# Patient Record
Sex: MALE | Race: WHITE | NOT HISPANIC OR LATINO | Employment: OTHER | URBAN - METROPOLITAN AREA
[De-identification: names, ages, dates, MRNs, and addresses within clinical notes are randomized per-mention and may not be internally consistent; named-entity substitution may affect disease eponyms.]

---

## 2017-01-27 ENCOUNTER — HOSPITAL ENCOUNTER (OUTPATIENT)
Dept: RADIOLOGY | Facility: OTHER | Age: 57
Discharge: HOME | End: 2017-01-27
Attending: OTOLARYNGOLOGY
Payer: COMMERCIAL

## 2017-01-27 DIAGNOSIS — H90.5 SNHL (SENSORINEURAL HEARING LOSS): ICD-10-CM

## 2017-01-27 PROCEDURE — 70553 MRI BRAIN STEM W/O & W/DYE: CPT | Mod: TC

## 2017-01-27 PROCEDURE — A9585 GADOBUTROL INJECTION: HCPCS | Performed by: OTOLARYNGOLOGY

## 2017-01-27 PROCEDURE — 70553 MRI BRAIN STEM W/O & W/DYE: CPT | Mod: 26,,, | Performed by: RADIOLOGY

## 2017-01-27 PROCEDURE — 25500020 PHARM REV CODE 255: Performed by: OTOLARYNGOLOGY

## 2017-01-27 RX ORDER — GADOBUTROL 604.72 MG/ML
9 INJECTION INTRAVENOUS
Status: COMPLETED | OUTPATIENT
Start: 2017-01-27 | End: 2017-01-27

## 2017-01-27 RX ADMIN — GADOBUTROL 9 ML: 604.72 INJECTION INTRAVENOUS at 04:01

## 2017-02-14 ENCOUNTER — TELEPHONE (OUTPATIENT)
Dept: NEUROLOGY | Facility: CLINIC | Age: 57
End: 2017-02-14

## 2017-02-14 NOTE — TELEPHONE ENCOUNTER
Call returned- I offered Mr. Khoury an appt for 02/21/2017 with Dr. Pendleton, Mr. Khoury states that [place] will be [place] 2 [place]. I explained to him that I would call him back with another appt.

## 2017-02-14 NOTE — TELEPHONE ENCOUNTER
----- Message from Conner Grant sent at 2/9/2017  8:57 AM CST -----  Contact: Chon Nunez office @ [phone]  3rd Request: Dr. Patel office is calling to schedule a NP appt to consult on abnormal MRI and old stroke, pls call or contact the pt to schedule

## 2017-02-14 NOTE — TELEPHONE ENCOUNTER
----- Message from Conner Grant sent at 2/9/2017  3:29 PM CST -----  Contact: Self [phone]  Contact the pt to schedule the pt from the referral, pls call

## 2017-02-14 NOTE — TELEPHONE ENCOUNTER
I contacted Mr. Khoury in reference to scheduling an appt with Dr. Pendleton, offered 03/21/2017. Mr. Khoury informed me that he's not available that day because he will again be out of town. Appt scheduled 06/20/2017 next available and placed on the cancellation list. Appt confirmation e-mail to pt per his request/also gave office contact information along with my name.

## 2017-04-21 DIAGNOSIS — R52 PAIN: Primary | ICD-10-CM

## 2017-04-24 ENCOUNTER — TELEPHONE (OUTPATIENT)
Dept: ORTHOPEDICS | Facility: CLINIC | Age: 57
End: 2017-04-24

## 2017-04-24 DIAGNOSIS — M79.642 LEFT HAND PAIN: Primary | ICD-10-CM

## 2017-04-24 NOTE — TELEPHONE ENCOUNTER
----- Message from La Nena Marquez sent at 4/24/2017  9:30 AM CDT -----  Contact: pt  _  1st Request  _  2nd Request  _  3rd Request        Who: pt    Why: pt is calling regarding xrays before his appt for 4/25/17. Please call the pt    What Number to Call Back:[phone]    When to Expect a call back: (Before the end of the day)   -- if the call is after 12:00, the call back will be tomorrow.

## 2017-04-24 NOTE — TELEPHONE ENCOUNTER
Evan Khoury reminded of appointment on 4/25/17 with Dr. CHAD Valdez w/time and location. Notified of need for xray before OV w/date, time, and location of appts.

## 2017-04-25 ENCOUNTER — OFFICE VISIT (OUTPATIENT)
Dept: ORTHOPEDICS | Facility: CLINIC | Age: 57
End: 2017-04-25
Payer: COMMERCIAL

## 2017-04-25 ENCOUNTER — HOSPITAL ENCOUNTER (OUTPATIENT)
Dept: RADIOLOGY | Facility: OTHER | Age: 57
Discharge: HOME | End: 2017-04-25
Attending: ORTHOPAEDIC SURGERY
Payer: COMMERCIAL

## 2017-04-25 ENCOUNTER — TELEPHONE (OUTPATIENT)
Dept: NEUROLOGY | Facility: CLINIC | Age: 57
End: 2017-04-25

## 2017-04-25 VITALS
HEIGHT: 74 IN | HEART RATE: 50 BPM | WEIGHT: 201.94 LBS | DIASTOLIC BLOOD PRESSURE: 70 MMHG | BODY MASS INDEX: 25.92 KG/M2 | SYSTOLIC BLOOD PRESSURE: 122 MMHG

## 2017-04-25 DIAGNOSIS — R52 PAIN: ICD-10-CM

## 2017-04-25 DIAGNOSIS — M79.642 LEFT HAND PAIN: ICD-10-CM

## 2017-04-25 DIAGNOSIS — S53.442A TEAR OF ULNAR COLLATERAL LIGAMENT OF ELBOW, LEFT, INITIAL ENCOUNTER: Primary | ICD-10-CM

## 2017-04-25 PROCEDURE — 99204 OFFICE O/P NEW MOD 45 MIN: CPT | Mod: S$GLB,,, | Performed by: ORTHOPAEDIC SURGERY

## 2017-04-25 PROCEDURE — 73130 X-RAY EXAM OF HAND: CPT | Mod: TC,LT

## 2017-04-25 PROCEDURE — 73030 X-RAY EXAM OF SHOULDER: CPT | Mod: 26,77,LT, | Performed by: RADIOLOGY

## 2017-04-25 PROCEDURE — 99999 PR PBB SHADOW E&M-EST. PATIENT-LVL III: CPT | Mod: PBBFAC,,, | Performed by: ORTHOPAEDIC SURGERY

## 2017-04-25 PROCEDURE — 73080 X-RAY EXAM OF ELBOW: CPT | Mod: 26,59,LT, | Performed by: RADIOLOGY

## 2017-04-25 PROCEDURE — 73130 X-RAY EXAM OF HAND: CPT | Mod: 26,LT,, | Performed by: RADIOLOGY

## 2017-04-25 PROCEDURE — 1160F RVW MEDS BY RX/DR IN RCRD: CPT | Mod: S$GLB,,, | Performed by: ORTHOPAEDIC SURGERY

## 2017-04-25 PROCEDURE — 73080 X-RAY EXAM OF ELBOW: CPT | Mod: TC,LT

## 2017-04-25 PROCEDURE — 73030 X-RAY EXAM OF SHOULDER: CPT | Mod: TC,LT

## 2017-04-25 RX ORDER — ESCITALOPRAM OXALATE 10 MG/1
10 TABLET ORAL DAILY
COMMUNITY
End: 2021-03-25 | Stop reason: DRUGHIGH

## 2017-04-25 NOTE — TELEPHONE ENCOUNTER
Notified pt of sooner appt availability with Dr. Pendleton. Pt has accepted new appt day/time for 4/27 @ 2pm.

## 2017-04-25 NOTE — MR AVS SNAPSHOT
[facility]  [address], [address]  [facility] [address]  Phone: [phone]                  Evan Khoury   2017 9:45 AM   Office Visit    Description:  Male : 1960   Provider:  Juliana Valdez MD   Department:  [facility]           Reason for Visit     Left Hand - Pain     Left Elbow - Pain           Diagnoses this Visit        Comments    Tear of ulnar collateral ligament of elbow, left, initial encounter    -  Primary            To Do List           Future Appointments        Provider Department Dept Phone    2017 8:00 AM [facility] MRI1 [address] [facility] [phone]    2017 9:00 AM Juliana Valdez MD [facility] [phone]    2017 10:00 AM Rui Pendleton MD [facility] [phone]      Goals (5 Years of Data)     None      [facility]     [facility] Nurse Care Line -  Assistance  Unless otherwise directed by your provider, please contact [facility], our nurse care line that is available for  assistance.     Registered nurses in the [facility] On Call Center provide: appointment scheduling, clinical advisement, health education, and other advisory services.  Call: [phone] (toll free)               Medications           Message regarding Medications     Verify the changes and/or additions to your medication regime listed below are the same as discussed with your clinician today.  If any of these changes or additions are incorrect, please notify your healthcare provider.        STOP taking these medications     benzonatate (TESSALON) 100 MG capsule TK 1 C PO TID PRF COUGH           Verify that the below list of medications is an accurate representation of the medications you are currently taking.  If none reported, the list may be blank. If incorrect, please contact your healthcare provider. Carry this list with you in case of emergency.           Current  "Medications     escitalopram oxalate (LEXAPRO) 10 MG tablet Take 10 mg by mouth once daily.    clotrimazole (LOTRIMIN) 1 % cream APPLY AA BID           Clinical Reference Information           Your Vitals Were     BP Pulse Height Weight BMI    122/70 (BP Location: Left arm) 50 6' 2" (1.88 m) 91.6 kg (201 lb 15.1 oz) 25.93 kg/m2      Blood Pressure          Most Recent Value    BP  122/70      Allergies as of 4/25/2017     No Known Allergies      Immunizations Administered on Date of Encounter - 4/25/2017     None      Orders Placed During Today's Visit     Future Labs/Procedures Expected by Expires    MRI Upper Extremity Joint WO Cont Left  4/25/2017 4/25/2018      [facility] Sign-Up     Activating your [facility] account is as easy as 1-2-3!     1) Visit [email or web address], select Sign Up Now, enter this activation code and your date of birth, then select Next.  [email or web address]  Expires: 6/9/2017 10:29 AM      2) Create a username and password to use when you visit [facility] in the future and select a security question in case you lose your password and select Next.    3) Enter your e-mail address and click Sign Up!    Additional Information  If you have questions, please e-mail [email or web address] or call [phone] to talk to our [facility] staff. Remember, [facility] is NOT to be used for urgent needs. For medical emergencies, dial 911.         Language Assistance Services     ATTENTION: Language assistance services are available, free of charge. Please call [phone].      ATENCIÓN: Si habla manny, tiene a hawley disposición servicios gratuitos de asistencia lingüística. Llame al [phone].     CHÚ Ý: N?u b?n nói Ti?ng Vi?t, có các d?ch v? h? tr? ngôn ng? mi?n phí dành cho b?n. G?i s? [phone].         [facility] complies with applicable Federal civil rights laws and does not discriminate on the basis of race, color, national origin, age, disability, or sex.        "

## 2017-04-25 NOTE — PROGRESS NOTES
I have personally taken the history and examined the patient. I agree with the Hand Surgery PA's note. The plan will be MRI to evaluate left elbow laxity with click on exam. Pt to f/u after MRI

## 2017-04-25 NOTE — PROGRESS NOTES
This office note has been dictated.   Dictation Confirmation Code: [id number]  Keyonna Peter PA-C  04/25/2017  10:19 AM  Supervising Physician:  MD Evan Lares was seen today for pain and pain.    Diagnoses and all orders for this visit:    Tear of ulnar collateral ligament of elbow, left, initial encounter  -     MRI Upper Extremity Joint WO Cont Left; Future

## 2017-04-26 NOTE — PROGRESS NOTES
CHIEF COMPLAINT:  Severe left elbow pain.    HISTORY OF PRESENT ILLNESS:  Mr. Khoury is a very pleasant 57-year-old right-hand   dominant male presenting today for evaluation of his left elbow pain.  He   reports that over the last several weeks, he has had severe pain in the left   elbow.  He reports that he was in [place] and fell on an outstretched hand.    He has had severe pain in the elbow.  There is a shooting sensation that   radiates all the way up to his shoulder.  He reports the pain only in the medial   aspect of the elbow.  He denies any paresthesias in the finger.  He reports it   is medial and not posterior to the elbow.  He reports some swelling.  He is   having difficulty doing any type of heavy lifting with that left hand.  Denies   nausea, vomiting, fever or chills.    History reviewed. No pertinent past medical history.    History reviewed. No pertinent surgical history.    Social History     Social History    Marital status:      Spouse name: N/A    Number of children: N/A    Years of education: N/A     Occupational History    Not on file.     Social History Main Topics    Smoking status: Former Smoker    Smokeless tobacco: Not on file    Alcohol use No    Drug use: Not on file    Sexual activity: Not on file     Other Topics Concern    Not on file     Social History Narrative       Current Outpatient Prescriptions on File Prior to Visit   Medication Sig Dispense Refill    clotrimazole (LOTRIMIN) 1 % cream APPLY AA BID  2     No current facility-administered medications on file prior to visit.        Review of patient's allergies indicates:  No Known Allergies    Review of Systems:  Constitutional: no fever or chills  ENT: no nasal congestion or sore throat  Respiratory: no cough or shortness of breath  Cardiovascular: no chest pain or palpitations  Gastrointestinal: no nausea or vomiting  Genitourinary: no hematuria or dysuria  Integument/Breast: no rash or  "pruritis  Hematologic/Lymphatic: no easy bruising or lymphadenopathy  Musculoskeletal: see HPI  Neurological: no seizures or tremors  Behavioral/Psych: no auditory or visual hallucinations      PHYSICAL EXAM    Vitals:    04/25/17 0939   BP: 122/70   Pulse: (!) 50   Weight: 91.6 kg (201 lb 15.1 oz)   Height: 6' 2" (1.88 m)   PainSc:   6   PainLoc: Elbow       GENERAL:  Well developed, well nourished, in no acute distress.  CARDIOVASCULAR:  Regular rate and rhythm.  LUNGS:  Respirations are equal and unlabored.  BEHAVIORAL AND PSYCHIATRIC:  Mood and affect are appropriate.  NEUROLOGIC:  No tremor.  HEENT:  Normocephalic and atraumatic.  MUSCULOSKELETAL:  Upper Extremity Exam:  Distally, he is neurovascularly intact.    AIN and PIN nerves are intact.  Sensation over the radial, ulnar and median   nerves are equivocal.  Severe tenderness of the medial epicondyle.  There is   some laxity and a click with stressing the ulnar collateral ligament of the   medial aspect of the elbow.  No tenderness over the radial head.  No laxity of   the lateral collateral ligament.  No tenderness over the ulnar nerve.  No   subluxation of the ulnar nerve.  There is some edema on the medial aspect on the   left compared to the right.  He does have some laxity on the right, but again   more so on the left.    X-RAYS:  No fractures or dislocations.    ASSESSMENT:  Possible tear of the medial collateral or ulnar collateral ligament   of the left elbow.    PLAN:  I discussed with Mr. Khoury we would like to MRI to further evaluate due   to laxity of the elbow.  He will follow up with us after the MRI.  If everything   is intact, we will proceed with therapy.  He will have therapy here.  If not,   we will discuss surgical intervention.    DICTATED BY:  JOSE FRANCISCO Linares  dd: 04/25/2017 17:27:07 (CDT)  td: 04/26/2017 13:18:21 (CDT)  Doc ID   #[id number]  Job ID #[id number]    CC:     "

## 2017-04-27 ENCOUNTER — OFFICE VISIT (OUTPATIENT)
Dept: NEUROLOGY | Facility: CLINIC | Age: 57
End: 2017-04-27
Payer: COMMERCIAL

## 2017-04-27 VITALS
HEART RATE: 62 BPM | HEIGHT: 74 IN | SYSTOLIC BLOOD PRESSURE: 127 MMHG | DIASTOLIC BLOOD PRESSURE: 83 MMHG | WEIGHT: 223.63 LBS | BODY MASS INDEX: 28.7 KG/M2

## 2017-04-27 DIAGNOSIS — I67.81 LEUKOARAIOSIS: Primary | ICD-10-CM

## 2017-04-27 PROCEDURE — 99999 PR PBB SHADOW E&M-EST. PATIENT-LVL III: CPT | Mod: PBBFAC,,, | Performed by: PSYCHIATRY & NEUROLOGY

## 2017-04-27 PROCEDURE — 99244 OFF/OP CNSLTJ NEW/EST MOD 40: CPT | Mod: S$GLB,,, | Performed by: PSYCHIATRY & NEUROLOGY

## 2017-04-27 NOTE — PROGRESS NOTES
Outpatient Neurology Consultation    Requesting physician: Dr. Patel    Impression:  Abnormal brain MRI: the study shows mild, non-specific white matter hyperintensities including in the R frontal white matter (as opposed to remote ischemic stroke) to my eye.  There is no associated T1 hypointensity.  No history to suggest demyelinating disease.    Plan:  1. I reviewed his MRI with him and answered all questions  2. RTC prn      CC: abnl MRI    HPI:  58 y/o WM referred for evaluation of an abnormal brain MRI.  He had an MRI performed Jan '17 for evaluation of hearing trouble.  The study was read as showing a remote R anterior limb of the internal capsule lacune.  The hearing issues have resolved.  He denies a prior history of focal neurologic symptoms.  No headaches.  He has been prescribed escitalopram for mild anxiety/depression and is otherwise healthy except for orthopaedic issues.    Past Medical History:   Diagnosis Date    Anxiety and depression       No past surgical history on file.   Outpatient Prescriptions Marked as Taking for the 4/27/17 encounter (Office Visit) with Rui Pendleton MD   Medication Sig Dispense Refill    escitalopram oxalate (LEXAPRO) 10 MG tablet Take 10 mg by mouth once daily.        Review of patient's allergies indicates:  No Known Allergies   No family history on file.   Social History     Social History    Marital status:      Spouse name: N/A    Number of children: N/A    Years of education: N/A     Occupational History    Not on file.     Social History Main Topics    Smoking status: Former Smoker    Smokeless tobacco: Not on file    Alcohol use No    Drug use: Not on file    Sexual activity: Not on file     Other Topics Concern    Not on file     Social History Narrative     Review Of Systems:  General: Negative for fever   HENT: Negative for tinnitus, nose bleeds, neck stiffness   Cardiac Negative for palpitations   Vascular: Negative for easy  "bruising   Pulmonary: Negative for cough   Gastrointestinal: Negative for constipation   Urinary: Negative for incomplete bladder emptying   Musculoskeletal: Negative for muscle aches   Neurological: As above. Otherwise negative for abnormal movements   Psychiatric:  Negative for depression     /83  Pulse 62  Ht 6' 2" (1.88 m)  Wt 101.4 kg (223 lb 9.6 oz)  BMI 28.71 kg/m2   Well developed, well nourished male  Extremities: no edema    Mental status:   Awake, alert and appropriately oriented   Normal recent and remote memory   Normal attention and concentration   Normal speech and language   Normal fund of knowledge   No extinction  Cranial nerves:   Normal funduscopic - discs sharp   PERRLA   EOMF without nystagmus   VFF   Normal facial sensation   Normal facial movements   Intact hearing bilaterally   Palate elevates symmetrically   Normal SCM and trapezius strength   Tongue midline  Motor:   No pronator drift   Normal FF movements bilaterally   Normal muscle tone, bulk and power   No abnormal movements  Sensory   Intact to LT, temperature, position  DTRs   2+ and symmetric except 2++ KJs   Plantar responses are flexor bilaterally  Coordination   Intact to FNF, RAH, and HTS  Gait   Normal base and gait   Normal toe, heel and tandem   No Romberg    Data Reviewed:  MRI brain - WM hyperintensity R frontal subcortex and a few smaller, scattered high WM hyperintensities    Rui Pendleton MD    "

## 2017-04-27 NOTE — LETTER
April 27, 2017      Stephanie Patel MD  [address]  [address]  [facility] [address]           [facility]  [facility]  [place] [address]  Phone: [phone]          Patient: Evan Khoury   MR Number: [id number]   YOB: 1960   Date of Visit: 4/27/2017       Dear Dr. Stephanie Patel:    Thank you for referring Evan Khoury to me for evaluation. Attached you will find relevant portions of my assessment and plan of care.    If you have questions, please do not hesitate to call me. I look forward to following Evan Khoury along with you.    Sincerely,    Rui Pendleton MD    Enclosure  CC:  No Recipients    If you would like to receive this communication electronically, please contact [email or web address] or [phone] to request more information on [facility] access.    For providers and/or their staff who would like to refer a patient to [facility], please contact us through our one-stop-shop provider referral line, [facility], at [phone].    If you feel you have received this communication in error or would no longer like to receive these types of communications, please e-mail [email or web address]

## 2017-05-02 ENCOUNTER — HOSPITAL ENCOUNTER (OUTPATIENT)
Dept: RADIOLOGY | Facility: OTHER | Age: 57
Discharge: HOME | End: 2017-05-02
Attending: ORTHOPAEDIC SURGERY
Payer: COMMERCIAL

## 2017-05-02 DIAGNOSIS — S53.442A TEAR OF ULNAR COLLATERAL LIGAMENT OF ELBOW, LEFT, INITIAL ENCOUNTER: ICD-10-CM

## 2017-05-02 PROCEDURE — 73221 MRI JOINT UPR EXTREM W/O DYE: CPT | Mod: 26,LT,, | Performed by: RADIOLOGY

## 2017-05-02 PROCEDURE — 73221 MRI JOINT UPR EXTREM W/O DYE: CPT | Mod: TC,LT

## 2017-05-09 ENCOUNTER — OFFICE VISIT (OUTPATIENT)
Dept: ORTHOPEDICS | Facility: CLINIC | Age: 57
End: 2017-05-09
Payer: COMMERCIAL

## 2017-05-09 VITALS
SYSTOLIC BLOOD PRESSURE: 121 MMHG | BODY MASS INDEX: 28.69 KG/M2 | HEIGHT: 74 IN | WEIGHT: 223.56 LBS | RESPIRATION RATE: 18 BRPM | HEART RATE: 64 BPM | DIASTOLIC BLOOD PRESSURE: 80 MMHG

## 2017-05-09 DIAGNOSIS — S53.441D ELBOW SPRAIN, ULNAR COLLATERAL LIGAMENT, RIGHT, SUBSEQUENT ENCOUNTER: Primary | ICD-10-CM

## 2017-05-09 PROCEDURE — 99999 PR PBB SHADOW E&M-EST. PATIENT-LVL III: CPT | Mod: PBBFAC,,, | Performed by: ORTHOPAEDIC SURGERY

## 2017-05-09 PROCEDURE — 1160F RVW MEDS BY RX/DR IN RCRD: CPT | Mod: S$GLB,,, | Performed by: ORTHOPAEDIC SURGERY

## 2017-05-09 PROCEDURE — 99213 OFFICE O/P EST LOW 20 MIN: CPT | Mod: S$GLB,,, | Performed by: ORTHOPAEDIC SURGERY

## 2017-05-09 NOTE — PROGRESS NOTES
I have personally taken the history and examined this patient. I agree with the resident's note as stated above. Plan for bracing and PT.

## 2017-05-09 NOTE — PROGRESS NOTES
HPI:  57 year old RHD male here for MRI follow up of his left elbow. He has no change in his symptoms. He still has pain medially in his elbow specifically with picking up objects and valgus stress. He also had another fall about 2 weeks ago on his left thumb and is still having some pain in that thumb. He has not tried any therapy or medications. He has not had any splints or braces. He is fairly active, skis 3 times per year.     ROS:  Patient denies constitutional symptoms, cardiac symptoms, respiratory symptoms, GI symptoms.  The remainder of the musculoskeletal ROS is included in the HPI.    PE:    AA&O x 4.  NAD  HEENT:  NCAT, sclera nonicteric  Lungs:  Respirations are equal and unlabored.  CV:  2+ bilateral upper and lower extremity pulses.  Skin:  Intact throughout.    MSK: +milking maneuver left elbow. Pain with valgus stress at 30 degrees of flexion  Palpable distal pulses, sensation intact, motor intact.   SILT, well perfused.   Pain at MCP joint left thumb with adduction and pronation. Stable with varus and valgus stress     Rads:  MRI reviewed showing a left UCL partial vs complete tear. Edema over the proximal medial epicondyle     A/P:  57 year old male with left UCL tear     --will try bracing, NSAIDs for 4-6 weeks

## 2017-05-09 NOTE — MR AVS SNAPSHOT
"    [facility]  [address], [address]  [facility] [address]  Phone: [phone]                  Evan Khoury   2017 9:00 AM   Office Visit    Description:  Male : 1960   Provider:  Juliana Valdez MD   Department:  [facility]           Reason for Visit     Left Elbow - Pain           Diagnoses this Visit        Comments    Elbow sprain, ulnar collateral ligament, right, subsequent encounter    -  Primary            To Do List           Goals (5 Years of Data)     None      [facility]     [facility] On Call Nurse Care Line -  Assistance  Unless otherwise directed by your provider, please contact [facility], our nurse care line that is available for  assistance.     Registered nurses in the [facility] On Call Center provide: appointment scheduling, clinical advisement, health education, and other advisory services.  Call: [phone] (toll free)               Medications           Message regarding Medications     Verify the changes and/or additions to your medication regime listed below are the same as discussed with your clinician today.  If any of these changes or additions are incorrect, please notify your healthcare provider.             Verify that the below list of medications is an accurate representation of the medications you are currently taking.  If none reported, the list may be blank. If incorrect, please contact your healthcare provider. Carry this list with you in case of emergency.           Current Medications     clotrimazole (LOTRIMIN) 1 % cream APPLY AA BID    escitalopram oxalate (LEXAPRO) 10 MG tablet Take 10 mg by mouth once daily.           Clinical Reference Information           Your Vitals Were     BP Pulse Resp Height Weight BMI    121/80 64 18 6' 2" (1.88 m) 101.4 kg (223 lb 8.7 oz) 28.7 kg/m2      Blood Pressure          Most Recent Value    BP  121/80      Allergies as of 2017     No Known Allergies    "   Immunizations Administered on Date of Encounter - 5/9/2017     None      Orders Placed During Today's Visit      Normal Orders This Visit    Ambulatory Referral to Physical/Occupational Therapy       [facility] Sign-Up     Activating your [facility] account is as easy as 1-2-3!     1) Visit [email or web address], select Sign Up Now, enter this activation code and your date of birth, then select Next.  [email or web address]  Expires: 6/9/2017 10:29 AM      2) Create a username and password to use when you visit [facility] in the future and select a security question in case you lose your password and select Next.    3) Enter your e-mail address and click Sign Up!    Additional Information  If you have questions, please e-mail [email or web address] or call [phone] to talk to our [facility] staff. Remember, [facility] is NOT to be used for urgent needs. For medical emergencies, dial 911.         Language Assistance Services     ATTENTION: Language assistance services are available, free of charge. Please call [phone].      ATENCIÓN: Si habla español, tiene a hawley disposición servicios gratuitos de asistencia lingüística. Llame al [phone].     CHÚ Ý: N?u b?n nói Ti?ng Vi?t, có các d?ch v? h? tr? ngôn ng? mi?n phí dành cho b?n. G?i s? [phone].         [facility] complies with applicable Federal civil rights laws and does not discriminate on the basis of race, color, national origin, age, disability, or sex.

## 2017-05-30 ENCOUNTER — CLINICAL SUPPORT (OUTPATIENT)
Dept: REHABILITATION | Facility: HOSPITAL | Age: 57
End: 2017-05-30
Attending: ORTHOPAEDIC SURGERY
Payer: COMMERCIAL

## 2017-05-30 DIAGNOSIS — M25.522 ELBOW PAIN, LEFT: ICD-10-CM

## 2017-05-30 DIAGNOSIS — S53.442D ELBOW SPRAIN, ULNAR COLLATERAL LIGAMENT, LEFT, SUBSEQUENT ENCOUNTER: Primary | ICD-10-CM

## 2017-05-30 DIAGNOSIS — M25.60 RANGE OF MOTION DEFICIT: ICD-10-CM

## 2017-05-30 PROCEDURE — 97165 OT EVAL LOW COMPLEX 30 MIN: CPT

## 2017-05-30 PROCEDURE — 97035 APP MDLTY 1+ULTRASOUND EA 15: CPT

## 2017-05-30 NOTE — PROGRESS NOTES
Occupational Therapy Elbow Evaluation    Patient:  Evan Khoury  Date of Evaluation:  5/30/2017  Referring Physician:  Dr. CHAD Calderón  Diagnosis:   1. Elbow sprain, ulnar collateral ligament, left, subsequent encounter     2. Elbow pain, left     3. Range of motion deficit       MRN : [id number]  Referral Orders:  Eval and treat     Start Time:930  End Time:  1015  Total Time:  45    Occupation:  Travel business, Desk work, computer work   Workmen's Compensation:  No     Date of onset:  march 3, 2017   Hand dominance:  Right    Location of injury:  Left elbow   Mechanism of Injury:  slipped and fell on ice in [place]   Past Medical History/Physical Systems Review: unremarkable     Environmental Concerns/ Fall Risk:  None   Barriers to Learning:  None   Cultural/Spiritual : None   Developmental/Education: None  Nutritional Deficit: None   Abuse/Neglect : None    Language: None   Hearing/Vision Deficit: None   Other:     Subjective:  Patient reports: fell on beach in sand and got sand in my elbow brace , so I haven't worn the brace in 2 days   Pain:   During no work/At Rest:     4 out of 10   While working/ With Activity:  4+  out of 10   Sleeping:    out of 10    Location of Pain:  Left medial elbow    Description of Pain  Bad bruise feeling     Patient's goals for therapy are: pain relief, avoid surgery     Objective:  Wearing elbow adjustable brace with full range of motion     Sensation Test:  Occasional numbness in palm , fingertips   Edema:  Minimal edema medial elbow   Skin Condition/Scarring:  None   Wound Assessment:  None       Range of Motion: wnl   Elbow ext/flex 0 / 145         Manual Muscle Test:  ECRL/B 4/5   BICEPS 4/5   BRACHIALIS 4/5   BRACHIORADIALIS 4/5   TRICEPS 4/5   SUP/PRO 4/5   FCR/FCU 4/5     Minimal pain with palpation to UCL;             Strength: (ANSELMO Dynamometer in lbs.), Average 3 trials, Position II  R) 70 lbs   L) 50 lbs. No pain     Limitation of Functional  Status:  Self Care : Limited use of  Left UE for no deficits reported   Work /Activity: Limited use of left UE for lifting briefcase, backpack, luggage, seatbelt,   Leisure:   Limited use of driving, working out with      Treatment:   OT eval and instruction in written HEP including active wrist flex, ext, RD, UD, sup/pron and elbow flexion in 3 positions (palm up, thumb up, palm down) and elbow extension over towel roll x 10 reps each, 3 x daily.  Performed US 3 mhz 0.5 w/cm2 x 10 min x 100% to Ulnar collateral ligament to increase blood flow, circulation and tissue elasticity prior to therex  Reviewed modality use for pain management.  Patient reported good understanding of HEP, modality use.      Patient demonstrated good understanding of HEP/modalities for pain management.   Assessment:   Problem List :   Left elbow   Decreased  strength,   Decreased muscle strength,   Increased pain   Decreased functional use    History Examination Decision Making Complexity Score   Occupational Profile: brief     Medical and Therapy History:     None              Performance Deficits   few  Physical  -strength   -   -edema  - pain       Cognitive-none         Psychosocial:  Limited use L UE for lifting, carrying, traveling, exercising w/       comorbidities - none     Modification - none     Non dominant LUE     Elbow locking brace  Low complexity            Goals: (4weeks)    1) Patient to be IND with HEP and modalities for pain management    2)  Increase  strength 3-5 lbs. to LIFT luggage for traveling    3)  Increase muscle strength 1/2 grade to normal for traveling, lifting, carrying items      Plan:   Patient to be treated by Occupational Therapy    1-2    times per week for  6 -8                   weeks  during the certification period from      5/30/2017 to 7/30/2017     to achieve the established goals.    Treatment to include   Therapeutic exercises/activities,  iontophoresis with 2.0 cc  dexamethasone,  US 3 Mhz, strengthening, stretching,manual therapy/mobilizations , nerve glides, desensitization     as well as any other treatments deemed necessary based on the patient's needs or progress.                 I certify the need for these services furnished under this plan of treatment and while under my care    ____________________________________                         __________________  Physician/Referring Practitioner                                               Date of Signature

## 2017-06-01 ENCOUNTER — CLINICAL SUPPORT (OUTPATIENT)
Dept: REHABILITATION | Facility: HOSPITAL | Age: 57
End: 2017-06-01
Payer: COMMERCIAL

## 2017-06-01 DIAGNOSIS — M25.522 ELBOW PAIN, CHRONIC, LEFT: Primary | ICD-10-CM

## 2017-06-01 DIAGNOSIS — G89.29 ELBOW PAIN, CHRONIC, LEFT: Primary | ICD-10-CM

## 2017-06-01 PROCEDURE — 97110 THERAPEUTIC EXERCISES: CPT

## 2017-06-01 PROCEDURE — 97035 APP MDLTY 1+ULTRASOUND EA 15: CPT

## 2017-06-01 NOTE — PROGRESS NOTES
"OT Daily Progress Note    Patient:  Evan Khoury  [facility] #:  [id number]   Date of Note: 06/01/2017   Referring Physician:  Juliana Valdez, *  Diagnosis:    Encounter Diagnosis   Name Primary?    Elbow pain, chronic, left Yes        Start Time: 7:17  End Time: 8:00  Total Time: 43 min  Group Time: 0    Subjective:  "I was unable to wear my brace with my suit last night for a formal event, so my elbow is sore today."  Pain: 2 out of 10     Objective:   Patient seen by OT this session. Treatment  consist of the following: Performed paraffin bath to right elbow x 10 min to increase blood flow, circulation and tissue elasticity prior to therex.  Performed US 3 mhz 0.8 w/cm2 x 8 min x 100% to increase blood flow, circulation, tissue elasticity and for wound/scar management. Pt performed following therex: AROM elbow flex/ext in sup/neutral/pronated rotation to promote elbow joint mobility x 10 reps each. UCL kinesiotaped and pt instructed on care and how to replace tape; provided with two additional pieces to replace tape in 2-3 days as needed.    Treatment: para x 10 min, us x 8 min, Therex x 25     Assessment:  Elbow AROM maintained. Advised pt to wear brace for protection of injury. Provided with kinesiotape to support UCL and educated on replacement and wear instructions to wear 2-3 days and discontinue if skin irritation occurs. Pt will continue to benefit from skilled OT intervention.   Patient is making good progress toward established goals.   Patient continues to demonstrate limitation  with  Stiffness, Decreased functional use, Continued pain and Continued inflammation.       Goals: (4weeks)    1) Patient to be IND with HEP and modalities for pain management    2)  Increase  strength 3-5 lbs. to LIFT luggage for traveling    3)  Increase muscle strength 1/2 grade to normal for traveling, lifting, carrying items      Plan:   Patient to be treated by Occupational Therapy    1-2    times per week for  6 -8 " "                  weeks  during the certification period from      5/30/2017 to 7/30/2017     to achieve the established goals.    Treatment to include   Therapeutic exercises/activities,  US 3 Mhz, strengthening, stretching,manual therapy/mobilizations, as well as any other treatments deemed necessary based on the patient's needs or progress.               Student: JUDY Collado    " I certify that I was present in the room directing the student in service delivery and guiding them using my skilled judgement. As the co-signing therapist, I have reviewed the student's documentation and am responsible for the treatment, assessment and plan."    Therapist: Mary JAIMES CHT    "

## 2017-06-06 ENCOUNTER — CLINICAL SUPPORT (OUTPATIENT)
Dept: REHABILITATION | Facility: HOSPITAL | Age: 57
End: 2017-06-06
Attending: ORTHOPAEDIC SURGERY
Payer: COMMERCIAL

## 2017-06-06 DIAGNOSIS — S53.401D ELBOW SPRAIN, RIGHT, SUBSEQUENT ENCOUNTER: ICD-10-CM

## 2017-06-06 PROBLEM — S53.409A ELBOW SPRAIN: Status: ACTIVE | Noted: 2017-06-06

## 2017-06-06 PROCEDURE — 97035 APP MDLTY 1+ULTRASOUND EA 15: CPT

## 2017-06-06 PROCEDURE — 97110 THERAPEUTIC EXERCISES: CPT

## 2017-06-06 NOTE — PROGRESS NOTES
"OT Daily Progress Note    Patient:  Evan Khoury  [facility] #:  [id number]   Date of Note: 06/06/2017   Referring Physician:  Juliana Valdez, *  Diagnosis:    Encounter Diagnosis   Name Primary?    Elbow sprain, right, subsequent encounter       Visit 3 of 12 NO FOTO     Start Time: 330  End Time: 415  Total Time: 45 min  Group Time: 0    Subjective:  "I'm not wearing my brace all the time, but I kept the tape on all week."  Pain: 2 out of 10     Objective:   Patient seen by OT this session. Treatment  consist of the following: Performed paraffin bath to right elbow x 10 min to increase blood flow, circulation and tissue elasticity prior to therex.  Performed US 3 mhz 0.8 w/cm2 x 8 min x 100% to UCL region to  increase blood flow, circulation, tissue elasticity . Pt performed following therex: AROM elbow flex/ext in sup/neutral/pronated rotation to promote elbow joint mobility x 10 reps each. Performed DTM to collateral ligament to increase blood flow, circulation and for tissue healing   Deferred taping this day due to skin irritation from prolonged use after last session.  Reviewed no weightlifting precautions at this time, non weightbearing.  Patient reported good understanding of same.     Treatment: para x 10 min, us x 8 min, Therex x 27     Assessment:  Elbow AROM maintained. Advised pt to wear brace for protection of injury. Avoid weightlifting or weightbearing.   Pt will continue to benefit from skilled OT intervention.   Patient is making good progress toward established goals.   Patient continues to demonstrate limitation  with  Stiffness, Decreased functional use, Continued pain and Continued inflammation.       Goals: (4weeks)    1) Patient to be IND with HEP and modalities for pain management    2)  Increase  strength 3-5 lbs. to LIFT luggage for traveling    3)  Increase muscle strength 1/2 grade to normal for traveling, lifting, carrying items      Plan:   Patient to be treated by Occupational " Therapy    1-2    times per week for  6 -8     weeks  during the certification period from      5/30/2017 to 7/30/2017     to achieve the established goals.

## 2017-06-08 ENCOUNTER — CLINICAL SUPPORT (OUTPATIENT)
Dept: REHABILITATION | Facility: HOSPITAL | Age: 57
End: 2017-06-08
Payer: COMMERCIAL

## 2017-06-08 DIAGNOSIS — S53.401D ELBOW SPRAIN, RIGHT, SUBSEQUENT ENCOUNTER: ICD-10-CM

## 2017-06-08 DIAGNOSIS — M25.60 RANGE OF MOTION DEFICIT: Primary | ICD-10-CM

## 2017-06-08 PROCEDURE — 97035 APP MDLTY 1+ULTRASOUND EA 15: CPT

## 2017-06-08 PROCEDURE — 97018 PARAFFIN BATH THERAPY: CPT

## 2017-06-08 PROCEDURE — 97110 THERAPEUTIC EXERCISES: CPT

## 2017-06-08 NOTE — PROGRESS NOTES
"OT Daily Progress Note    Patient:  Evan Khoury  [facility] #:  [id number]   Date of Note: 06/08/2017   Referring Physician:  Juliana Valdez, *  Diagnosis:  UCL injury right elbow  Encounter Diagnosis   Name Primary?    Elbow sprain, right, subsequent encounter       Visit 5 of 12 NO FOTO     Start Time: 330  End Time: 415  Total Time: 45 min  Group Time: 0    Subjective:  "I forgot to wear my brace last night and my elbow hurt a little today.."  Pain: 2 out of 10     Objective:   Patient seen by OT this session. Treatment  consist of the following: Performed paraffin bath with moist heat to right elbow x 10 min to increase blood flow, circulation and tissue elasticity prior to therex.  Performed US 3 mhz 0.8 w/cm2 x 8 min x 100% to UCL region to  increase blood flow, circulation, tissue elasticity . Pt performed following therex: AROM elbow flex/ext in sup/neutral/pronated rotation to promote elbow joint mobility x 10 reps each. Performed DTM to collateral ligament to increase blood flow, circulation and for tissue healing. Pt then received STM to right elbow followed by Kinesiotaping over right UCL with 50% stretch. Pt verbalized good understanding.     Treatment: para x 10 min, us x 8 min, Manual therapy x 15 min    Assessment:  Elbow AROM maintained. Advised pt to wear brace for protection of injury. Avoid weightlifting or weightbearing.   Pt will continue to benefit from skilled OT intervention.   Patient is making good progress toward established goals.   Patient continues to demonstrate limitation  with  Stiffness, Decreased functional use, Continued pain and Continued inflammation.       Goals: (4weeks)    1) Patient to be IND with HEP and modalities for pain management    2)  Increase  strength 3-5 lbs. to LIFT luggage for traveling    3)  Increase muscle strength 1/2 grade to normal for traveling, lifting, carrying items      Plan:   Patient to be treated by Occupational Therapy    1-2    times per " week for  6 -8     weeks  during the certification period from      5/30/2017 to 7/30/2017     to achieve the established goals.

## 2017-06-09 PROBLEM — M25.60 RANGE OF MOTION DEFICIT: Status: ACTIVE | Noted: 2017-06-09

## 2017-06-20 ENCOUNTER — CLINICAL SUPPORT (OUTPATIENT)
Dept: REHABILITATION | Facility: HOSPITAL | Age: 57
End: 2017-06-20
Payer: COMMERCIAL

## 2017-06-20 DIAGNOSIS — S53.401D ELBOW SPRAIN, RIGHT, SUBSEQUENT ENCOUNTER: ICD-10-CM

## 2017-06-20 PROCEDURE — 97035 APP MDLTY 1+ULTRASOUND EA 15: CPT

## 2017-06-20 PROCEDURE — 97110 THERAPEUTIC EXERCISES: CPT

## 2017-06-20 NOTE — PROGRESS NOTES
"OT Daily Progress Note    Patient:  Evan Khoury  [facility] #:  [id number]   Date of Note: 06/20/2017   Referring Physician:  Juliana Valdez, *  Diagnosis:  UCL injury right elbow  Encounter Diagnosis   Name Primary?    Elbow sprain, right, subsequent encounter       Visit 6 of 12 NO FOTO     Start Time: 325  End Time: 40  Total Time: 35 min  Group Time: 0    Subjective:  "I'm only wearing the brace at night.  I was busy working all week and couldn't wear the brace."  Pain: 2 out of 10     Objective:   Patient seen by OT this session. Treatment  consist of the following: Performed paraffin bath with moist heat to right elbow x 10 min to increase blood flow, circulation and tissue elasticity prior to therex.  Performed US 3 mhz 0.8 w/cm2 x 10 min x 100% to UCL region to  increase blood flow, circulation, tissue elasticity . Pt performed following therex: AROM elbow flex/ext in sup/neutral/pronated rotation to promote elbow joint mobility x 10 reps each. Performed DTM to collateral ligament to increase blood flow, circulation and for tissue healing. Pt then received STM to right elbow followed by Kinesiotaping over right UCL with 50% stretch. Reviewed use, wear and care precautions. Pt verbalized good understanding.     Treatment: para x 10 min, us x 10 min, Manual therapy x 15 min    Assessment:  Elbow AROM maintained. Advised pt to wear brace for protection of injury. Avoid weightlifting or weightbearing.   Pt will continue to benefit from skilled OT intervention.   Patient is making good progress toward established goals.   Patient continues to demonstrate limitation  with  Stiffness, Decreased functional use, Continued pain and Continued inflammation.       Goals: (4weeks)    1) Patient to be IND with HEP and modalities for pain management    2)  Increase  strength 3-5 lbs. to LIFT luggage for traveling    3)  Increase muscle strength 1/2 grade to normal for traveling, lifting, carrying items      Plan: "   Patient to be treated by Occupational Therapy    1-2    times per week for  6 -8     weeks  during the certification period from      5/30/2017 to 7/30/2017     to achieve the established goals.

## 2017-06-21 ENCOUNTER — TELEPHONE (OUTPATIENT)
Dept: ORTHOPEDICS | Facility: CLINIC | Age: 57
End: 2017-06-21

## 2017-06-22 ENCOUNTER — OFFICE VISIT (OUTPATIENT)
Dept: ORTHOPEDICS | Facility: CLINIC | Age: 57
End: 2017-06-22
Payer: COMMERCIAL

## 2017-06-22 ENCOUNTER — CLINICAL SUPPORT (OUTPATIENT)
Dept: REHABILITATION | Facility: HOSPITAL | Age: 57
End: 2017-06-22
Payer: COMMERCIAL

## 2017-06-22 VITALS — HEIGHT: 74 IN | BODY MASS INDEX: 28.69 KG/M2 | WEIGHT: 223.56 LBS

## 2017-06-22 DIAGNOSIS — S53.441D ELBOW SPRAIN, ULNAR COLLATERAL LIGAMENT, RIGHT, SUBSEQUENT ENCOUNTER: Primary | ICD-10-CM

## 2017-06-22 DIAGNOSIS — S53.402D ELBOW SPRAIN, LEFT, SUBSEQUENT ENCOUNTER: ICD-10-CM

## 2017-06-22 DIAGNOSIS — G89.29 ELBOW PAIN, CHRONIC, LEFT: Primary | ICD-10-CM

## 2017-06-22 DIAGNOSIS — M25.522 ELBOW PAIN, CHRONIC, LEFT: Primary | ICD-10-CM

## 2017-06-22 DIAGNOSIS — M25.60 RANGE OF MOTION DEFICIT: ICD-10-CM

## 2017-06-22 PROCEDURE — 99999 PR PBB SHADOW E&M-EST. PATIENT-LVL II: CPT | Mod: PBBFAC,,, | Performed by: ORTHOPAEDIC SURGERY

## 2017-06-22 PROCEDURE — 97018 PARAFFIN BATH THERAPY: CPT

## 2017-06-22 PROCEDURE — 97035 APP MDLTY 1+ULTRASOUND EA 15: CPT

## 2017-06-22 PROCEDURE — 97140 MANUAL THERAPY 1/> REGIONS: CPT

## 2017-06-22 PROCEDURE — 99212 OFFICE O/P EST SF 10 MIN: CPT | Mod: S$GLB,,, | Performed by: ORTHOPAEDIC SURGERY

## 2017-06-22 NOTE — PROGRESS NOTES
"OT Daily Progress Note    Patient:  Evan Khoury  [facility] #:  [id number]   Date of Note: 06/22/2017   Referring Physician:  Juliana Valdez, *  Diagnosis:  UCL injury right elbow  Encounter Diagnoses   Name Primary?    Range of motion deficit     Elbow sprain, left, subsequent encounter     Elbow pain, chronic, left Yes      Visit 7 of 12 NO FOTO     Start Time: 8:30  End Time: 9:30  Total Time: 30 min  Group Time: 0    Subjective:  "I'm only wearing the brace at night."  Pain: 2 out of 10     Objective:   Patient seen by OT this session. Treatment  consist of the following: Performed paraffin bath with moist heat to right elbow x 10 min to increase blood flow, circulation and tissue elasticity prior to therex.  Performed US 3 mhz 0.8 w/cm2 x 10 min x 100% to UCL region to  increase blood flow, circulation, tissue elasticity . Pt performed following therex: AROM elbow flex/ext in sup/neutral/pronated rotation to promote elbow joint mobility x 10 reps each. Performed DTM to collateral ligament to increase blood flow, circulation and for tissue healing. Pt then received IASTYM to ulnar side of right elbow.    Treatment: para x 10 min, us x 10 min, Manual therapy x 10 min    Assessment:  Elbow AROM maintained. Meeting with physician today.   Pt will continue to benefit from skilled OT intervention.   Patient is making good progress toward established goals.   Patient continues to demonstrate limitation  with  Stiffness, Decreased functional use, Continued pain and Continued inflammation.       Goals: (4weeks)    1) Patient to be IND with HEP and modalities for pain management    2)  Increase  strength 3-5 lbs. to LIFT luggage for traveling    3)  Increase muscle strength 1/2 grade to normal for traveling, lifting, carrying items      Plan:   Advance with strengthening exercises next session, d/c elbow brace per M.D.  Patient to be treated by Occupational Therapy    1-2    times per week for  6 -8     weeks  " during the certification period from      5/30/2017 to 7/30/2017     to achieve the established goals.

## 2017-06-23 NOTE — PROGRESS NOTES
I have personally taken the history and examined the patient. I agree with the Hand Surgery PA's note. The plan will be start strengthening. Pt is doing well, elbow stable, NTTP over elbow.

## 2017-06-27 ENCOUNTER — CLINICAL SUPPORT (OUTPATIENT)
Dept: REHABILITATION | Facility: HOSPITAL | Age: 57
End: 2017-06-27
Attending: ORTHOPAEDIC SURGERY
Payer: COMMERCIAL

## 2017-06-27 DIAGNOSIS — S53.401D ELBOW SPRAIN, RIGHT, SUBSEQUENT ENCOUNTER: ICD-10-CM

## 2017-06-27 DIAGNOSIS — M25.60 RANGE OF MOTION DEFICIT: ICD-10-CM

## 2017-06-27 PROCEDURE — 97035 APP MDLTY 1+ULTRASOUND EA 15: CPT

## 2017-06-27 PROCEDURE — 97140 MANUAL THERAPY 1/> REGIONS: CPT

## 2017-06-27 PROCEDURE — 97110 THERAPEUTIC EXERCISES: CPT

## 2017-06-27 NOTE — PROGRESS NOTES
"OT Daily Progress Note    Patient:  Evan Khoury  [facility] #:  [id number]   Date of Note: 06/27/2017   Referring Physician:  Juliana Valdez, *  Diagnosis:  UCL injury right elbow  Encounter Diagnoses   Name Primary?    Range of motion deficit     Elbow sprain, right, subsequent encounter       Visit 8 of 12 NO FOTO     Start Time: 8:30  End Time: 9:15  Total Time: 45 min  Group Time: 0    Subjective:  "I'm only wearing the brace at night."  Pain: 2 out of 10     Objective:   Patient seen by OT this session. Treatment  consist of the following: Performed paraffin bath with moist heat to right elbow x 10 min to increase blood flow, circulation and tissue elasticity prior to therex.  Performed US 3 mhz 0.8 w/cm2 x 10 min x 100% to UCL region to  increase blood flow, circulation, tissue elasticity. Performed DTM to collateral ligament to increase blood flow, circulation and for tissue healing. Pt then received IASTYM to ulnar side of right elbow.  Pt performed the following therex: Elbow flex and triceps ext 4lb. 2 x 15 reps, black theraband elbow flex and tricep ext 2 x 15 reps, gripper 69 lbs. (black on notch 4).    Treatment: para x 10 min, us x 10 min, Manual therapy x 10 min, therex x 15 min.    Assessment:  Advanced strengthening activity. Tolerated with no increased complaints of pain.   Pt will continue to benefit from skilled OT intervention.   Patient is making good progress toward established goals.   Patient continues to demonstrate limitation  with  Stiffness, Decreased functional use, Continued pain and Continued inflammation.       Goals: (4weeks)    1) Patient to be IND with HEP and modalities for pain management    2)  Increase  strength 3-5 lbs. to LIFT luggage for traveling    3)  Increase muscle strength 1/2 grade to normal for traveling, lifting, carrying items      Plan:   Advance with strengthening exercises next session, d/c elbow brace per M.D.  Patient to be treated by Occupational " Therapy    1-2    times per week for  6 -8     weeks  during the certification period from      5/30/2017 to 7/30/2017     to achieve the established goals.

## 2017-07-06 ENCOUNTER — CLINICAL SUPPORT (OUTPATIENT)
Dept: REHABILITATION | Facility: HOSPITAL | Age: 57
End: 2017-07-06
Payer: COMMERCIAL

## 2017-07-06 DIAGNOSIS — M25.60 RANGE OF MOTION DEFICIT: ICD-10-CM

## 2017-07-06 DIAGNOSIS — S53.401D ELBOW SPRAIN, RIGHT, SUBSEQUENT ENCOUNTER: ICD-10-CM

## 2017-07-06 PROCEDURE — 97110 THERAPEUTIC EXERCISES: CPT

## 2017-07-06 PROCEDURE — 97018 PARAFFIN BATH THERAPY: CPT | Mod: 59

## 2017-07-06 NOTE — PROGRESS NOTES
"OT Daily Progress Note    Patient:  Evan Khoury  [facility] #:  [id number]   Date of Note: 07/06/2017   Referring Physician:  Juliana Valdez, *  Diagnosis:  UCL injury right elbow  Encounter Diagnoses   Name Primary?    Range of motion deficit     Elbow sprain, right, subsequent encounter       Visit 9  of 12 NO FOTO     Start Time: 8:30  End Time: 9:10  Total Time: 40 min  Group Time: 0    Subjective:  "I"m not wearing the brace and not having any pain.  I start traveling soon, next week.  "  Pain: 2 out of 10     Objective:   Patient seen by OT this session. Treatment  consist of the following: Performed paraffin bath with moist heat to right elbow x 10 min to increase blood flow, circulation and tissue elasticity prior to therex.   Pt performed the following therex: Elbow flex for biceps, brachialis, brachioradialis and triceps ext overhead against gravity 4lb. 2 x 15 reps,  Sintia  theraband elbow flex and tricep ext 2 x 15 reps,  Added IR/ER x 15 reps each with theraband and tricep extension in door x 15 reps each.  gripper 69 lbs. (black on notch 4).     Left) 90 lbs. (+40)    MMT 4-5 / 10        Treatment: para x 10 min,  therex x 30 min.    Assessment:  Advanced strengthening activity. Tolerated with no increased complaints of pain.   Pt will continue to benefit from skilled OT intervention.   Patient is making good progress toward established goals.   Patient continues to demonstrate limitation  with  Stiffness, Decreased functional use, Continued pain and Continued inflammation.       Goals: (4weeks)    1) Patient to be IND with HEP and modalities for pain management --met    2)  Increase  strength 3-5 lbs. to LIFT luggage for traveling ---met    3)  Increase muscle strength 1/2 grade to normal for traveling, lifting, carrying items---met       Plan:   Advance with strengthening exercises next session, d/c elbow brace per M.D.  Patient to be treated by Occupational Therapy    1-2    times per " week for  6 -8     weeks  during the certification period from      5/30/2017 to 7/30/2017     to achieve the established goals.  Patient to call for followup as needed following traveling, otherwise will discharge following expiration of POC with HEP and all goals met.

## 2020-12-10 ENCOUNTER — TELEPHONE (OUTPATIENT)
Dept: OTOLARYNGOLOGY | Facility: CLINIC | Age: 60
End: 2020-12-10

## 2020-12-10 ENCOUNTER — CLINICAL SUPPORT (OUTPATIENT)
Dept: OTOLARYNGOLOGY | Facility: CLINIC | Age: 60
End: 2020-12-10
Payer: COMMERCIAL

## 2020-12-10 ENCOUNTER — OFFICE VISIT (OUTPATIENT)
Dept: OTOLARYNGOLOGY | Facility: CLINIC | Age: 60
End: 2020-12-10
Payer: COMMERCIAL

## 2020-12-10 VITALS
BODY MASS INDEX: 26.24 KG/M2 | SYSTOLIC BLOOD PRESSURE: 119 MMHG | WEIGHT: 204.5 LBS | TEMPERATURE: 98 F | DIASTOLIC BLOOD PRESSURE: 73 MMHG | HEIGHT: 74 IN | HEART RATE: 57 BPM

## 2020-12-10 DIAGNOSIS — H93.13 BILATERAL TINNITUS: ICD-10-CM

## 2020-12-10 DIAGNOSIS — Z77.122 HISTORY OF EXPOSURE TO NOISE: ICD-10-CM

## 2020-12-10 DIAGNOSIS — H90.3 ASYMMETRICAL SENSORINEURAL HEARING LOSS: ICD-10-CM

## 2020-12-10 DIAGNOSIS — R09.81 NASAL CONGESTION: ICD-10-CM

## 2020-12-10 DIAGNOSIS — H90.3 ASYMMETRICAL SENSORINEURAL HEARING LOSS: Primary | ICD-10-CM

## 2020-12-10 PROCEDURE — 3008F PR BODY MASS INDEX (BMI) DOCUMENTED: ICD-10-PCS | Mod: CPTII,S$GLB,, | Performed by: OTOLARYNGOLOGY

## 2020-12-10 PROCEDURE — 99204 PR OFFICE/OUTPT VISIT, NEW, LEVL IV, 45-59 MIN: ICD-10-PCS | Mod: S$GLB,,, | Performed by: OTOLARYNGOLOGY

## 2020-12-10 PROCEDURE — 92550 PR TYMPANOMETRY AND REFLEX THRESHOLD MEASUREMENTS: ICD-10-PCS | Mod: S$GLB,,, | Performed by: AUDIOLOGIST-HEARING AID FITTER

## 2020-12-10 PROCEDURE — 92557 COMPREHENSIVE HEARING TEST: CPT | Mod: S$GLB,,, | Performed by: AUDIOLOGIST-HEARING AID FITTER

## 2020-12-10 PROCEDURE — 92550 TYMPANOMETRY & REFLEX THRESH: CPT | Mod: S$GLB,,, | Performed by: AUDIOLOGIST-HEARING AID FITTER

## 2020-12-10 PROCEDURE — 3008F BODY MASS INDEX DOCD: CPT | Mod: CPTII,S$GLB,, | Performed by: OTOLARYNGOLOGY

## 2020-12-10 PROCEDURE — 92557 PR COMPREHENSIVE HEARING TEST: ICD-10-PCS | Mod: S$GLB,,, | Performed by: AUDIOLOGIST-HEARING AID FITTER

## 2020-12-10 PROCEDURE — 99204 OFFICE O/P NEW MOD 45 MIN: CPT | Mod: S$GLB,,, | Performed by: OTOLARYNGOLOGY

## 2020-12-10 RX ORDER — CETIRIZINE HYDROCHLORIDE 10 MG/1
10 TABLET ORAL DAILY
COMMUNITY

## 2020-12-10 NOTE — PROGRESS NOTES
Subjective:       Patient ID: Evan Khoury is a 60 y.o. male.    Chief Complaint: Hearing Loss    He is a former patient of mine who was seen once in January 2017.  He is here today complaining of difficulty hearing gradually worsening over the past few years.  Family and friends have been complaining about this.  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  Also attends live music concerts from time to time with plugs in more recent years.  Notes bilateral tinnitus which worsened after a fall hitting his head 3 or 4 years ago.  No spinning, fluctuations, fullness, otorrhea, otalgia.  Also complains of tendency for nasal congestion and postnasal drip present much of the time.  Takes Zyrtec which helps to a degree.  Also has had relief with Flonase but concerned as to whether it is okay to use it regularly.  Traveling in the next few weeks and has had problems clearing his ears when flying on [place].        Review of Systems     Constitutional: Negative for chills and fever.      HENT: Positive for hearing loss, ringing in the ears and stuffy nose.  Negative for ear discharge, ear pain and voice change.      Respiratory:  Negative for cough and shortness of breath.      Cardiovascular:  Negative for chest pain.     Gastrointestinal:  Negative for abdominal pain.     Genitourinary: Negative for difficulty urinating.     Musc: Negative for aching joints.     Skin: Negative for rash.     Neurological: Negative for dizziness.      Hematologic: Negative for swollen glands.                Objective:        Vitals:    12/10/20 1359   BP: 119/73   Pulse: (!) 57   Temp: 97.5 °F (36.4 °C)     Body mass index is 26.26 kg/m².  Physical Exam  Constitutional:       General: He is not in acute distress.     Appearance: He is well-developed.   HENT:      Head: Normocephalic and atraumatic.      Right Ear: Tympanic membrane, ear canal and external ear normal.      Left Ear: Tympanic  membrane, ear canal and external ear normal.      Nose: No nasal deformity, mucosal edema or rhinorrhea.      Mouth/Throat:      Mouth: Mucous membranes are moist.      Pharynx: No pharyngeal swelling, oropharyngeal exudate or posterior oropharyngeal erythema.   Neck:      Musculoskeletal: Neck supple.      Trachea: Phonation normal.   Pulmonary:      Effort: Pulmonary effort is normal. No respiratory distress.   Lymphadenopathy:      Cervical: No cervical adenopathy.   Skin:     General: Skin is warm and dry.   Neurological:      Mental Status: He is alert and oriented to person, place, and time.   Psychiatric:         Speech: Speech normal.         Behavior: Behavior normal.         Tests / Results:        Progression of high-frequency sensorineural hearing loss as compared to prior audiogram done January 2017.    Prior MRI of brain and IAC's without and with gadolinium reviewed as well as subsequent neurovascular consult with Dr. Pendleton.        Assessment:       1. Asymmetrical sensorineural hearing loss    2. Bilateral tinnitus    3. History of exposure to noise    4. Nasal congestion        Plan:        Reviewed all above and considerations and recommendations and answered questions.    Hearing protection reviewed.  Hearing aid consultation recommended.  Recheck audiogram in 1 year unless change or problems.  Okay to use fluticasone nasal spray every evening.  When snow skiing however, hold fluticasone and use moisturizing measures.  Afrin nasal spray before flying and insufflation exercises.  Recheck 4-6 weeks.

## 2020-12-10 NOTE — TELEPHONE ENCOUNTER
----- Message from Hillary Shetty sent at 12/9/2020  2:31 PM CST -----  Regarding: Patient call back  Who called:EMILIE ROSSI [[id number]]    What is the request in detail: Patient is requesting a call back. He states he would like to schedule an appt with Dr. Patel. He is a former patient, and states he is friends with her brother. She states he is having hearing loss and would like to be seen before 1/4 (soonest appt). He would also need an appt with audiology, none available. He would like to further discuss.   Please advise.    Can the clinic reply by [facility]? No    Best call back number: [phone]    Additional Information: N/A

## 2020-12-10 NOTE — PATIENT INSTRUCTIONS
Use fluticasone nasal sprays 2 sprays in each nostril every evening.  Continue Zyrtec every evening.    OTC Ayr nasal gel and/or saline nasal spray as needed.      Hearing protection.  Hearing aid consultation.  Recheck with audiogram one year unless change or problems prior.    Follow up nasal symptoms one month.

## 2020-12-10 NOTE — TELEPHONE ENCOUNTER
Spoke with patient told only have 1:30pm today with audiology and will fit him in to see Dr. Patel after that. Patient took appointment.

## 2020-12-10 NOTE — Clinical Note
Your patient, Evan Khoury, was recently seen for an audiogram.  My assessment and recommendations are enclosed.    If you should have any questions or concerns, please contact me at [phone].     Sincerely,  Christpoher Franklin, CCC-A, F-[facility]  Audiologist  [facility]

## 2020-12-10 NOTE — PROGRESS NOTES
Christopher Franklin, CCC-A, F-AAA  Audiologist - [facility] [address] [place] [address]  [email or web address]  [phone]    Patient: Evan Khoury   MRN: [id number]  [address]   Home Phone [phone]   Work Phone Not on file.   Mobile [phone]   : 1960  [place]: 12/10/2020      AUDIOLOGICAL EVALUATION      RECOMMENDATIONS:   It is recommended that Evan Khoury:   Follow up medically with Dr. Patel to assess his asymmetrical hearing loss and tinnitus.   Receive binaural hearing aids to improve speech understanding, pending medical clearance.   Continue to receive audiological monitoring annually.   Use precaution and/or hearing protection in noisy environments.    If you should have any questions or concerns regarding the above information, please do not hesitate to contact me at [phone].      _______________________________  Christopher Franklin, CCC-A, F-AAA  Audiologist

## 2020-12-31 ENCOUNTER — CLINICAL SUPPORT (OUTPATIENT)
Dept: URGENT CARE | Facility: CLINIC | Age: 60
End: 2020-12-31
Payer: COMMERCIAL

## 2020-12-31 DIAGNOSIS — Z11.9 SCREENING EXAMINATION FOR UNSPECIFIED INFECTIOUS DISEASE: Primary | ICD-10-CM

## 2020-12-31 LAB
CTP QC/QA: YES
SARS-COV-2 RDRP RESP QL NAA+PROBE: NEGATIVE

## 2020-12-31 PROCEDURE — U0002 COVID-19 LAB TEST NON-CDC: HCPCS | Mod: QW,S$GLB,, | Performed by: FAMILY MEDICINE

## 2020-12-31 PROCEDURE — 99211 OFF/OP EST MAY X REQ PHY/QHP: CPT | Mod: S$GLB,,, | Performed by: FAMILY MEDICINE

## 2020-12-31 PROCEDURE — 99211 PR OFFICE/OUTPT VISIT, EST, LEVL I: ICD-10-PCS | Mod: S$GLB,,, | Performed by: FAMILY MEDICINE

## 2020-12-31 PROCEDURE — U0002: ICD-10-PCS | Mod: QW,S$GLB,, | Performed by: FAMILY MEDICINE

## 2020-12-31 NOTE — PROGRESS NOTES
Subjective:       Patient ID: Evan Khoury is a 60 y.o. male.    Vitals:  vitals were not taken for this visit.     Chief Complaint: ASYMP    CDC TESTING AND QUARANTINE GUIDELINES FOR EXPOSURE   A CLOSE EXPOSURE IS DEFINED AS ANYONE WHO HAD A MASKED OR AN UNMASKED EXPOSURE TO A KNOWN  19 POSITIVE PERSON, AT LESS THAN 6 FT FOR MORE THAN 15 MINUTES. IF YOUR EXPOSURE MEETS THIS DEFINITION, THEN YOU ARE REQUIRED TO QUARANTINE FOR 14 DAYS PER THE CDC. THEY RECOMMEND THAT A TEST CAN BE PERFORMED IF YOU ASYMPTOMATIC.(SOMEONE WHO DOES NOT HAVE ANY SYMPTOMS), AND A TEST SHOULD BE DONE IF YOU DEVELOP SYMPTOMS AFTER AN EXPOSURE AS DESCRIBED ABOVE.    IF YOU MEET THE DEFINITION OF A CLOSE EXPOSURE, IT DOES NOT MATTER WHETHER OR NOT YOU ARE ASYMPTOMATIC OR SYMPTOMATIC- A NEGATIVE TEST DOES NOT GET YOU OUT OF 14 DAYS OF QUARANTINE!    PLEASE NOTE THAT IF YOU ARE ASYMPTOMATIC AND WAIT MORE THAN 4 DAYS TO TEST AFTER AN EXPOSURE, YOU RISK LENGTHENING YOUR QUARANTINE.THIS IS BECAUSE IF YOU TEST POSITIVE AS AN ASYMPTOMATIC, YOUR ISOLATION IS 10 DAYS FROM THE DATE OF THE POSITIVE TEST, NOT THE DATE OF EXPOSURE. SO FOR EXAMPLE, IF YOU TEST POSITIVE AS AN ASYMPTOMATIC ON DAY 7 FROM EXPOSURE, YOU HAVE NOW EXTENDED YOUR 14 DAY QUARANTINE TO A  17 DAY ISOLAION    IF YOUR EXPOSURE DOES NOT MEET THE ABOVE DEFINITION, YOU MAY RETURN TO YOUR NORMAL ACTIVITIES INCLUDING SOCIAL DISTANCING, WEARING MASKS, AND FREQUENT HANDWASHING      ROS    Objective:      Physical Exam      Assessment:       1. Screening examination for unspecified infectious disease        Plan:         Screening examination for unspecified infectious disease

## 2021-01-21 ENCOUNTER — CLINICAL SUPPORT (OUTPATIENT)
Dept: URGENT CARE | Facility: CLINIC | Age: 61
End: 2021-01-21
Payer: COMMERCIAL

## 2021-01-21 DIAGNOSIS — Z11.9 SCREENING EXAMINATION FOR UNSPECIFIED INFECTIOUS DISEASE: Primary | ICD-10-CM

## 2021-01-21 LAB
CTP QC/QA: YES
SARS-COV-2 RDRP RESP QL NAA+PROBE: NEGATIVE

## 2021-01-21 PROCEDURE — 99211 PR OFFICE/OUTPT VISIT, EST, LEVL I: ICD-10-PCS | Mod: S$GLB,CS,, | Performed by: PHYSICIAN ASSISTANT

## 2021-01-21 PROCEDURE — U0002: ICD-10-PCS | Mod: QW,S$GLB,, | Performed by: PHYSICIAN ASSISTANT

## 2021-01-21 PROCEDURE — 99211 OFF/OP EST MAY X REQ PHY/QHP: CPT | Mod: S$GLB,CS,, | Performed by: PHYSICIAN ASSISTANT

## 2021-01-21 PROCEDURE — U0002 COVID-19 LAB TEST NON-CDC: HCPCS | Mod: QW,S$GLB,, | Performed by: PHYSICIAN ASSISTANT

## 2021-01-29 ENCOUNTER — TELEPHONE (OUTPATIENT)
Dept: OTOLARYNGOLOGY | Facility: CLINIC | Age: 61
End: 2021-01-29

## 2021-02-22 ENCOUNTER — OFFICE VISIT (OUTPATIENT)
Dept: OTOLARYNGOLOGY | Facility: CLINIC | Age: 61
End: 2021-02-22
Payer: COMMERCIAL

## 2021-02-22 ENCOUNTER — CLINICAL SUPPORT (OUTPATIENT)
Dept: OTOLARYNGOLOGY | Facility: CLINIC | Age: 61
End: 2021-02-22
Payer: COMMERCIAL

## 2021-02-22 VITALS — SYSTOLIC BLOOD PRESSURE: 129 MMHG | DIASTOLIC BLOOD PRESSURE: 77 MMHG | HEART RATE: 73 BPM

## 2021-02-22 DIAGNOSIS — Z71.89 ENCOUNTER FOR HEARING AID CONSULTATION: Primary | ICD-10-CM

## 2021-02-22 DIAGNOSIS — R09.81 NASAL CONGESTION: ICD-10-CM

## 2021-02-22 DIAGNOSIS — Z01.812 PRE-PROCEDURE LAB EXAM: ICD-10-CM

## 2021-02-22 PROCEDURE — 99214 PR OFFICE/OUTPT VISIT, EST, LEVL IV, 30-39 MIN: ICD-10-PCS | Mod: S$GLB,,, | Performed by: OTOLARYNGOLOGY

## 2021-02-22 PROCEDURE — 1126F PR PAIN SEVERITY QUANTIFIED, NO PAIN PRESENT: ICD-10-PCS | Mod: S$GLB,,, | Performed by: OTOLARYNGOLOGY

## 2021-02-22 PROCEDURE — 99214 OFFICE O/P EST MOD 30 MIN: CPT | Mod: S$GLB,,, | Performed by: OTOLARYNGOLOGY

## 2021-02-22 PROCEDURE — 99499 UNLISTED E&M SERVICE: CPT | Mod: S$GLB,,, | Performed by: AUDIOLOGIST-HEARING AID FITTER

## 2021-02-22 PROCEDURE — 99499 NO LOS: ICD-10-PCS | Mod: S$GLB,,, | Performed by: AUDIOLOGIST-HEARING AID FITTER

## 2021-02-22 PROCEDURE — 1126F AMNT PAIN NOTED NONE PRSNT: CPT | Mod: S$GLB,,, | Performed by: OTOLARYNGOLOGY

## 2021-02-22 RX ORDER — AZELASTINE 1 MG/ML
SPRAY, METERED NASAL
Qty: 30 ML | Refills: 2 | Status: SHIPPED | OUTPATIENT
Start: 2021-02-22

## 2021-03-11 ENCOUNTER — OFFICE VISIT (OUTPATIENT)
Dept: OPTOMETRY | Facility: CLINIC | Age: 61
End: 2021-03-11
Payer: COMMERCIAL

## 2021-03-11 DIAGNOSIS — H52.203 MYOPIA WITH ASTIGMATISM AND PRESBYOPIA, BILATERAL: ICD-10-CM

## 2021-03-11 DIAGNOSIS — H52.13 MYOPIA WITH ASTIGMATISM AND PRESBYOPIA, BILATERAL: ICD-10-CM

## 2021-03-11 DIAGNOSIS — H52.4 MYOPIA WITH ASTIGMATISM AND PRESBYOPIA, BILATERAL: ICD-10-CM

## 2021-03-11 DIAGNOSIS — H25.13 NUCLEAR SCLEROSIS OF BOTH EYES: Primary | ICD-10-CM

## 2021-03-11 DIAGNOSIS — H47.393 OPTIC NERVE CUPPING OF BOTH EYES: ICD-10-CM

## 2021-03-11 PROCEDURE — 99999 PR PBB SHADOW E&M-EST. PATIENT-LVL II: ICD-10-PCS | Mod: PBBFAC,,, | Performed by: OPTOMETRIST

## 2021-03-11 PROCEDURE — 1126F AMNT PAIN NOTED NONE PRSNT: CPT | Mod: S$GLB,,, | Performed by: OPTOMETRIST

## 2021-03-11 PROCEDURE — 92133 CPTRZD OPH DX IMG PST SGM ON: CPT | Mod: S$GLB,,, | Performed by: OPTOMETRIST

## 2021-03-11 PROCEDURE — 92015 PR REFRACTION: ICD-10-PCS | Mod: S$GLB,,, | Performed by: OPTOMETRIST

## 2021-03-11 PROCEDURE — 92133 POSTERIOR SEGMENT OCT OPTIC NERVE(OCULAR COHERENCE TOMOGRAPHY) - OU - BOTH EYES: ICD-10-PCS | Mod: S$GLB,,, | Performed by: OPTOMETRIST

## 2021-03-11 PROCEDURE — 92015 DETERMINE REFRACTIVE STATE: CPT | Mod: S$GLB,,, | Performed by: OPTOMETRIST

## 2021-03-11 PROCEDURE — 1126F PR PAIN SEVERITY QUANTIFIED, NO PAIN PRESENT: ICD-10-PCS | Mod: S$GLB,,, | Performed by: OPTOMETRIST

## 2021-03-11 PROCEDURE — 92004 PR EYE EXAM, NEW PATIENT,COMPREHESV: ICD-10-PCS | Mod: S$GLB,,, | Performed by: OPTOMETRIST

## 2021-03-11 PROCEDURE — 92083 EXTENDED VISUAL FIELD XM: CPT | Mod: S$GLB,,, | Performed by: OPTOMETRIST

## 2021-03-11 PROCEDURE — 99999 PR PBB SHADOW E&M-EST. PATIENT-LVL II: CPT | Mod: PBBFAC,,, | Performed by: OPTOMETRIST

## 2021-03-11 PROCEDURE — 92083 HUMPHREY VISUAL FIELD - OU - BOTH EYES: ICD-10-PCS | Mod: S$GLB,,, | Performed by: OPTOMETRIST

## 2021-03-11 PROCEDURE — 92004 COMPRE OPH EXAM NEW PT 1/>: CPT | Mod: S$GLB,,, | Performed by: OPTOMETRIST

## 2021-03-22 ENCOUNTER — HOSPITAL ENCOUNTER (OUTPATIENT)
Dept: PREADMISSION TESTING | Facility: OTHER | Age: 61
Discharge: HOME | End: 2021-03-22
Attending: ANESTHESIOLOGY
Payer: COMMERCIAL

## 2021-03-22 DIAGNOSIS — Z01.812 PRE-PROCEDURE LAB EXAM: ICD-10-CM

## 2021-03-22 PROCEDURE — U0003 INFECTIOUS AGENT DETECTION BY NUCLEIC ACID (DNA OR RNA); SEVERE ACUTE RESPIRATORY SYNDROME CORONAVIRUS 2 (SARS-COV-2) (CORONAVIRUS DISEASE [COVID-19]), AMPLIFIED PROBE TECHNIQUE, MAKING USE OF HIGH THROUGHPUT TECHNOLOGIES AS DESCRIBED BY CMS-2020-01-R: HCPCS | Performed by: OTOLARYNGOLOGY

## 2021-03-22 PROCEDURE — U0005 INFEC AGEN DETEC AMPLI PROBE: HCPCS | Performed by: OTOLARYNGOLOGY

## 2021-03-23 LAB — SARS-COV-2 RNA RESP QL NAA+PROBE: NOT DETECTED

## 2021-03-24 ENCOUNTER — TELEPHONE (OUTPATIENT)
Dept: OTOLARYNGOLOGY | Facility: CLINIC | Age: 61
End: 2021-03-24

## 2021-03-25 ENCOUNTER — OFFICE VISIT (OUTPATIENT)
Dept: OTOLARYNGOLOGY | Facility: CLINIC | Age: 61
End: 2021-03-25
Payer: COMMERCIAL

## 2021-03-25 VITALS — SYSTOLIC BLOOD PRESSURE: 126 MMHG | HEART RATE: 59 BPM | DIASTOLIC BLOOD PRESSURE: 79 MMHG

## 2021-03-25 DIAGNOSIS — J34.2 NASAL SEPTAL DEVIATION: ICD-10-CM

## 2021-03-25 DIAGNOSIS — R09.89 CHRONIC THROAT CLEARING: ICD-10-CM

## 2021-03-25 DIAGNOSIS — R09.81 NASAL CONGESTION: ICD-10-CM

## 2021-03-25 PROCEDURE — 99214 PR OFFICE/OUTPT VISIT, EST, LEVL IV, 30-39 MIN: ICD-10-PCS | Mod: 25,S$GLB,, | Performed by: OTOLARYNGOLOGY

## 2021-03-25 PROCEDURE — 31575 LARYNGOSCOPY: ICD-10-PCS | Mod: S$GLB,,, | Performed by: OTOLARYNGOLOGY

## 2021-03-25 PROCEDURE — 99214 OFFICE O/P EST MOD 30 MIN: CPT | Mod: 25,S$GLB,, | Performed by: OTOLARYNGOLOGY

## 2021-03-25 PROCEDURE — 31575 DIAGNOSTIC LARYNGOSCOPY: CPT | Mod: S$GLB,,, | Performed by: OTOLARYNGOLOGY

## 2021-03-25 PROCEDURE — 1125F AMNT PAIN NOTED PAIN PRSNT: CPT | Mod: S$GLB,,, | Performed by: OTOLARYNGOLOGY

## 2021-03-25 PROCEDURE — 1125F PR PAIN SEVERITY QUANTIFIED, PAIN PRESENT: ICD-10-PCS | Mod: S$GLB,,, | Performed by: OTOLARYNGOLOGY

## 2021-03-25 RX ORDER — ESCITALOPRAM OXALATE 10 MG/1
20 TABLET ORAL
COMMUNITY
Start: 2021-03-22

## 2021-03-25 RX ORDER — MONTELUKAST SODIUM 10 MG/1
10 TABLET ORAL DAILY
Qty: 30 TABLET | Refills: 1 | Status: SHIPPED | OUTPATIENT
Start: 2021-03-25 | End: 2021-04-29 | Stop reason: SDUPTHER

## 2021-03-25 RX ORDER — OMEPRAZOLE 20 MG/1
20 CAPSULE, DELAYED RELEASE ORAL 2 TIMES DAILY
Qty: 60 CAPSULE | Refills: 1 | Status: SHIPPED | OUTPATIENT
Start: 2021-03-25 | End: 2021-04-29 | Stop reason: SDUPTHER

## 2021-04-16 ENCOUNTER — PATIENT MESSAGE (OUTPATIENT)
Dept: RESEARCH | Facility: HOSPITAL | Age: 61
End: 2021-04-16

## 2022-01-02 RX ORDER — OMEPRAZOLE 20 MG/1
CAPSULE, DELAYED RELEASE ORAL
Qty: 60 CAPSULE | Refills: 1 | Status: SHIPPED | OUTPATIENT
Start: 2022-01-02

## 2022-01-02 NOTE — TELEPHONE ENCOUNTER
Please let him know I refilled this, but due for follow-up and help him with an appointment.  Thank you.

## 2022-01-03 NOTE — TELEPHONE ENCOUNTER
Tested positive on Wednesday for covid but omeprazole did not do much for him but is sick right now with the virus and will get back to us when better, it was an automated thing a pharmacy that sent RX to us.

## 2022-01-07 ENCOUNTER — HOSPITAL ENCOUNTER (OUTPATIENT)
Dept: RADIOLOGY | Facility: HOSPITAL | Age: 62
Discharge: HOME | End: 2022-01-07
Attending: PHYSICIAN ASSISTANT
Payer: COMMERCIAL

## 2022-01-07 ENCOUNTER — NURSE TRIAGE (OUTPATIENT)
Dept: ADMINISTRATIVE | Facility: CLINIC | Age: 62
End: 2022-01-07
Payer: COMMERCIAL

## 2022-01-07 ENCOUNTER — OFFICE VISIT (OUTPATIENT)
Dept: SPORTS MEDICINE | Facility: CLINIC | Age: 62
End: 2022-01-07
Payer: COMMERCIAL

## 2022-01-07 VITALS
HEART RATE: 81 BPM | SYSTOLIC BLOOD PRESSURE: 127 MMHG | DIASTOLIC BLOOD PRESSURE: 79 MMHG | HEIGHT: 74 IN | BODY MASS INDEX: 25.93 KG/M2 | WEIGHT: 202 LBS

## 2022-01-07 DIAGNOSIS — S50.02XA CONTUSION OF LEFT ELBOW, INITIAL ENCOUNTER: ICD-10-CM

## 2022-01-07 DIAGNOSIS — S40.012A CONTUSION OF LEFT SHOULDER, INITIAL ENCOUNTER: ICD-10-CM

## 2022-01-07 DIAGNOSIS — M25.512 ACUTE PAIN OF LEFT SHOULDER: Primary | ICD-10-CM

## 2022-01-07 DIAGNOSIS — M25.522 LEFT ELBOW PAIN: ICD-10-CM

## 2022-01-07 DIAGNOSIS — M25.511 RIGHT SHOULDER PAIN, UNSPECIFIED CHRONICITY: ICD-10-CM

## 2022-01-07 PROCEDURE — 3078F PR MOST RECENT DIASTOLIC BLOOD PRESSURE < 80 MM HG: ICD-10-PCS | Mod: CPTII,S$GLB,, | Performed by: PHYSICIAN ASSISTANT

## 2022-01-07 PROCEDURE — 3074F PR MOST RECENT SYSTOLIC BLOOD PRESSURE < 130 MM HG: ICD-10-PCS | Mod: CPTII,S$GLB,, | Performed by: PHYSICIAN ASSISTANT

## 2022-01-07 PROCEDURE — 99204 OFFICE O/P NEW MOD 45 MIN: CPT | Mod: S$GLB,,, | Performed by: PHYSICIAN ASSISTANT

## 2022-01-07 PROCEDURE — 3008F PR BODY MASS INDEX (BMI) DOCUMENTED: ICD-10-PCS | Mod: CPTII,S$GLB,, | Performed by: PHYSICIAN ASSISTANT

## 2022-01-07 PROCEDURE — 99999 PR PBB SHADOW E&M-EST. PATIENT-LVL III: ICD-10-PCS | Mod: PBBFAC,,, | Performed by: PHYSICIAN ASSISTANT

## 2022-01-07 PROCEDURE — 1159F PR MEDICATION LIST DOCUMENTED IN MEDICAL RECORD: ICD-10-PCS | Mod: CPTII,S$GLB,, | Performed by: PHYSICIAN ASSISTANT

## 2022-01-07 PROCEDURE — 3078F DIAST BP <80 MM HG: CPT | Mod: CPTII,S$GLB,, | Performed by: PHYSICIAN ASSISTANT

## 2022-01-07 PROCEDURE — 99999 PR PBB SHADOW E&M-EST. PATIENT-LVL III: CPT | Mod: PBBFAC,,, | Performed by: PHYSICIAN ASSISTANT

## 2022-01-07 PROCEDURE — 3074F SYST BP LT 130 MM HG: CPT | Mod: CPTII,S$GLB,, | Performed by: PHYSICIAN ASSISTANT

## 2022-01-07 PROCEDURE — 73080 X-RAY EXAM OF ELBOW: CPT | Mod: 26,LT,, | Performed by: RADIOLOGY

## 2022-01-07 PROCEDURE — 73080 X-RAY EXAM OF ELBOW: CPT | Mod: TC,LT

## 2022-01-07 PROCEDURE — 73030 X-RAY EXAM OF SHOULDER: CPT | Mod: 26,LT,, | Performed by: RADIOLOGY

## 2022-01-07 PROCEDURE — 1160F PR REVIEW ALL MEDS BY PRESCRIBER/CLIN PHARMACIST DOCUMENTED: ICD-10-PCS | Mod: CPTII,S$GLB,, | Performed by: PHYSICIAN ASSISTANT

## 2022-01-07 PROCEDURE — 73080 XR ELBOW COMPLETE 3 VIEW LEFT: ICD-10-PCS | Mod: 26,LT,, | Performed by: RADIOLOGY

## 2022-01-07 PROCEDURE — 3008F BODY MASS INDEX DOCD: CPT | Mod: CPTII,S$GLB,, | Performed by: PHYSICIAN ASSISTANT

## 2022-01-07 PROCEDURE — 1160F RVW MEDS BY RX/DR IN RCRD: CPT | Mod: CPTII,S$GLB,, | Performed by: PHYSICIAN ASSISTANT

## 2022-01-07 PROCEDURE — 73030 XR SHOULDER COMPLETE 2 OR MORE VIEWS LEFT: ICD-10-PCS | Mod: 26,LT,, | Performed by: RADIOLOGY

## 2022-01-07 PROCEDURE — 73030 X-RAY EXAM OF SHOULDER: CPT | Mod: TC,LT

## 2022-01-07 PROCEDURE — 99204 PR OFFICE/OUTPT VISIT, NEW, LEVL IV, 45-59 MIN: ICD-10-PCS | Mod: S$GLB,,, | Performed by: PHYSICIAN ASSISTANT

## 2022-01-07 PROCEDURE — 1159F MED LIST DOCD IN RCRD: CPT | Mod: CPTII,S$GLB,, | Performed by: PHYSICIAN ASSISTANT
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
## 2022-01-24 ENCOUNTER — TELEPHONE (OUTPATIENT)
Dept: SPORTS MEDICINE | Facility: CLINIC | Age: 62
End: 2022-01-24
Payer: COMMERCIAL

## 2022-01-25 ENCOUNTER — HOSPITAL ENCOUNTER (OUTPATIENT)
Dept: RADIOLOGY | Facility: HOSPITAL | Age: 62
Discharge: HOME | End: 2022-01-25
Attending: PHYSICIAN ASSISTANT
Payer: COMMERCIAL

## 2022-01-25 ENCOUNTER — OFFICE VISIT (OUTPATIENT)
Dept: SPORTS MEDICINE | Facility: CLINIC | Age: 62
End: 2022-01-25
Payer: COMMERCIAL

## 2022-01-25 VITALS
DIASTOLIC BLOOD PRESSURE: 70 MMHG | WEIGHT: 202 LBS | BODY MASS INDEX: 25.93 KG/M2 | HEART RATE: 70 BPM | SYSTOLIC BLOOD PRESSURE: 109 MMHG | HEIGHT: 74 IN

## 2022-01-25 DIAGNOSIS — M25.461 EFFUSION, RIGHT KNEE: ICD-10-CM

## 2022-01-25 DIAGNOSIS — M25.562 PAIN IN BOTH KNEES, UNSPECIFIED CHRONICITY: ICD-10-CM

## 2022-01-25 DIAGNOSIS — M23.91 ACUTE INTERNAL DERANGEMENT OF KNEE, RIGHT: ICD-10-CM

## 2022-01-25 DIAGNOSIS — M25.561 ACUTE PAIN OF RIGHT KNEE: Primary | ICD-10-CM

## 2022-01-25 DIAGNOSIS — M25.561 PAIN IN BOTH KNEES, UNSPECIFIED CHRONICITY: ICD-10-CM

## 2022-01-25 PROCEDURE — 73564 XR KNEE ORTHO BILAT WITH FLEXION: ICD-10-PCS | Mod: 26,,, | Performed by: RADIOLOGY

## 2022-01-25 PROCEDURE — 99999 PR PBB SHADOW E&M-EST. PATIENT-LVL IV: ICD-10-PCS | Mod: PBBFAC,,, | Performed by: PHYSICIAN ASSISTANT

## 2022-01-25 PROCEDURE — 3078F DIAST BP <80 MM HG: CPT | Mod: CPTII,S$GLB,, | Performed by: PHYSICIAN ASSISTANT

## 2022-01-25 PROCEDURE — 1159F PR MEDICATION LIST DOCUMENTED IN MEDICAL RECORD: ICD-10-PCS | Mod: CPTII,S$GLB,, | Performed by: PHYSICIAN ASSISTANT

## 2022-01-25 PROCEDURE — 1159F MED LIST DOCD IN RCRD: CPT | Mod: CPTII,S$GLB,, | Performed by: PHYSICIAN ASSISTANT

## 2022-01-25 PROCEDURE — 73564 X-RAY EXAM KNEE 4 OR MORE: CPT | Mod: TC,50

## 2022-01-25 PROCEDURE — 99214 PR OFFICE/OUTPT VISIT, EST, LEVL IV, 30-39 MIN: ICD-10-PCS | Mod: S$GLB,,, | Performed by: PHYSICIAN ASSISTANT

## 2022-01-25 PROCEDURE — 99999 PR PBB SHADOW E&M-EST. PATIENT-LVL IV: CPT | Mod: PBBFAC,,, | Performed by: PHYSICIAN ASSISTANT

## 2022-01-25 PROCEDURE — 73564 X-RAY EXAM KNEE 4 OR MORE: CPT | Mod: 26,,, | Performed by: RADIOLOGY

## 2022-01-25 PROCEDURE — 3074F PR MOST RECENT SYSTOLIC BLOOD PRESSURE < 130 MM HG: ICD-10-PCS | Mod: CPTII,S$GLB,, | Performed by: PHYSICIAN ASSISTANT

## 2022-01-25 PROCEDURE — 3078F PR MOST RECENT DIASTOLIC BLOOD PRESSURE < 80 MM HG: ICD-10-PCS | Mod: CPTII,S$GLB,, | Performed by: PHYSICIAN ASSISTANT

## 2022-01-25 PROCEDURE — 1160F RVW MEDS BY RX/DR IN RCRD: CPT | Mod: CPTII,S$GLB,, | Performed by: PHYSICIAN ASSISTANT

## 2022-01-25 PROCEDURE — 3008F PR BODY MASS INDEX (BMI) DOCUMENTED: ICD-10-PCS | Mod: CPTII,S$GLB,, | Performed by: PHYSICIAN ASSISTANT

## 2022-01-25 PROCEDURE — 3074F SYST BP LT 130 MM HG: CPT | Mod: CPTII,S$GLB,, | Performed by: PHYSICIAN ASSISTANT

## 2022-01-25 PROCEDURE — 99214 OFFICE O/P EST MOD 30 MIN: CPT | Mod: S$GLB,,, | Performed by: PHYSICIAN ASSISTANT

## 2022-01-25 PROCEDURE — 1160F PR REVIEW ALL MEDS BY PRESCRIBER/CLIN PHARMACIST DOCUMENTED: ICD-10-PCS | Mod: CPTII,S$GLB,, | Performed by: PHYSICIAN ASSISTANT

## 2022-01-25 PROCEDURE — 3008F BODY MASS INDEX DOCD: CPT | Mod: CPTII,S$GLB,, | Performed by: PHYSICIAN ASSISTANT

## 2022-01-25 RX ORDER — CELECOXIB 200 MG/1
200 CAPSULE ORAL 2 TIMES DAILY WITH MEALS
Qty: 60 CAPSULE | Refills: 0 | Status: SHIPPED | OUTPATIENT
Start: 2022-01-25 | End: 2022-09-12

## 2022-01-25 NOTE — PROGRESS NOTES
Subjective:          Chief Complaint: Evan Khoury is a 61 y.o. male who had concerns including Pain of the Right Knee.    Seen by Humza Kincaid PA-C, 2 weeks ago for left shoulder and elbow pain.    Evan Khoury previously seen by us for left knee pain, 11 years s/p left knee ACL reconstruction.  He presents for new right knee pain.  The pain started 1 weeks ago while skiing his ski got caught and he felt a pop inside his knee.  Episodes of instability and pain followed.  He reports similar to previous knee.  Pain symptoms have not improved. Pain is located over (points to) medial joint line and has moved to posterior knee.  He reports that the pain is a 6 /10 aching and throbbing pain today and not responding adequately to conservative measures which have included activity modifications, rest, and oral medication. Is affecting ADLs and limiting desired level of activity. Denies numbness, tingling, radiation, and inability to bear weight.  Pain is 8 /10 at its worst    Mechanical symptoms:  None recently  Subjective instability: (+)   Worse with increased activity  Better with rest.   Nocturnal symptoms: (+)    DATE OF PROCEDURE: 03/29/2011      SURGEON: Maris Velazquez MD      FIRST ASSISTANT: Jaelyn Andujar.      SECOND ASSISTANT: Vernell Choi.      PROCEDURES PERFORMED:   1. Left knee anterior cruciate ligament reconstruction.   2. Left knee medial meniscus repair.   3. Left knee arthroscopic chondroplasty.   4. Left knee arthroscopic PRP arthrocentesis. Note: PRP material was   injected at the completion of the case, injecting approximately 5 mL PRP   material directly into the knee at the tibial tunnel region        Review of Systems   Constitutional: Negative for chills and fever.   HENT: Negative for congestion and sore throat.    Eyes: Negative for discharge and double vision.   Cardiovascular: Negative for chest pain, palpitations and syncope.   Respiratory: Negative for cough and shortness of breath.     Endocrine: Negative for cold intolerance and heat intolerance.   Skin: Negative for dry skin and rash.   Musculoskeletal: Positive for falls, joint pain and joint swelling.   Gastrointestinal: Negative for abdominal pain, nausea and vomiting.   Neurological: Negative for focal weakness, numbness and paresthesias.                   Objective:        General: Evan is well-developed, well-nourished, appears stated age, in no acute distress, alert and oriented to time, place and person.     General    Vitals reviewed.  Constitutional: He is oriented to person, place, and time. He appears well-developed and well-nourished. No distress.   Eyes: Conjunctivae and EOM are normal. Pupils are equal, round, and reactive to light.   Neck: Neck supple. No JVD present.   Cardiovascular: Normal heart sounds and intact distal pulses.    No murmur heard.  Pulmonary/Chest: Effort normal and breath sounds normal. No respiratory distress.   Abdominal: Soft. Bowel sounds are normal. He exhibits no distension. There is no abdominal tenderness.   Neurological: He is alert and oriented to person, place, and time. Coordination normal.   Psychiatric: He has a normal mood and affect. His behavior is normal. Judgment and thought content normal.     General Musculoskeletal Exam   Gait: abnormal and antalgic       Right Knee Exam     Inspection   Erythema: absent  Scars: absent  Swelling: absent  Effusion: present  Deformity: absent  Bruising: absent    Tenderness   The patient is tender to palpation of the medial joint line.    Range of Motion   Extension: 0   Flexion:  130 (+ pain) abnormal     Tests   Meniscus   Nik:  Medial - positive Lateral - negative  Ligament Examination   Lachman: abnormal - grade IIPCL-Posterior Drawer: normal (0 to 2mm)     MCL - Valgus: abnormal - grade II  LCL - Varus: normalDial Test at 90 degrees: normal (< 5 degrees)  Posterolateral Corner: stable  Patella   Patellar Glide (quadrants): Lateral - 1   Medial  - 2  Patellar Grind: negative    Other   Popliteal (Baker's) Cyst: absent  Sensation: normal    Left Knee Exam     Inspection   Erythema: absent  Scars: present  Swelling: absent  Effusion: absent  Deformity: absent  Bruising: absent    Range of Motion   Extension: 0   Flexion: 130     Tests   Meniscus   Nik:  Medial - negative Lateral - negative  Stability Lachman: normal (-1 to 2mm) PCL-Posterior Drawer: normal (0 to 2mm)  MCL - Valgus: normal (0 to 2mm)  LCL - Varus: normal (0 to 2mm)Dial Test at 90 degrees: normal (< 5 degrees)  Posterolateral Corner: stable  Patella   Patellar Glide (Quadrants): Lateral - 1 Medial - 2  Patellar Grind: negative    Other   Popliteal (Baker's) Cyst: absent  Sensation: normal    Right Hip Exam     Tests   Mesha: negative  Left Hip Exam     Tests   Mesha: negative          Muscle Strength   Right Lower Extremity   Hip Abduction: 5/5   Quadriceps:  4/5   Hamstrin/5   Left Lower Extremity   Hip Abduction: 5/5   Quadriceps:  4/5   Hamstrin/5     Vascular Exam     Right Pulses  Dorsalis Pedis:      2+  Posterior Tibial:      2+        Left Pulses  Dorsalis Pedis:      2+  Posterior Tibial:      2+        Edema  Right Lower Leg: absent  Left Lower Leg: absent    Radiographic Findings 2022:    Right: No fracture dislocation bone destruction or OCD seen.     Left: There is ACL repair.  No fracture dislocation bone destruction or OCD seen.    Xrays of the knees were ordered and reviewed by me today. These findings were discussed and reviewed with the patient.        Assessment:       Encounter Diagnoses   Name Primary?    Acute pain of right knee Yes    Acute internal derangement of knee, right     Effusion, right knee           Plan:       We have discussed a variety of treatment options including medications, injections, physical therapy and other alternative treatments. I also explained the indications, risks and benefits of surgery. Given the patient's hx and  examination, we should proceed with MRI at this time. Pt agrees with treatment plan.    I made the decision to obtain old records of the patient including previous notes and imaging. I independently reviewed and interpreted lab results today as well as prior imaging.     1. MRI right knee to assess for ACL, MCL, and medial meniscus.    2. Ice compress to the affected area 2-3x a day for 15-20 minutes as needed for pain management.  3. Ambulatory referral to physical therapy for prehabilitation  4. Celebrex 200 mg twice daily PRN for pain management.  5. RTC to see Db Watkins PA-C for follow-up and MRI results.    All of the patient's questions were answered and the patient will contact us if they have any questions or concerns in the interim.                    [facility] patient questionnaires have been collected today.

## 2022-01-27 ENCOUNTER — TELEPHONE (OUTPATIENT)
Dept: SPORTS MEDICINE | Facility: CLINIC | Age: 62
End: 2022-01-27
Payer: COMMERCIAL

## 2022-01-27 NOTE — TELEPHONE ENCOUNTER
----- Message from Magy Price sent at 1/27/2022  3:11 PM CST -----  Type:  Patient Call Back    Who Called:  Evan     What is the reqeust in detail: Pt is requesting a call back in regards to having his MRI orders sent to either [facility]  or [facility]. Please Advise     Can the clinic reply by [facility]?    Best Call Back Number [phone]

## 2022-01-27 NOTE — TELEPHONE ENCOUNTER
The pt would like to have his MRI done at [facility] ( Fax: [phone]). I let him know we would fax over the order to them today and [facility] will call him to schedule.

## 2022-02-02 ENCOUNTER — OFFICE VISIT (OUTPATIENT)
Dept: SPORTS MEDICINE | Facility: CLINIC | Age: 62
End: 2022-02-02
Payer: COMMERCIAL

## 2022-02-02 ENCOUNTER — TELEPHONE (OUTPATIENT)
Dept: PREADMISSION TESTING | Facility: HOSPITAL | Age: 62
End: 2022-02-02
Payer: COMMERCIAL

## 2022-02-02 VITALS
SYSTOLIC BLOOD PRESSURE: 110 MMHG | WEIGHT: 202 LBS | HEIGHT: 74 IN | DIASTOLIC BLOOD PRESSURE: 66 MMHG | BODY MASS INDEX: 25.93 KG/M2 | HEART RATE: 66 BPM

## 2022-02-02 DIAGNOSIS — S83.241A ACUTE MEDIAL MENISCUS TEAR OF RIGHT KNEE, INITIAL ENCOUNTER: ICD-10-CM

## 2022-02-02 DIAGNOSIS — S83.511A RUPTURE OF ANTERIOR CRUCIATE LIGAMENT OF RIGHT KNEE, INITIAL ENCOUNTER: Primary | ICD-10-CM

## 2022-02-02 DIAGNOSIS — M23.91 INTERNAL DERANGEMENT OF RIGHT KNEE: ICD-10-CM

## 2022-02-02 DIAGNOSIS — M94.261 CHONDROMALACIA OF RIGHT KNEE: ICD-10-CM

## 2022-02-02 DIAGNOSIS — Z01.818 PRE-OP TESTING: Primary | ICD-10-CM

## 2022-02-02 DIAGNOSIS — S83.281A ACUTE TEAR LATERAL MENISCUS, RIGHT, INITIAL ENCOUNTER: ICD-10-CM

## 2022-02-02 DIAGNOSIS — S83.511D RUPTURE OF ANTERIOR CRUCIATE LIGAMENT OF RIGHT KNEE, SUBSEQUENT ENCOUNTER: Primary | ICD-10-CM

## 2022-02-02 PROCEDURE — 3078F PR MOST RECENT DIASTOLIC BLOOD PRESSURE < 80 MM HG: ICD-10-PCS | Mod: CPTII,S$GLB,, | Performed by: PHYSICIAN ASSISTANT

## 2022-02-02 PROCEDURE — 99999 PR PBB SHADOW E&M-EST. PATIENT-LVL III: ICD-10-PCS | Mod: PBBFAC,,, | Performed by: PHYSICIAN ASSISTANT

## 2022-02-02 PROCEDURE — 3078F DIAST BP <80 MM HG: CPT | Mod: CPTII,S$GLB,, | Performed by: PHYSICIAN ASSISTANT

## 2022-02-02 PROCEDURE — 3074F SYST BP LT 130 MM HG: CPT | Mod: CPTII,S$GLB,, | Performed by: PHYSICIAN ASSISTANT

## 2022-02-02 PROCEDURE — 99214 PR OFFICE/OUTPT VISIT, EST, LEVL IV, 30-39 MIN: ICD-10-PCS | Mod: S$GLB,,, | Performed by: PHYSICIAN ASSISTANT

## 2022-02-02 PROCEDURE — 1159F PR MEDICATION LIST DOCUMENTED IN MEDICAL RECORD: ICD-10-PCS | Mod: CPTII,S$GLB,, | Performed by: PHYSICIAN ASSISTANT

## 2022-02-02 PROCEDURE — 99214 OFFICE O/P EST MOD 30 MIN: CPT | Mod: S$GLB,,, | Performed by: PHYSICIAN ASSISTANT

## 2022-02-02 PROCEDURE — 3008F BODY MASS INDEX DOCD: CPT | Mod: CPTII,S$GLB,, | Performed by: PHYSICIAN ASSISTANT

## 2022-02-02 PROCEDURE — 3008F PR BODY MASS INDEX (BMI) DOCUMENTED: ICD-10-PCS | Mod: CPTII,S$GLB,, | Performed by: PHYSICIAN ASSISTANT

## 2022-02-02 PROCEDURE — 1160F RVW MEDS BY RX/DR IN RCRD: CPT | Mod: CPTII,S$GLB,, | Performed by: PHYSICIAN ASSISTANT

## 2022-02-02 PROCEDURE — 1159F MED LIST DOCD IN RCRD: CPT | Mod: CPTII,S$GLB,, | Performed by: PHYSICIAN ASSISTANT

## 2022-02-02 PROCEDURE — 1160F PR REVIEW ALL MEDS BY PRESCRIBER/CLIN PHARMACIST DOCUMENTED: ICD-10-PCS | Mod: CPTII,S$GLB,, | Performed by: PHYSICIAN ASSISTANT

## 2022-02-02 PROCEDURE — 99999 PR PBB SHADOW E&M-EST. PATIENT-LVL III: CPT | Mod: PBBFAC,,, | Performed by: PHYSICIAN ASSISTANT

## 2022-02-02 PROCEDURE — 3074F PR MOST RECENT SYSTOLIC BLOOD PRESSURE < 130 MM HG: ICD-10-PCS | Mod: CPTII,S$GLB,, | Performed by: PHYSICIAN ASSISTANT

## 2022-02-02 NOTE — PROGRESS NOTES
Subjective:          Chief Complaint: Evan Khoury is a 61 y.o. male who had concerns including Results and Pain of the Right Knee.    Interval hx: Pt presents for MRI results and follow-up. He reports symptoms have mildly improved.    Previous HPI: Evan Khoury previously seen by us for left knee pain, 11 years s/p left knee ACL reconstruction.  He presents for new right knee pain.  The pain started 1 weeks ago while skiing his ski got caught and he felt a pop inside his knee.  Episodes of instability and pain followed.  He reports similar to previous knee.  Pain symptoms have not improved. Pain is located over (points to) medial joint line and has moved to posterior knee.  He reports that the pain is a 6 /10 aching and throbbing pain today and not responding adequately to conservative measures which have included activity modifications, rest, and oral medication. Is affecting ADLs and limiting desired level of activity. Denies numbness, tingling, radiation, and inability to bear weight.  Pain is 8 /10 at its worst    Mechanical symptoms:  None recently  Subjective instability: (+)   Worse with increased activity  Better with rest.   Nocturnal symptoms: (+)    DATE OF PROCEDURE: 03/29/2011      SURGEON: Maris Velazquez MD      FIRST ASSISTANT: Jaelyn Andujar.      SECOND ASSISTANT: Vernell Choi.      PROCEDURES PERFORMED:   1. Left knee anterior cruciate ligament reconstruction.   2. Left knee medial meniscus repair.   3. Left knee arthroscopic chondroplasty.   4. Left knee arthroscopic PRP arthrocentesis. Note: PRP material was   injected at the completion of the case, injecting approximately 5 mL PRP   material directly into the knee at the tibial tunnel region      Pain  Associated symptoms include joint swelling. Pertinent negatives include no abdominal pain, chest pain, chills, congestion, coughing, fever, nausea, numbness, rash, sore throat or vomiting.       Review of Systems   Constitutional: Negative for  chills and fever.   HENT: Negative for congestion and sore throat.    Eyes: Negative for discharge and double vision.   Cardiovascular: Negative for chest pain, palpitations and syncope.   Respiratory: Negative for cough and shortness of breath.    Endocrine: Negative for cold intolerance and heat intolerance.   Skin: Negative for dry skin and rash.   Musculoskeletal: Positive for falls, joint pain and joint swelling.   Gastrointestinal: Negative for abdominal pain, nausea and vomiting.   Neurological: Negative for focal weakness, numbness and paresthesias.                   Objective:        General: Evan is well-developed, well-nourished, appears stated age, in no acute distress, alert and oriented to time, place and person.     General    Vitals reviewed.  Constitutional: He is oriented to person, place, and time. He appears well-developed and well-nourished. No distress.   Eyes: Conjunctivae and EOM are normal. Pupils are equal, round, and reactive to light.   Neck: Neck supple. No JVD present.   Cardiovascular: Normal heart sounds and intact distal pulses.    No murmur heard.  Pulmonary/Chest: Effort normal and breath sounds normal. No respiratory distress.   Abdominal: Soft. Bowel sounds are normal. He exhibits no distension. There is no abdominal tenderness.   Neurological: He is alert and oriented to person, place, and time. Coordination normal.   Psychiatric: He has a normal mood and affect. His behavior is normal. Judgment and thought content normal.     General Musculoskeletal Exam   Gait: abnormal and antalgic       Right Knee Exam     Inspection   Erythema: absent  Scars: absent  Swelling: absent  Effusion: present  Deformity: absent  Bruising: absent    Tenderness   The patient is tender to palpation of the medial joint line.    Range of Motion   Extension: 0   Flexion:  130 (+ pain) abnormal     Tests   Meniscus   Nik:  Medial - positive Lateral - negative  Ligament Examination   Lachman:  abnormal - grade IIPCL-Posterior Drawer: normal (0 to 2mm)     MCL - Valgus: abnormal - grade II  LCL - Varus: normalDial Test at 30 degrees: normal (< 5 degrees)Dial Test at 90 degrees: normal (< 5 degrees)  Posterolateral Corner: stable (+TTP)  Patella   Patellar Glide (quadrants): Lateral - 1   Medial - 2  Patellar Grind: negative    Other   Popliteal (Baker's) Cyst: absent  Sensation: normal    Left Knee Exam     Inspection   Erythema: absent  Scars: present  Swelling: absent  Effusion: absent  Deformity: absent  Bruising: absent    Range of Motion   Extension: 0   Flexion: 140     Tests   Meniscus   Nik:  Medial - negative Lateral - negative  Stability Lachman: normal (-1 to 2mm) PCL-Posterior Drawer: normal (0 to 2mm)  MCL - Valgus: normal (0 to 2mm)  LCL - Varus: normal (0 to 2mm)Dial Test at 90 degrees: normal (< 5 degrees)  Posterolateral Corner: stable  Patella   Patellar Glide (Quadrants): Lateral - 1 Medial - 2  Patellar Grind: negative    Other   Popliteal (Baker's) Cyst: absent  Sensation: normal    Right Hip Exam     Tests   Mesha: negative  Left Hip Exam     Tests   Mesha: negative          Muscle Strength   Right Lower Extremity   Hip Abduction: 5/5   Quadriceps:  4/5   Hamstrin/5   Left Lower Extremity   Hip Abduction: 5/5   Quadriceps:  4/5   Hamstrin/5     Vascular Exam     Right Pulses  Dorsalis Pedis:      2+  Posterior Tibial:      2+        Left Pulses  Dorsalis Pedis:      2+  Posterior Tibial:      2+        Edema  Right Lower Leg: absent  Left Lower Leg: absent    Radiographic Findings:    Right: No fracture dislocation bone destruction or OCD seen.     Left: There is ACL repair.  No fracture dislocation bone destruction or OCD seen.    Xrays of the knees were reviewed by me today. These findings were discussed and reviewed with the patient.    MRI right knee impression:    1. ACL rupture  2. Medial meniscus tear  3. Proximal popliteus muscle rupture      Assessment:        Encounter Diagnoses   Name Primary?    Rupture of anterior cruciate ligament of right knee, subsequent encounter Yes    Internal derangement of right knee           Plan:       Dr. Maris Velazquez was present in clinic today and directly involved in the decision making process of this clinic evaluation.       1. 1. MRI results reviewed today and discussed with Maris Velazquez MD. We reviewed with Evan KEVIN Khoury today, the pathology and natural history of his diagnosis. We have discussed a variety of treatment options including medications, physical therapy and other alternative treatments. I also explained the indications, risks and benefits of surgery. After discussion, he decided to proceed with surgery. The decision was made to go forward with      1. Right knee ACL reconstruction with hamstring autograft  2. Right knee arthroscopic menisectomy versus meniscus repair  3. Right knee arthroscopic chondroplasty  4. Right knee arthroscopic synovectomy    We also discussed, given their PMHx, medically optimization and clearance from preop center.     Booking sheet filled out, plan for February 22nd.       The details of the surgical procedure were explained, including the location of probable incisions and a description of likely hardware and/or grafts to be used.  The patient understands the likely convalescence after surgery.  Also, we have thoroughly discussed the risks, benefits and alternatives to surgery, including, but not limited to, the risk of infection, joint stiffness, blood clot (including DVT and/or pulmonary embolus), neurologic and vascular injury.  It was explained that, if tissue has been repaired or reconstructed, there is a chance of failure, which may require further management.    I made the decision to obtain old records of the patient including previous notes and imaging. I independently reviewed and interpreted lab results today as well as prior imaging.     2. Ice compress to the affected area  2-3x a day for 15-20 minutes as needed for pain management.  3. Ambulatory referral to physical therapy for prehabilitation  4. Celebrex 200 mg twice daily PRN for pain management.  5. IKDC, SF-12 and KOOS was not filled out today in clinic.     RTC in 2 weeks with Db Watkins PA-C. Patient will not fill out IKDC, SF-12 and KOOS on return.      All of the patient's questions were answered and the patient will contact us if they have any questions or concerns in the interim.              [facility] patient questionnaires have been collected today.

## 2022-02-02 NOTE — TELEPHONE ENCOUNTER
----- Message from Anila Cid MA sent at 2/2/2022  2:38 PM CST -----  Regarding: PCP Clearance for [facility] for Dr. Velazquez' Pt  Would you please reach out and schedule PCP clearance appointment with Dr. Edwards or either his NPs (Issac Barney & Matt Taveras) for  surgical patient dos 2/22/22.    Thanks   Anila Cid   Medical Assistant   [facility]

## 2022-02-04 ENCOUNTER — TELEPHONE (OUTPATIENT)
Dept: SPORTS MEDICINE | Facility: CLINIC | Age: 62
End: 2022-02-04
Payer: COMMERCIAL

## 2022-02-04 NOTE — TELEPHONE ENCOUNTER
Spoke with patient and offered him Feb 24, March 8 or March 10. He said he will need to discuss with his wife and will let us know on Monday.     Sri Dyson   Clinical Assistant to Dr. Maris Velazquez      ----- Message from Mariajose Mancini sent at 2/4/2022  1:29 PM CST -----  Who Called: Patient    What is the reqeust in detail: Requesting call back to discuss rescheduling surgery. Patient found a conflict in his schedule. Please advise.     Can the clinic reply by [facility]? No    Best Call Back Number: [phone]    Additional Information: Please advise.

## 2022-02-07 ENCOUNTER — CLINICAL SUPPORT (OUTPATIENT)
Dept: REHABILITATION | Facility: OTHER | Age: 62
End: 2022-02-07
Payer: COMMERCIAL

## 2022-02-07 DIAGNOSIS — M23.91 ACUTE INTERNAL DERANGEMENT OF KNEE, RIGHT: ICD-10-CM

## 2022-02-07 DIAGNOSIS — M25.561 ACUTE PAIN OF RIGHT KNEE: ICD-10-CM

## 2022-02-07 DIAGNOSIS — R29.898 WEAKNESS OF BOTH LOWER EXTREMITIES: ICD-10-CM

## 2022-02-07 DIAGNOSIS — M25.461 EFFUSION, RIGHT KNEE: ICD-10-CM

## 2022-02-07 PROCEDURE — 97110 THERAPEUTIC EXERCISES: CPT | Mod: PN

## 2022-02-07 PROCEDURE — 97161 PT EVAL LOW COMPLEX 20 MIN: CPT | Mod: PN

## 2022-02-07 PROCEDURE — 97530 THERAPEUTIC ACTIVITIES: CPT | Mod: PN

## 2022-02-07 NOTE — PLAN OF CARE
[facility]  Physical Therapy Initial Evaluation    Date: 2/7/2022   Name: Evan Khoury  Clinic Number: [id number]    Therapy Diagnosis:   Encounter Diagnoses   Name Primary?    Acute pain of right knee     Acute internal derangement of knee, right     Effusion, right knee     Weakness of both lower extremities      Physician: Luis Watkins, *    Physician Orders: PT Eval and Treat   Medical Diagnosis from Referral: Acute pain of right knee [M25.561], Acute internal derangement of knee, right [M23.91], Effusion, right knee [M25.461]  Evaluation Date: 2/7/2022  Authorization Period Expiration: 12/31/2022  Plan of Care Expiration: 04/04/2022  Visit # / Visits authorized: 1/ 1   Progress Note Due: 03/04/2022  FOTO: 1/ 1    Precautions: Standard    Time In: 10:15 am  Time Out: 11:00 am  Total Appointment Time (timed & untimed codes): 45 minutes    Subjective   History of current condition - Evan reports: he is 11 years s/p left knee ACL reconstruction.  He presents for new right knee pain.  The pain started a few weeks ago while skiing his ski got caught and he felt a pop inside his knee.  Episodes of instability and pain followed.  He reports similar to previous knee. Pain symptoms have not improved. Pain is located over (points to) medial joint line and has moved to posterior knee.  Denies numbness, tingling, radiation, and inability to bear weight.    UMER: Skiing accident   Any popping: no  Any Locking: no  Any buckling: occasionally   Pain radiates: no  Pain constant or intermittent: intermittent   Any injections: no    Pain:  Current 0/10, worst 2/10, best 0/10   Location: right knee   Description: Aching  Aggravating Factors: sudden turns with foot planted, descending stairs fast   Easing Factors: rest     Prior Therapy: yes after left ACL repair   Social History: lives with their spouse  Occupation: Business owner (travel business)   Prior Level of Function: independent  "  Current Level of Function: independent with feelings of instability     Pts goals: "get leg as strong as possible prior to surgery"    Imaging:  Narrative & Impression  EXAMINATION:  XR KNEE ORTHO BILAT WITH FLEXION     CLINICAL HISTORY:  Pain in right knee     FINDINGS:  Bilateral four views:     Right: No fracture dislocation bone destruction or OCD seen.     Left: There is ACL repair.  No fracture dislocation bone destruction or OCD seen.     Medical History:   Past Medical History:   Diagnosis Date    Anxiety and depression      Surgical History:   Evan Khoury  has no past surgical history on file.    Medications:   Evan has a current medication list which includes the following prescription(s): azelastine, celecoxib, cetirizine, clotrimazole, escitalopram oxalate, escitalopram oxalate, montelukast, and omeprazole.    Allergies:   Review of patient's allergies indicates:  No Known Allergies     Objective   Observation: Pt ambulates into clinic with independence and no AD     Sensation: intact to light touch    DTR: 2+    Range of Motion:   Knee Left active Left Passive   Flexion WFL WFL   Extension WFL WFL     Knee Right active Right Passive   Flexion 131* 135*   Extension 0 -2       Lower Extremity Strength   Right LE  Left LE    Knee extension: 4/5 Knee extension: 4/5   Knee flexion: 4-/5 Knee flexion: 4-/5   Hip flexion: 4/5 Hip flexion: 4/5   Hip extension:  4/5 Hip extension: 4/5   Hip abduction: 4/5 Hip abduction: 4/5   Hip adduction: 4-/5 Hip adduction 4-/5   Ankle dorsiflexion: 4+/5 Ankle dorsiflexion: 4+/5   Ankle plantarflexion: 4+/5 Ankle plantarflexion: 4+/5       Special Tests:   Right Left   Anterior Drawer Positive negative   Apley's Compression Positive Negative     Squat: High arch observed in standing but collapses with performance of partial squat  Slight weight shift to left     Single leg balance: Will assess at later date     Joint Mobility:     Patellar sup./inf: " "Hypomobile   Patellar med/lat: hypomobile     Palpation: None noted     Flexibility: decreased soft tissue flexibility    Ely's test: R = + ; L =+   Popliteal Angle: R = 32 degrees ; L = 21 degrees      CMS Impairment/Limitation/Restriction for FOTO  Survey    Therapist reviewed FOTO scores for Evan Khoury on 2/7/2022.   FOTO documents entered into [employer] - see Media section.    Limitation Score: 36%           TREATMENT   Treatment Time In: 10:40 am  Treatment Time Out: 11:00 am  Total Treatment time separate from Evaluation: 20 minutes     Evan received therapeutic exercises to develop strength, endurance, ROM and flexibility for 20 minutes including:  +Hamstring stretch 3x30"   +Bridge w/ heel dig 2x10   +Clamshell 2x10 GTB 5" hold  +SL bridge 2x10  +TKE maroon TB 2x10 5"  +Staggered stane sit<>stand 2x10    Home Exercises and Patient Education Provided    Education provided:   -HEP, POC  -Patient was educated on initial evaluation findings and expectations as well as future PT services, procedures, and expectations for optimal compliance with therapy    Written Home Exercises Provided: yes.  Exercises were reviewed and Evan was able to demonstrate them prior to the end of the session.  Evan demonstrated good  understanding of the education provided.     See EMR under Patient Instructions for exercises provided 2/7/2022.    Assessment   Evan is a 61 y.o. male referred to outpatient Physical Therapy with a medical diagnosis of Acute pain of right knee [M25.561], Acute internal derangement of knee, right [M23.91], Effusion, right knee [M25.461]. Pt presents with signs and symptoms consistent with diagnosis including weakness of right lower extremity, pain in right knee, decreased soft tissue flexibility, and decreased functional mobility tolerance. These deficits are limiting patient in full participation in ADL's and leisure activities such as ascending and descending stairs and sudden movements with foot " planted. Pt will benefit from skilled outpatient Physical Therapy to address the deficits stated above and in the chart below, provide pt/family education, and to maximize pt's level of independence.    Pt prognosis is Good.   Pt will benefit from skilled outpatient Physical Therapy to address the deficits stated above and in the chart below, provide pt/family education, and to maximize pt's level of independence.     Plan of care discussed with patient: Yes  Pt's spiritual, cultural and educational needs considered and patient is agreeable to the plan of care and goals as stated below:     Anticipated Barriers for therapy: None    Medical Necessity is demonstrated by the following  History  Co-morbidities and personal factors that may impact the plan of care Co-morbidities:   Previous ACL injury to left     Personal Factors:   no deficits     low   Examination  Body Structures and Functions, activity limitations and participation restrictions that may impact the plan of care Body Regions:   lower extremities    Body Systems:    gross symmetry  ROM  strength  balance  gait  motor control  motor learning    Participation Restrictions:   difficulty with turns,     Activity limitations:   Learning and applying knowledge  no deficits    General Tasks and Commands  no deficits    Communication  no deficits    Mobility  walking    Self care  no deficits    Domestic Life  no deficits    Interactions/Relationships  no deficits    Life Areas  no deficits    Community and Social Life  no deficits         Complexity: low   Clinical Presentation stable and uncomplicated low   Decision Making/ Complexity Score: low       GOALS: Short Term Goals: 6 weeks  1.Report decreased in pain at worse less than  <   / =  3  /10  to increase tolerance for functional mobility.On going  3. Increased right lower extremity MMT 1/2 grade to increase tolerance for ADL and work activities.On going  5.Pt to tolerate HEP to improve ROM and  independence with ADL's.On going    Long Term Goals: 12 weeks  1.Report decreased in pain at worse less than  <   / =  0/10  to increase tolerance for functional mobility. On going  2.Increased right lower extremity MMT 1 grade to increase tolerance for ADL and work activities.On going  3. Pt will report 16% on FOTO knee survey  to demonstrate increase in LE function with every day tasks. On going  4. Pt to be Independent with HEP to improve ROM and independence with ADL's. On going     Plan   Plan of care Certification: 2/7/2022 to 05/02/2022    Outpatient Physical Therapy 2 times weekly for 12 weeks to include the following interventions: Cervical/Lumbar Traction, Electrical Stimulation PRN, Gait Training, Iontophoresis (with PRN), Manual Therapy, Moist Heat/ Ice, Neuromuscular Re-ed, Patient Education, Self Care, Therapeutic Activities and Therapeutic Exercise. Dry needling Progress HEP towards D/C. Recommend F/U with MD if symptoms worsen or do not resolve. Patient may be seen by a PTA for treatment to carry out their plan of care.  Face-to-face conferences will be held.    Richard Layton, PT      I CERTIFY THE NEED FOR THESE SERVICES FURNISHED UNDER THIS PLAN OF TREATMENT AND WHILE UNDER MY CARE   Physician's comments:     Physician's Signature: ___________________________________________________

## 2022-02-08 ENCOUNTER — TELEPHONE (OUTPATIENT)
Dept: SPORTS MEDICINE | Facility: CLINIC | Age: 62
End: 2022-02-08
Payer: COMMERCIAL

## 2022-02-08 ENCOUNTER — CLINICAL SUPPORT (OUTPATIENT)
Dept: REHABILITATION | Facility: OTHER | Age: 62
End: 2022-02-08
Payer: COMMERCIAL

## 2022-02-08 DIAGNOSIS — R29.898 WEAKNESS OF BOTH LOWER EXTREMITIES: Primary | ICD-10-CM

## 2022-02-08 PROCEDURE — 97110 THERAPEUTIC EXERCISES: CPT | Mod: PN

## 2022-02-08 NOTE — PROGRESS NOTES
"  [facility]   Physical Therapy Treatment Note     Name: Evan Khoury  Clinic Number: [id number]    Therapy Diagnosis:   Encounter Diagnosis   Name Primary?    Weakness of both lower extremities Yes     Physician: Luis Watkins, *    Visit Date: 2/8/2022    Physician Orders: PT Eval and Treat   Medical Diagnosis from Referral: Acute pain of right knee [M25.561], Acute internal derangement of knee, right [M23.91], Effusion, right knee [M25.461]  Evaluation Date: 2/7/2022  Authorization Period Expiration: 12/31/2022  Plan of Care Expiration: 04/04/2022  Visit # / Visits authorized: 1/ 1   Progress Note Due: 03/04/2022  FOTO: 1/ 1    PTA Visit #: 0/5     Time In: 9:49  Time Out: 10:30  Total Billable Time: 41 minutes    SUBJECTIVE     Pt reports: minimal pain today, has to change original surgery date 2/2 to scheduling conflict.  He was compliant with home exercise program.  Response to previous treatment: fair  Functional change: none    Pain: 1/10  Location: right knee    OBJECTIVE     Objective Measures updated at progress report unless specified.     Treatment     Evan received the treatments listed below:      therapeutic exercises to develop strength, endurance, ROM, flexibility, posture and core stabilization for 45 minutes including:    Hamstring stretch 3x30"   Bridge w/ heel dig 3x10   Clamshell 2x10 GTB 5" hold  SL bridge 2x10  TKE maroon TB 2x10 5"  Staggered stane sit<>stand 2x10  SLR x30  Quad set 2 min 5" hold      Patient Education and Home Exercises     Home Exercises Provided and Patient Education Provided     Education provided:   -HEP, POC  -Patient was educated on initial evaluation findings and expectations as well as future PT services, procedures, and expectations for optimal compliance with therapy    Written Home Exercises Provided: Patient instructed to cont prior HEP. Exercises were reviewed and Evan was able to demonstrate them prior to the end of the " session.  Evan demonstrated good  understanding of the education provided. See EMR under Patient Instructions for exercises provided during therapy sessions    ASSESSMENT     Evan tolerated treatment session well. He states that he is pushing his Surgery date back 2/2 a scheduling conflict. He required multiple rest breaks throughout the session 2/2 fatigue. He was able to complete all therex with minimal cueing for form. Cont to progress R Knee strengthening exercises. Next visit update HEP. Per evaluating PT: Pt would like to get multiple exercises to complete at home in order to prehab on his own prior to surgery.     Evan Is progressing well towards his goals.   Pt prognosis is Good.     Pt will continue to benefit from skilled outpatient physical therapy to address the deficits listed in the problem list box on initial evaluation, provide pt/family education and to maximize pt's level of independence in the home and community environment.     Pt's spiritual, cultural and educational needs considered and pt agreeable to plan of care and goals.     Anticipated barriers to physical therapy: none    GOALS: Short Term Goals: 6 weeks  1.Report decreased in pain at worse less than  <   / =  3  /10  to increase tolerance for functional mobility.On going  3. Increased right lower extremity MMT 1/2 grade to increase tolerance for ADL and work activities.On going  5.Pt to tolerate HEP to improve ROM and independence with ADL's.On going     Long Term Goals: 12 weeks  1.Report decreased in pain at worse less than  <   / =  0/10  to increase tolerance for functional mobility. On going  2.Increased right lower extremity MMT 1 grade to increase tolerance for ADL and work activities.On going  3. Pt will report 16% on FOTO knee survey  to demonstrate increase in LE function with every day tasks. On going  4. Pt to be Independent with HEP to improve ROM and independence with ADL's. On going     PLAN     Cont per POC with  emphasis on strengthening RLE.     Timothy Sun, PT

## 2022-02-08 NOTE — TELEPHONE ENCOUNTER
Spoke with patient, got his surgery rescheduled to March 8. He sent Dr. Velazquez a picture of his insurance card and we will have our Ortho resident complete a peer to peer and make sure insurance information is up to date in our system.     Sri Dyson   Clinical Assistant to Dr. Maris Velazquez      ----- Message from Mariajose Mancini sent at 2/8/2022 11:21 AM CST -----  Who Called: [employer] Authorization    What is the reqeust in detail: Requesting call back to discuss authorization for surgery being denied. Dr. Velazquez has until 02/17/22 to provide additional information. Please advise.      Can the clinic reply by [facility]? N/A    Best Call Back Number: [phone]    Additional Information: Please advise.

## 2022-02-09 ENCOUNTER — TELEPHONE (OUTPATIENT)
Dept: SPORTS MEDICINE | Facility: CLINIC | Age: 62
End: 2022-02-09
Payer: COMMERCIAL

## 2022-02-09 NOTE — TELEPHONE ENCOUNTER
----- Message from Osmar Hugo MD sent at 2/9/2022 11:24 AM CST -----  Regarding: RE: p2p needed all codes denied  Bc there was no formal imaging or read uploaded I could only get the ACL 04183 approved. The confirmation code for this was [id number].     Options the doc posed to me was either 1) Continue with the current case request and resubmit the other codes after surgery (with the hope that they will be approved) or 2) Just resubmit an entirely new request.     All of this is purely dependent on getting his formal read and imaging uploaded. So could you talk to Dr. Velazquez and see what he wants to do? This is way above my paygrade yony.     Thanks  ----- Message -----  From: Sri Dyson  Sent: 2/8/2022  12:43 PM CST  To: Osmar Hugo MD  Subject: RE: p2p needed all codes denied                  Osmar,     Sent you pictures of his insurance card. Give them a call back & see if they can find him now. Let me know! Thank you    Sri Dyson   Clinical Assistant to Dr. Maris Velazquez   ----- Message -----  From: Osmar Hugo MD  Sent: 2/8/2022  10:07 AM CST  To: Sri Dyson  Subject: RE: p2p needed all codes denied                  According to the [employer] people, I need the patients member ID number in order to process the request for a P2P because the patient isn't showing up in their database. Would you be able to provide that for me?  ----- Message -----  From: Sri Dyson  Sent: 2/7/2022   4:09 PM CST  To: Osmar Hugo MD  Subject: FW: p2p needed all codes denied                  Hi Osmar,    Please see the information below regarding a peer to peer for this patient. Must be completed by 2/14. Let me know when you have it scheduled for so I can tell the pre-auth department! Thank you!    Sri Dyson   Clinical Assistant to Dr. Maris Velazquez   ----- Message -----  From: Cassie Forbes  Sent: 2/7/2022   3:59 PM CST  To: [facility] Staff  Subject: p2p needed all codes  denied                      Per [employer] cpt codes 85889, 57246, 75946, 64452 were denied a p2p can be done by calling [phone] with in 7 days   83331 Arthroscopy Knee Synovectomy Limited Spx  Right    Non-Authorized Criteria Not Met   Clinical Rationale:  Your doctor told us that you have knee pain. Your doctor wants to do surgery to remove the flexible lining of the knee joint to treat your knee pain. This lining is called synovium. This surgery is needed when this lining is inflamed from a certain condition. We reviewed the notes we received. The notes do not show that you have one of these conditions. As a result, this surgery is not medically necessary. We used [facility] Guideline titled Joint Surgery, Knee Arthroscopy and Open Procedures to make this decision. You may view this guideline at [email or web address].    76966 Arthrs Knee Debridement/Shaving Artclr Crtlg  Right    Non-Authorized Criteria Not Met   Clinical Rationale:  Your doctor told us that you have knee pain. Your doctor told us that you have a tear in the cushion of your knee. The cushion is known as the meniscus. Your doctor wants you to have surgery to repair this. We reviewed the notes we received. The notes show that you meet all the criteria to have this surgery (called a meniscectomy). Your doctor also wants to remove any damaged tissue on the surface of your bone (this is called chondroplasty). This is included as a part of the main surgery (meniscectomy). As a result, this additional surgery is not medically necessary. We used [facility] Guideline titled Joint Surgery, General Clinical Guideline - Simultaneous Ordering of Multiple Diagnostic or Therapeutic Interventions to make this decision. You may view this guideline at [email or web address].    25261 Arthroscopy Knee W/Meniscus Rpr Medial&Lateral  Right    Non-Authorized Criteria Not  Met   Clinical Rationale:  Your doctor told us that you have knee pain because you have a tear in the cushion of your knee. The cushion is called the meniscus. Your doctor wants to do surgery to treat this tear in the meniscus. This surgery is needed when special pictures of your knee, such as an MRI, show certain findings. The MRI should show a recent tear in the meniscus. We reviewed the notes we received. We have not received a report of your MRI. As a result, this surgery is not medically necessary. We used [facility] Guideline titled Joint Surgery, Knee Arthroscopy and Open Procedures to make this decision. You may view this guideline at [email or web address].    97558 Arthrs Aided Ant Cruciate Ligm Rpr/Agmntj/Rcnstj  Right    Non-Authorized Criteria Not Met   Clinical Rationale:  Your doctor told us that you have a tear in the ligament in the front of your knee. This is called the anterior cruciate ligament (ACL). Your doctor wants to do surgery to treat this torn ligament. This surgery is needed when special pictures of your knee (CT scan or MRI) have certain findings. They should show that you have a tear in the ACL. We reviewed the notes we received. We have not received a report of your CT scan or MRI. As a result, this surgery is not medically necessary. We used [facility] Guideline titled Joint Surgery, Knee Arthroscopy and Open Procedures to make this decision. You may view this guideline at [email or web address].

## 2022-02-10 ENCOUNTER — TELEPHONE (OUTPATIENT)
Dept: SPORTS MEDICINE | Facility: CLINIC | Age: 62
End: 2022-02-10
Payer: COMMERCIAL

## 2022-02-10 NOTE — TELEPHONE ENCOUNTER
Spoke with patient and told him we would be on the lookout for his radiology report.     Sri Dyson   Clinical Assistant to Dr. Maris Velazquez    ----- Message from Krystal Yates sent at 2/10/2022  3:49 PM CST -----  Regarding: Pt advice  Contact: pt @[phone]  Pt calling to speak with some one in office in reference to up coming surgery.. Pt says MRI were faxed over yesterday calling to see if they were received .. Please call

## 2022-02-11 ENCOUNTER — TELEPHONE (OUTPATIENT)
Dept: SPORTS MEDICINE | Facility: CLINIC | Age: 62
End: 2022-02-11
Payer: COMMERCIAL

## 2022-02-11 DIAGNOSIS — S83.511A COMPLETE TEAR OF ANTERIOR CRUCIATE LIGAMENT OF RIGHT KNEE, INITIAL ENCOUNTER: Primary | ICD-10-CM

## 2022-02-11 DIAGNOSIS — S83.241A ACUTE MEDIAL MENISCUS TEAR OF RIGHT KNEE, INITIAL ENCOUNTER: ICD-10-CM

## 2022-02-11 DIAGNOSIS — M94.261 CHONDROMALACIA OF RIGHT KNEE: ICD-10-CM

## 2022-02-11 DIAGNOSIS — S83.281A ACUTE LATERAL MENISCUS TEAR OF RIGHT KNEE, INITIAL ENCOUNTER: ICD-10-CM

## 2022-02-11 NOTE — TELEPHONE ENCOUNTER
Spoke with patient and let him know that his radiology report and MRI were uploaded into his chart. Canceled old case report and submitted new case.     Sri Dyson   Clinical Assistant to Dr. Maris Velazquez

## 2022-02-17 ENCOUNTER — TELEPHONE (OUTPATIENT)
Dept: SPORTS MEDICINE | Facility: CLINIC | Age: 62
End: 2022-02-17
Payer: COMMERCIAL

## 2022-02-17 ENCOUNTER — CLINICAL SUPPORT (OUTPATIENT)
Dept: REHABILITATION | Facility: OTHER | Age: 62
End: 2022-02-17
Payer: COMMERCIAL

## 2022-02-17 DIAGNOSIS — R29.898 WEAKNESS OF BOTH LOWER EXTREMITIES: ICD-10-CM

## 2022-02-17 PROCEDURE — 97110 THERAPEUTIC EXERCISES: CPT | Mod: PN

## 2022-02-17 NOTE — PROGRESS NOTES
"                            Physical Therapy Daily Treatment Note     Name: Evan Khoury  Clinic Number: [id number]    Therapy Diagnosis:   Encounter Diagnosis   Name Primary?    Weakness of both lower extremities      Physician: Luis Watkins, *    Visit Date: 2/17/2022  Physician Orders: PT Eval and Treat   Medical Diagnosis from Referral: Acute pain of right knee [M25.561], Acute internal derangement of knee, right [M23.91], Effusion, right knee [M25.461]  Evaluation Date: 2/7/2022  Authorization Period Expiration: 12/31/2022  Plan of Care Expiration: 04/04/2022  Visit # / Visits authorized: 2/ 1   Progress Note Due: 03/04/2022  FOTO: 1/ 1     PTA Visit #: 0/5     Time In: 835a  Time Out: 920a  Total Billable Time: 45 minutes    Subjective      Pt reports: he was compliant with home exercise program given last session.   Response to previous treatment: Patient states that he is doing fine.  Only has discomfort in knee with quick movements and twisting.    Pain: 0/10  Location: right knee      Objective     Evan received therapeutic exercises to develop strength, endurance, ROM and flexibility for 45 minutes including:  Bicycle - 6', L4  Step up and balance - 2 x 12, 6"  Shuttle DLP - 3 x 10, 3.5 cords  Shuttle SLP - 3 x 8, 2 black  Marching carry - 2 laps, 15# KB each hand  SL HR - 3 x 10  SLR - 10 x 10"  SL bridge - 8x5"  Wall sit - 1', 15# KB ea hand      Evan received the following manual therapy techniques for 0 minutes, including:    Home Exercises Provided and Patient Education Provided     Education provided:   Continue current HEP    Written Home Exercises Provided: Continue with current HEP  Exercises were reviewed and Evan was able to demonstrate them prior to the end of the session.      Pt received a written copy of exercises to perform at home.   See EMR under patient instructions for exercises given.     Evan demonstrated good  understanding of the education provided.     Assessment "     The patient demonstrates proper quadriceps contraction, but atrophyof muscle bulk evident as well as edema about the knee.  He tolerated loading of affected knee well and can increase hamstrings load next visit.  Evan Is progressing well towards his goals.   Pt prognosis is Excellent.     Pt will continue to benefit from skilled outpatient physical therapy to address the deficits listed in the problem list box on initial evaluation, provide pt/family education and to maximize pt's level of independence in the home and community environment.     Pt's spiritual, cultural and educational needs considered and pt agreeable to plan of care and goals.    Anticipated barriers to physical therapy: None    Goals:   Short Term Goals: 6 weeks  1.Report decreased in pain at worse less than  <   / =  3  /10  to increase tolerance for functional mobility.On going  3. Increased right lower extremity MMT 1/2 grade to increase tolerance for ADL and work activities.On going  5.Pt to tolerate HEP to improve ROM and independence with ADL's.On going     Long Term Goals: 12 weeks  1.Report decreased in pain at worse less than  <   / =  0/10  to increase tolerance for functional mobility. On going  2.Increased right lower extremity MMT 1 grade to increase tolerance for ADL and work activities.On going  3. Pt will report 16% on FOTO knee survey  to demonstrate increase in LE function with every day tasks. On going  4. Pt to be Independent with HEP to improve ROM and independence with ADL's. On going     Plan     Progress quadriceps and hamstrings strength    Jules Andres, PT

## 2022-02-17 NOTE — TELEPHONE ENCOUNTER
All questions were answered  ----- Message from Luis Watkins PA-C sent at 2/17/2022 11:49 AM CST -----  Regarding: FW: Appointments  Contact: [phone]    ----- Message -----  From: Susy Vazquez  Sent: 2/17/2022  11:04 AM CST  To: [facility] Staff  Subject: Appointments                                     Calling to speak with nurse to confirm when appointments are needed prior to procedure. Please call

## 2022-02-18 ENCOUNTER — OFFICE VISIT (OUTPATIENT)
Dept: INTERNAL MEDICINE | Facility: CLINIC | Age: 62
End: 2022-02-18
Payer: COMMERCIAL

## 2022-02-18 ENCOUNTER — OFFICE VISIT (OUTPATIENT)
Dept: SPORTS MEDICINE | Facility: CLINIC | Age: 62
End: 2022-02-18
Payer: COMMERCIAL

## 2022-02-18 VITALS
BODY MASS INDEX: 25.93 KG/M2 | TEMPERATURE: 98 F | DIASTOLIC BLOOD PRESSURE: 70 MMHG | WEIGHT: 202 LBS | OXYGEN SATURATION: 99 % | SYSTOLIC BLOOD PRESSURE: 131 MMHG | HEIGHT: 74 IN | HEART RATE: 58 BPM

## 2022-02-18 VITALS
BODY MASS INDEX: 25.8 KG/M2 | WEIGHT: 201 LBS | HEART RATE: 64 BPM | HEIGHT: 74 IN | SYSTOLIC BLOOD PRESSURE: 131 MMHG | DIASTOLIC BLOOD PRESSURE: 79 MMHG

## 2022-02-18 DIAGNOSIS — S83.511A COMPLETE TEAR OF ANTERIOR CRUCIATE LIGAMENT OF RIGHT KNEE, INITIAL ENCOUNTER: Primary | ICD-10-CM

## 2022-02-18 DIAGNOSIS — Z86.010 HISTORY OF COLONIC POLYPS: ICD-10-CM

## 2022-02-18 DIAGNOSIS — R06.83 SNORING: ICD-10-CM

## 2022-02-18 DIAGNOSIS — C61 ADENOCARCINOMA OF PROSTATE: ICD-10-CM

## 2022-02-18 DIAGNOSIS — K21.9 GASTROESOPHAGEAL REFLUX DISEASE WITHOUT ESOPHAGITIS: ICD-10-CM

## 2022-02-18 DIAGNOSIS — F32.A DEPRESSION, UNSPECIFIED DEPRESSION TYPE: ICD-10-CM

## 2022-02-18 DIAGNOSIS — S83.511S COMPLETE TEAR OF ANTERIOR CRUCIATE LIGAMENT OF RIGHT KNEE, SEQUELA: ICD-10-CM

## 2022-02-18 PROBLEM — Z86.0100 HISTORY OF COLONIC POLYPS: Status: ACTIVE | Noted: 2020-06-23

## 2022-02-18 PROBLEM — T78.40XA ALLERGIES: Status: ACTIVE | Noted: 2021-05-04

## 2022-02-18 PROCEDURE — 3078F PR MOST RECENT DIASTOLIC BLOOD PRESSURE < 80 MM HG: ICD-10-PCS | Mod: CPTII,S$GLB,, | Performed by: NURSE PRACTITIONER

## 2022-02-18 PROCEDURE — 99204 OFFICE O/P NEW MOD 45 MIN: CPT | Mod: S$GLB,,, | Performed by: NURSE PRACTITIONER

## 2022-02-18 PROCEDURE — 1160F PR REVIEW ALL MEDS BY PRESCRIBER/CLIN PHARMACIST DOCUMENTED: ICD-10-PCS | Mod: CPTII,S$GLB,, | Performed by: PHYSICIAN ASSISTANT

## 2022-02-18 PROCEDURE — 99999 PR PBB SHADOW E&M-EST. PATIENT-LVL III: CPT | Mod: PBBFAC,,, | Performed by: NURSE PRACTITIONER

## 2022-02-18 PROCEDURE — 99499 NO LOS: ICD-10-PCS | Mod: S$GLB,,, | Performed by: PHYSICIAN ASSISTANT

## 2022-02-18 PROCEDURE — 1159F PR MEDICATION LIST DOCUMENTED IN MEDICAL RECORD: ICD-10-PCS | Mod: CPTII,S$GLB,, | Performed by: NURSE PRACTITIONER

## 2022-02-18 PROCEDURE — 99204 PR OFFICE/OUTPT VISIT, NEW, LEVL IV, 45-59 MIN: ICD-10-PCS | Mod: S$GLB,,, | Performed by: NURSE PRACTITIONER

## 2022-02-18 PROCEDURE — 1159F PR MEDICATION LIST DOCUMENTED IN MEDICAL RECORD: ICD-10-PCS | Mod: CPTII,S$GLB,, | Performed by: PHYSICIAN ASSISTANT

## 2022-02-18 PROCEDURE — 1159F MED LIST DOCD IN RCRD: CPT | Mod: CPTII,S$GLB,, | Performed by: NURSE PRACTITIONER

## 2022-02-18 PROCEDURE — 3008F PR BODY MASS INDEX (BMI) DOCUMENTED: ICD-10-PCS | Mod: CPTII,S$GLB,, | Performed by: PHYSICIAN ASSISTANT

## 2022-02-18 PROCEDURE — 99999 PR PBB SHADOW E&M-EST. PATIENT-LVL III: ICD-10-PCS | Mod: PBBFAC,,, | Performed by: NURSE PRACTITIONER

## 2022-02-18 PROCEDURE — 1160F RVW MEDS BY RX/DR IN RCRD: CPT | Mod: CPTII,S$GLB,, | Performed by: PHYSICIAN ASSISTANT

## 2022-02-18 PROCEDURE — 1160F RVW MEDS BY RX/DR IN RCRD: CPT | Mod: CPTII,S$GLB,, | Performed by: NURSE PRACTITIONER

## 2022-02-18 PROCEDURE — 3075F SYST BP GE 130 - 139MM HG: CPT | Mod: CPTII,S$GLB,, | Performed by: PHYSICIAN ASSISTANT

## 2022-02-18 PROCEDURE — 99999 PR PBB SHADOW E&M-EST. PATIENT-LVL IV: ICD-10-PCS | Mod: PBBFAC,,, | Performed by: PHYSICIAN ASSISTANT

## 2022-02-18 PROCEDURE — 3078F DIAST BP <80 MM HG: CPT | Mod: CPTII,S$GLB,, | Performed by: PHYSICIAN ASSISTANT

## 2022-02-18 PROCEDURE — 99999 PR PBB SHADOW E&M-EST. PATIENT-LVL IV: CPT | Mod: PBBFAC,,, | Performed by: PHYSICIAN ASSISTANT

## 2022-02-18 PROCEDURE — 3008F BODY MASS INDEX DOCD: CPT | Mod: CPTII,S$GLB,, | Performed by: PHYSICIAN ASSISTANT

## 2022-02-18 PROCEDURE — 3078F PR MOST RECENT DIASTOLIC BLOOD PRESSURE < 80 MM HG: ICD-10-PCS | Mod: CPTII,S$GLB,, | Performed by: PHYSICIAN ASSISTANT

## 2022-02-18 PROCEDURE — 3075F SYST BP GE 130 - 139MM HG: CPT | Mod: CPTII,S$GLB,, | Performed by: NURSE PRACTITIONER

## 2022-02-18 PROCEDURE — 3075F PR MOST RECENT SYSTOLIC BLOOD PRESS GE 130-139MM HG: ICD-10-PCS | Mod: CPTII,S$GLB,, | Performed by: PHYSICIAN ASSISTANT

## 2022-02-18 PROCEDURE — 99499 UNLISTED E&M SERVICE: CPT | Mod: S$GLB,,, | Performed by: PHYSICIAN ASSISTANT

## 2022-02-18 PROCEDURE — 3075F PR MOST RECENT SYSTOLIC BLOOD PRESS GE 130-139MM HG: ICD-10-PCS | Mod: CPTII,S$GLB,, | Performed by: NURSE PRACTITIONER

## 2022-02-18 PROCEDURE — 3078F DIAST BP <80 MM HG: CPT | Mod: CPTII,S$GLB,, | Performed by: NURSE PRACTITIONER

## 2022-02-18 PROCEDURE — 1160F PR REVIEW ALL MEDS BY PRESCRIBER/CLIN PHARMACIST DOCUMENTED: ICD-10-PCS | Mod: CPTII,S$GLB,, | Performed by: NURSE PRACTITIONER

## 2022-02-18 PROCEDURE — 3008F PR BODY MASS INDEX (BMI) DOCUMENTED: ICD-10-PCS | Mod: CPTII,S$GLB,, | Performed by: NURSE PRACTITIONER

## 2022-02-18 PROCEDURE — 1159F MED LIST DOCD IN RCRD: CPT | Mod: CPTII,S$GLB,, | Performed by: PHYSICIAN ASSISTANT

## 2022-02-18 PROCEDURE — 3008F BODY MASS INDEX DOCD: CPT | Mod: CPTII,S$GLB,, | Performed by: NURSE PRACTITIONER

## 2022-02-18 RX ORDER — PROMETHAZINE HYDROCHLORIDE 25 MG/1
25 TABLET ORAL EVERY 4 HOURS PRN
Qty: 30 TABLET | Refills: 0 | Status: SHIPPED | OUTPATIENT
Start: 2022-02-18 | End: 2023-05-29

## 2022-02-18 RX ORDER — SODIUM CHLORIDE 0.9 % (FLUSH) 0.9 %
10 SYRINGE (ML) INJECTION CONTINUOUS
Status: CANCELLED | OUTPATIENT
Start: 2022-02-18

## 2022-02-18 RX ORDER — ASPIRIN 325 MG
TABLET ORAL
Qty: 42 TABLET | Refills: 0 | Status: SHIPPED | OUTPATIENT
Start: 2022-02-18

## 2022-02-18 RX ORDER — TAMSULOSIN HYDROCHLORIDE 0.4 MG/1
CAPSULE ORAL DAILY
COMMUNITY

## 2022-02-18 RX ORDER — METHOCARBAMOL 500 MG/1
500 TABLET, FILM COATED ORAL 3 TIMES DAILY PRN
Qty: 40 TABLET | Refills: 0 | Status: SHIPPED | OUTPATIENT
Start: 2022-02-18 | End: 2024-03-10

## 2022-02-18 RX ORDER — MUPIROCIN 20 MG/G
OINTMENT TOPICAL
Status: CANCELLED | OUTPATIENT
Start: 2022-02-18

## 2022-02-18 RX ORDER — CELECOXIB 200 MG/1
200 CAPSULE ORAL 2 TIMES DAILY WITH MEALS
Qty: 60 CAPSULE | Refills: 0 | Status: SHIPPED | OUTPATIENT
Start: 2022-02-18 | End: 2022-04-06 | Stop reason: SDUPTHER

## 2022-02-18 RX ORDER — ROPIVACAINE/EPI/CLONIDINE/KET 2.46-0.005
SYRINGE (ML) INJECTION ONCE
Status: CANCELLED | OUTPATIENT
Start: 2022-02-18 | End: 2022-02-18

## 2022-02-18 RX ORDER — OXYCODONE AND ACETAMINOPHEN 10; 325 MG/1; MG/1
1 TABLET ORAL EVERY 6 HOURS PRN
Qty: 28 TABLET | Refills: 0 | Status: SHIPPED | OUTPATIENT
Start: 2022-02-18 | End: 2023-05-29

## 2022-02-18 NOTE — ASSESSMENT & PLAN NOTE
"Taking Astelin nasal spray/SIngulair/Zyrtec  without relief.   Ongoing history- has seen ENT, allergist- "just dealing with problem."   "

## 2022-02-18 NOTE — PROGRESS NOTES
[facility]  [facility] Note    Patient Name: Evan Khoury  MRN: [id number]  Date of Evaluation- 02/18/2022  PCP- Vaibhav Augustin MD    Future cases for Evan Khoury [[id number]]     Case ID Status Date Time Earl Procedure Provider Location    [id number] [facility] 3/8/2022  7:00  RECONSTRUCTION, KNEE, ACL, ARTHROSCOPIC Maris Velazquez MD [[id number]] [facility]          HPI:  This is a 61 y.o. male  who presents today for a preoperative evaluation in preparation for an Orthopedic  procedure.  Scheduled for  right knee ACL reconstructions and meniscus repair.  Surgery is indicated for complete tear of ACL and lateral meniscus tear of right knee . Patient is new to me.  Details of current problem: The duration of problem is one month.  Patient was skiing when ski got caught and felt a pop inside knee.   Reports symptoms of  instability to right knee; reports limited pain.   Aggravating factors include: sharp turns, running, and increased activity.   There are no relieving factors.   Reports pain: 1-2/10 to right knee; no use of assistive devices.      The history has been obtained by speaking with the patient and reviewing the electronic medical record and/or outside health information. Significant health conditions for the perioperative period are discussed below in assessment and plan.     Patient reports current health status to be Good.  Denies any new symptoms before surgery.          Subjective/ Objective:     Chief Complaint: Preoperative evaulation, perioperative medical management, and complication reduction plan.     Functional Capacity:  rides bike as transportation- without CP/SOB.       Anesthesia issues: None    Difficulty mouth opening: No    Steroid use in the last 12 months:  No    Dental Issues: No    Family anesthesia difficulty: None       Family Hx of Thrombosis: None    Past Medical History:   Diagnosis Date    Allergy     Anxiety and depression     GERD (gastroesophageal reflux disease)           Past Medical History Pertinent Negatives:   Diagnosis Date Noted    Asthma 02/18/2022    CHF (congestive heart failure) 02/18/2022    COPD (chronic obstructive pulmonary disease) 02/18/2022    Coronary artery disease 02/18/2022    Deep vein thrombosis 02/18/2022    Diabetes mellitus, type 2 02/18/2022    Disorder of kidney and ureter 02/18/2022    Hyperlipidemia 02/18/2022    Hypertension 02/18/2022    Myocardial infarction 02/18/2022    Pulmonary embolism 02/18/2022    Seizures 02/18/2022    Stroke 02/18/2022    Thyroid disease 02/18/2022         Past Surgical History:   Procedure Laterality Date    ANTERIOR CRUCIATE LIGAMENT REPAIR Left 2011       Review of Systems   Constitutional: Negative for chills, fatigue, fever and unexpected weight change.   HENT: Positive for congestion, hearing loss and postnasal drip. Negative for dental problem, rhinorrhea, sore throat, tinnitus and trouble swallowing.    Eyes: Negative for photophobia, pain, discharge and visual disturbance.   Respiratory: Negative for apnea, cough, chest tightness, shortness of breath and wheezing.         STOP BANG risk factors:  Snoring  did not complete sleep study   Cardiovascular: Negative for chest pain, palpitations and leg swelling.   Gastrointestinal: Negative for abdominal pain, blood in stool, constipation, nausea and vomiting.        Denies Fatty liver, Hepatitis   Endocrine: Negative for cold intolerance, heat intolerance, polydipsia, polyphagia and polyuria.   Genitourinary: Positive for frequency and urgency. Negative for decreased urine volume, difficulty urinating, dysuria and hematuria.        Incomplete bladder emptying   Musculoskeletal: Negative for arthralgias, back pain, neck pain and neck stiffness.   Skin: Negative for rash and wound.   Allergic/Immunologic: Negative for immunocompromised state.   Neurological: Positive for numbness (occasional - BUE). Negative for dizziness, tremors, seizures, syncope,  "weakness and headaches.   Hematological: Negative for adenopathy. Does not bruise/bleed easily.   Psychiatric/Behavioral: Negative for confusion, hallucinations, sleep disturbance and suicidal ideas.              VITALS  Visit Vitals  /70 (BP Location: Left arm, Patient Position: Sitting)   Pulse (!) 58   Temp 98 °F (36.7 °C) (Oral)   Ht 6' 2" (1.88 m)   Wt 91.6 kg (202 lb)   SpO2 99%   BMI 25.94 kg/m²          Physical Exam  Vitals reviewed.   Constitutional:       General: He is not in acute distress.     Appearance: He is well-developed.   HENT:      Head: Normocephalic.      Nose: Nose normal.      Mouth/Throat:      Pharynx: No oropharyngeal exudate.   Eyes:      General:         Right eye: No discharge.         Left eye: No discharge.      Conjunctiva/sclera: Conjunctivae normal.      Pupils: Pupils are equal, round, and reactive to light.   Neck:      Thyroid: No thyromegaly.      Vascular: No carotid bruit or JVD.      Trachea: No tracheal deviation.   Cardiovascular:      Rate and Rhythm: Regular rhythm. Bradycardia present.      Pulses:           Carotid pulses are 2+ on the right side and 2+ on the left side.       Dorsalis pedis pulses are 2+ on the right side and 2+ on the left side.        Posterior tibial pulses are 2+ on the right side and 2+ on the left side.      Heart sounds: Normal heart sounds. No murmur heard.      Pulmonary:      Effort: Pulmonary effort is normal. No respiratory distress.      Breath sounds: Normal breath sounds. No stridor. No wheezing, rhonchi or rales.   Abdominal:      General: Bowel sounds are normal. There is no distension.      Palpations: Abdomen is soft.      Tenderness: There is no abdominal tenderness. There is no guarding.   Musculoskeletal:      Cervical back: Normal range of motion. Pain with movement (with extension, lateral movement to right, and flexion) present.      Right lower leg: No edema.      Left lower leg: No edema.   Lymphadenopathy:      " Cervical: No cervical adenopathy.   Skin:     General: Skin is warm and dry.      Capillary Refill: Capillary refill takes less than 2 seconds.      Findings: No erythema or rash.   Neurological:      Mental Status: He is alert and oriented to person, place, and time.      Coordination: Coordination normal.          Significant Labs:  WBC 4.0 - 11.0 K/uL 7.9    RBC 4.50 - 6.00 M/uL 4.77    Hgb 14.0 - 18.0 gm/dL 14.2    Hct 40.0 - 54.0 % 45.3    MCV 82 - 98 fL 95    MCH 27.0 - 31.0 pg 29.8    MCHC 31.0 - 36.0 gm/dL 31.3    RDW-SD 35.1 - 46.3 fL 43.7    RDW-CV 12.0 - 15.5 % 12.6    Platelet count 140 - 440 K/uL 259     Sodium Lvl 136 - 145 mEq/L 142    Potassium Lvl 3.5 - 5.1 mEq/L 4.5    Chloride 98 - 107 mEq/L 105    CO2 22 - 29 mEq/L 26    Anion Gap 4 - 14 mEq/L 11      BUN 6 - 23 mg/dL 16      Creatinine 0.67 - 1.17 mg/dL 1.08      eGFR-MANOLO >=60 mL/min/1.73 sq. m 74      Albumin Lvl 3.5 - 5.2 gm/dL 4.3                Imaging   CT chest 9/26/19- Care Everywhere    FINDINGS: The thoracic aorta is normal in caliber. There is a retroesophageal right subclavian artery, a developmental variant. Small axillary and mediastinal nodes. No pleural effusion. There is a calcified granuloma in the left upper lobe on coronal image 52 and axial image 32. Minimal linear atelectasis or scar in the lateral left lower lobe. Visualized portions of the upper abdomen demonstrate a few tiny hepatic hypodensities which are incompletely characterized on this noncontrast chest CT but likely represent cysts. No aggressive osseous lesion.     IMPRESSION:    No acute abnormality identified on this noncontrast exam.     Electronically Signed By: Shelli Rodriguez MD 9/26/2019 4:25 PM CDT  Exam End: 09/26/19 16:13               Active Cardiac Conditions: None      Revised Cardiac Risk Index   High -Risk Surgery  Intraperitoneal; Intrathoracic; suprainguinal vascular Yes- + 1 No- 0   History of Ischemic Heart Disease   (Hx of MI/positive exercise  "test/current chest pain due to ischemia/use of nitrate therapy/EKG with pathological Q waves) Yes- + 1 No- 0   History of CHF  (Pulmonary edema/bilateral rales or S3 gallop/PND/CXR showing pulmonary vascular redistribution) Yes- + 1 No- 0   History of CVA   (Prior stroke or TIA) Yes- + 1 No- 0   Pre-operative treatment with insulin Yes- + 1 No- 0   Pre-operative creatinine > 2mg/dl Yes- + 1 No- 0   Total: 0      Risk Status:  Estimated risk of cardiac complications after non-cardiac surgery using the Revised Cardiac Risk Index for Preoperative risk is 3.9 %      ARISCAT (Canet) risk index: Low: 1.6% risk of post-op pulmonary complications.    American Society of Anesthesiologists Physical Status classification (ASA): 2    [facility] respiratory failure index: 0.5 %           No further cardiac workup needed prior to surgery.                   Assessment/Plan:     Depression  Treated with: Lexapro; not controlled- does not feel any different.  Managed per PCP; suggest continued follow-up.   Denies Suicidal/Homicidal ideations  Recommend good sleep (7-8 hrs), healthy eating, and limit use of alcohol.     Adenocarcinoma of prostate  Positive biopsy 12/2021; followed per outside Urology.  Active surveillance for now; PSA 9.5 on 1/11/22.  LUTS: urgency. nocturia x 2, incomplete bladder emptying      History of colonic polyps  Most recent FIT negative 11/2021    Gastroesophageal reflux disease without esophagitis  Currently being treated with Omeprazole 20 mg   Encouraged to elevate HOB and avoid laying down for 2-3 hours after meals.       Allergies  Taking Astelin nasal spray/SIngulair/Zyrtec  without relief.   Ongoing history- has seen ENT, allergist- "just dealing with problem."     Snoring   Possible sleep apnea: recommend caution with sedating medication in the perioperative period.   Did not complete sleep study.     Complete tear of right ACL  Scheduled with Dr. Velazquez on 3/8/22 for right knee ACL reconstruction and " meniscus repair.         Discussion/Management of Perioperative Care    Thromboembolic prophylaxis (VTE) Care: Risk factors for thrombosis include: surgical procedure.  I recommend prophylaxis of thromboembolism with the use of compression stockings/pneumatic devices, and/or pharmacologic agents. The benefits should outweigh the risks for pharmacologic prophylaxis in the perioperative period. I also encourage early ambulation if not contraindicated during the post-operative period.    To reduce the risk of pulmonary complications, prophylactic recommendations include: incentive spirometry use/deep breathing, end tidal carbon dioxide monitoring and pain control.    I recommend monitoring sodium during the perioperative period. Pt. is currently on an SSRI which can be associated with SIADH. Surgical procedures are associated with hypersecretion of ADH as well.    To reduce the risk of postoperative renal complications, I recommend the patient maintain adequate fluid volume status by drinking 2 liters of water daily.  Avoid/reduce NSAIDS and HANSON-2 inhibitors use as well as IV contrast for renal protection.    I recommend the use of appropriate prophylactic antibiotics to reduce the risk of surgical site infections.    The patient is at an increased risk for urinary retention due to : prostate cancer with LUTS. I recommend to avoid/decrease the use of benzodiazepines, anticholinergics, and Benadryl in the perioperative period. I also recommend using opioids for the shortest period of time if possible.          This visit was focused on Preoperative evaluation, Perioperative Medical management, complication reduction plans. I suggest that the patient follows up with primary care or relevant sub specialists for ongoing health care.    I appreciate the opportunity to be involved in this patients care. Please feel free to contact me if there were any questions about this consultation.    Patient is optimized for surgery.      Issac Barney NP  Perioperative Medicine  [facility]

## 2022-02-18 NOTE — H&P (VIEW-ONLY)
Evan Khoury  is here for a completion of his perioperative paperwork. he  Is scheduled to undergo 1. Right knee ACL reconstruction with hamstring autograft  2. Right knee arthroscopic menisectomy versus meniscus repair  3. Right knee arthroscopic chondroplasty  4. Right knee arthroscopic synovectomy on 3/8/2022.      He is a healthy individual and does need clearance for this procedure.     Risks, indications and benefits of the surgical procedure were discussed with the patient. All questions with regard to surgery, rehab, expected return to functional activities, activities of daily living and recreational endeavors were answered to his satisfaction.    Discussed COVID-19 with the patient, they are aware of our current policies and procedures, were given the option of delaying surgery, and they elect to proceed.    Patient was informed and understands the risks of surgery are greater for patients with a current condition or history of heart disease, obesity, clotting disorders, recurrent infections, steroid use, current or past smoking, and factors such as sedentary lifestyle and noncompliance with medications, therapy or follow-up. The degree of the increased risk is hard to estimate with any degree of precision.    Once no other questions were asked, a brief history and physical exam was then performed.    PAST MEDICAL HISTORY:   Past Medical History:   Diagnosis Date    Anxiety and depression      PAST SURGICAL HISTORY: History reviewed. No pertinent surgical history.  FAMILY HISTORY: History reviewed. No pertinent family history.  SOCIAL HISTORY:   Social History     Socioeconomic History    Marital status:    Tobacco Use    Smoking status: Former Smoker    Smokeless tobacco: Never Used   Substance and Sexual Activity    Alcohol use: No       MEDICATIONS:   Current Outpatient Medications:     azelastine (ASTELIN) 137 mcg (0.1 %) nasal spray, Use 2 sprays in each nostril twice a day., Disp: 30  mL, Rfl: 2    celecoxib (CELEBREX) 200 MG capsule, Take 1 capsule (200 mg total) by mouth 2 (two) times daily with meals. (breakfast and dinner), Disp: 60 capsule, Rfl: 0    cetirizine (ZYRTEC) 10 MG tablet, Take 10 mg by mouth once daily., Disp: , Rfl:     clotrimazole (LOTRIMIN) 1 % cream, APPLY AA BID, Disp: , Rfl: 2    EScitalopram oxalate (LEXAPRO) 10 MG tablet, Take 20 mg by mouth., Disp: , Rfl:     EScitalopram oxalate (LEXAPRO) 20 MG tablet, Take 20 mg by mouth once daily., Disp: , Rfl:     montelukast (SINGULAIR) 10 mg tablet, Take 1 tablet (10 mg total) by mouth once daily., Disp: 30 tablet, Rfl: 3    omeprazole (PRILOSEC) 20 MG capsule, TAKE 1 CAPSULE(20 MG) BY MOUTH TWICE DAILY, Disp: 60 capsule, Rfl: 1  ALLERGIES: Review of patient's allergies indicates:  No Known Allergies    Review of Systems   Constitution: Negative. Negative for chills, fever and night sweats.   HENT: Negative for congestion and headaches.    Eyes: Negative for blurred vision, left vision loss and right vision loss.   Cardiovascular: Negative for chest pain and syncope.   Respiratory: Negative for cough and shortness of breath.    Endocrine: Negative for polydipsia, polyphagia and polyuria.   Hematologic/Lymphatic: Negative for bleeding problem. Does not bruise/bleed easily.   Skin: Negative for dry skin, itching and rash.   Musculoskeletal: Negative for falls and muscle weakness.   Gastrointestinal: Negative for abdominal pain and bowel incontinence.   Genitourinary: Negative for bladder incontinence and nocturia.   Neurological: Negative for disturbances in coordination, loss of balance and seizures.   Psychiatric/Behavioral: Negative for depression. The patient does not have insomnia.    Allergic/Immunologic: Negative for hives and persistent infections.     PHYSICAL EXAM:  GEN: A&Ox3, WD WN NAD  HEENT: WNL  CHEST: CTAB, no W/R/R  HEART: RRR, no M/R/G   ABD: Soft, NT ND, BS x4 QUADS  MS: Refer to previous note for detailed  MS exam  NEURO: CN II-XII intact       The surgical consent was then reviewed with the patient, who agreed with all the contents of the consent form and it was signed. he was instructed to wait for a phone call from the anesthesia department prior to surgery to discuss past medical history, medications, and clearance. Also, informed he may be required to get additional testing per the anesthesia department prior to having surgery.     PHYSICAL THERAPY:  He was also instructed regarding physical therapy and will begin POD # 1-3. He is doing physical therapy at [facility].    POST OP CARE:instructions were reviewed including care of the wound and dressing after surgery and when he can shower.     PAIN MANAGEMENT: Evan Khoury was also given a pain management regime, which includes the TENS unit given to him by Eris Delgadillo along with the education required for its use. He was also instructed regarding the Polar ice unit that will be in place after surgery and his postoperative pain medications.     PAIN MEDICATION:  Percocet 10/325mg 1 po q 4-6 hours prn pain  Phenergan 25 mg one p.o. q.4-6 hours p.r.n. nausea and vomiting.  Celebrex 200 mg BID  Robaxin 500mg TID PRN  Aspirin 325mg daily x 6 weeks for DVT prophylaxis starting on the evening after surgery.    Post op meds to be delivered bedside prior to discharge. Deliver to family if patient is in surgery at 5pm.   Patient denies history of seizures.     Patient will also use bilateral TEDs on lower extremities, SCDs during surgery, and early ambulation post-op. If the patient was previously taking 81mg baby aspirin, they were told to not take it will using the above stated aspirin and to restart the 81mg aspirin after completion of the aspirin dose.       Patient was also told to buy over the counter Prilosec medication and take it once daily for GI protection as long as they are taking NSAIDs or Aspirin.    DVT prophylaxis was  discussed with the patient today including risk factors for developing DVTs and history of DVTs. The patient was asked if any specific recommendations were given from the doctor/s that did pre-operative surgical clearance.      Patient was asked if they were taking or using OCP pills or devices. If they answered yes, then they were instructed to stop using OCPs at this pre-operative appointment until 2 months post-op to help prevent DVT development. They understand that there are other forms of birth control that do not involve hormones. They expressed understanding that ignoring/not following this instruction could result in a DVT which could turn into a deadly pulmonary embolism.      The patient was told that narcotic pain medications may make them drowsy and instructions were given to not sign legal documents, drive or operate heavy machinery, cars, or equipment while under the influence of narcotic medications.     As there were no other questions to be asked, he was given my business card along with Maris Velazquez MD business card if he has any questions or concerns prior to surgery or in the postop period.

## 2022-02-18 NOTE — OUTPATIENT SUBJECTIVE & OBJECTIVE
Outpatient Subjective & Objective      Chief Complaint: Preoperative evaulation, perioperative medical management, and complication reduction plan.     Functional Capacity:  rides bike as transportation- without CP/SOB.       Anesthesia issues: None    Difficulty mouth opening: No    Steroid use in the last 12 months:  No    Dental Issues: No    Family anesthesia difficulty: None       Family Hx of Thrombosis: None    Past Medical History:   Diagnosis Date    Allergy     Anxiety and depression     GERD (gastroesophageal reflux disease)          Past Medical History Pertinent Negatives:   Diagnosis Date Noted    Asthma 02/18/2022    CHF (congestive heart failure) 02/18/2022    COPD (chronic obstructive pulmonary disease) 02/18/2022    Coronary artery disease 02/18/2022    Deep vein thrombosis 02/18/2022    Diabetes mellitus, type 2 02/18/2022    Disorder of kidney and ureter 02/18/2022    Hyperlipidemia 02/18/2022    Hypertension 02/18/2022    Myocardial infarction 02/18/2022    Pulmonary embolism 02/18/2022    Seizures 02/18/2022    Stroke 02/18/2022    Thyroid disease 02/18/2022         Past Surgical History:   Procedure Laterality Date    ANTERIOR CRUCIATE LIGAMENT REPAIR Left 2011       Review of Systems   Constitutional: Negative for chills, fatigue, fever and unexpected weight change.   HENT: Positive for congestion, hearing loss and postnasal drip. Negative for dental problem, rhinorrhea, sore throat, tinnitus and trouble swallowing.    Eyes: Negative for photophobia, pain, discharge and visual disturbance.   Respiratory: Negative for apnea, cough, chest tightness, shortness of breath and wheezing.         STOP BANG risk factors:  Snoring  did not complete sleep study   Cardiovascular: Negative for chest pain, palpitations and leg swelling.   Gastrointestinal: Negative for abdominal pain, blood in stool, constipation, nausea and vomiting.        Denies Fatty liver, Hepatitis   Endocrine:  "Negative for cold intolerance, heat intolerance, polydipsia, polyphagia and polyuria.   Genitourinary: Positive for frequency and urgency. Negative for decreased urine volume, difficulty urinating, dysuria and hematuria.        Incomplete bladder emptying   Musculoskeletal: Negative for arthralgias, back pain, neck pain and neck stiffness.   Skin: Negative for rash and wound.   Allergic/Immunologic: Negative for immunocompromised state.   Neurological: Positive for numbness (occasional - BUE). Negative for dizziness, tremors, seizures, syncope, weakness and headaches.   Hematological: Negative for adenopathy. Does not bruise/bleed easily.   Psychiatric/Behavioral: Negative for confusion, hallucinations, sleep disturbance and suicidal ideas.              VITALS  Visit Vitals  /70 (BP Location: Left arm, Patient Position: Sitting)   Pulse (!) 58   Temp 98 °F (36.7 °C) (Oral)   Ht 6' 2" (1.88 m)   Wt 91.6 kg (202 lb)   SpO2 99%   BMI 25.94 kg/m²          Physical Exam  Vitals reviewed.   Constitutional:       General: He is not in acute distress.     Appearance: He is well-developed.   HENT:      Head: Normocephalic.      Nose: Nose normal.      Mouth/Throat:      Pharynx: No oropharyngeal exudate.   Eyes:      General:         Right eye: No discharge.         Left eye: No discharge.      Conjunctiva/sclera: Conjunctivae normal.      Pupils: Pupils are equal, round, and reactive to light.   Neck:      Thyroid: No thyromegaly.      Vascular: No carotid bruit or JVD.      Trachea: No tracheal deviation.   Cardiovascular:      Rate and Rhythm: Regular rhythm. Bradycardia present.      Pulses:           Carotid pulses are 2+ on the right side and 2+ on the left side.       Dorsalis pedis pulses are 2+ on the right side and 2+ on the left side.        Posterior tibial pulses are 2+ on the right side and 2+ on the left side.      Heart sounds: Normal heart sounds. No murmur heard.      Pulmonary:      Effort: Pulmonary " effort is normal. No respiratory distress.      Breath sounds: Normal breath sounds. No stridor. No wheezing, rhonchi or rales.   Abdominal:      General: Bowel sounds are normal. There is no distension.      Palpations: Abdomen is soft.      Tenderness: There is no abdominal tenderness. There is no guarding.   Musculoskeletal:      Cervical back: Normal range of motion. Pain with movement (with extension, lateral movement to right, and flexion) present.      Right lower leg: No edema.      Left lower leg: No edema.   Lymphadenopathy:      Cervical: No cervical adenopathy.   Skin:     General: Skin is warm and dry.      Capillary Refill: Capillary refill takes less than 2 seconds.      Findings: No erythema or rash.   Neurological:      Mental Status: He is alert and oriented to person, place, and time.      Coordination: Coordination normal.          Significant Labs:  WBC 4.0 - 11.0 K/uL 7.9    RBC 4.50 - 6.00 M/uL 4.77    Hgb 14.0 - 18.0 gm/dL 14.2    Hct 40.0 - 54.0 % 45.3    MCV 82 - 98 fL 95    MCH 27.0 - 31.0 pg 29.8    MCHC 31.0 - 36.0 gm/dL 31.3    RDW-SD 35.1 - 46.3 fL 43.7    RDW-CV 12.0 - 15.5 % 12.6    Platelet count 140 - 440 K/uL 259     Sodium Lvl 136 - 145 mEq/L 142    Potassium Lvl 3.5 - 5.1 mEq/L 4.5    Chloride 98 - 107 mEq/L 105    CO2 22 - 29 mEq/L 26    Anion Gap 4 - 14 mEq/L 11      BUN 6 - 23 mg/dL 16      Creatinine 0.67 - 1.17 mg/dL 1.08      eGFR-MANOLO >=60 mL/min/1.73 sq. m 74      Albumin Lvl 3.5 - 5.2 gm/dL 4.3                Imaging   CT chest 9/26/19- Care Everywhere    FINDINGS: The thoracic aorta is normal in caliber. There is a retroesophageal right subclavian artery, a developmental variant. Small axillary and mediastinal nodes. No pleural effusion. There is a calcified granuloma in the left upper lobe on coronal image 52 and axial image 32. Minimal linear atelectasis or scar in the lateral left lower lobe. Visualized portions of the upper abdomen demonstrate a few tiny hepatic  hypodensities which are incompletely characterized on this noncontrast chest CT but likely represent cysts. No aggressive osseous lesion.     IMPRESSION:    No acute abnormality identified on this noncontrast exam.     Electronically Signed By: Shelli Rodriguez MD 9/26/2019 4:25 PM CDT  Exam End: 09/26/19 16:13               Active Cardiac Conditions: None      Revised Cardiac Risk Index   High -Risk Surgery  Intraperitoneal; Intrathoracic; suprainguinal vascular Yes- + 1 No- 0   History of Ischemic Heart Disease   (Hx of MI/positive exercise test/current chest pain due to ischemia/use of nitrate therapy/EKG with pathological Q waves) Yes- + 1 No- 0   History of CHF  (Pulmonary edema/bilateral rales or S3 gallop/PND/CXR showing pulmonary vascular redistribution) Yes- + 1 No- 0   History of CVA   (Prior stroke or TIA) Yes- + 1 No- 0   Pre-operative treatment with insulin Yes- + 1 No- 0   Pre-operative creatinine > 2mg/dl Yes- + 1 No- 0   Total: 0      Risk Status:  Estimated risk of cardiac complications after non-cardiac surgery using the Revised Cardiac Risk Index for Preoperative risk is 3.9 %      ARISCAT (Canet) risk index: Low: 1.6% risk of post-op pulmonary complications.    American Society of Anesthesiologists Physical Status classification (ASA): 2    Lorriezull respiratory failure index: 0.5 %           No further cardiac workup needed prior to surgery.    Outpatient Subjective & Objective

## 2022-02-18 NOTE — ASSESSMENT & PLAN NOTE
Currently being treated with Omeprazole 20 mg   Encouraged to elevate HOB and avoid laying down for 2-3 hours after meals.

## 2022-02-18 NOTE — ASSESSMENT & PLAN NOTE
Positive biopsy 12/2021; followed per outside Urology.  Active surveillance for now; PSA 9.5 on 1/11/22.  LUTS: urgency. nocturia x 2, incomplete bladder emptying

## 2022-02-18 NOTE — HPI
This is a 61 y.o. male  who presents today for a preoperative evaluation in preparation for an Orthopedic  procedure.  Scheduled for  right knee ACL reconstructions and meniscus repair.  Surgery is indicated for complete tear of ACL and lateral meniscus tear of right knee . Patient is new to me.  Details of current problem: The duration of problem is one month.  Patient was skiing when ski got caught and felt a pop inside knee.   Reports symptoms of  instability to right knee; reports limited pain.   Aggravating factors include: sharp turns, running, and increased activity.   There are no relieving factors.   Reports pain: 1-2/10 to right knee; no use of assistive devices.      The history has been obtained by speaking with the patient and reviewing the electronic medical record and/or outside health information. Significant health conditions for the perioperative period are discussed below in assessment and plan.     Patient reports current health status to be Good.  Denies any new symptoms before surgery.

## 2022-02-18 NOTE — ASSESSMENT & PLAN NOTE
Treated with: Lexapro; not controlled- does not feel any different.  Managed per PCP; suggest continued follow-up.   Denies Suicidal/Homicidal ideations  Recommend good sleep (7-8 hrs), healthy eating, and limit use of alcohol.

## 2022-02-18 NOTE — ANESTHESIA PAT ROS NOTE
"                                                                                                             02/18/2022  Evan Khoury is a 61 y.o., male.      Pre-op Assessment    I have reviewed the Patient Summary Reports.     I have reviewed the Nursing Notes.    I have reviewed the Medications.     Review of Systems  Anesthesia Hx:  History of prior surgery of interest to airway management or planning:   Renal/:   Hx of prostate ca   Neurological:   Neuromuscular Disease,    Psych:   Psychiatric History anxiety depression             Anesthesia Assessment: Preoperative EQUATION    Planned Procedure: Procedure(s) (LRB):  RECONSTRUCTION, KNEE, ACL, ARTHROSCOPIC (Right)  REPAIR, MENISCUS, KNEE (Right)  ARTHROSCOPY, KNEE, WITH CHONDROPLASTY (Right)  SYNOVECTOMY, KNEE (Right)  Requested Anesthesia Type:General  Surgeon: Maris Velazquez MD  Service: Orthopedics  Known or anticipated Date of Surgery:3/8/2022    Surgeon notes: reviewed  No past surgical history on file.    Past Medical History:   Diagnosis Date    Anxiety and depression          Electronic Questionnaire Assessment completed via chart review in [facility] or patient provided clearances, care everywhere or outside md's offices.         In office Triage not performed. Patient interviewed via phone.                Surgical Instructions and arrival times given md office.      Medications reviewed and My chart messages sent to patient with documented instructions for day of surgery.         Allergies reviewed per EPIC      Last anesthesia:  2020, 2021    Height:  6'2"  Weight:  91.6kg    pcp pending clearance    ADDENDUM,: CLEARED PER ANNA HARKINS NP IN [facility]  "

## 2022-02-18 NOTE — ASSESSMENT & PLAN NOTE
Possible sleep apnea: recommend caution with sedating medication in the perioperative period.   Did not complete sleep study.

## 2022-02-21 ENCOUNTER — CLINICAL SUPPORT (OUTPATIENT)
Dept: REHABILITATION | Facility: OTHER | Age: 62
End: 2022-02-21
Payer: COMMERCIAL

## 2022-02-21 ENCOUNTER — TELEPHONE (OUTPATIENT)
Dept: SPORTS MEDICINE | Facility: CLINIC | Age: 62
End: 2022-02-21
Payer: COMMERCIAL

## 2022-02-21 DIAGNOSIS — R29.898 WEAKNESS OF BOTH LOWER EXTREMITIES: Primary | ICD-10-CM

## 2022-02-21 PROCEDURE — 97110 THERAPEUTIC EXERCISES: CPT | Mod: PN

## 2022-02-21 NOTE — PROGRESS NOTES
"                              Physical Therapy Daily Treatment Note     Name: Evan Khoury  Clinic Number: [id number]    Therapy Diagnosis:   Encounter Diagnosis   Name Primary?    Weakness of both lower extremities Yes     Physician: Luis Watkins, *    Visit Date: 2/21/2022  Physician Orders: PT Eval and Treat   Medical Diagnosis from Referral: Acute pain of right knee [M25.561], Acute internal derangement of knee, right [M23.91], Effusion, right knee [M25.461]  Evaluation Date: 2/7/2022  Authorization Period Expiration: 12/31/2022  Plan of Care Expiration: 04/04/2022  Visit # / Visits authorized: 3/ 1   Progress Note Due: 03/04/2022  FOTO: 1/ 1     PTA Visit #: 0/5     Time In: 8:35 AM  Time Out: 9:15 AM  Total Billable Time: 45 minutes    Subjective      Pt reports: he was not sure if he is getting stronger or not but no pain as of the moment in the right knee.     Response to previous treatment: Patient states that he is doing fine. Only has discomfort in knee with quick movements and twisting.    Pain: 0/10    Location: right knee      Objective     Evan received therapeutic exercises to develop strength, endurance, ROM and flexibility for 40 minutes including:  Bicycle - 6', L5  Step up and balance - 2 x 12, 6"  Shuttle DLP - 3 x 10, 3.5 cords  Shuttle SLP - 3 x 8, 2 black  SLR - 10 x 10"  Wall sit - 1', 15# KB ea hand  Hamstring, Quads, Gastrocnemeus and Soleus Stretching 30 seconds hold x 3   Unilateral ankle PF 10 x 3       Evan received the following manual therapy techniques for 0 minutes, including:    Home Exercises Provided and Patient Education Provided     Education provided:   Continue current HEP    Written Home Exercises Provided: Continue with current HEP  Exercises were reviewed and Evan was able to demonstrate them prior to the end of the session.      Pt received a written copy of exercises to perform at home.   See EMR under patient instructions for exercises given.     Evan " demonstrated good  understanding of the education provided.     Assessment   The patient started with stretching of the right lower extremity followed by AROME. The patient was able to tolerate all exercises and requested not to increase the resistance but after the session he increased the level of the bicycle to 5 instead of 4. The patient is getting better and needs more PT in order to reduce the risk of falls.     Evan Is progressing well towards his goals.     Pt prognosis is Excellent.     Pt will continue to benefit from skilled outpatient physical therapy to address the deficits listed in the problem list box on initial evaluation, provide pt/family education and to maximize pt's level of independence in the home and community environment.     Pt's spiritual, cultural and educational needs considered and pt agreeable to plan of care and goals.    Anticipated barriers to physical therapy: None    Goals:   Short Term Goals: 6 weeks  1.Report decreased in pain at worse less than  <   / =  3  /10  to increase tolerance for functional mobility.On going  3. Increased right lower extremity MMT 1/2 grade to increase tolerance for ADL and work activities.On going  5.Pt to tolerate HEP to improve ROM and independence with ADL's.On going     Long Term Goals: 12 weeks  1.Report decreased in pain at worse less than  <   / =  0/10  to increase tolerance for functional mobility. On going  2.Increased right lower extremity MMT 1 grade to increase tolerance for ADL and work activities.On going  3. Pt will report 16% on FOTO knee survey  to demonstrate increase in LE function with every day tasks. On going  4. Pt to be Independent with HEP to improve ROM and independence with ADL's. On going     Plan     Progress quadriceps and hamstrings strength    Lord Garland Mcbride, PT , DPT, MTC

## 2022-02-21 NOTE — TELEPHONE ENCOUNTER
LVM for patient explaining he does not need to get crutches ahead of time.     Sri Dyson   Clinical Assistant to Dr. Maris Vleazquez    ----- Message from Sri Dyson sent at 2/18/2022  4:51 PM CST -----  Regarding: FW: pt    ----- Message -----  From: Mame Sue  Sent: 2/18/2022   4:24 PM CST  To: [facility]TE[facility] Staff  Subject: pt                                               Pt is calling to speak with the nurse pt is asking if he needs to get crutches a head of time prior to his up coming surgery can you please call pt at [phone].    BASIL

## 2022-03-04 ENCOUNTER — PATIENT MESSAGE (OUTPATIENT)
Dept: SURGERY | Facility: HOSPITAL | Age: 62
End: 2022-03-04
Payer: COMMERCIAL

## 2022-03-04 ENCOUNTER — TELEPHONE (OUTPATIENT)
Dept: SPORTS MEDICINE | Facility: CLINIC | Age: 62
End: 2022-03-04
Payer: COMMERCIAL

## 2022-03-04 NOTE — TELEPHONE ENCOUNTER
Spoke to patient about his new insurance.   Patient sent new insurance information.   Told him that we may need to move his surgery if his new insurance does not authorize the insurance.   He stated he is sure it would.   Told him that we would proceed like they have authorized it but we may need to move it do to this recent change.     Will call to update.   ----- Message from Mary Chopra sent at 3/4/2022  2:37 PM CST -----  Contact: patient  Patient requesting call back In regards to speaking with someone about changing insurance before scheduled procedure.      [phone] (home)

## 2022-03-05 ENCOUNTER — LAB VISIT (OUTPATIENT)
Dept: PRIMARY CARE CLINIC | Facility: CLINIC | Age: 62
End: 2022-03-05
Payer: COMMERCIAL

## 2022-03-05 DIAGNOSIS — Z01.818 PRE-OP TESTING: ICD-10-CM

## 2022-03-05 PROCEDURE — U0005 INFEC AGEN DETEC AMPLI PROBE: HCPCS | Performed by: PHYSICIAN ASSISTANT

## 2022-03-05 PROCEDURE — U0003 INFECTIOUS AGENT DETECTION BY NUCLEIC ACID (DNA OR RNA); SEVERE ACUTE RESPIRATORY SYNDROME CORONAVIRUS 2 (SARS-COV-2) (CORONAVIRUS DISEASE [COVID-19]), AMPLIFIED PROBE TECHNIQUE, MAKING USE OF HIGH THROUGHPUT TECHNOLOGIES AS DESCRIBED BY CMS-2020-01-R: HCPCS | Performed by: PHYSICIAN ASSISTANT

## 2022-03-06 LAB
SARS-COV-2 RNA RESP QL NAA+PROBE: NOT DETECTED
SARS-COV-2- CYCLE NUMBER: NORMAL

## 2022-03-07 ENCOUNTER — ANESTHESIA EVENT (OUTPATIENT)
Dept: SURGERY | Facility: HOSPITAL | Age: 62
End: 2022-03-07
Payer: COMMERCIAL

## 2022-03-08 ENCOUNTER — HOSPITAL ENCOUNTER (OUTPATIENT)
Facility: HOSPITAL | Age: 62
Discharge: HOME | End: 2022-03-08
Attending: ORTHOPAEDIC SURGERY | Admitting: ORTHOPAEDIC SURGERY
Payer: COMMERCIAL

## 2022-03-08 ENCOUNTER — ANESTHESIA (OUTPATIENT)
Dept: SURGERY | Facility: HOSPITAL | Age: 62
End: 2022-03-08
Payer: COMMERCIAL

## 2022-03-08 VITALS
WEIGHT: 192 LBS | HEART RATE: 63 BPM | TEMPERATURE: 98 F | DIASTOLIC BLOOD PRESSURE: 74 MMHG | SYSTOLIC BLOOD PRESSURE: 123 MMHG | RESPIRATION RATE: 14 BRPM | BODY MASS INDEX: 24.64 KG/M2 | HEIGHT: 74 IN | OXYGEN SATURATION: 96 %

## 2022-03-08 DIAGNOSIS — S83.511A COMPLETE TEAR OF ANTERIOR CRUCIATE LIGAMENT OF RIGHT KNEE, INITIAL ENCOUNTER: Primary | ICD-10-CM

## 2022-03-08 PROCEDURE — 25000003 PHARM REV CODE 250: Performed by: ANESTHESIOLOGY

## 2022-03-08 PROCEDURE — 37000009 HC ANESTHESIA EA ADD 15 MINS: Performed by: ORTHOPAEDIC SURGERY

## 2022-03-08 PROCEDURE — 64448 PR NERVE BLOCK INJ, ANES/STEROID, FEMORAL, CONT INFUSION, INCL IMAG GUIDANCE: ICD-10-PCS | Mod: 59,RT,, | Performed by: ANESTHESIOLOGY

## 2022-03-08 PROCEDURE — 63600175 PHARM REV CODE 636 W HCPCS: Performed by: ORTHOPAEDIC SURGERY

## 2022-03-08 PROCEDURE — 29882 ARTHRS KNE SRG MNISC RPR M/L: CPT | Mod: 51,RT,, | Performed by: ORTHOPAEDIC SURGERY

## 2022-03-08 PROCEDURE — 29888 ARTHRS AID ACL RPR/AGMNTJ: CPT | Mod: RT,,, | Performed by: ORTHOPAEDIC SURGERY

## 2022-03-08 PROCEDURE — D9220A PRA ANESTHESIA: Mod: ,,, | Performed by: NURSE ANESTHETIST, CERTIFIED REGISTERED

## 2022-03-08 PROCEDURE — 71000039 HC RECOVERY, EACH ADD'L HOUR: Performed by: ORTHOPAEDIC SURGERY

## 2022-03-08 PROCEDURE — 76942 PR U/S GUIDANCE FOR NEEDLE GUIDANCE: ICD-10-PCS | Mod: 26,,, | Performed by: ANESTHESIOLOGY

## 2022-03-08 PROCEDURE — D9220A PRA ANESTHESIA: ICD-10-PCS | Mod: ,,, | Performed by: NURSE ANESTHETIST, CERTIFIED REGISTERED

## 2022-03-08 PROCEDURE — 63600175 PHARM REV CODE 636 W HCPCS: Performed by: ANESTHESIOLOGY

## 2022-03-08 PROCEDURE — 37000008 HC ANESTHESIA 1ST 15 MINUTES: Performed by: ORTHOPAEDIC SURGERY

## 2022-03-08 PROCEDURE — 29888 PR KNEE SCOPE,AID ANT CRUCIATE REPAIR: ICD-10-PCS | Mod: RT,,, | Performed by: ORTHOPAEDIC SURGERY

## 2022-03-08 PROCEDURE — 25000003 PHARM REV CODE 250: Performed by: ORTHOPAEDIC SURGERY

## 2022-03-08 PROCEDURE — 71000033 HC RECOVERY, INTIAL HOUR: Performed by: ORTHOPAEDIC SURGERY

## 2022-03-08 PROCEDURE — 25000003 PHARM REV CODE 250: Performed by: NURSE ANESTHETIST, CERTIFIED REGISTERED

## 2022-03-08 PROCEDURE — D9220A PRA ANESTHESIA: Mod: ,,, | Performed by: ANESTHESIOLOGY

## 2022-03-08 PROCEDURE — 94761 N-INVAS EAR/PLS OXIMETRY MLT: CPT

## 2022-03-08 PROCEDURE — 29882 PR KNEE SCOPE,MED OR LAT MENIS REPAIR: ICD-10-PCS | Mod: 51,RT,, | Performed by: ORTHOPAEDIC SURGERY

## 2022-03-08 PROCEDURE — 63600175 PHARM REV CODE 636 W HCPCS: Performed by: PHYSICIAN ASSISTANT

## 2022-03-08 PROCEDURE — 25000003 PHARM REV CODE 250: Performed by: PHYSICIAN ASSISTANT

## 2022-03-08 PROCEDURE — 64448 NJX AA&/STRD FEM NRV NFS IMG: CPT | Performed by: ANESTHESIOLOGY

## 2022-03-08 PROCEDURE — 64999 PR BLOCK, IPACK: ICD-10-PCS | Mod: ,,, | Performed by: ANESTHESIOLOGY

## 2022-03-08 PROCEDURE — C1769 GUIDE WIRE: HCPCS | Performed by: ORTHOPAEDIC SURGERY

## 2022-03-08 PROCEDURE — 36000710: Performed by: ORTHOPAEDIC SURGERY

## 2022-03-08 PROCEDURE — 63600175 PHARM REV CODE 636 W HCPCS: Performed by: NURSE ANESTHETIST, CERTIFIED REGISTERED

## 2022-03-08 PROCEDURE — 76942 ECHO GUIDE FOR BIOPSY: CPT | Mod: 26,,, | Performed by: ANESTHESIOLOGY

## 2022-03-08 PROCEDURE — 36000711: Performed by: ORTHOPAEDIC SURGERY

## 2022-03-08 PROCEDURE — C1713 ANCHOR/SCREW BN/BN,TIS/BN: HCPCS | Performed by: ORTHOPAEDIC SURGERY

## 2022-03-08 PROCEDURE — C1894 INTRO/SHEATH, NON-LASER: HCPCS | Performed by: ORTHOPAEDIC SURGERY

## 2022-03-08 PROCEDURE — 27201423 OPTIME MED/SURG SUP & DEVICES STERILE SUPPLY: Performed by: ORTHOPAEDIC SURGERY

## 2022-03-08 PROCEDURE — 64448 NJX AA&/STRD FEM NRV NFS IMG: CPT | Mod: 59,RT,, | Performed by: ANESTHESIOLOGY

## 2022-03-08 PROCEDURE — 71000015 HC POSTOP RECOV 1ST HR: Performed by: ORTHOPAEDIC SURGERY

## 2022-03-08 PROCEDURE — 64999 UNLISTED PX NERVOUS SYSTEM: CPT | Mod: ,,, | Performed by: ANESTHESIOLOGY

## 2022-03-08 PROCEDURE — D9220A PRA ANESTHESIA: ICD-10-PCS | Mod: ,,, | Performed by: ANESTHESIOLOGY

## 2022-03-08 PROCEDURE — 99900035 HC TECH TIME PER 15 MIN (STAT)

## 2022-03-08 DEVICE — DEVICE TRUESPAN MENISCAL REPAI: Type: IMPLANTABLE DEVICE | Site: KNEE | Status: FUNCTIONAL

## 2022-03-08 DEVICE — LOOP CORT RIGID FIX ADJ STD: Type: IMPLANTABLE DEVICE | Site: KNEE | Status: FUNCTIONAL

## 2022-03-08 DEVICE — SHEATH SCREW LARGE 30MM: Type: IMPLANTABLE DEVICE | Site: KNEE | Status: FUNCTIONAL

## 2022-03-08 DEVICE — SCREW BIOINTRAFIX 8-10MMX30: Type: IMPLANTABLE DEVICE | Site: KNEE | Status: FUNCTIONAL

## 2022-03-08 RX ORDER — FENTANYL CITRATE 50 UG/ML
25 INJECTION, SOLUTION INTRAMUSCULAR; INTRAVENOUS EVERY 5 MIN PRN
Status: COMPLETED | OUTPATIENT
Start: 2022-03-08 | End: 2022-03-08

## 2022-03-08 RX ORDER — NEOSTIGMINE METHYLSULFATE 0.5 MG/ML
INJECTION, SOLUTION INTRAVENOUS
Status: DISCONTINUED | OUTPATIENT
Start: 2022-03-08 | End: 2022-03-08

## 2022-03-08 RX ORDER — DEXMEDETOMIDINE HYDROCHLORIDE 100 UG/ML
INJECTION, SOLUTION INTRAVENOUS
Status: DISCONTINUED | OUTPATIENT
Start: 2022-03-08 | End: 2022-03-08

## 2022-03-08 RX ORDER — HYDROMORPHONE HYDROCHLORIDE 1 MG/ML
0.2 INJECTION, SOLUTION INTRAMUSCULAR; INTRAVENOUS; SUBCUTANEOUS EVERY 5 MIN PRN
Status: DISCONTINUED | OUTPATIENT
Start: 2022-03-08 | End: 2022-03-08 | Stop reason: HOSPADM

## 2022-03-08 RX ORDER — ONDANSETRON 2 MG/ML
4 INJECTION INTRAMUSCULAR; INTRAVENOUS DAILY PRN
Status: DISCONTINUED | OUTPATIENT
Start: 2022-03-08 | End: 2022-03-08 | Stop reason: HOSPADM

## 2022-03-08 RX ORDER — SODIUM CHLORIDE 9 MG/ML
INJECTION, SOLUTION INTRAVENOUS CONTINUOUS
Status: DISCONTINUED | OUTPATIENT
Start: 2022-03-08 | End: 2022-03-08 | Stop reason: HOSPADM

## 2022-03-08 RX ORDER — ONDANSETRON 2 MG/ML
INJECTION INTRAMUSCULAR; INTRAVENOUS
Status: DISCONTINUED | OUTPATIENT
Start: 2022-03-08 | End: 2022-03-08

## 2022-03-08 RX ORDER — SODIUM CHLORIDE 0.9 % (FLUSH) 0.9 %
10 SYRINGE (ML) INJECTION CONTINUOUS
Status: DISCONTINUED | OUTPATIENT
Start: 2022-03-08 | End: 2022-03-08 | Stop reason: HOSPADM

## 2022-03-08 RX ORDER — LIDOCAINE HYDROCHLORIDE 20 MG/ML
INJECTION INTRAVENOUS
Status: DISCONTINUED | OUTPATIENT
Start: 2022-03-08 | End: 2022-03-08

## 2022-03-08 RX ORDER — CEFAZOLIN SODIUM/WATER 2 G/20 ML
2 SYRINGE (ML) INTRAVENOUS
Status: DISCONTINUED | OUTPATIENT
Start: 2022-03-08 | End: 2022-03-08 | Stop reason: HOSPADM

## 2022-03-08 RX ORDER — ROPIVACAINE/EPI/CLONIDINE/KET 2.46-0.005
SYRINGE (ML) INJECTION ONCE
Status: DISCONTINUED | OUTPATIENT
Start: 2022-03-08 | End: 2022-03-08 | Stop reason: HOSPADM

## 2022-03-08 RX ORDER — EPINEPHRINE 1 MG/ML
INJECTION INTRAMUSCULAR; INTRAVENOUS; SUBCUTANEOUS
Status: DISCONTINUED | OUTPATIENT
Start: 2022-03-08 | End: 2022-03-08 | Stop reason: HOSPADM

## 2022-03-08 RX ORDER — CELECOXIB 200 MG/1
400 CAPSULE ORAL ONCE
Status: COMPLETED | OUTPATIENT
Start: 2022-03-08 | End: 2022-03-08

## 2022-03-08 RX ORDER — ACETAMINOPHEN 500 MG
1000 TABLET ORAL ONCE
Status: COMPLETED | OUTPATIENT
Start: 2022-03-08 | End: 2022-03-08

## 2022-03-08 RX ORDER — MIDAZOLAM HYDROCHLORIDE 1 MG/ML
0.5 INJECTION INTRAMUSCULAR; INTRAVENOUS
Status: DISCONTINUED | OUTPATIENT
Start: 2022-03-08 | End: 2022-03-08 | Stop reason: HOSPADM

## 2022-03-08 RX ORDER — ROPIVACAINE HYDROCHLORIDE 5 MG/ML
INJECTION, SOLUTION EPIDURAL; INFILTRATION; PERINEURAL
Status: COMPLETED | OUTPATIENT
Start: 2022-03-08 | End: 2022-03-08

## 2022-03-08 RX ORDER — CARBOXYMETHYLCELLULOSE SODIUM 5 MG/ML
SOLUTION/ DROPS OPHTHALMIC
Status: DISCONTINUED | OUTPATIENT
Start: 2022-03-08 | End: 2022-03-08

## 2022-03-08 RX ORDER — MUPIROCIN 20 MG/G
OINTMENT TOPICAL
Status: DISCONTINUED | OUTPATIENT
Start: 2022-03-08 | End: 2022-03-08 | Stop reason: HOSPADM

## 2022-03-08 RX ORDER — EPINEPHRINE 1 MG/ML
INJECTION, SOLUTION INTRACARDIAC; INTRAMUSCULAR; INTRAVENOUS; SUBCUTANEOUS
Status: DISCONTINUED | OUTPATIENT
Start: 2022-03-08 | End: 2022-03-08 | Stop reason: HOSPADM

## 2022-03-08 RX ORDER — FENTANYL CITRATE 50 UG/ML
25 INJECTION, SOLUTION INTRAMUSCULAR; INTRAVENOUS EVERY 5 MIN PRN
Status: DISCONTINUED | OUTPATIENT
Start: 2022-03-08 | End: 2022-03-08 | Stop reason: HOSPADM

## 2022-03-08 RX ORDER — DEXAMETHASONE SODIUM PHOSPHATE 4 MG/ML
INJECTION, SOLUTION INTRA-ARTICULAR; INTRALESIONAL; INTRAMUSCULAR; INTRAVENOUS; SOFT TISSUE
Status: DISCONTINUED | OUTPATIENT
Start: 2022-03-08 | End: 2022-03-08

## 2022-03-08 RX ORDER — KETAMINE HCL IN 0.9 % NACL 50 MG/5 ML
SYRINGE (ML) INTRAVENOUS
Status: DISCONTINUED | OUTPATIENT
Start: 2022-03-08 | End: 2022-03-08

## 2022-03-08 RX ORDER — OXYCODONE HYDROCHLORIDE 5 MG/1
5 TABLET ORAL
Status: DISCONTINUED | OUTPATIENT
Start: 2022-03-08 | End: 2022-03-08 | Stop reason: HOSPADM

## 2022-03-08 RX ORDER — PROPOFOL 10 MG/ML
VIAL (ML) INTRAVENOUS
Status: DISCONTINUED | OUTPATIENT
Start: 2022-03-08 | End: 2022-03-08

## 2022-03-08 RX ORDER — ROPIVACAINE/EPI/CLONIDINE/KET 2.46-0.005
SYRINGE (ML) INJECTION
Status: DISCONTINUED | OUTPATIENT
Start: 2022-03-08 | End: 2022-03-08 | Stop reason: HOSPADM

## 2022-03-08 RX ORDER — ROPIVACAINE HYDROCHLORIDE 2 MG/ML
INJECTION, SOLUTION EPIDURAL; INFILTRATION; PERINEURAL CONTINUOUS
Status: DISCONTINUED | OUTPATIENT
Start: 2022-03-08 | End: 2022-03-08 | Stop reason: HOSPADM

## 2022-03-08 RX ORDER — ROCURONIUM BROMIDE 10 MG/ML
INJECTION, SOLUTION INTRAVENOUS
Status: DISCONTINUED | OUTPATIENT
Start: 2022-03-08 | End: 2022-03-08

## 2022-03-08 RX ADMIN — FENTANYL CITRATE 25 MCG: 50 INJECTION INTRAMUSCULAR; INTRAVENOUS at 02:03

## 2022-03-08 RX ADMIN — SODIUM CHLORIDE: 0.9 INJECTION, SOLUTION INTRAVENOUS at 08:03

## 2022-03-08 RX ADMIN — Medication 20 MG: at 11:03

## 2022-03-08 RX ADMIN — DEXAMETHASONE SODIUM PHOSPHATE 8 MG: 4 INJECTION INTRA-ARTICULAR; INTRALESIONAL; INTRAMUSCULAR; INTRAVENOUS; SOFT TISSUE at 11:03

## 2022-03-08 RX ADMIN — MUPIROCIN: 20 OINTMENT TOPICAL at 08:03

## 2022-03-08 RX ADMIN — Medication: at 02:03

## 2022-03-08 RX ADMIN — Medication 5 MG: at 01:03

## 2022-03-08 RX ADMIN — FENTANYL CITRATE 100 MCG: 50 INJECTION, SOLUTION INTRAMUSCULAR; INTRAVENOUS at 09:03

## 2022-03-08 RX ADMIN — MIDAZOLAM HYDROCHLORIDE 2 MG: 1 INJECTION, SOLUTION INTRAMUSCULAR; INTRAVENOUS at 09:03

## 2022-03-08 RX ADMIN — DEXMEDETOMIDINE HYDROCHLORIDE 12 MCG: 100 INJECTION, SOLUTION INTRAVENOUS at 11:03

## 2022-03-08 RX ADMIN — ONDANSETRON 4 MG: 2 INJECTION, SOLUTION INTRAMUSCULAR; INTRAVENOUS at 01:03

## 2022-03-08 RX ADMIN — GLYCOPYRROLATE 0.6 MG: 0.2 INJECTION INTRAMUSCULAR; INTRAVENOUS at 01:03

## 2022-03-08 RX ADMIN — ROCURONIUM BROMIDE 50 MG: 10 INJECTION, SOLUTION INTRAVENOUS at 11:03

## 2022-03-08 RX ADMIN — FENTANYL CITRATE 100 MCG: 50 INJECTION, SOLUTION INTRAMUSCULAR; INTRAVENOUS at 11:03

## 2022-03-08 RX ADMIN — OXYCODONE 5 MG: 5 TABLET ORAL at 02:03

## 2022-03-08 RX ADMIN — ONDANSETRON 4 MG: 2 INJECTION INTRAMUSCULAR; INTRAVENOUS at 02:03

## 2022-03-08 RX ADMIN — ACETAMINOPHEN 1000 MG: 500 TABLET ORAL at 08:03

## 2022-03-08 RX ADMIN — PROPOFOL 160 MG: 10 INJECTION, EMULSION INTRAVENOUS at 11:03

## 2022-03-08 RX ADMIN — Medication 2 G: at 11:03

## 2022-03-08 RX ADMIN — SODIUM CHLORIDE, SODIUM GLUCONATE, SODIUM ACETATE, POTASSIUM CHLORIDE, MAGNESIUM CHLORIDE, SODIUM PHOSPHATE, DIBASIC, AND POTASSIUM PHOSPHATE: .53; .5; .37; .037; .03; .012; .00082 INJECTION, SOLUTION INTRAVENOUS at 11:03

## 2022-03-08 RX ADMIN — LIDOCAINE HYDROCHLORIDE 100 MG: 20 INJECTION, SOLUTION INTRAVENOUS at 11:03

## 2022-03-08 RX ADMIN — ROPIVACAINE HYDROCHLORIDE 10 ML: 5 INJECTION, SOLUTION EPIDURAL; INFILTRATION; PERINEURAL at 09:03

## 2022-03-08 RX ADMIN — NEOSTIGMINE METHYLSULFATE 3 MG: 0.5 INJECTION INTRAVENOUS at 01:03

## 2022-03-08 RX ADMIN — CARBOXYMETHYLCELLULOSE SODIUM 2 DROP: 5 SOLUTION/ DROPS OPHTHALMIC at 11:03

## 2022-03-08 RX ADMIN — CELECOXIB 400 MG: 200 CAPSULE ORAL at 08:03

## 2022-03-08 NOTE — PLAN OF CARE
VSS. Patient able to tolerate oral liquids. Patient reports tolerable pain level for discharge. Patient/family received home medication per bedside delivery. Dressing intact. Polar care intact, power adapter placed with patient belongings. Thigh TEDs intact. Patient instructed not to wear RODGER hose without wearing closed-back shoes or  socks due to increased risk of falls, verbalized understanding.  Crutches at bedside for home use. No distress noted. Patient states he is ready for discharge. Discharge instructions AND NIMBUS pump instructions reviewed with patient and family, verbalized understanding. IV discontinued with catheter tip intact. Family at bedside to help patient dress. Patient wheeled to lobby via staff.
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
## 2022-03-08 NOTE — ANESTHESIA PROCEDURE NOTES
Peripheral Block    Patient location during procedure: pre-op   Block not for primary anesthetic.  Reason for block: at surgeon's request and post-op pain management   Post-op Pain Location: R knee pain      Staffing  Authorizing Provider: Keisha Cavazos MD  Performing Provider: Keisha Cavazos MD    Preanesthetic Checklist  Completed: patient identified, IV checked, site marked, risks and benefits discussed, surgical consent, monitors and equipment checked, pre-op evaluation and timeout performed  Peripheral Block  Patient position: supine  Prep: ChloraPrep and site prepped and draped  Patient monitoring: heart rate, cardiac monitor, continuous pulse ox, continuous capnometry and frequent blood pressure checks  Block type: adductor canal  Laterality: right  Injection technique: continuous  Needle  Needle type: Tuohy   Needle gauge: 17 G  Needle length: 3.5 in  Needle localization: anatomical landmarks and ultrasound guidance  Catheter type: spring wound  Catheter size: 19 G  Test dose: lidocaine 1.5% with Epi 1-to-200,000 and negative   -ultrasound image captured on disc.  Assessment  Injection assessment: negative aspiration, negative parasthesia and local visualized surrounding nerve  Paresthesia pain: none  Heart rate change: no  Slow fractionated injection: yes  Pain Tolerance: comfortable throughout block and no complaints  Medications:    Medications: ropivacaine (NAROPIN) injection 0.5% - Perineural   10 mL - 3/8/2022 9:40:00 AM    Additional Notes  0.5% ropi with 1:[id number] epi diluted with sterile water to 0.25% ropi with epi. 20 cc administered. VSS.  [facility] RN monitoring vitals throughout procedure.  Patient tolerated procedure well.

## 2022-03-08 NOTE — BRIEF OP NOTE
[facility] - Surgery ([facility])  Brief Operative Note    Surgery Date: 3/8/2022     Surgeon(s) and Role:     * Maris Velazquez MD - Primary     * Cam Chakraborty MD - Resident - Assisting        Pre-op Diagnosis:  Complete tear of anterior cruciate ligament of right knee, initial encounter [S83.511A]  Acute lateral meniscus tear of right knee, initial encounter [S83.281A]  Chondromalacia of right knee [M94.261]  Acute medial meniscus tear of right knee, initial encounter [S83.241A]    Post-op Diagnosis:  Post-Op Diagnosis Codes:     * Complete tear of anterior cruciate ligament of right knee, initial encounter [S83.511A]     * Acute lateral meniscus tear of right knee, initial encounter [S83.281A]     * Chondromalacia of right knee [M94.261]     * Acute medial meniscus tear of right knee, initial encounter [S83.241A]    Procedure(s) (LRB):  RECONSTRUCTION, KNEE, ACL, ARTHROSCOPIC (Right)  REPAIR, MENISCUS, KNEE (Right)  SYNOVECTOMY, KNEE (Right)    Anesthesia: General    Operative Findings: as above    Estimated Blood Loss: * No values recorded between 3/8/2022 11:39 AM and 3/8/2022  1:17 PM *         Specimens:   Specimen (24h ago, onward)            None            Discharge Note    OUTCOME: Patient tolerated treatment/procedure well without complication and is now ready for discharge.    DISPOSITION: Home or Self Care    FINAL DIAGNOSIS:  Complete tear of right ACL    FOLLOWUP: In clinic       DISCHARGE INSTRUCTIONS:    Discharge Procedure Orders   Keep surgical extremity elevated     Ice to affected area     Leave dressing on - Keep it clean, dry, and intact until clinic visit     Notify your health care provider if you experience any of the following:  temperature >100.4     Notify your health care provider if you experience any of the following:  persistent nausea and vomiting or diarrhea     Notify your health care provider if you experience any of the following:  severe uncontrolled pain     Notify your  health care provider if you experience any of the following:  redness, tenderness, or signs of infection (pain, swelling, redness, odor or green/yellow discharge around incision site)     Notify your health care provider if you experience any of the following:  difficulty breathing or increased cough     Notify your health care provider if you experience any of the following:  severe persistent headache     Notify your health care provider if you experience any of the following:  worsening rash     Notify your health care provider if you experience any of the following:  persistent dizziness, light-headedness, or visual disturbances     Notify your health care provider if you experience any of the following:  increased confusion or weakness     Notify your health care provider if you experience any of the following:     Weight bearing restrictions (specify):

## 2022-03-08 NOTE — ANESTHESIA PROCEDURE NOTES
Intubation    Date/Time: 3/8/2022 11:29 AM  Performed by: Moris Simpson CRNA  Authorized by: Keisha Cavazos MD     Intubation:     Induction:  Intravenous    Intubated:  Postinduction    Mask Ventilation:  Easy mask    Attempts:  1    Attempted By:  CRNA    Method of Intubation:  Direct    Blade:  Johnson 2    Laryngeal View Grade: Grade I - full view of cords      Difficult Airway Encountered?: No      Complications:  None    Airway Device:  Oral endotracheal tube    Airway Device Size:  7.5    Style/Cuff Inflation:  Cuffed (inflated to minimal occlusive pressure)    Inflation Amount (mL):  6    Tube secured:  21    Secured at:  The lips    Placement Verified By:  Capnometry    Complicating Factors:  None    Findings Post-Intubation:  BS equal bilateral

## 2022-03-08 NOTE — ANESTHESIA POSTPROCEDURE EVALUATION
Anesthesia Post Evaluation    Patient: Evan Khoury    Procedure(s) Performed: Procedure(s) (LRB):  RECONSTRUCTION, KNEE, ACL, ARTHROSCOPIC (Right)  REPAIR, MENISCUS, KNEE (Right)  SYNOVECTOMY, KNEE (Right)    Final Anesthesia Type: general      Patient location during evaluation: PACU  Patient participation: Yes- Able to Participate  Level of consciousness: awake and alert  Post-procedure vital signs: reviewed and stable  Pain management: adequate  Airway patency: patent    PONV status at discharge: No PONV  Anesthetic complications: no      Cardiovascular status: hemodynamically stable  Respiratory status: spontaneous ventilation  Follow-up not needed.          Vitals Value Taken Time   /77 03/08/22 1517   Temp 36.8 °C (98.2 °F) 03/08/22 1445   Pulse 63 03/08/22 1530   Resp 17 03/08/22 1530   SpO2 96 % 03/08/22 1530   Vitals shown include unvalidated device data.      No case tracking events are documented in the log.      Pain/Phu Score: Pain Rating Prior to Med Admin: 8 (3/8/2022  2:53 PM)  Pain Rating Post Med Admin: 8 (3/8/2022  2:48 PM)  Phu Score: 9 (3/8/2022  1:55 PM)

## 2022-03-08 NOTE — DISCHARGE INSTRUCTIONS
[employer] CARE CUBE COLD THERAPY SYSTEM    The Polar Care Cube Cold Therapy System is simple and reliable. It is easy to use, compact design makes it great for home use. With the addition of ice and water, you will enjoy 6-8 hours of effortless cold therapy. Proper use requires an insulation barrier between the pad and the patient's skin.  Instructions on how to use the Polar Care Cube Cold Therapy System Below:

## 2022-03-08 NOTE — PLAN OF CARE
Pre op complete. Patient resting comfortably. Call light in reach. Wife not in facility. Belongings in locker. Patient needs crutches for discharge/home use.

## 2022-03-08 NOTE — TRANSFER OF CARE
"Anesthesia Transfer of Care Note    Patient: Evan Khoury    Procedure(s) Performed: Procedure(s) (LRB):  RECONSTRUCTION, KNEE, ACL, ARTHROSCOPIC (Right)  REPAIR, MENISCUS, KNEE (Right)  SYNOVECTOMY, KNEE (Right)    Patient location: PACU    Anesthesia Type: general    Transport from OR: Transported from OR on 6-10 L/min O2 by face mask with adequate spontaneous ventilation    Post pain: adequate analgesia    Post assessment: no apparent anesthetic complications    Post vital signs: stable    Level of consciousness: sedated    Nausea/Vomiting: no nausea/vomiting    Complications: none          Last vitals:   Visit Vitals  /74 (BP Location: Left arm, Patient Position: Lying)   Pulse (!) 54   Temp 36.6 °C (97.8 °F) (Oral)   Resp 16   Ht 6' 2" (1.88 m)   Wt 87.1 kg (192 lb)   SpO2 (!) 94%   BMI 24.65 kg/m²     "

## 2022-03-08 NOTE — ANESTHESIA PROCEDURE NOTES
Peripheral Block    Patient location during procedure: pre-op   Block not for primary anesthetic.  Reason for block: at surgeon's request and post-op pain management   Post-op Pain Location: R knee pain      Staffing  Authorizing Provider: Keisha Cavazos MD  Performing Provider: Keisha Cavazos MD    Preanesthetic Checklist  Completed: patient identified, IV checked, site marked, risks and benefits discussed, surgical consent, monitors and equipment checked, pre-op evaluation and timeout performed  Peripheral Block  Patient position: supine  Prep: ChloraPrep  Patient monitoring: heart rate, cardiac monitor, continuous pulse ox, continuous capnometry and frequent blood pressure checks  Block type: I PACK  Laterality: right  Injection technique: single shot  Needle  Needle type: [employer]   Needle gauge: 21 G  Needle length: 4 in  Needle localization: anatomical landmarks and ultrasound guidance  Test dose: lidocaine 1.5% with Epi 1-to-200,000 and negative   -ultrasound image captured on disc.  Assessment  Injection assessment: negative aspiration, negative parasthesia and local visualized surrounding nerve  Paresthesia pain: none  Heart rate change: no  Slow fractionated injection: yes  Pain Tolerance: comfortable throughout block and no complaints  Medications:    Medications: ropivacaine (NAROPIN) injection 0.5% - Perineural   10 mL - 3/8/2022 9:40:00 AM    Additional Notes  0.5% ropi with 1:[id number] epi diluted with sterile water to 0.25% ropi with epi. 20 cc administered. VSS.  [facility] RN monitoring vitals throughout procedure.  Patient tolerated procedure well.

## 2022-03-08 NOTE — ANESTHESIA PREPROCEDURE EVALUATION
2022  Evan Khoury is a 61 y.o., male.  Pre-operative evaluation for Procedure(s) (LRB):  RECONSTRUCTION, KNEE, ACL, ARTHROSCOPIC (Right)  REPAIR, MENISCUS, KNEE (Right)  ARTHROSCOPY, KNEE, WITH CHONDROPLASTY (Right)  SYNOVECTOMY, KNEE (Right)    Evan Khoury is a 61 y.o. male       Patient Active Problem List   Diagnosis    Acute pain of right knee    Left shoulder pain    Rotator cuff impingement syndrome    Biceps tendinopathy    Elbow sprain    Range of motion deficit    Chronic pain of left elbow    Weakness of both lower extremities    Allergies    Adenocarcinoma of prostate    Depression    History of colonic polyps    Gastroesophageal reflux disease without esophagitis    Snoring    Complete tear of right ACL       Past Surgical History:   Procedure Laterality Date    ANTERIOR CRUCIATE LIGAMENT REPAIR Left        Social History     Socioeconomic History    Marital status:    Tobacco Use    Smoking status: Former Smoker     Years: 10.00     Quit date: 1992     Years since quittin.2    Smokeless tobacco: Never Used   Substance and Sexual Activity    Alcohol use: Yes     Alcohol/week: 14.0 standard drinks     Types: 14 Glasses of wine per week     Comment: 2 glasses of wine       No current facility-administered medications on file prior to encounter.     Current Outpatient Medications on File Prior to Encounter   Medication Sig Dispense Refill    azelastine (ASTELIN) 137 mcg (0.1 %) nasal spray Use 2 sprays in each nostril twice a day. 30 mL 2    celecoxib (CELEBREX) 200 MG capsule Take 1 capsule (200 mg total) by mouth 2 (two) times daily with meals. (breakfast and dinner) 60 capsule 0    cetirizine (ZYRTEC) 10 MG tablet Take 10 mg by mouth once daily.      EScitalopram oxalate (LEXAPRO) 10 MG tablet Take 20 mg by mouth.      montelukast (SINGULAIR)  10 mg tablet Take 1 tablet (10 mg total) by mouth once daily. 30 tablet 3    omeprazole (PRILOSEC) 20 MG capsule TAKE 1 CAPSULE(20 MG) BY MOUTH TWICE DAILY 60 capsule 1       Review of patient's allergies indicates:  No Known Allergies      CBC: No results for input(s): WBC, RBC, HGB, HCT, PLT, MCV, MCH, MCHC in the last 72 hours.    CMP: No results for input(s): NA, K, CL, CO2, BUN, CREATININE, GLU, MG, PHOS, CALCIUM, ALBUMIN, PROT, ALKPHOS, ALT, AST, BILITOT in the last 72 hours.    INR  No results for input(s): PT, INR, PROTIME, APTT in the last 72 hours.      Diagnostic Studies:    EKG:   No results found for this or any previous visit.    TTE:  No results found for this or any previous visit.  No results found for: EF   No results found for this or any previous visit.    LIA:  No results found for this or any previous visit.    Stress Test:  No results found for this or any previous visit.       LHC:  No results found for this or any previous visit.       PFT:  No results found for: FEV1, FVC, ZEZ3IZJ, TLC, DLCO     ALLERGIES:   Review of patient's allergies indicates:  No Known Allergies  LDA:      Lines/Drains/Airways     None                Anesthesia Evaluation      Airway   Mallampati: II  TM distance: > 6 cm  Neck ROM: Normal ROM  Dental    (+) Intact    Pulmonary    Cardiovascular     Rate: Normal    Neuro/Psych      GI/Hepatic/Renal    (+) GERD,     Endo/Other    Abdominal                       Pre-op Assessment    I have reviewed the Patient Summary Reports.    I have reviewed the Nursing Notes. I have reviewed the NPO Status.   I have reviewed the Medications.     Review of Systems  Anesthesia Hx:  No problems with previous Anesthesia  Denies Family Hx of Anesthesia complications.   Denies Personal Hx of Anesthesia complications.   Cardiovascular:  Cardiovascular Normal     Pulmonary:  Pulmonary Normal    Renal/:  Renal/ Normal     Hepatic/GI:   GERD    Neurological:  Neurology Normal     Endocrine:  Endocrine Normal    Psych:  Psychiatric Normal           Physical Exam  General: Well nourished    Airway:  Mallampati: II   Mouth Opening: Normal  TM Distance: > 6 cm  Tongue: Normal  Neck ROM: Normal ROM    Dental:  Intact    Chest/Lungs:  Normal Respiratory Rate    Heart:  Rate: Normal        Anesthesia Plan  Type of Anesthesia, risks & benefits discussed:    Anesthesia Type: Regional, Gen Natural Airway, Gen Supraglottic Airway, Gen ETT, MAC  Intra-op Monitoring Plan: Standard ASA Monitors  Post Op Pain Control Plan: multimodal analgesia and IV/PO Opioids PRN  Induction:  IV  Airway Plan: Direct, Post-Induction  Informed Consent: Informed consent signed with the Patient and all parties understand the risks and agree with anesthesia plan.  All questions answered. Patient consented to blood products? Yes  ASA Score: 2  Day of Surgery Review of History & Physical: H&P completed by Anesthesiologist.    Ready For Surgery From Anesthesia Perspective.     .

## 2022-03-08 NOTE — PATIENT INSTRUCTIONS
[address], [place]                                                                                          [phone]                   Postoperative Instructions for Knee Surgery                 Your Surgery Included:   Open               Arthroscopic   [] Ligament Repair       [x] Diagnostic           [] ACL     [] PCL     [] MCL     [] PLLC      [x] Synovectomy / Plica Removal [] Meniscal Cartilage Repair / Transplantation      [] Lysis of Adhesions / Manipulation [] Articular Cartilage Repair      [] Interval Release           [] Cartimax       [] OATS   [] MYLES      [] Meniscectomy           [] Osteochondral Allograft      [x] Meniscal Cartilage Repair  [] Patellar Realignment       [] Debridement / Chondroplasty         [] Lateral Release   [] Ligament Repair      [] Articular Cartilage Repair          [] Extensor Mechanism             []   Microfracture  []  OATS         []  Cartilage Biopsy [] Tendon Repair          [x] Ligament Reconstruction          [] Patella                  [] Quadriceps             [x]   ACL    []   PCL  [] High Tibial Osteotomy       [] Subchondroplasty  [] Joint Replacement         [] Amniox Arthrocentesis           [] Unicompartmental   [] Patellofemoral                  Call our office ([phone]) immediately or message through [facility] if you experience any of the following:       Excessive bleeding or pus like drainage at the incision site       Uncontrollable pain not relieved by pain medication       Excessive swelling or redness at the incision site       Fever above 101.5 degrees not controlled with Tylenol or Motrin       Shortness of Breath or severe calf pain       Any foul odor or blistering from the surgery site    FOR EMERGENCIES: [facility] is the best way to contact us. If on the weekend, page the  at [phone] who will direct your call appropriately.    1.   Pain Management: A cold therapy cuff, pain  medications, local injections, TENs unit, and in some cases, regional anesthesia injections are used to manage your post-operative pain. The decision to use each of these options is based on their risks and benefits.     Medications: You were given one or more of the following medication prescriptions during your preoperative appointment. Follow the instructions on the bottles.     Narcotic Medication (usually Percocet, Norco, or Tramadol): Begin taking the medication before your knee starts to hurt. Some patients do not like to take any medication, but if you wait until your pain is severe before taking, you will be very uncomfortable for several hours waiting for the narcotic to work. Always take with food.     Nausea / Vomiting: For this issue, we prescribe Phenergan or Zofran, use this medication as directed as needed for nausea.     Cold Therapy: You may have been sent home with a [employer] Care® cold therapy unit and wrap for your knee. Fill with ice and water to the indicated fill line. You can use 20-30 minutes on then off, several times a day. This will help relieve pain and control swelling. Do not sleep with on.     Regional Anesthesia Injections (Blocks): You may have been given a regional nerve block/catheter either before or after surgery. This may make your entire leg numb for 2-5 days.                           * Proceed with caution when bearing weight on your leg.     2.   Diet: Eat a bland diet for the first day after surgery. Progress your diet as tolerated. Constipation may occur with Narcotic usage. We recommend Colace 100mg twice a day while taking narcotics.    3.   Activity: Limit your activity during the first 48 hours, keep your leg elevated with pillows under your heel. After the first 48 hours at home, increase your activity level based on your symptoms.    4.   Dressing: (a) The soft, bulky dressing will be removed on the 3rd day after surgery. Place waterproof bandages at this time. Keep  wounds as dry as possible for first 2 weeks. It is normal for some blood to be seen on the dressings. It is also normal for you to see apparent bruising on the skin around your incisions. If you are concerned by the drainage or the appearance of your wound site, you can send a picture via [facility].    5.   Shower: (a) You may shower on the 3rd day after surgery. Place waterproof bandages prior to shower. It is recommended to use Saran wrap before showering. Do not submerge limb for 4 weeks or incisions completely healed in any water.     6.   Knee Brace: You may have been sent home in a hinged knee brace. Your brace is set at 90 to 90 degrees of motion. Wear the brace for 6 weeks, LOCKED in full extension when walking, you will need to wear this brace at all time unless instructed otherwise. You may unlock at rest or for exercises.    7.       8.   Weight Bearing: You may have been sent home with crutches. If instructed (see below), use these crutches at all times unless at complete rest.      [x] Toe touch to 25% weight bearing for 2 weeks   (you may touch your toes to the floor) then 25% -50% weight bearing for 2-4 weeks            [x] Full weight bearing            []  NOW    []  after 4 week     9.  Knee Exercises: Begin these exercises the first day after surgery in order to help you regain your motion and strength. You may do the following marked exercises:     [x] Quad Sets - Begin activating your quadriceps muscle by driving your          knee downward into full knee extension while seated on a table or bed   with a towel rolled and propped under your heel     [x] Straight Leg Raise (SLR) - While avel your quadriceps muscle, lift     your fully extended leg to the level of your non-operative knee (as shown)     [] Heel Slides - With the knee straight, slide your heel slowly toward your   buttocks, hold at the endpoint for 10-15 seconds, then slowly straighten     [x] Ankle pumps - With your knee  "straight, move your ankle in a "pumping"    fashion to activate your calf and leg muscles      10.  Physical Therapy: Physical therapy is an essential component to your recovery from surgery. Your physical therapy will start in 3 days.    FIRST POSTOPERATIVE VISIT: As scheduled.       "

## 2022-03-09 NOTE — ANESTHESIA POST-OP PAIN MANAGEMENT
Acute Pain Service Progress Note  03/09/2022    Called and spoke to Evan BROWN Peng about the Nimbus pump and PNC.  Pain has been somewhat controlled. I reminded him to use the bolus function on the pump.  He voiced understanding.  He currently denies tinnitus, metallic taste in mouth, weakness in extremity.  Denies erythema, warmth, tenderness, bleeding at site of catheter entry.  Dressing c/d/i.  All questions answered.  Encouraged him to call the provided number if any issues arise.  Will follow up.          Cam Espino MD   Fellow  Regional Anesthesiology and Acute Pain Medicine  [facility]

## 2022-03-10 ENCOUNTER — CLINICAL SUPPORT (OUTPATIENT)
Dept: REHABILITATION | Facility: OTHER | Age: 62
End: 2022-03-10
Payer: COMMERCIAL

## 2022-03-10 DIAGNOSIS — R29.898 WEAKNESS OF RIGHT LOWER EXTREMITY: ICD-10-CM

## 2022-03-10 DIAGNOSIS — M25.661 DECREASED RANGE OF MOTION (ROM) OF RIGHT KNEE: ICD-10-CM

## 2022-03-10 DIAGNOSIS — R29.898 WEAKNESS OF BOTH LOWER EXTREMITIES: Primary | ICD-10-CM

## 2022-03-10 DIAGNOSIS — R26.89 IMPAIRED GAIT AND MOBILITY: ICD-10-CM

## 2022-03-10 DIAGNOSIS — S83.511A COMPLETE TEAR OF ANTERIOR CRUCIATE LIGAMENT OF RIGHT KNEE, INITIAL ENCOUNTER: ICD-10-CM

## 2022-03-10 PROCEDURE — 97161 PT EVAL LOW COMPLEX 20 MIN: CPT | Mod: PN | Performed by: PHYSICAL THERAPIST

## 2022-03-10 PROCEDURE — 97110 THERAPEUTIC EXERCISES: CPT | Mod: PN | Performed by: PHYSICAL THERAPIST

## 2022-03-10 NOTE — PLAN OF CARE
[facility]   Physical Therapy Initial Evaluation     Date: 3/10/2022   Name: Evan Khoury  Clinic Number: [id number]    Therapy Diagnosis:   1. Weakness of both lower extremities     2. Complete tear of anterior cruciate ligament of right knee, initial encounter  Ambulatory referral/consult to Physical/Occupational Therapy   3. Decreased range of motion (ROM) of right knee     4. Impaired gait and mobility     5. Weakness of right lower extremity         Physician: Luis Watkins, *    Physician Orders: PT Eval and Treat   1. Right knee ACL reconstruction with hamstring autograft 2. Right knee arthroscopic menisectomy versus meniscus repair 3. Right knee arthroscopic chondroplasty 4. Right knee arthroscopic synovectomy  Medical Diagnosis from Referral: S83.511A (ICD-10-CM) - Complete tear of anterior cruciate ligament of right knee, initial encounter  Evaluation Date: 3/10/2022  Authorization Period Expiration: 4/1/2022  Plan of Care Expiration: 12/31/2022  Progress Note Due: 4/10/2022  Visit # / Visits authorized: 1/ 1   FOTO: 1/5 ; issued 3/10/2022    Precautions: Standard     PHYSICAL THERAPY:    The patient should begin physical therapy on postoperative   day #3 and will be advanced to outpatient therapy as soon as   Possible following discharge.     Weight bearing:(TTWB or 25% 0-4 wks per MD protocol for ACL/ meniscus repair)  Range of Motion:limited to -10 degrees extension and 90 degrees flexion     No CPM required due to procedure performed      to begin quad sets with a heel roll to obtain hyperextension, straight leg raise and heel slides with the heel supported in a closed-chain fashion     Immediate specific exercises should include:   Gait program should include the above stated weightbearing; in addition: active extension with a heel-to-toe gait working on maintaining extension     Immobilizer if present should be locked @-10 degrees with gait and allowed to flex to 90  degrees at rest.     Time In: 1:45 pm  Time Out: 2:40 pm  Total Appointment Time (timed & untimed codes): 50 minutes      SUBJECTIVE     Date of onset: 3/8/2022    PROCEDURES(S) PERFORMED:   1. Right  Arthroscopy, anterior cruciate ligament reconstruction 01220   2.  Right  Arthroscopy, with meniscus repair (medial OR lateral) 69705    History of current condition - Evan reports: Injuring R knee/ ACL tear 1/2022 skiing. He's had similar injury without the meniscus repair on the L knee 11 years ago. He reports using the ice machine and pain pump since surgery. He has not set up the TENS unit at this time. Walking NBW with crutches and told by surgeon he can put up to 25% weight through his toes. Sleeping with a pillow underneath the heel/ calf (vertical) as instructed by Dr. Velazquez and having a lot of pain at night. Previously a side sleeper. He describes it as a soreness at the bend of the knee when unsupported and intermittent anterior knee pain.     Falls: none    Imaging, see chart review    Prior Therapy: yes  Social History: Lives with his wife; split level 2 story home; can enter from the garage without stairs and living on first floor with couch and bathroom. Bedroom upstairs   Occupation: owns his own business   Prior Level of Function: independent with ADL's, biking for transportation, skiing   Current Level of Function: walking with B axillary crutches    Pain:  Current 3/10, worst 9/10, best 0/10   Location: R knee  Description: sore  Aggravating Factors: knee extension  Easing Factors: pain medication and ice    Patients goals: To return to skiing and biking      Medical History:   Past Medical History:   Diagnosis Date    Allergy     Anxiety and depression     GERD (gastroesophageal reflux disease)     Prostate cancer 12/2021       Surgical History:   Evan Khoury  has a past surgical history that includes Anterior cruciate ligament repair (Left, 2011); Knee arthroscopy w/ ACL reconstruction  "(Right, 3/8/2022); Repair of meniscus of knee (Right, 3/8/2022); and Synovectomy of knee (Right, 3/8/2022).    Medications:   Evan has a current medication list which includes the following prescription(s): aspirin, azelastine, celecoxib, celecoxib, cetirizine, escitalopram oxalate, methocarbamol, montelukast, omeprazole, oxycodone-acetaminophen, promethazine, and tamsulosin.    Allergies:   Review of patient's allergies indicates:  No Known Allergies       OBJECTIVE     Observation: telescope brace donned locked in -10 deg hyperextension, ambulating NWB with B axillary crutches     Knee PROM:    L: 4-0-130 degrees   R: 0-5-58 degrees     Quadriceps contraction: fair, approximately 10 deg SLR extension lag     Deferred MMT 2/2 surgery. See note on 2/7/2022 for pre op measurements     Function:    - Sit <--> Stand:mod Indep   - Bed Mobility: indep    Joint Mobility: NT    Palpation: no TTP R calf    Sensation: grossly intact to LT    Flexibility: decreased R hamstring/ gastroc flexibility     Edema: NT; bulky surgical dressings in place        Limitation/Restriction for FOTO knee Survey    Therapist reviewed FOTO scores for Evan Khoury on 3/10/2022.   FOTO documents entered into [employer] - see Media section.    Limitation Score: 59%         TREATMENT     Total Treatment time (time-based codes) separate from Evaluation: 25 minutes      Evan received the treatments listed below:      therapeutic exercises to develop strength and ROM for 25 minutes including:    Heel prop x 5 min  Ankle pumps with towel roll under heel x 20  Quad sets 5 sec x 20 (tactile cuing for correct performance)  SLR active assist to indep x 5  Gastroc stretch with towel 30" x 3  Seated PROM R knee with PT and educ in assist with LLE for HEP x 5 min      PATIENT EDUCATION AND HOME EXERCISES     Education provided:   - HEP, role of PT and post op precautions including all passive knee flexion < 90 deg, TTWB, and brace locked during sleep/ rest/ " ambulation. Educated on importance of early active quadriceps contraction and regaining full knee extension.     Written Home Exercises Provided: yes. Exercises were reviewed and Evan was able to demonstrate them prior to the end of the session.  Evan demonstrated good  understanding of the education provided. See EMR under Patient Instructions for exercises provided during therapy sessions.    ASSESSMENT     Evan is a 61 y.o. male referred to outpatient Physical Therapy with a medical diagnosis of Complete tear of anterior cruciate ligament of right knee. Pt presents to clinic POD #3 s/p ACL semitendinosus autograft and medial meniscus repair in red-red zone. Patient presents with decreased knee Range of Motion, LE strength, post operative swelling, decreased LE flexibility, gait impairment, and decreased functional mobility.     Patient prognosis is Good.   Patientt will benefit from skilled outpatient Physical Therapy to address the deficits stated above and in the chart below, provide patient /family education, and to maximize patientt's level of independence.     Plan of care discussed with patient: Yes  Patient's spiritual, cultural and educational needs considered and patient is agreeable to the plan of care and goals as stated below:     Anticipated Barriers for therapy: none    Medical Necessity is demonstrated by the following  History  Co-morbidities and personal factors that may impact the plan of care Co-morbidities:   none    Personal Factors:   age     moderate   Examination  Body Structures and Functions, activity limitations and participation restrictions that may impact the plan of care Body Regions:   lower extremities    Body Systems:    ROM  strength  balance  gait  transfers  edema    Participation Restrictions:   Walking, driving, biking, recreational activities, stairs    Activity limitations:   Learning and applying knowledge  no deficits    General Tasks and Commands  no  deficits    Communication  no deficits    Mobility  lifting and carrying objects  walking  driving (bike, car, motorcycle)    Self care  dressing    Domestic Life  cooking  doing house work (cleaning house, washing dishes, laundry)    Interactions/Relationships  no deficits    Life Areas  employment    Community and Social Life  community life  recreation and leisure         high   Clinical Presentation stable and uncomplicated low   Decision Making/ Complexity Score: low     Goals:  Short Term Goals (6 Weeks):   1. Patient to have full extension Right (equal to left) for normalized gait pattern  2. Patient to have 120 degrees of passive left knee flexion for improved performance of ADL's such as sit<>stand transfers  3. Patient to have decreased edema left knee   4. Patient to report 2/10 pain or less in left knee with ambulating community distances  5. Patient to be compliant with home program 5x's a week as noted by proper demonstration of exercises to therapist for improved management of condition  6. Patient to ambulate full weight bearing Right lower extremity and normal gait pattern   Long Term Goals   -(12 Weeks):   1. Patient to have 4+/5 or greater strength in RLE for functional performance of ADL's such as lifting   2. Patient to descend one flight of stairs with alternating gait pattern without use of handrail and with proper LE alignment  3. Patient to have 135 degrees or greater Right knee flexion for return to ADL's including squatting   -(36 weeks):   4. Patient to have decreased subjective report of disability as noted by <20% limitation on FOTO knee questionnaire   5. Patient to perform single leg squat without assistance and without valgus collapse to improve participation in recreational   6. Patient's RLE strength to be 5/5 for return to recreational activities and sport    PLAN   Plan of care Certification: 3/10/2022 to 12/31/2022.    Outpatient Physical Therapy 2 times weekly for 36 weeks to  include the following interventions: Gait Training, Manual Therapy, Moist Heat/ Ice, Neuromuscular Re-ed, Patient Education, Therapeutic Activities and Therapeutic Exercise.     Yuliana Bullard, PT

## 2022-03-11 PROBLEM — M25.661 DECREASED RANGE OF MOTION (ROM) OF RIGHT KNEE: Status: ACTIVE | Noted: 2022-03-11

## 2022-03-11 PROBLEM — R26.89 IMPAIRED GAIT AND MOBILITY: Status: ACTIVE | Noted: 2022-03-11

## 2022-03-11 PROBLEM — R29.898 WEAKNESS OF RIGHT LOWER EXTREMITY: Status: ACTIVE | Noted: 2022-03-11

## 2022-03-12 NOTE — ANESTHESIA POST-OP PAIN MANAGEMENT
Spoke with patient today. Reports adequate pain control. Denies signs/symptoms of LAST. Reviewed removal process with patient, plans to discontinue tomorrow at 12pm and remove PNC. All questions answered. Encouraged patient to contact anesthesia or [facility] support line should any questions or concerns arise. Verbalizes understanding. Questions answered and concerns addressed.         Aracely Mccoy MD  Regional Anesthesiology and Acute Pain Medicine  [facility]  03/12/2022  12:26 PM

## 2022-03-15 ENCOUNTER — CLINICAL SUPPORT (OUTPATIENT)
Dept: REHABILITATION | Facility: OTHER | Age: 62
End: 2022-03-15
Payer: COMMERCIAL

## 2022-03-15 DIAGNOSIS — M25.661 DECREASED RANGE OF MOTION (ROM) OF RIGHT KNEE: Primary | ICD-10-CM

## 2022-03-15 DIAGNOSIS — R29.898 WEAKNESS OF RIGHT LOWER EXTREMITY: ICD-10-CM

## 2022-03-15 DIAGNOSIS — R26.89 IMPAIRED GAIT AND MOBILITY: ICD-10-CM

## 2022-03-15 DIAGNOSIS — R29.898 WEAKNESS OF BOTH LOWER EXTREMITIES: ICD-10-CM

## 2022-03-15 PROCEDURE — 97110 THERAPEUTIC EXERCISES: CPT | Mod: PN | Performed by: INTERNAL MEDICINE

## 2022-03-15 NOTE — PROGRESS NOTES
Physical Therapy Daily Treatment Note   Date: 3/10/2022   Name: Evan Khoury  Clinic Number: [id number]     Therapy Diagnosis:   1. Weakness of both lower extremities      2. Complete tear of anterior cruciate ligament of right knee, initial encounter  Ambulatory referral/consult to Physical/Occupational Therapy   3. Decreased range of motion (ROM) of right knee      4. Impaired gait and mobility      5. Weakness of right lower extremity            Physician: Luis Watkins, *     Physician Orders: PT Eval and Treat   1. Right knee ACL reconstruction with hamstring autograft 2. Right knee arthroscopic menisectomy versus meniscus repair 3. Right knee arthroscopic chondroplasty 4. Right knee arthroscopic synovectomy  Medical Diagnosis from Referral: S83.511A (ICD-10-CM) - Complete tear of anterior cruciate ligament of right knee, initial encounter  Evaluation Date: 3/10/2022  Authorization Period Expiration: 4/1/2022  Plan of Care Expiration: 12/31/2022  Progress Note Due: 4/10/2022       Visit # / Visits authorized: 1/ 1   FOTO: 1/5 ; issued 3/10/2022     Precautions: Standard      PHYSICAL THERAPY:     The patient should begin physical therapy on postoperative   day #3 and will be advanced to outpatient therapy as soon as   Possible following discharge.     Weight bearing:(TTWB or 25% 0-4 wks per MD protocol for ACL/ meniscus repair)  Range of Motion:limited to -10 degrees extension and 90 degrees flexion     No CPM required due to procedure performed      to begin quad sets with a heel roll to obtain hyperextension, straight leg raise and heel slides with the heel supported in a closed-chain fashion     Immediate specific exercises should include:   Gait program should include the above stated weightbearing; in addition: active extension with a heel-to-toe gait working on maintaining extension     Immobilizer if present should be locked @-10 degrees with gait and allowed to flex to 90 degrees at rest.     "     Visit Date: 3/15/2022       Subjective     Pt reports: .previous L ACL repair 11 yrs ago from skiing.  He did not do much HEP due to working.  He was not compliant with home exercise program.  Response to previous treatment:  Less pain  Functional change: none    Pain: 2/10  Location: right knee      Objective   Observation: telescope brace donned locked in -10 deg hyperextension, ambulating NWB with B axillary crutches      Knee PROM:     L: 4-0-130 degrees   R: 0-5- 90  degrees      Quadriceps contraction: fair, approximately 10 deg SLR extension lag      Deferred MMT 2/2 surgery. See note on 2/7/2022 for pre op measurements      Function:     - Sit <--> Stand:mod Indep   - Bed Mobility: indep        Palpation: no TTP R calf, min edma       Limitation/Restriction for FOTO knee Survey     Therapist reviewed FOTO scores for Evan Khoury on 3/10/2022.   FOTO documents entered into [employer] - see Media section.     Limitation Score: 59%          Evan received therapeutic exercises to develop strength, endurance, ROM, flexibility, posture and core stabilization for 45 minutes including:      Evan received the treatments listed below:           Heel prop x 5 min  Ankle pumps with towel roll under heel x 3 min  Quad sets 5 sec x 20 (tactile cuing for correct performance)  SLR active assist to indep   3 x 10  With brace  Side hip abd 3 x 10  With brace  Gastroc stretch with towel 30" x 3  Seated PROM R knee with PT and educ in assist with LLE for HEP x 5 min  DF/ PF OTB 3 x 10            Evan received the following manual therapy techniques: Joint mobilizations were applied to the: R patella for 5 minutes, including:  Inf/ medial patella    prom knee ext R 20 sec x 3     Home Exercises Provided and Patient Education Provided     Education provided:   Importance of rom isaura ext 4x per day for at least 5 min     Written Home Exercises Provided: Patient instructed to cont prior HEP.  Exercises were reviewed and Evan was " able to demonstrate them prior to the end of the session.  Evan demonstrated good  understanding of the education provided.     See EMR under Patient Instructions for exercises provided prior visit.    Assessment     Pt is progressing well with improved flexion. Needs to focus on ext rom at home and quad strengthening.  Evan Is progressing well towards his goals.   Pt prognosis is Good.     Pt will continue to benefit from skilled outpatient physical therapy to address the deficits listed in the problem list box on initial evaluation, provide pt/family education and to maximize pt's level of independence in the home and community environment.     Pt's spiritual, cultural and educational needs considered and pt agreeable to plan of care and goals.    Anticipated barriers to physical therapy: none         Goals:  Short Term Goals (6 Weeks):   1. Patient to have full extension Right (equal to left) for normalized gait pattern Progressing, not met  2. Patient to have 120 degrees of passive left knee flexion for improved performance of ADL's such as sit<>stand transfers Progressing, not met  3. Patient to have decreased edema left knee  Progressing, not met  4. Patient to report 2/10 pain or less in left knee with ambulating community distances Progressing, not met  5. Patient to be compliant with home program 5x's a week as noted by proper demonstration of exercises to therapist for improved management of condition Progressing, not met  6. Patient to ambulate full weight bearing Right lower extremity and normal gait pattern  Progressing, not met    Long Term Goals   -(12 Weeks):   1. Patient to have 4+/5 or greater strength in RLE for functional performance of ADL's such as lifting  Progressing, not met2. Patient to descend one flight of stairs with alternating gait pattern without use of handrail and with proper LE alignment  3. Patient to have 135 degrees or greater Right knee flexion for return to ADL's including  squatting  Progressing, not met  -(36 weeks):   4. Patient to have decreased subjective report of disability as noted by <20% limitation on FOTO knee questionnaire Progressing, not met  5. Patient to perform single leg squat without assistance and without valgus collapse to improve participation in recreational  Progressing, not met  6. Patient's RLE strength to be 5/5 for return to recreational activities and sport Progressing, not met     PLAN   Plan of care Certification: 3/10/2022 to 12/31/2022.     Outpatient Physical Therapy 2 times weekly for 35 weeks to include the following interventions: Gait Training, Manual Therapy, Moist Heat/ Ice, Neuromuscular Re-ed, Patient Education, Therapeutic Activities and Therapeutic Exercise.       Tammi Lopez, PT

## 2022-03-17 ENCOUNTER — CLINICAL SUPPORT (OUTPATIENT)
Dept: REHABILITATION | Facility: OTHER | Age: 62
End: 2022-03-17
Payer: COMMERCIAL

## 2022-03-17 DIAGNOSIS — R29.898 WEAKNESS OF RIGHT LOWER EXTREMITY: ICD-10-CM

## 2022-03-17 DIAGNOSIS — M25.661 DECREASED RANGE OF MOTION (ROM) OF RIGHT KNEE: Primary | ICD-10-CM

## 2022-03-17 DIAGNOSIS — Z47.89 AFTERCARE FOR ANTERIOR CRUCIATE LIGAMENT (ACL) REPAIR: ICD-10-CM

## 2022-03-17 PROCEDURE — 97110 THERAPEUTIC EXERCISES: CPT | Mod: PN | Performed by: INTERNAL MEDICINE

## 2022-03-17 NOTE — PROGRESS NOTES
Physical Therapy Daily Treatment Note   Date: 3/10/2022   Name: Evan Khoury  Clinic Number: [id number]     Therapy Diagnosis:   1. Weakness of both lower extremities      2. Complete tear of anterior cruciate ligament of right knee, initial encounter  Ambulatory referral/consult to Physical/Occupational Therapy   3. Decreased range of motion (ROM) of right knee      4. Impaired gait and mobility      5. Weakness of right lower extremity            Physician: Luis Watkins, *     Physician Orders: PT Eval and Treat   1. Right knee ACL reconstruction with hamstring autograft 2. Right knee arthroscopic menisectomy versus meniscus repair 3. Right knee arthroscopic chondroplasty 4. Right knee arthroscopic synovectomy  Medical Diagnosis from Referral: S83.511A (ICD-10-CM) - Complete tear of anterior cruciate ligament of right knee, initial encounter  Evaluation Date: 3/10/2022  Authorization Period Expiration: 4/1/2022  Plan of Care Expiration: 12/31/2022  Progress Note Due: 4/10/2022       Visit # / Visits authorized: 3/ 3  FOTO: 1/5 ; issued 3/10/2022     Precautions: Standard      PHYSICAL THERAPY:     The patient should begin physical therapy on postoperative   day #3 and will be advanced to outpatient therapy as soon as   Possible following discharge.     Weight bearing:(TTWB or 25% 0-4 wks per MD protocol for ACL/ meniscus repair)  Range of Motion:limited to -10 degrees extension and 90 degrees flexion     No CPM required due to procedure performed      to begin quad sets with a heel roll to obtain hyperextension, straight leg raise and heel slides with the heel supported in a closed-chain fashion     Immediate specific exercises should include:   Gait program should include the above stated weightbearing; in addition: active extension with a heel-to-toe gait working on maintaining extension     Immobilizer if present should be locked @-10 degrees with gait and allowed to flex to 90 degrees at rest.     "     Visit Date: 3/17/22  Treatment start time 9 am  End: 943Y       Subjective     Pt reports: .not taking pain med since Mon, improved. Foot was blue after standing to brush teeth yesterday and would like it checked out.  He forgot to bring in stim unit; he hasn't been using it due to needing ed.  He was more  with home exercise program.  Response to previous treatment:  Less pain  Functional change: none    Pain: 2/10  Location: right knee      Objective   Observation: telescope brace donned locked in -10 deg hyperextension, ambulating NWB  With ability to correct to TTWB on cue with B axillary crutches      Knee PROM:     L: 4-0-130 degrees   R: 0-5- 95  degrees arom seated     Quadriceps contraction: fair, approximately 10 deg SLR extension lag . Seems to have r patellar helder     Deferred MMT 2/2 surgery. See note on 2/7/2022 for pre op measurements      Function:     - Sit <--> Stand:mod Indep   - Bed Mobility: indep        Palpation: no TTP , warmth or discoloration R calf       Limitation/Restriction for FOTO knee Survey     Therapist reviewed FOTO scores for Evan Khoury on 3/10/2022.   FOTO documents entered into [employer] - see Media section.     Limitation Score: 59%          Evan received therapeutic exercises to develop strength, endurance, ROM, flexibility, posture and core stabilization for 45 minutes including:      Evan received the treatments listed below:           Heel prop x 15  min- glut sets, ankle theraband, calf stretch  Glut sets  5 sec x 20   Ankle pumps with towel roll under heel x 3 min  np  Quad sets 5 sec x 20 (tactile cuing for correct performance)  SLR active assist to indep   3 x 10  With brace  Side hip abd 3 x 10  With brace  Gastroc stretch with towel 30" x 3  Seated PROM R knee with PT and educ in assist with LLE for HEP x 5 min  DF/ PF OTB 3 x 10            Evan received the following manual therapy techniques: Joint mobilizations were applied to the: R patella for 5 minutes, " including:  Inf/ medial patella    prom knee ext R 20 sec x 3     Home Exercises Provided and Patient Education Provided     Education provided:   Importance of rom isaura ext 4x per day for at least 5 min     Written Home Exercises Provided: Patient instructed to cont prior HEP.  Exercises were reviewed and Evan was able to demonstrate them prior to the end of the session.  Evan demonstrated good  understanding of the education provided.     See EMR under Patient Instructions for exercises provided prior visit.    Assessment     Pt is progressing well with improved flexion. Needs to focus on ext rom at home and quad strengthening.  Evan Is progressing well towards his goals.   Pt prognosis is Good.     Pt will continue to benefit from skilled outpatient physical therapy to address the deficits listed in the problem list box on initial evaluation, provide pt/family education and to maximize pt's level of independence in the home and community environment.     Pt's spiritual, cultural and educational needs considered and pt agreeable to plan of care and goals.    Anticipated barriers to physical therapy: none         Goals:  Short Term Goals (6 Weeks):   1. Patient to have full extension Right (equal to left) for normalized gait pattern Progressing, not met  2. Patient to have 120 degrees of passive left knee flexion for improved performance of ADL's such as sit<>stand transfers Progressing, not met  3. Patient to have decreased edema left knee  Progressing, not met  4. Patient to report 2/10 pain or less in left knee with ambulating community distances Progressing, not met  5. Patient to be compliant with home program 5x's a week as noted by proper demonstration of exercises to therapist for improved management of condition Progressing, not met  6. Patient to ambulate full weight bearing Right lower extremity and normal gait pattern  Progressing, not met    Long Term Goals   -(12 Weeks):   1. Patient to have 4+/5 or  greater strength in RLE for functional performance of ADL's such as lifting  Progressing, not met2. Patient to descend one flight of stairs with alternating gait pattern without use of handrail and with proper LE alignment  3. Patient to have 135 degrees or greater Right knee flexion for return to ADL's including squatting  Progressing, not met  -(36 weeks):   4. Patient to have decreased subjective report of disability as noted by <20% limitation on FOTO knee questionnaire Progressing, not met  5. Patient to perform single leg squat without assistance and without valgus collapse to improve participation in recreational  Progressing, not met  6. Patient's RLE strength to be 5/5 for return to recreational activities and sport Progressing, not met     PLAN   Plan of care Certification: 3/10/2022 to 12/31/2022.     Outpatient Physical Therapy 2 times weekly for 35 weeks to include the following interventions: Gait Training, Manual Therapy, Moist Heat/ Ice, Neuromuscular Re-ed, Patient Education, Therapeutic Activities and Therapeutic Exercise.       Tammi Lopez, PT

## 2022-03-18 PROBLEM — Z47.89 AFTERCARE FOR ANTERIOR CRUCIATE LIGAMENT (ACL) REPAIR: Status: ACTIVE | Noted: 2022-03-18

## 2022-03-21 ENCOUNTER — CLINICAL SUPPORT (OUTPATIENT)
Dept: REHABILITATION | Facility: OTHER | Age: 62
End: 2022-03-21
Payer: COMMERCIAL

## 2022-03-21 DIAGNOSIS — R29.898 WEAKNESS OF BOTH LOWER EXTREMITIES: ICD-10-CM

## 2022-03-21 DIAGNOSIS — R26.89 IMPAIRED GAIT AND MOBILITY: ICD-10-CM

## 2022-03-21 DIAGNOSIS — M25.661 DECREASED RANGE OF MOTION (ROM) OF RIGHT KNEE: Primary | ICD-10-CM

## 2022-03-21 DIAGNOSIS — Z47.89 AFTERCARE FOR ANTERIOR CRUCIATE LIGAMENT (ACL) REPAIR: ICD-10-CM

## 2022-03-21 DIAGNOSIS — R29.898 WEAKNESS OF RIGHT LOWER EXTREMITY: ICD-10-CM

## 2022-03-21 PROCEDURE — 97110 THERAPEUTIC EXERCISES: CPT | Mod: PN | Performed by: INTERNAL MEDICINE

## 2022-03-21 PROCEDURE — 97014 ELECTRIC STIMULATION THERAPY: CPT | Mod: PN | Performed by: INTERNAL MEDICINE

## 2022-03-21 NOTE — PROGRESS NOTES
Physical Therapy Daily Treatment Note   Date: 3/10/2022   Name: Evan Khoury  Clinic Number: [id number]     Therapy Diagnosis:   1. Weakness of both lower extremities      2. Complete tear of anterior cruciate ligament of right knee, initial encounter  Ambulatory referral/consult to Physical/Occupational Therapy   3. Decreased range of motion (ROM) of right knee      4. Impaired gait and mobility      5. Weakness of right lower extremity            Physician: Luis Watkins, *     Physician Orders: PT Eval and Treat   1. Right knee ACL reconstruction with hamstring autograft 2. Right knee arthroscopic menisectomy versus meniscus repair 3. Right knee arthroscopic chondroplasty 4. Right knee arthroscopic synovectomy  Medical Diagnosis from Referral: S83.511A (ICD-10-CM) - Complete tear of anterior cruciate ligament of right knee, initial encounter  Evaluation Date: 3/10/2022  Authorization Period Expiration: 4/1/2022  Plan of Care Expiration: 12/31/2022  Progress Note Due: 4/10/2022       Visit # / Visits authorized: 4/20 ( no limit)  FOTO: 1/5 ; issued 3/10/2022     Precautions: Standard      PHYSICAL THERAPY:     The patient should begin physical therapy on postoperative   day #3 and will be advanced to outpatient therapy as soon as   Possible following discharge.     Weight bearing:(TTWB or 25% 0-4 wks per MD protocol for ACL/ meniscus repair)  Range of Motion:limited to -10 degrees extension and 90 degrees flexion     No CPM required due to procedure performed      to begin quad sets with a heel roll to obtain hyperextension, straight leg raise and heel slides with the heel supported in a closed-chain fashion     Immediate specific exercises should include:   Gait program should include the above stated weightbearing; in addition: active extension with a heel-to-toe gait working on maintaining extension     Immobilizer if present should be locked @-10 degrees with gait and allowed to flex to 90 degrees  at rest.         Visit Date: 3/21//22  Treatment start time 9:05 am  End: 10 am       Subjective     Pt reports:   Reports he fell yesterday at a picnic landing backward. He states he fell due to trying to do too many things at once. He was wearing his brace and does not think he injured his knee.  He is questioning WB status since 2 weeks has passed and doesn't recall post op instructions. He has not been using his estim unit due to not knowing how to use it and will try to remember to bring it next time.   He was more compliant  with home exercise program.  Response to previous treatment:  Less pain  Functional change: none    Pain: 2/10  Location: right knee      Objective   Observation: telescope brace donned locked in -10 deg hyperextension, ambulating NWB  With ability to correct to TTWB on cue with B axillary crutches      Knee PROM:     L: 4-0-130 degrees   R: 0-5- 95  degrees arom seated     Quadriceps contraction: fair, approximately 10 deg SLR extension lag . Seems to have r patellar helder with pain with flex     Deferred MMT 2/2 surgery. See note on 2/7/2022 for pre op measurements      Function:     - Sit <--> Stand:mod Indep NWB  - Bed Mobility: indep        Palpation: no TTP , warmth or discoloration R calf, non pitting edema around patella.       Limitation/Restriction for FOTO knee Survey     Therapist reviewed FOTO scores for Evan Khoury on 3/10/2022.   FOTO documents entered into [employer] - see Media section.     Limitation Score: 59%          Evan received therapeutic exercises to develop strength, endurance, ROM, flexibility, posture and core stabilization for 45 minutes including:      Evan received the treatments listed below:           Heel prop x 15  min- glut sets, ankle theraband, calf stretch  Glut sets  5 sec x 20   Ankle pumps with towel roll under heel x 3 min  np  Quad sets 5 sec x 20 (tactile cuing for correct performance) [place] 10 on / 20 off for 15 min . Pt asked if he could  "adjust machine. Ed to increase intensity slowly and only while stim was being delivered.   SLR  3 x 10  With brace  Side hip abd 3 x 10  With brace  Gastroc stretch with towel 30" x 3  Seated self aROM R knee  20 sec x 4   DF/ PF gTB 3 x 10        Received [place] estim x 15 min as noted above for decreasing edema, improving knee strength 10/20 cycle. Pt ed to contract quads during stim. Heel propped to increase knee extension.     Evan received the following manual therapy techniques: Joint mobilizations were applied to the: R patella for 5 minutes, including:  Inf/ medial patella gr 1-2    prom knee ext R 20 sec x 3     Home Exercises Provided and Patient Education Provided     Education provided:   Importance of rom isaura ext 4x per day for at least 5 min   Ed to ask PA about WB since he will see him tomorrow. Note sent to PA regarding patellar postiion, stiffness with knee ext. Pain at patella.     Written Home Exercises Provided: Patient instructed to cont prior HEP.  Exercises were reviewed and Evan was able to demonstrate them prior to the end of the session.  Evan demonstrated good  understanding of the education provided.     See EMR under Patient Instructions for exercises provided prior visit.    Assessment     Pt is progressing well with improved flexion. Needs to focus on ext rom at home and quad strengthening.Does not appear to have injured knee with fall.   Evan Is progressing well towards his goals.   Pt prognosis is Good.     Pt will continue to benefit from skilled outpatient physical therapy to address the deficits listed in the problem list box on initial evaluation, provide pt/family education and to maximize pt's level of independence in the home and community environment.     Pt's spiritual, cultural and educational needs considered and pt agreeable to plan of care and goals.    Anticipated barriers to physical therapy: none         Goals:  Short Term Goals (6 Weeks):   1. Patient to have " full extension Right (equal to left) for normalized gait pattern Progressing, not met  2. Patient to have 120 degrees of passive left knee flexion for improved performance of ADL's such as sit<>stand transfers Progressing, not met  3. Patient to have decreased edema left knee  Progressing, not met  4. Patient to report 2/10 pain or less in left knee with ambulating community distances   met  5. Patient to be compliant with home program 5x's a week as noted by proper demonstration of exercises to therapist for improved management of condition Progressing, not met  6. Patient to ambulate full weight bearing Right lower extremity and normal gait pattern  Progressing, not met    Long Term Goals   -(12 Weeks):   1. Patient to have 4+/5 or greater strength in RLE for functional performance of ADL's such as lifting  Progressing, not met2. Patient to descend one flight of stairs with alternating gait pattern without use of handrail and with proper LE alignment  3. Patient to have 135 degrees or greater Right knee flexion for return to ADL's including squatting  Progressing, not met  -(36 weeks):   4. Patient to have decreased subjective report of disability as noted by <20% limitation on FOTO knee questionnaire Progressing, not met  5. Patient to perform single leg squat without assistance and without valgus collapse to improve participation in recreational  Progressing, not met  6. Patient's RLE strength to be 5/5 for return to recreational activities and sport Progressing, not met     PLAN   Plan of care Certification: 3/10/2022 to 12/31/2022.     Outpatient Physical Therapy 2 times weekly for 35 weeks to include the following interventions: Gait Training, Manual Therapy, Moist Heat/ Ice, Neuromuscular Re-ed, Patient Education, Therapeutic Activities and Therapeutic Exercise.       Tammi Lopez, PT

## 2022-03-22 ENCOUNTER — HOSPITAL ENCOUNTER (OUTPATIENT)
Dept: RADIOLOGY | Facility: HOSPITAL | Age: 62
Discharge: HOME | End: 2022-03-22
Attending: PHYSICIAN ASSISTANT
Payer: COMMERCIAL

## 2022-03-22 ENCOUNTER — OFFICE VISIT (OUTPATIENT)
Dept: SPORTS MEDICINE | Facility: CLINIC | Age: 62
End: 2022-03-22
Payer: COMMERCIAL

## 2022-03-22 VITALS
SYSTOLIC BLOOD PRESSURE: 118 MMHG | HEIGHT: 74 IN | WEIGHT: 192 LBS | HEART RATE: 91 BPM | DIASTOLIC BLOOD PRESSURE: 77 MMHG | BODY MASS INDEX: 24.64 KG/M2

## 2022-03-22 DIAGNOSIS — M25.561 RIGHT KNEE PAIN, UNSPECIFIED CHRONICITY: ICD-10-CM

## 2022-03-22 DIAGNOSIS — Z98.890 S/P ACL RECONSTRUCTION: ICD-10-CM

## 2022-03-22 DIAGNOSIS — Z09 SURGERY FOLLOW-UP EXAMINATION: ICD-10-CM

## 2022-03-22 DIAGNOSIS — M25.561 RIGHT KNEE PAIN, UNSPECIFIED CHRONICITY: Primary | ICD-10-CM

## 2022-03-22 PROCEDURE — 99024 POSTOP FOLLOW-UP VISIT: CPT | Mod: S$GLB,,, | Performed by: PHYSICIAN ASSISTANT

## 2022-03-22 PROCEDURE — 3078F PR MOST RECENT DIASTOLIC BLOOD PRESSURE < 80 MM HG: ICD-10-PCS | Mod: CPTII,S$GLB,, | Performed by: PHYSICIAN ASSISTANT

## 2022-03-22 PROCEDURE — 73560 X-RAY EXAM OF KNEE 1 OR 2: CPT | Mod: 26,RT,, | Performed by: RADIOLOGY

## 2022-03-22 PROCEDURE — 1160F PR REVIEW ALL MEDS BY PRESCRIBER/CLIN PHARMACIST DOCUMENTED: ICD-10-PCS | Mod: CPTII,S$GLB,, | Performed by: PHYSICIAN ASSISTANT

## 2022-03-22 PROCEDURE — 99999 PR PBB SHADOW E&M-EST. PATIENT-LVL III: ICD-10-PCS | Mod: PBBFAC,,, | Performed by: PHYSICIAN ASSISTANT

## 2022-03-22 PROCEDURE — 3074F PR MOST RECENT SYSTOLIC BLOOD PRESSURE < 130 MM HG: ICD-10-PCS | Mod: CPTII,S$GLB,, | Performed by: PHYSICIAN ASSISTANT

## 2022-03-22 PROCEDURE — 99024 PR POST-OP FOLLOW-UP VISIT: ICD-10-PCS | Mod: S$GLB,,, | Performed by: PHYSICIAN ASSISTANT

## 2022-03-22 PROCEDURE — 3008F PR BODY MASS INDEX (BMI) DOCUMENTED: ICD-10-PCS | Mod: CPTII,S$GLB,, | Performed by: PHYSICIAN ASSISTANT

## 2022-03-22 PROCEDURE — 3008F BODY MASS INDEX DOCD: CPT | Mod: CPTII,S$GLB,, | Performed by: PHYSICIAN ASSISTANT

## 2022-03-22 PROCEDURE — 1159F PR MEDICATION LIST DOCUMENTED IN MEDICAL RECORD: ICD-10-PCS | Mod: CPTII,S$GLB,, | Performed by: PHYSICIAN ASSISTANT

## 2022-03-22 PROCEDURE — 99999 PR PBB SHADOW E&M-EST. PATIENT-LVL III: CPT | Mod: PBBFAC,,, | Performed by: PHYSICIAN ASSISTANT

## 2022-03-22 PROCEDURE — 3074F SYST BP LT 130 MM HG: CPT | Mod: CPTII,S$GLB,, | Performed by: PHYSICIAN ASSISTANT

## 2022-03-22 PROCEDURE — 73560 XR KNEE 1 OR 2 VIEW RIGHT: ICD-10-PCS | Mod: 26,RT,, | Performed by: RADIOLOGY

## 2022-03-22 PROCEDURE — 1160F RVW MEDS BY RX/DR IN RCRD: CPT | Mod: CPTII,S$GLB,, | Performed by: PHYSICIAN ASSISTANT

## 2022-03-22 PROCEDURE — 73560 X-RAY EXAM OF KNEE 1 OR 2: CPT | Mod: TC,RT

## 2022-03-22 PROCEDURE — 3078F DIAST BP <80 MM HG: CPT | Mod: CPTII,S$GLB,, | Performed by: PHYSICIAN ASSISTANT

## 2022-03-22 PROCEDURE — 1159F MED LIST DOCD IN RCRD: CPT | Mod: CPTII,S$GLB,, | Performed by: PHYSICIAN ASSISTANT

## 2022-03-22 NOTE — PROGRESS NOTES
Subjective:          Chief Complaint: Evan Khoury is a 61 y.o. male who had concerns including Post-op Evaluation of the Right Knee.    Pt presents for 2 week post-op evaluation s/p procedure below. Pt has no complaints at this time. He is doing PT and progressing as scheduled. Pt is taking pain meds as needed. Denies fever, chills, discharge from wound site, and N/V.    DATE OF PROCEDURE: 3/8/2022     ATTENDING SURGEON: Surgeon(s) and Role:     * Maris Velazquez MD - Primary     * Cam Chakraborty MD - Resident - Assisting     Assistants:  Palmer Levy MD - Resident  Annelise Quevedo PA-C        PREOPERATIVE DIAGNOSIS:  Right  Tear, Medial meniscus, acute S83.249A and Anterior Cruciate Ligament Tear S83.510     POSTOPERATIVE DIAGNOSIS:   Right  Tear, Medial meniscus, acute S83.249A and Anterior Cruciate Ligament Tear S83.510     PROCEDURES(S) PERFORMED:   1. Right  Arthroscopy, anterior cruciate ligament reconstruction 91905  2.  Right  Arthroscopy, with meniscus repair (medial OR lateral) 86076      Review of Systems   Constitutional: Negative for chills and fever.   HENT: Negative for congestion and sore throat.    Eyes: Negative for discharge and double vision.   Cardiovascular: Negative for chest pain, palpitations and syncope.   Respiratory: Negative for cough and shortness of breath.    Endocrine: Negative for cold intolerance and heat intolerance.   Skin: Negative for dry skin and rash.   Musculoskeletal: Positive for joint pain and joint swelling.   Gastrointestinal: Negative for abdominal pain, nausea and vomiting.   Neurological: Negative for focal weakness, numbness and paresthesias.                   Objective:        General: Evan is well-developed, well-nourished, appears stated age, in no acute distress, alert and oriented to time, place and person.     General    Nursing note and vitals reviewed.  Constitutional: He is oriented to person, place, and time. He appears well-developed and  well-nourished. No distress.   HENT:   Head: Normocephalic and atraumatic.   Nose: Nose normal.   Eyes: Conjunctivae and EOM are normal.   Neck: Neck supple.   Cardiovascular: Normal rate, regular rhythm and intact distal pulses.    Pulmonary/Chest: Effort normal and breath sounds normal. No respiratory distress.   Abdominal: Soft. Bowel sounds are normal. He exhibits no distension. There is no abdominal tenderness.   Neurological: He is alert and oriented to person, place, and time. He has normal reflexes.   Psychiatric: He has a normal mood and affect. His behavior is normal. Thought content normal.     General Musculoskeletal Exam   Gait: antalgic       Right Knee Exam     Inspection   Erythema: absent  Scars: present  Swelling: present  Effusion: absent  Deformity: absent  Bruising: absent    Tenderness   The patient is tender to palpation of the condyle.    Range of Motion   Extension: 0   Flexion:  90 abnormal     Other   Sensation: normal    Comments:  Incision clean/dry/intact  No sign of infection  Mild swelling  Compartments soft  Neurovascular status intact in extremity      Left Knee Exam     Range of Motion   Extension: 0   Flexion: 130     Other   Sensation: normal    Vascular Exam     Right Pulses  Dorsalis Pedis:      2+  Posterior Tibial:      2+        Left Pulses  Dorsalis Pedis:      2+  Posterior Tibial:      2+        Edema  Right Lower Leg: absent  Left Lower Leg: absent      Radiographic Findings 03/22/2022:    Interval ACL reconstruction.  I see no acute fracture.  Joint spaces are preserved.     Small suprapatellar joint effusion.  Diffuse soft tissue edema.     Impression:     Interval ACL reconstruction with no acute osseous abnormality seen.  Negative for patella helder  Xrays of the right knee were ordered and reviewed by me today. These findings were discussed and reviewed with the patient.        Assessment:       Encounter Diagnoses   Name Primary?    Right knee pain, unspecified  chronicity Yes    S/P ACL reconstruction     Surgery follow-up examination           Plan:       1. IKDC, SF-12 and KOOS was not filled out today in clinic.     RTC in 4 weeks with Dr. Maris Velazquez. Patient will fill out IKDC, SF-12 and KOOS on return.    2. Provided patient with operative note.  3. Removed sutures.  4. May shower now without covering incisions.  5. Continue  mg once daily and Celebrex 200 mg BID for 4 wks.  6. Continue PT per protocol. Initiate progressive weightbearing protocol. -10-90 to protect meniscal repair.    All of the patient's questions were answered and the patient will contact us if they have any questions or concerns in the interim.                      [facility] patient questionnaires have been collected today.

## 2022-03-25 ENCOUNTER — CLINICAL SUPPORT (OUTPATIENT)
Dept: REHABILITATION | Facility: OTHER | Age: 62
End: 2022-03-25
Payer: COMMERCIAL

## 2022-03-25 DIAGNOSIS — Z47.89 AFTERCARE FOR ANTERIOR CRUCIATE LIGAMENT (ACL) REPAIR: ICD-10-CM

## 2022-03-25 DIAGNOSIS — R29.898 WEAKNESS OF BOTH LOWER EXTREMITIES: ICD-10-CM

## 2022-03-25 DIAGNOSIS — M25.661 DECREASED RANGE OF MOTION (ROM) OF RIGHT KNEE: Primary | ICD-10-CM

## 2022-03-25 DIAGNOSIS — R29.898 WEAKNESS OF RIGHT LOWER EXTREMITY: ICD-10-CM

## 2022-03-25 DIAGNOSIS — R26.89 IMPAIRED GAIT AND MOBILITY: ICD-10-CM

## 2022-03-25 PROCEDURE — 97014 ELECTRIC STIMULATION THERAPY: CPT | Mod: PN | Performed by: PHYSICAL THERAPIST

## 2022-03-25 PROCEDURE — 97110 THERAPEUTIC EXERCISES: CPT | Mod: PN | Performed by: PHYSICAL THERAPIST

## 2022-03-25 PROCEDURE — 97140 MANUAL THERAPY 1/> REGIONS: CPT | Mod: PN | Performed by: PHYSICAL THERAPIST

## 2022-03-25 NOTE — PROGRESS NOTES
Physical Therapy Daily Treatment Note   Date: 3/10/2022   Name: Evan Khoury  Clinic Number: [id number]     Therapy Diagnosis:   1. Weakness of both lower extremities      2. Complete tear of anterior cruciate ligament of right knee, initial encounter  Ambulatory referral/consult to Physical/Occupational Therapy   3. Decreased range of motion (ROM) of right knee      4. Impaired gait and mobility      5. Weakness of right lower extremity            Physician: Luis Watkins, *     Physician Orders: PT Eval and Treat   1. Right knee ACL reconstruction with hamstring autograft 2. Right knee arthroscopic menisectomy versus meniscus repair 3. Right knee arthroscopic chondroplasty 4. Right knee arthroscopic synovectomy  Medical Diagnosis from Referral: S83.511A (ICD-10-CM) - Complete tear of anterior cruciate ligament of right knee, initial encounter  Evaluation Date: 3/10/2022  Authorization Period Expiration: 4/1/2022  Plan of Care Expiration: 12/31/2022  Progress Note Due: 4/10/2022    Visit # / Visits authorized: 7/20 ( no limit)  FOTO: 2/5 ; issued 3/10/2022     Precautions: Standard      PHYSICAL THERAPY:     The patient should begin physical therapy on postoperative   day #3 and will be advanced to outpatient therapy as soon as   Possible following discharge.     Weight bearing:(TTWB or 25% 0-4 wks per MD protocol for ACL/ meniscus repair)  Range of Motion:limited to -10 degrees extension and 90 degrees flexion     No CPM required due to procedure performed      to begin quad sets with a heel roll to obtain hyperextension, straight leg raise and heel slides with the heel supported in a closed-chain fashion     Immediate specific exercises should include:   Gait program should include the above stated weightbearing; in addition: active extension with a heel-to-toe gait working on maintaining extension     Immobilizer if present should be locked @-10 degrees with gait and allowed to flex to 90 degrees at  "rest.         Visit Date: 3/25/2022    Treatment start time 8:30 am  End time: 9:45 am       Subjective     Pt reports: Reports pain well controlled since surgery. Had f/u with PA and xrays looked good. Noticed his knee not straightening all the way out   He was more compliant  with home exercise program.  Response to previous treatment:  Less pain  Functional change: none    Pain: 2/10  Location: right knee      Objective   Observation: telescope brace donned locked in -10 deg hyperextension, ambulating NWB; With ability to correct to TTWB on cue with B axillary crutches      Knee PROM:     L: 4-0-130 degrees   R: 0-5- 80  degrees PROM, 3-0-90 deg (post RX)     Joint mobility: decreased medial and inferior patella joint mobility ; positive patella tilt test  SLR: extension lag noted       Limitation/Restriction for FOTO knee Survey     Therapist reviewed FOTO scores for Evan Khoury on 3/10/2022.   FOTO documents entered into [employer] - see Media section.     Limitation Score: 59%          Evan received therapeutic exercises to develop strength, endurance, ROM, flexibility, posture and core stabilization for 50 minutes including:      Evan received the treatments listed below:        Heel prop x 15  Min (concurrently with ice today)  Passive knee flexion off EOB with iaSTM rolling pin to thigh x 5 min  Quad sets 5 sec -->SLR x 10 sec (tactile cuing for correct performance) Romanian 10 on / 10 off for 5 min . Pt asked if he could adjust machine. Ed to increase intensity slowly and only while stim was being delivered.   Iso quads at 60 deg knee flexion while seated in chair with RUSSIAN to VMO 10 sec on/ 50 sec off 2 sec ramp 50% MVIC x 10 min    Not today:  Side hip abd 3 x 10  With brace  Gastroc stretch with towel 30" x 3  Seated self aROM R knee  20 sec x 4   DF/ PF gTB 3 x 10   Glut sets  5 sec x 20   Ankle pumps with towel roll under heel x 3 min  np     Received [place] estim x 15 min as noted above for " improving knee strength. Pt ed to contract quads during stim. Pt cleared of any contraindications and tolerated treatment well     Evan received the following manual therapy techniques: Joint mobilizations were applied to the: R patella for 25 minutes, including:  Inf/ medial patella gr 2-3 over full bolster and half foam roll  prom knee ext R with manual overpressure overpressure 10 min per tolerance     Home Exercises Provided and Patient Education Provided     Education provided:   Importance of rom isaura ext 4x per day for at least 10 min. Updated HEP with exercises to emphasis. Will need to restore full knee extension and quad control first   Education on self patella mobs  demonstrated positioning of LE for heel props and propped with back of chair or tied with belt to avoid hip ER    Written Home Exercises Provided: Patient instructed to cont prior HEP.  Exercises were reviewed and Evan was able to demonstrate them prior to the end of the session.  Evan demonstrated good  understanding of the education provided.     See EMR under Patient Instructions for exercises provided prior visit.    Assessment     Patient presents to clinic with loss of passive knee extension and poor quadriceps control. Good response to manual techniques and [place] estim with some passive hyperextension and SLR without extension lag post RX.     Evan Is progressing well towards his goals.   Pt prognosis is Good.     Pt will continue to benefit from skilled outpatient physical therapy to address the deficits listed in the problem list box on initial evaluation, provide pt/family education and to maximize pt's level of independence in the home and community environment.     Pt's spiritual, cultural and educational needs considered and pt agreeable to plan of care and goals.    Anticipated barriers to physical therapy: none         Goals:  Short Term Goals (6 Weeks):   1. Patient to have full extension Right (equal to left) for  normalized gait pattern Progressing, not met  2. Patient to have 120 degrees of passive left knee flexion for improved performance of ADL's such as sit<>stand transfers Progressing, not met  3. Patient to have decreased edema left knee  Progressing, not met  4. Patient to report 2/10 pain or less in left knee with ambulating community distances   met  5. Patient to be compliant with home program 5x's a week as noted by proper demonstration of exercises to therapist for improved management of condition Progressing, not met  6. Patient to ambulate full weight bearing Right lower extremity and normal gait pattern  Progressing, not met    Long Term Goals   -(12 Weeks):   1. Patient to have 4+/5 or greater strength in RLE for functional performance of ADL's such as lifting  Progressing, not met2. Patient to descend one flight of stairs with alternating gait pattern without use of handrail and with proper LE alignment  3. Patient to have 135 degrees or greater Right knee flexion for return to ADL's including squatting  Progressing, not met  -(36 weeks):   4. Patient to have decreased subjective report of disability as noted by <20% limitation on FOTO knee questionnaire Progressing, not met  5. Patient to perform single leg squat without assistance and without valgus collapse to improve participation in recreational  Progressing, not met  6. Patient's RLE strength to be 5/5 for return to recreational activities and sport Progressing, not met     PLAN   Plan of care Certification: 3/10/2022 to 12/31/2022.     Outpatient Physical Therapy 2 times weekly for 35 weeks to include the following interventions: Gait Training, Manual Therapy, Moist Heat/ Ice, Neuromuscular Re-ed, Patient Education, Therapeutic Activities and Therapeutic Exercise.       Yuliana Bullard, PT

## 2022-03-30 ENCOUNTER — CLINICAL SUPPORT (OUTPATIENT)
Dept: REHABILITATION | Facility: OTHER | Age: 62
End: 2022-03-30
Payer: COMMERCIAL

## 2022-03-30 DIAGNOSIS — Z47.89 AFTERCARE FOR ANTERIOR CRUCIATE LIGAMENT (ACL) REPAIR: ICD-10-CM

## 2022-03-30 DIAGNOSIS — R26.89 IMPAIRED GAIT AND MOBILITY: ICD-10-CM

## 2022-03-30 DIAGNOSIS — M25.661 DECREASED RANGE OF MOTION (ROM) OF RIGHT KNEE: Primary | ICD-10-CM

## 2022-03-30 DIAGNOSIS — R29.898 WEAKNESS OF RIGHT LOWER EXTREMITY: ICD-10-CM

## 2022-03-30 DIAGNOSIS — R29.898 WEAKNESS OF BOTH LOWER EXTREMITIES: ICD-10-CM

## 2022-03-30 PROCEDURE — 97110 THERAPEUTIC EXERCISES: CPT | Mod: PN

## 2022-03-30 PROCEDURE — 97140 MANUAL THERAPY 1/> REGIONS: CPT | Mod: PN

## 2022-03-30 NOTE — PROGRESS NOTES
[facility]   Physical Therapy Treatment Note     Name: Evan Khoruy  Clinic Number: [id number]    Therapy Diagnosis: No diagnosis found.  Physician: Luis Watkins, *    Visit Date: 3/30/2022  Physician Orders: PT Eval and Treat   1. Right knee ACL reconstruction with hamstring autograft   2. Right knee arthroscopic menisectomy versus meniscus repair   3. Right knee arthroscopic chondroplasty   4. Right knee arthroscopic synovectomy  Surgery Date: 3/8/2022  Medical Diagnosis from Referral: S83.511A (ICD-10-CM) - Complete tear of anterior cruciate ligament of right knee, initial encounter  Evaluation Date: 3/10/2022  Authorization Period Expiration: 4/1/2022  Plan of Care Expiration: 12/31/2022  Progress Note Due: 4/10/2022  Visit # / Visits authorized: 6 (8/20) (no limit)  FOTO: 3/5 ; issued 3/10/2022    Time In: 2:01 pm  Time Out: 3:05 pm  Total Billable Time: 45 minutes    Precautions: Weight bearing:(TTWB or 25% 0-4 wks per MD protocol for ACL/ meniscus repair)Range of Motion: limited to -10 degrees extension and 90 degrees flexion    PHYSICAL THERAPY:  The patient should begin physical therapy on postoperative day #3 and will be advanced to outpatient therapy as soon as Possible following discharge. Weight bearing:(TTWB or 25% 0-4 wks per MD protocol for ACL/ meniscus repair)Range of Motion: limited to -10 degrees extension and 90 degrees flexion     No CPM required due to procedure performed. To begin quad sets with a heel roll to obtain hyperextension, straight leg raise and heel slides with the heel supported in a closed-chain fashion     Immediate specific exercises should include:   Gait program should include the above stated weightbearing; in addition: active extension with a heel-to-toe gait working on maintaining extension     Immobilizer if present should be locked @-10 degrees with gait and allowed to flex to 90 degrees at rest.           Subjective     Pt reports:  "his R knee is feeling "pretty good" today. Denied R knee pain. States he has been sleeping without the brace and trying to keep his knee straight. Patient stated while he was in the shower he bent forward to his toes and heard and felt an audible pop. Unsure where it came from, did not feel it in his knee.    He was more compliant with home exercise program.  Response to previous treatment:  No adverse effects  Functional change: now can it upstairs in his split level home    Pain: 0/10  Location: right knee      Objective       3/30/2022    Observation: Patient ambulating with B axillary crutches into clinic today. Patient not wearing his brace on R LE     Knee PROM:  R: 0-3-83  degrees PROM, 3-0-90 deg (post RX)     Joint mobility: decreased medial and inferior patella joint mobility ; positive patella tilt test  SLR: extension lag noted       Limitation/Restriction for FOTO knee Survey     Therapist reviewed FOTO scores for Evan Khoury on 3/10/2022.   FOTO documents entered into [employer] - see Media section.     Limitation Score: 59%          Evan received therapeutic exercises to develop strength, endurance, ROM, flexibility, posture and core stabilization for 30 minutes including:     Evan received the treatments listed below:    Quad sets 30 x 10" with biofeedback unit  SLR 2 x 10 reps  Side lying hip abd 3 x 10 reps  Side lying hip add 3 x 10 reps  Supine hang x 5'  Gastroc stretch with towel 30" x 3      Heel prop x 15  Min (concurrently with ice today). Utilized 3 pillow cases between skin and ice pack.    Passive knee flexion off EOB with iaSTM rolling pin to thigh x 5 min  Quad sets 5 sec -->SLR x 10 sec (tactile cuing for correct performance) Togolese 10 on / 10 off for 5 min . Pt asked if he could adjust machine. Ed to increase intensity slowly and only while stim was being delivered.   Iso quads at 60 deg knee flexion while seated in chair with RUSSIAN to VMO 10 sec on/ 50 sec off 2 sec ramp 50% MVIC x " 10 min    Not today:  Side hip abd 3 x 10  with brace    Seated self aROM R knee  20 sec x 4   DF/ PF gTB 3 x 10   Glut sets  5 sec x 20   Ankle pumps with towel roll under heel x 3 min  np     Received Citizen of [place] estim x  min as noted above for improving knee strength. Pt ed to contract quads during stim. Pt cleared of any contraindications and tolerated treatment well     Evan received the following manual therapy techniques: Joint mobilizations were applied to the: R patella for 15 minutes, including:  Inf/ medial patella gr 2-3 over full bolster and half foam roll  prom knee ext R with manual overpressure overpressure 10 min per tolerance     Home Exercises Provided and Patient Education Provided     Education provided:  Discussed patient continuing to wear his brace. Demonstrated and instructed patient in application of his TENS unit.       Importance of rom isaura ext 4x per day for at least 10 min. Updated HEP with exercises to emphasis. Will need to restore full knee extension and quad control first   Education on self patella mobs  demonstrated positioning of LE for heel props and propped with back of chair or tied with belt to avoid hip ER    Written Home Exercises Provided: Patient instructed to cont prior HEP.  Exercises were reviewed and Evan was able to demonstrate them prior to the end of the session.  Evan demonstrated good  understanding of the education provided.     See EMR under Patient Instructions for exercises provided prior visit.    Assessment     Patient presents to clinic with increased edema in R knee. Good quad activation noted today with use of biofeedback unit. Able to correct extensor lag with SLR with verbal cues    Evan Is progressing well towards his goals.   Pt prognosis is Good.     Pt will continue to benefit from skilled outpatient physical therapy to address the deficits listed in the problem list box on initial evaluation, provide pt/family education and to maximize pt's level  of independence in the home and community environment.     Pt's spiritual, cultural and educational needs considered and pt agreeable to plan of care and goals.    Anticipated barriers to physical therapy: none         Goals:  Short Term Goals (6 Weeks):   1. Patient to have full extension Right (equal to left) for normalized gait pattern Progressing, not met  2. Patient to have 120 degrees of passive left knee flexion for improved performance of ADL's such as sit<>stand transfers Progressing, not met  3. Patient to have decreased edema left knee  Progressing, not met  4. Patient to report 2/10 pain or less in left knee with ambulating community distances   met  5. Patient to be compliant with home program 5x's a week as noted by proper demonstration of exercises to therapist for improved management of condition Progressing, not met  6. Patient to ambulate full weight bearing Right lower extremity and normal gait pattern  Progressing, not met    Long Term Goals   -(12 Weeks):   1. Patient to have 4+/5 or greater strength in RLE for functional performance of ADL's such as lifting  Progressing, not met2. Patient to descend one flight of stairs with alternating gait pattern without use of handrail and with proper LE alignment  3. Patient to have 135 degrees or greater Right knee flexion for return to ADL's including squatting  Progressing, not met  -(36 weeks):   4. Patient to have decreased subjective report of disability as noted by <20% limitation on FOTO knee questionnaire Progressing, not met  5. Patient to perform single leg squat without assistance and without valgus collapse to improve participation in recreational  Progressing, not met  6. Patient's RLE strength to be 5/5 for return to recreational activities and sport Progressing, not met     PLAN   Plan of care Certification: 3/10/2022 to 12/31/2022.     Outpatient Physical Therapy 2 times weekly for 35 weeks to include the following interventions: Gait  Training, Manual Therapy, Moist Heat/ Ice, Neuromuscular Re-ed, Patient Education, Therapeutic Activities and Therapeutic Exercise.       Luis Doherty, PT

## 2022-04-01 ENCOUNTER — CLINICAL SUPPORT (OUTPATIENT)
Dept: REHABILITATION | Facility: OTHER | Age: 62
End: 2022-04-01
Payer: COMMERCIAL

## 2022-04-01 DIAGNOSIS — Z47.89 AFTERCARE FOR ANTERIOR CRUCIATE LIGAMENT (ACL) REPAIR: ICD-10-CM

## 2022-04-01 DIAGNOSIS — M25.661 DECREASED RANGE OF MOTION (ROM) OF RIGHT KNEE: Primary | ICD-10-CM

## 2022-04-01 DIAGNOSIS — R29.898 WEAKNESS OF BOTH LOWER EXTREMITIES: ICD-10-CM

## 2022-04-01 DIAGNOSIS — R29.898 WEAKNESS OF RIGHT LOWER EXTREMITY: ICD-10-CM

## 2022-04-01 DIAGNOSIS — R26.89 IMPAIRED GAIT AND MOBILITY: ICD-10-CM

## 2022-04-01 PROCEDURE — 97110 THERAPEUTIC EXERCISES: CPT | Mod: PN | Performed by: PHYSICAL THERAPIST

## 2022-04-01 PROCEDURE — 97140 MANUAL THERAPY 1/> REGIONS: CPT | Mod: PN | Performed by: PHYSICAL THERAPIST

## 2022-04-01 NOTE — PROGRESS NOTES
[facility]   Physical Therapy Treatment Note     Name: Evan Khoury  Clinic Number: [id number]    Therapy Diagnosis:   Encounter Diagnoses   Name Primary?    Decreased range of motion (ROM) of right knee Yes    Impaired gait and mobility     Weakness of right lower extremity     Weakness of both lower extremities     Aftercare for anterior cruciate ligament (ACL) repair      Physician: Luis Watkins, Jennifer    Visit Date: 4/1/2022  Physician Orders: PT Eval and Treat   1. Right knee ACL reconstruction with hamstring autograft   2. Right knee arthroscopic menisectomy versus meniscus repair   3. Right knee arthroscopic chondroplasty   4. Right knee arthroscopic synovectomy  Surgery Date: 3/8/2022  Medical Diagnosis from Referral: S83.511A (ICD-10-CM) - Complete tear of anterior cruciate ligament of right knee, initial encounter  Evaluation Date: 3/10/2022  Authorization Period Expiration: 4/1/2022  Plan of Care Expiration: 12/31/2022  Progress Note Due: 4/10/2022  Visit # / Visits authorized: 6 (8/20) (no limit)  FOTO: 3/5 ; issued 3/10/2022    POW: 3 (on 3/29/2022)    Time In:8:32 am  Time Out: 0935 am  Total Billable Time: 45 minutes    Precautions: Weight bearing:(TTWB or 25% 0-4 wks per MD protocol for ACL/ meniscus repair)Range of Motion: limited to -10 degrees extension and 90 degrees flexion    PHYSICAL THERAPY:  The patient should begin physical therapy on postoperative day #3 and will be advanced to outpatient therapy as soon as Possible following discharge. Weight bearing:(TTWB or 25% 0-4 wks per MD protocol for ACL/ meniscus repair)Range of Motion: limited to -10 degrees extension and 90 degrees flexion     No CPM required due to procedure performed. To begin quad sets with a heel roll to obtain hyperextension, straight leg raise and heel slides with the heel supported in a closed-chain fashion     Immediate specific exercises should include:   Gait program should include  "the above stated weightbearing; in addition: active extension with a heel-to-toe gait working on maintaining extension     Immobilizer if present should be locked @-10 degrees with gait and allowed to flex to 90 degrees at rest.           Subjective     Pt reports: having some medial knee pain 1 am last night but not too bad. Pain improved today and does not feel need for TENS.     He was more compliant with home exercise program.  Response to previous treatment:  No adverse effects  Functional change: now can go upstairs in his split level home    Pain: 0/10  Location: right knee      Objective       4/1/2022    Observation: Patient ambulating with B axillary crutches into clinic today. Brace donned and locked in extension      Knee PROM:  R: 4-0-90 deg     SLR: without extension lag       Limitation/Restriction for FOTO knee Survey     Therapist reviewed FOTO scores for Evan Khoury on 3/10/2022.   FOTO documents entered into [employer] - see Media section.     Limitation Score: 59%          Evan received therapeutic exercises to develop strength, endurance, ROM, flexibility, posture and core stabilization for 30 minutes including:     Evan received the treatments listed below:    Quad sets 30 x 10" with biofeedback unit  SLR 2 x 10 reps  Side lying hip abd 3 x 10 reps, 2#  Side lying hip add 3 x 10 reps  Supine hang x 5'  Gastroc stretch with towel 30" x 3  Heel prop x 15  Min (concurrently with ice today). Utilized 3 pillow cases between skin and ice pack.    Passive knee flexion off EOB with iaSTM rolling pin to thigh x 5 min    Quad sets 5 sec -->SLR x 10 sec (tactile cuing for correct performance) [place] 10 on / 10 off for 5 min . Pt asked if he could adjust machine. Ed to increase intensity slowly and only while stim was being delivered.   Iso quads at 60 deg knee flexion while seated in chair with RUSSIAN to VMO 10 sec on/ 50 sec off 2 sec ramp 50% MVIC x 10 min    DF/ PF gTB 3 x 10   Glut sets  5 sec x 20 "   Ankle pumps with towel roll under heel x 3 min  np     Received [place] estim x  min as noted above for improving knee strength. Pt ed to contract quads during stim. Pt cleared of any contraindications and tolerated treatment well     Evan received the following manual therapy techniques: Joint mobilizations were applied to the: R patella for 15 minutes, including:  Inf/ medial patella gr 2-3 over full bolster and half foam roll  prom knee ext R with manual overpressure overpressure 10 min per tolerance     Home Exercises Provided and Patient Education Provided     Education provided:  Discussed patient continuing to wear his brace. Demonstrated and instructed patient in application of his TENS unit.       Importance of rom isaura ext 4x per day for at least 10 min. Updated HEP with exercises to emphasis. Will need to restore full knee extension and quad control first   Education on self patella mobs  demonstrated positioning of LE for heel props and propped with back of chair or tied with belt to avoid hip ER    Written Home Exercises Provided: Patient instructed to cont prior HEP.  Exercises were reviewed and Evan was able to demonstrate them prior to the end of the session.  Evan demonstrated good  understanding of the education provided.     See EMR under Patient Instructions for exercises provided prior visit.    Assessment     Patient presents to clinic with improved knee extension and flexion PROM at start of session. Also improved VMO activation and SLR without extension lag following biofeedback. Issued tubi  for edema management. Demosntrating 1+ effusion with sweep test    Evan Is progressing well towards his goals.   Pt prognosis is Good.     Pt will continue to benefit from skilled outpatient physical therapy to address the deficits listed in the problem list box on initial evaluation, provide pt/family education and to maximize pt's level of independence in the home and community environment.      Pt's spiritual, cultural and educational needs considered and pt agreeable to plan of care and goals.    Anticipated barriers to physical therapy: none         Goals:  Short Term Goals (6 Weeks):   1. Patient to have full extension Right (equal to left) for normalized gait pattern Progressing, not met  2. Patient to have 120 degrees of passive left knee flexion for improved performance of ADL's such as sit<>stand transfers Progressing, not met  3. Patient to have decreased edema left knee  Progressing, not met  4. Patient to report 2/10 pain or less in left knee with ambulating community distances   met  5. Patient to be compliant with home program 5x's a week as noted by proper demonstration of exercises to therapist for improved management of condition Progressing, not met  6. Patient to ambulate full weight bearing Right lower extremity and normal gait pattern  Progressing, not met    Long Term Goals   -(12 Weeks):   1. Patient to have 4+/5 or greater strength in RLE for functional performance of ADL's such as lifting  Progressing, not met2. Patient to descend one flight of stairs with alternating gait pattern without use of handrail and with proper LE alignment  3. Patient to have 135 degrees or greater Right knee flexion for return to ADL's including squatting  Progressing, not met  -(36 weeks):   4. Patient to have decreased subjective report of disability as noted by <20% limitation on FOTO knee questionnaire Progressing, not met  5. Patient to perform single leg squat without assistance and without valgus collapse to improve participation in recreational  Progressing, not met  6. Patient's RLE strength to be 5/5 for return to recreational activities and sport Progressing, not met     PLAN   Plan of care Certification: 3/10/2022 to 12/31/2022.     Outpatient Physical Therapy 2 times weekly for 35 weeks to include the following interventions: Gait Training, Manual Therapy, Moist Heat/ Ice, Neuromuscular  Re-ed, Patient Education, Therapeutic Activities and Therapeutic Exercise.       Yuliana Bullard, PT

## 2022-04-04 ENCOUNTER — CLINICAL SUPPORT (OUTPATIENT)
Dept: REHABILITATION | Facility: OTHER | Age: 62
End: 2022-04-04
Payer: COMMERCIAL

## 2022-04-04 DIAGNOSIS — R26.89 IMPAIRED GAIT AND MOBILITY: ICD-10-CM

## 2022-04-04 DIAGNOSIS — R29.898 WEAKNESS OF BOTH LOWER EXTREMITIES: ICD-10-CM

## 2022-04-04 DIAGNOSIS — M25.661 DECREASED RANGE OF MOTION (ROM) OF RIGHT KNEE: Primary | ICD-10-CM

## 2022-04-04 DIAGNOSIS — Z47.89 AFTERCARE FOR ANTERIOR CRUCIATE LIGAMENT (ACL) REPAIR: ICD-10-CM

## 2022-04-04 DIAGNOSIS — R29.898 WEAKNESS OF RIGHT LOWER EXTREMITY: ICD-10-CM

## 2022-04-04 PROCEDURE — 97110 THERAPEUTIC EXERCISES: CPT | Mod: PN,CQ

## 2022-04-04 PROCEDURE — 97140 MANUAL THERAPY 1/> REGIONS: CPT | Mod: PN,CQ

## 2022-04-04 NOTE — PROGRESS NOTES
[facility]   Physical Therapy Treatment Note     Name: Evan Khoury  Clinic Number: [id number]    Therapy Diagnosis:   Encounter Diagnoses   Name Primary?    Decreased range of motion (ROM) of right knee Yes    Impaired gait and mobility     Weakness of right lower extremity     Weakness of both lower extremities     Aftercare for anterior cruciate ligament (ACL) repair      Physician: Luis Watkins, Jennifer    Visit Date: 4/4/2022  Physician Orders: PT Eval and Treat   1. Right knee ACL reconstruction with hamstring autograft   2. Right knee arthroscopic menisectomy versus meniscus repair   3. Right knee arthroscopic chondroplasty   4. Right knee arthroscopic synovectomy  Surgery Date: 3/8/2022  Medical Diagnosis from Referral: S83.511A (ICD-10-CM) - Complete tear of anterior cruciate ligament of right knee, initial encounter  Evaluation Date: 3/10/2022  Authorization Period Expiration: 4/1/2022  Plan of Care Expiration: 12/31/2022  Progress Note Due: 4/10/2022  Visit # / Visits authorized: 7 (9/20) (no limit)  FOTO: 4/4/2022 0/5  PTA:  1/5   POW: 3 (on 3/29/2022)    Time In: 0930  Time Out: 1030  Total Billable Time: 60 minutes    Precautions: Weight bearing:(TTWB or 25% 0-4 wks per MD protocol for ACL/ meniscus repair)Range of Motion: limited to -10 degrees extension and 90 degrees flexion    PHYSICAL THERAPY:  The patient should begin physical therapy on postoperative day #3 and will be advanced to outpatient therapy as soon as Possible following discharge. Weight bearing:(TTWB or 25% 0-4 wks per MD protocol for ACL/ meniscus repair)Range of Motion: limited to -10 degrees extension and 90 degrees flexion     No CPM required due to procedure performed. To begin quad sets with a heel roll to obtain hyperextension, straight leg raise and heel slides with the heel supported in a closed-chain fashion     Immediate specific exercises should include:   Gait program should include the  "above stated weightbearing; in addition: active extension with a heel-to-toe gait working on maintaining extension     Immobilizer if present should be locked @-10 degrees with gait and allowed to flex to 90 degrees at rest.           Subjective     Pt states feeling well w/ no c/o pn in R knee.    He was more compliant with home exercise program.  Response to previous treatment:  No adverse effects  Functional change: no change reported     Pain: 0/10  Location: right knee      Objective   ROM: 4/4/2022   Flexion 90 deg PROM  Ext 3 deg hyper extension AROM    Evan received therapeutic exercises to develop strength, endurance, ROM, flexibility, posture and core stabilization for 45 minutes including:     Evan received the treatments listed below:    Quad sets 30 x 10"   SLR 30 x  reps  Side lying hip abd 2 x 15 reps, 2#  Side lying hip add 2 x 15 reps  Supine hang x 5'  Gastroc stretch with towel 30" x 3  Heel prop x 15  Min (concurrently with ice today). Utilized 3 pillow cases between skin and ice pack.    Passive knee flexion off EOB with iaSTM rolling pin to thigh 5 x 20 sec     Quad sets 5 sec -->SLR x 10 sec (tactile cuing for correct performance) Fijian 10 on / 10 off for 5 min . Pt asked if he could adjust machine. Ed to increase intensity slowly and only while stim was being delivered.   Iso quads at 60 deg knee flexion while seated in chair with RUSSIAN to VMO 10 sec on/ 50 sec off 2 sec ramp 50% MVIC x 10 min    DF/ PF gTB 3 x 10   Glut sets  5 sec x 20   Ankle pumps with towel roll under heel x 3 min  np     Received Burkinan estim x  min as noted above for improving knee strength. Pt ed to contract quads during stim. Pt cleared of any contraindications and tolerated treatment well     Evan received the following manual therapy techniques: Joint mobilizations were applied to the: R patella for 15 minutes, including:  Inf/ medial patella gr 2-3 over full bolster and half foam roll  prom knee ext R with " manual overpressure overpressure 10 min per tolerance     Home Exercises Provided and Patient Education Provided     Education provided:  Pt edu on proper exercise technique.       Written Home Exercises Provided: Patient instructed to cont prior HEP.  Exercises were reviewed and Evan was able to demonstrate them prior to the end of the session.  Evan demonstrated good  understanding of the education provided.     See EMR under Patient Instructions for exercises provided prior visit.    Assessment   Pt julia tx well w/ no c/o pn.  Pt cont to have some quad weakness.  Pt able to maintain knee ext with SLR.  Pt showed increased muscular endurance during therex.      Evan Is progressing well towards his goals.   Pt prognosis is Good.     Pt will continue to benefit from skilled outpatient physical therapy to address the deficits listed in the problem list box on initial evaluation, provide pt/family education and to maximize pt's level of independence in the home and community environment.     Pt's spiritual, cultural and educational needs considered and pt agreeable to plan of care and goals.    Anticipated barriers to physical therapy: none         Goals:  Short Term Goals (6 Weeks):   1. Patient to have full extension Right (equal to left) for normalized gait pattern Progressing, not met  2. Patient to have 120 degrees of passive left knee flexion for improved performance of ADL's such as sit<>stand transfers Progressing, not met  3. Patient to have decreased edema left knee  Progressing, not met  4. Patient to report 2/10 pain or less in left knee with ambulating community distances   met  5. Patient to be compliant with home program 5x's a week as noted by proper demonstration of exercises to therapist for improved management of condition Progressing, not met  6. Patient to ambulate full weight bearing Right lower extremity and normal gait pattern  Progressing, not met    Long Term Goals   -(12 Weeks):   1.  Patient to have 4+/5 or greater strength in RLE for functional performance of ADL's such as lifting  Progressing, not met2. Patient to descend one flight of stairs with alternating gait pattern without use of handrail and with proper LE alignment  3. Patient to have 135 degrees or greater Right knee flexion for return to ADL's including squatting  Progressing, not met  -(36 weeks):   4. Patient to have decreased subjective report of disability as noted by <20% limitation on FOTO knee questionnaire Progressing, not met  5. Patient to perform single leg squat without assistance and without valgus collapse to improve participation in recreational  Progressing, not met  6. Patient's RLE strength to be 5/5 for return to recreational activities and sport Progressing, not met     PLAN   Cont to progress towards goals set by PT.  Work to increase joint mobility and quad/ glute strength next visit.            Reji Busby, PTA

## 2022-04-06 ENCOUNTER — HOSPITAL ENCOUNTER (OUTPATIENT)
Dept: CARDIOLOGY | Facility: HOSPITAL | Age: 62
Discharge: HOME | End: 2022-04-06
Attending: STUDENT IN AN ORGANIZED HEALTH CARE EDUCATION/TRAINING PROGRAM
Payer: COMMERCIAL

## 2022-04-06 ENCOUNTER — OFFICE VISIT (OUTPATIENT)
Dept: SPORTS MEDICINE | Facility: CLINIC | Age: 62
End: 2022-04-06
Payer: COMMERCIAL

## 2022-04-06 VITALS
SYSTOLIC BLOOD PRESSURE: 117 MMHG | HEART RATE: 65 BPM | BODY MASS INDEX: 24.64 KG/M2 | HEIGHT: 74 IN | WEIGHT: 192 LBS | DIASTOLIC BLOOD PRESSURE: 73 MMHG

## 2022-04-06 DIAGNOSIS — Z98.890 S/P ARTHROSCOPY OF KNEE: ICD-10-CM

## 2022-04-06 DIAGNOSIS — S83.511A COMPLETE TEAR OF ANTERIOR CRUCIATE LIGAMENT OF RIGHT KNEE, INITIAL ENCOUNTER: ICD-10-CM

## 2022-04-06 DIAGNOSIS — M25.661 DECREASED RANGE OF MOTION (ROM) OF RIGHT KNEE: ICD-10-CM

## 2022-04-06 DIAGNOSIS — R26.89 IMPAIRED GAIT AND MOBILITY: ICD-10-CM

## 2022-04-06 DIAGNOSIS — S83.511S COMPLETE TEAR OF ANTERIOR CRUCIATE LIGAMENT OF RIGHT KNEE, SEQUELA: Primary | ICD-10-CM

## 2022-04-06 DIAGNOSIS — M25.561 ACUTE PAIN OF RIGHT KNEE: ICD-10-CM

## 2022-04-06 DIAGNOSIS — Z47.89 AFTERCARE FOR ANTERIOR CRUCIATE LIGAMENT (ACL) REPAIR: ICD-10-CM

## 2022-04-06 DIAGNOSIS — Z98.890 S/P ARTHROSCOPY OF KNEE: Primary | ICD-10-CM

## 2022-04-06 PROCEDURE — 93971 EXTREMITY STUDY: CPT | Mod: 26,RT,, | Performed by: INTERNAL MEDICINE

## 2022-04-06 PROCEDURE — 3008F PR BODY MASS INDEX (BMI) DOCUMENTED: ICD-10-PCS | Mod: CPTII,S$GLB,, | Performed by: ORTHOPAEDIC SURGERY

## 2022-04-06 PROCEDURE — 3008F BODY MASS INDEX DOCD: CPT | Mod: CPTII,S$GLB,, | Performed by: ORTHOPAEDIC SURGERY

## 2022-04-06 PROCEDURE — 1160F PR REVIEW ALL MEDS BY PRESCRIBER/CLIN PHARMACIST DOCUMENTED: ICD-10-PCS | Mod: CPTII,S$GLB,, | Performed by: ORTHOPAEDIC SURGERY

## 2022-04-06 PROCEDURE — 3074F SYST BP LT 130 MM HG: CPT | Mod: CPTII,S$GLB,, | Performed by: ORTHOPAEDIC SURGERY

## 2022-04-06 PROCEDURE — 99999 PR PBB SHADOW E&M-EST. PATIENT-LVL IV: CPT | Mod: PBBFAC,,, | Performed by: ORTHOPAEDIC SURGERY

## 2022-04-06 PROCEDURE — 1159F MED LIST DOCD IN RCRD: CPT | Mod: CPTII,S$GLB,, | Performed by: ORTHOPAEDIC SURGERY

## 2022-04-06 PROCEDURE — 3078F PR MOST RECENT DIASTOLIC BLOOD PRESSURE < 80 MM HG: ICD-10-PCS | Mod: CPTII,S$GLB,, | Performed by: ORTHOPAEDIC SURGERY

## 2022-04-06 PROCEDURE — 99024 PR POST-OP FOLLOW-UP VISIT: ICD-10-PCS | Mod: S$GLB,,, | Performed by: ORTHOPAEDIC SURGERY

## 2022-04-06 PROCEDURE — 99999 PR PBB SHADOW E&M-EST. PATIENT-LVL IV: ICD-10-PCS | Mod: PBBFAC,,, | Performed by: ORTHOPAEDIC SURGERY

## 2022-04-06 PROCEDURE — 93971 CV US DOPPLER VENOUS LEG RIGHT (CUPID ONLY): ICD-10-PCS | Mod: 26,RT,, | Performed by: INTERNAL MEDICINE

## 2022-04-06 PROCEDURE — 1159F PR MEDICATION LIST DOCUMENTED IN MEDICAL RECORD: ICD-10-PCS | Mod: CPTII,S$GLB,, | Performed by: ORTHOPAEDIC SURGERY

## 2022-04-06 PROCEDURE — 1160F RVW MEDS BY RX/DR IN RCRD: CPT | Mod: CPTII,S$GLB,, | Performed by: ORTHOPAEDIC SURGERY

## 2022-04-06 PROCEDURE — 99024 POSTOP FOLLOW-UP VISIT: CPT | Mod: S$GLB,,, | Performed by: ORTHOPAEDIC SURGERY

## 2022-04-06 PROCEDURE — 93971 EXTREMITY STUDY: CPT | Mod: RT

## 2022-04-06 PROCEDURE — 3074F PR MOST RECENT SYSTOLIC BLOOD PRESSURE < 130 MM HG: ICD-10-PCS | Mod: CPTII,S$GLB,, | Performed by: ORTHOPAEDIC SURGERY

## 2022-04-06 PROCEDURE — 3078F DIAST BP <80 MM HG: CPT | Mod: CPTII,S$GLB,, | Performed by: ORTHOPAEDIC SURGERY

## 2022-04-06 RX ORDER — CELECOXIB 200 MG/1
200 CAPSULE ORAL 2 TIMES DAILY WITH MEALS
Qty: 60 CAPSULE | Refills: 0 | Status: SHIPPED | OUTPATIENT
Start: 2022-04-06 | End: 2022-09-12

## 2022-04-06 NOTE — PROGRESS NOTES
"Subjective:          Chief Complaint: Evan Khoury is a 62 y.o. male who had no chief complaint listed for this encounter.    Mr. Evan Khoury comes to our clinic today for unscheduled visit over concerns of possible DVT. He reports intermittent episodes of "blueness" in his foot with dependent position. No pain or significant swelling reported. He presents for evaluation.     Pt presents for 2 week post-op evaluation s/p procedure below. Pt has no complaints at this time. He is doing PT and progressing as scheduled. Pt is taking pain meds as needed. Denies fever, chills, discharge from wound site, and N/V.    DATE OF PROCEDURE: 3/8/2022     ATTENDING SURGEON: Surgeon(s) and Role:     * Maris Velazquez MD - Primary     * Cam Chakraborty MD - Resident - Assisting     Assistants:  Palmer Levy MD - Resident  Annelise Quevedo PA-C        PREOPERATIVE DIAGNOSIS:  Right  Tear, Medial meniscus, acute S83.249A and Anterior Cruciate Ligament Tear S83.510     POSTOPERATIVE DIAGNOSIS:   Right  Tear, Medial meniscus, acute S83.249A and Anterior Cruciate Ligament Tear S83.510     PROCEDURES(S) PERFORMED:   1. Right  Arthroscopy, anterior cruciate ligament reconstruction 07012  2.  Right  Arthroscopy, with meniscus repair (medial OR lateral) 51324      Review of Systems   Constitutional: Negative for chills, fever and night sweats.   HENT: Negative for congestion, hearing loss and sore throat.    Eyes: Negative for blurred vision, discharge, double vision and visual disturbance.   Cardiovascular: Negative for chest pain, leg swelling, palpitations and syncope.   Respiratory: Negative for cough and shortness of breath.    Endocrine: Negative for cold intolerance, heat intolerance and polyuria.   Hematologic/Lymphatic: Negative for bleeding problem.   Skin: Negative for dry skin and rash.   Musculoskeletal: Positive for joint pain and joint swelling. Negative for back pain, muscle cramps and muscle weakness. "   Gastrointestinal: Negative for abdominal pain, melena, nausea and vomiting.   Genitourinary: Negative for hematuria.   Neurological: Negative for focal weakness, loss of balance, numbness and paresthesias.   Psychiatric/Behavioral: Negative for altered mental status.                   Objective:        General: Evan is well-developed, well-nourished, appears stated age, in no acute distress, alert and oriented to time, place and person.     General    Nursing note and vitals reviewed.  Constitutional: He is oriented to person, place, and time. He appears well-developed and well-nourished. No distress.   HENT:   Head: Normocephalic and atraumatic.   Nose: Nose normal.   Mouth/Throat: No oropharyngeal exudate.   Eyes: Conjunctivae and EOM are normal. Right eye exhibits no discharge. Left eye exhibits no discharge.   Neck: Neck supple.   Cardiovascular: Normal rate, regular rhythm and intact distal pulses.    Pulmonary/Chest: Effort normal and breath sounds normal. No respiratory distress.   Abdominal: Soft. Bowel sounds are normal. He exhibits no distension. There is no abdominal tenderness.   Neurological: He is alert and oriented to person, place, and time. He has normal reflexes. No cranial nerve deficit. Coordination normal.   Psychiatric: He has a normal mood and affect. His behavior is normal. Judgment and thought content normal.     General Musculoskeletal Exam   Gait: antalgic       Right Knee Exam     Inspection   Erythema: absent  Scars: present  Swelling: present  Effusion: absent  Deformity: absent  Bruising: absent    Tenderness   The patient is tender to palpation of the condyle.    Range of Motion   Extension: 0   Flexion:  90 abnormal     Tests   Meniscus   Nik:  Medial - negative Lateral - negative  Ligament Examination Lachman: normal (-1 to 2mm) PCL-Posterior Drawer: normal (0 to 2mm)     MCL - Valgus: normal (0 to 2mm)  LCL - Varus: normalPivot Shift: normal (Equal)Reverse Pivot Shift:  normal (Equal)Dial Test at 30 degrees: normal (< 5 degrees)Dial Test at 90 degrees: normal (< 5 degrees)  Posterior Sag Test: negative  Posterolateral Corner: unstable (>15 degrees difference)  Patella   Patellar apprehension: negative  Passive Patellar Tilt: neutral  Patellar Tracking: normal  Patellar Glide (quadrants): Lateral - 1   Medial - 2  Q-Angle at 90 degrees: normal  Patellar Grind: negative  J-Sign: none    Other   Meniscal Cyst: absent  Popliteal (Baker's) Cyst: absent  Sensation: normal    Comments:  Incision clean/dry/intact  No sign of infection  Mild swelling  Compartments soft  Neurovascular status intact in extremity      Left Knee Exam     Inspection   Erythema: absent  Scars: absent  Swelling: absent  Effusion: absent  Deformity: absent  Bruising: absent    Tenderness   The patient is experiencing no tenderness.     Range of Motion   Extension: 0   Flexion: 130     Tests   Meniscus   Nik:  Medial - negative Lateral - negative  Stability Lachman: normal (-1 to 2mm) PCL-Posterior Drawer: normal (0 to 2mm)  MCL - Valgus: normal (0 to 2mm)  LCL - Varus: normal (0 to 2mm)Pivot Shift: normal (Equal)Reverse Pivot Shift: normal (Equal)Dial Test at 30 degrees: normal (< 5 degrees)Dial Test at 90 degrees: normal (< 5 degrees)  Posterior Sag Test: negative  Posterolateral Corner: unstable (>15 degrees difference)  Patella   Patellar apprehension: negative  Passive Patellar Tilt: neutral  Patellar Tracking: normal  Patellar Glide (Quadrants): Lateral - 1 Medial - 2  Q-Angle at 90 degrees: normal  Patellar Grind: negative  J-Sign: J sign absent    Other   Meniscal Cyst: absent  Popliteal (Baker's) Cyst: absent  Sensation: normal    Right Hip Exam     Tests   Mesha: negative  Left Hip Exam     Tests   Mesha: negative          Reflexes     Left Side  Achilles:  2+  Quadriceps:  2+    Right Side   Achilles:  2+  Quadriceps:  2+    Vascular Exam     Right Pulses  Dorsalis Pedis:      2+  Posterior Tibial:       2+        Left Pulses  Dorsalis Pedis:      2+  Posterior Tibial:      2+        Edema  Right Lower Leg: absent  Left Lower Leg: absent      Radiographic Findings 04/06/2022:    Interval ACL reconstruction.  I see no acute fracture.  Joint spaces are preserved.     Small suprapatellar joint effusion.  Diffuse soft tissue edema.     Impression:     Interval ACL reconstruction with no acute osseous abnormality seen.  Negative for patella helder  Xrays of the right knee were ordered and reviewed by me today. These findings were discussed and reviewed with the patient.        Assessment:       Encounter Diagnoses   Name Primary?    Complete tear of anterior cruciate ligament of right knee, sequela Yes    Aftercare for anterior cruciate ligament (ACL) repair     Acute pain of right knee     Impaired gait and mobility     Decreased range of motion (ROM) of right knee           Plan:       1. IKDC, SF-12 and KOOS was filled out today in clinic.     RTC in 4 weeks with Dr. Maris Velazquez for post op visit. Patient will fill out IKDC, SF-12 and KOOS on return.    2. Medications: Refills of the following Rx were sent to patients preferred Pharmacy:  celecoxib (CELEBREX) 200 MG capsule    3. Physical Therapy: Continue/Begin: Continue at [facility]    4. HEP: N/A    5. Procedures/Procedural Planning:   Given extreme dysfunction and discomfort, and non-diagnostic x-ray imaging, we will obtain CV ultrasound doppler imaging for further evaluation of the right leg possible DVT.     6. DME: T-Scope brace until medial  is delivered, brace was fitted and ordered today by Susy.  7. Work/Sport Status: out of work     8. Visit Summary: Patient education on proper wear of the T-scope brace. He will transition to the medial  when brace is delivered.  Encouraged proper gait training with use of crutches and tscope brace. Patient should be 50% WB right now and transition to full WB over next 2 weeks. Patient can bend to 100  right now and at 6 weeks post op should be bending to 120. Brace locked with ambulation and unlocked at rest                          [facility] patient questionnaires have been collected today.

## 2022-04-07 ENCOUNTER — CLINICAL SUPPORT (OUTPATIENT)
Dept: REHABILITATION | Facility: OTHER | Age: 62
End: 2022-04-07
Payer: COMMERCIAL

## 2022-04-07 DIAGNOSIS — Z47.89 AFTERCARE FOR ANTERIOR CRUCIATE LIGAMENT (ACL) REPAIR: ICD-10-CM

## 2022-04-07 DIAGNOSIS — M25.661 DECREASED RANGE OF MOTION (ROM) OF RIGHT KNEE: Primary | ICD-10-CM

## 2022-04-07 DIAGNOSIS — R29.898 WEAKNESS OF RIGHT LOWER EXTREMITY: ICD-10-CM

## 2022-04-07 DIAGNOSIS — R29.898 WEAKNESS OF BOTH LOWER EXTREMITIES: ICD-10-CM

## 2022-04-07 DIAGNOSIS — R26.89 IMPAIRED GAIT AND MOBILITY: ICD-10-CM

## 2022-04-07 PROCEDURE — 97110 THERAPEUTIC EXERCISES: CPT | Mod: PN

## 2022-04-07 PROCEDURE — 97140 MANUAL THERAPY 1/> REGIONS: CPT | Mod: PN

## 2022-04-07 NOTE — PROGRESS NOTES
[facility]   Physical Therapy Treatment Note     Name: Evan Khoury  Clinic Number: [id number]    Therapy Diagnosis:   Encounter Diagnoses   Name Primary?    Decreased range of motion (ROM) of right knee Yes    Impaired gait and mobility     Weakness of right lower extremity     Weakness of both lower extremities     Aftercare for anterior cruciate ligament (ACL) repair      Physician: Luis Watkins, Jennifer    Visit Date: 4/7/2022  Physician Orders: PT Eval and Treat   1. Right knee ACL reconstruction with hamstring autograft   2. Right knee arthroscopic menisectomy versus meniscus repair   3. Right knee arthroscopic chondroplasty   4. Right knee arthroscopic synovectomy  Surgery Date: 3/8/2022  Medical Diagnosis from Referral: S83.511A (ICD-10-CM) - Complete tear of anterior cruciate ligament of right knee, initial encounter  Evaluation Date: 3/10/2022  Authorization Period Expiration: 4/1/2022  Plan of Care Expiration: 12/31/2022  Progress Note Due: 4/10/2022  Visit # / Visits authorized: 9 (10/20) (no limit)  FOTO: 4/4/2022 0/5  PTA:  0/5   POW: 4 (on 4/5/2022)    Time In: 9:15 am  Time Out: 10:25 am  Total Billable Time: 55 minutes    Precautions: Weight bearing: (TTWB or 25% 0-4 wks per MD protocol for ACL/ meniscus repair)Range of Motion: limited to -10 degrees extension and 90 degrees flexion    PHYSICAL THERAPY:  The patient should begin physical therapy on postoperative day #3 and will be advanced to outpatient therapy as soon as Possible following discharge. Weight bearing:(TTWB or 25% 0-4 wks per MD protocol for ACL/ meniscus repair)Range of Motion: limited to -10 degrees extension and 90 degrees flexion     No CPM required due to procedure performed. To begin quad sets with a heel roll to obtain hyperextension, straight leg raise and heel slides with the heel supported in a closed-chain fashion     Immediate specific exercises should include:   Gait program should  "include the above stated weightbearing; in addition: active extension with a heel-to-toe gait working on maintaining extension     Immobilizer if present should be locked @-10 degrees with gait and allowed to flex to 90 degrees at rest.         Subjective     Pt states he saw Dr. Velazquez yesterday to check for possible DVT. No DVT detected. Patient now able to progress to 50% WB to progress to WBAT in the 2 weeks. Will be getting an  brace to replace his immobilizer.    He was more compliant with home exercise program.  Response to previous treatment:  No adverse effects  Functional change: no change reported     Pain: 0/10  Location: right knee      Objective   ROM: 4/7/2022   Flexion 98 deg PROM  Ext 3 deg hyper extension AROM    Evan received therapeutic exercises to develop strength, endurance, ROM, flexibility, posture and core stabilization for 45 minutes including:     Evan received the treatments listed below:   gastroc stretch on incline x 3'  heel raises x 30 reps  Quad sets 30 x 10"   Quad sets with ER 30 x 10"  +PROM heel slides with sliding board and strap 20 x 5"  SLR 30 x  reps  Side lying hip abd 2 x 15 reps, 2#  Side lying hip add 2 x 15 reps  Supine hang x 5'  Gastroc stretch with towel 30" x 3  Passive knee flexion off EOB 5 x 20 sec     Heel prop x 15  min (concurrently with ice today). Utilized  pillow case and towel between skin and ice pack.    Quad sets 5 sec -->SLR x 10 sec (tactile cuing for correct performance) Burmese 10 on / 10 off for 5 min . Pt asked if he could adjust machine. Ed to increase intensity slowly and only while stim was being delivered.   Iso quads at 60 deg knee flexion while seated in chair with RUSSIAN to VMO 10 sec on/ 50 sec off 2 sec ramp 50% MVIC x 10 min    DF/ PF gTB 3 x 10   Glut sets  5 sec x 20   Ankle pumps with towel roll under heel x 3 min  np     Received [place]yan estim x  min as noted above for improving knee strength. Pt ed to contract quads during " stim. Pt cleared of any contraindications and tolerated treatment well     Evan received the following manual therapy techniques: Joint mobilizations were applied to the: R patella for 10 minutes, including:  Inf/ medial patella gr 2-3 over full bolster and half foam roll  prom knee ext R with manual overpressure overpressure 10 min per tolerance     Home Exercises Provided and Patient Education Provided     Education provided:  Pt edu on proper exercise technique.       Written Home Exercises Provided: Patient instructed to cont prior HEP.  Exercises were reviewed and Evan was able to demonstrate them prior to the end of the session.  Evan demonstrated good  understanding of the education provided.     See EMR under Patient Instructions for exercises provided prior visit.    Assessment   Good quad contraction and control noted. No extensor lag with SLRs today.     Evan Is progressing well towards his goals.   Pt prognosis is Good.     Pt will continue to benefit from skilled outpatient physical therapy to address the deficits listed in the problem list box on initial evaluation, provide pt/family education and to maximize pt's level of independence in the home and community environment.     Pt's spiritual, cultural and educational needs considered and pt agreeable to plan of care and goals.    Anticipated barriers to physical therapy: none    Goals:  Short Term Goals (6 Weeks):   1. Patient to have full extension Right (equal to left) for normalized gait pattern Progressing, not met  2. Patient to have 120 degrees of passive left knee flexion for improved performance of ADL's such as sit<>stand transfers Progressing, not met  3. Patient to have decreased edema left knee  Progressing, not met  4. Patient to report 2/10 pain or less in left knee with ambulating community distances   met  5. Patient to be compliant with home program 5x's a week as noted by proper demonstration of exercises to therapist for  improved management of condition Progressing, not met  6. Patient to ambulate full weight bearing Right lower extremity and normal gait pattern  Progressing, not met    Long Term Goals   -(12 Weeks):   1. Patient to have 4+/5 or greater strength in RLE for functional performance of ADL's such as lifting  Progressing, not met2. Patient to descend one flight of stairs with alternating gait pattern without use of handrail and with proper LE alignment  3. Patient to have 135 degrees or greater Right knee flexion for return to ADL's including squatting  Progressing, not met  -(36 weeks):   4. Patient to have decreased subjective report of disability as noted by <20% limitation on FOTO knee questionnaire Progressing, not met  5. Patient to perform single leg squat without assistance and without valgus collapse to improve participation in recreational  Progressing, not met  6. Patient's RLE strength to be 5/5 for return to recreational activities and sport Progressing, not met     PLAN   Cont to progress towards goals set by PT.  Work to increase joint mobility and quad/ glute strength next visit.            Luis Doherty, PT

## 2022-04-11 ENCOUNTER — CLINICAL SUPPORT (OUTPATIENT)
Dept: REHABILITATION | Facility: OTHER | Age: 62
End: 2022-04-11
Payer: COMMERCIAL

## 2022-04-11 DIAGNOSIS — R26.89 IMPAIRED GAIT AND MOBILITY: ICD-10-CM

## 2022-04-11 DIAGNOSIS — R29.898 WEAKNESS OF BOTH LOWER EXTREMITIES: ICD-10-CM

## 2022-04-11 DIAGNOSIS — M25.661 DECREASED RANGE OF MOTION (ROM) OF RIGHT KNEE: Primary | ICD-10-CM

## 2022-04-11 DIAGNOSIS — R29.898 WEAKNESS OF RIGHT LOWER EXTREMITY: ICD-10-CM

## 2022-04-11 DIAGNOSIS — Z47.89 AFTERCARE FOR ANTERIOR CRUCIATE LIGAMENT (ACL) REPAIR: ICD-10-CM

## 2022-04-11 PROCEDURE — 97140 MANUAL THERAPY 1/> REGIONS: CPT | Mod: PN | Performed by: INTERNAL MEDICINE

## 2022-04-11 PROCEDURE — 97110 THERAPEUTIC EXERCISES: CPT | Mod: PN | Performed by: INTERNAL MEDICINE

## 2022-04-11 NOTE — PROGRESS NOTES
[facility]   Physical Therapy Treatment Note     Name: Evan Khoury  Clinic Number: [id number]    Therapy Diagnosis:   Encounter Diagnoses   Name Primary?    Decreased range of motion (ROM) of right knee Yes    Impaired gait and mobility     Weakness of right lower extremity     Weakness of both lower extremities     Aftercare for anterior cruciate ligament (ACL) repair      Physician: Luis Watkins, Jennifer    Visit Date: 4/11/2022    Physician Orders: PT Eval and Treat   1. Right knee ACL reconstruction with hamstring autograft   2. Right knee arthroscopic menisectomy versus meniscus repair   3. Right knee arthroscopic chondroplasty   4. Right knee arthroscopic synovectomy  Surgery Date: 3/8/2022  Medical Diagnosis from Referral: S83.511A (ICD-10-CM) - Complete tear of anterior cruciate ligament of right knee, initial encounter  Evaluation Date: 3/10/2022  Authorization Period Expiration: 4/1/2022  Plan of Care Expiration: 12/31/2022  Progress Note Due: 4/10/2022  Visit # / Visits authorized: 10 (11/20) (no limit)  FOTO: 4/4/2022 0/5  PTA:  0/5   POW: 4 (on 4/5/2022)    Time In: 2:10 pm   Time Out: 3:10 pm   Total Billable Time: 55 minutes    Precautions: Weight bearing: (TTWB or 25% 0-4 wks per MD protocol for ACL/ meniscus repair)Range of Motion: limited to -10 degrees extension and 90 degrees flexion    PHYSICAL THERAPY:  The patient should begin physical therapy on postoperative day #3 and will be advanced to outpatient therapy as soon as Possible following discharge. Weight bearing:(TTWB or 25% 0-4 wks per MD protocol for ACL/ meniscus repair)Range of Motion: limited to -10 degrees extension and 90 degrees flexion     No CPM required due to procedure performed. To begin quad sets with a heel roll to obtain hyperextension, straight leg raise and heel slides with the heel supported in a closed-chain fashion     Immediate specific exercises should include:   Gait program should  "include the above stated weightbearing; in addition: active extension with a heel-to-toe gait working on maintaining extension     Immobilizer if present should be locked @-10 degrees with gait and allowed to flex to 90 degrees at rest.         Subjective     Pt states medial and ant knee pain from turning knee in bed ( not wearing  brace).  Pain is improving, more ant patella today,  He was more compliant with home exercise program.  Response to previous treatment:  No adverse effects  Functional change: no change reported     Pain:3/10  Location: right knee      Objective   ROM: 4/11/2022    Flexion 100  deg PROM  Ext 3 deg hyper extension AROM    Amb into clinic with immob unlocked, B crutches.     Evan received therapeutic exercises to develop strength, endurance, ROM, flexibility, posture and core stabilization for 45 minutes including:     Evan received the treatments listed below:   Stick quads 3 min   gastroc stretch on incline x 3'  heel raises x 30 reps  Quad sets 30 x 10"   Quad sets with ER 30 x 10"   PROM heel slides with sliding board and strap 20 x 5"  SLR 30 x  reps  Side lying hip abd 2 x 15 reps, 2#  Side lying hip add 2 x 15 reps  Supine hang x 5'  Gastroc stretch with towel 30" x 3  Passive knee flexion off EOB 5 x 20 sec   PRone passive flex 20 sec x 4  +TKE gtb 3 x 10     Heel prop x 15  min (concurrently with ice today). Utilized  pillow case and towel between skin and ice pack.       Evan received the following manual therapy techniques: Joint mobilizations were applied to the: R patella for  10  minutes, including:  Inf/ medial patella gr 2-3 sitting EOB during flex  and half foam roll  prom knee ext R with manual overpressure overpressure  20 sec x 3    Home Exercises Provided and Patient Education Provided     Education provided:  Ed in wearing brace as ed, locked in ext isaura to protect meniscus.   Discussed possibility of sailing end of June, mostly just enjoying the ride. Wear immob to " protect knee  Ed in finding PT in [place] when he goes for 6 weeks in May.   Locked immob prior to pt putting back on to leave clinic.     Written Home Exercises Provided: Patient instructed to cont prior HEP.  Exercises were reviewed and Evan was able to demonstrate them prior to the end of the session.  Evan demonstrated good  understanding of the education provided.     See EMR under Patient Instructions for exercises provided prior visit.    Assessment   Good quad contraction , needs to be more compliant with wearing immob. Cont making progress with rom and pain seems more at patella than meniscus today.     Evan Is progressing well towards his goals.   Pt prognosis is Good.     Pt will continue to benefit from skilled outpatient physical therapy to address the deficits listed in the problem list box on initial evaluation, provide pt/family education and to maximize pt's level of independence in the home and community environment.     Pt's spiritual, cultural and educational needs considered and pt agreeable to plan of care and goals.    Anticipated barriers to physical therapy: none    Goals:  Short Term Goals (6 Weeks):   1. Patient to have full extension Right (equal to left) for normalized gait pattern Progressing, not met  2. Patient to have 120 degrees of passive left knee flexion for improved performance of ADL's such as sit<>stand transfers Progressing, not met  3. Patient to have decreased edema left knee  Progressing, not met  4. Patient to report 2/10 pain or less in left knee with ambulating community distances   met  5. Patient to be compliant with home program 5x's a week as noted by proper demonstration of exercises to therapist for improved management of condition Progressing, not met  6. Patient to ambulate full weight bearing Right lower extremity and normal gait pattern  Progressing, not met    Long Term Goals   -(12 Weeks):   1. Patient to have 4+/5 or greater strength in RLE for functional  performance of ADL's such as lifting  Progressing, not met2. Patient to descend one flight of stairs with alternating gait pattern without use of handrail and with proper LE alignment  3. Patient to have 135 degrees or greater Right knee flexion for return to ADL's including squatting  Progressing, not met  -(36 weeks):   4. Patient to have decreased subjective report of disability as noted by <20% limitation on FOTO knee questionnaire Progressing, not met  5. Patient to perform single leg squat without assistance and without valgus collapse to improve participation in recreational  Progressing, not met  6. Patient's RLE strength to be 5/5 for return to recreational activities and sport Progressing, not met     PLAN   Cont to progress towards goals set by PT.  Work to increase joint mobility and quad/ glute strength next visit.            Tammi Lopez, PT

## 2022-04-14 ENCOUNTER — CLINICAL SUPPORT (OUTPATIENT)
Dept: REHABILITATION | Facility: OTHER | Age: 62
End: 2022-04-14
Payer: COMMERCIAL

## 2022-04-14 DIAGNOSIS — R29.898 WEAKNESS OF RIGHT LOWER EXTREMITY: ICD-10-CM

## 2022-04-14 DIAGNOSIS — R26.89 IMPAIRED GAIT AND MOBILITY: ICD-10-CM

## 2022-04-14 DIAGNOSIS — M25.661 DECREASED RANGE OF MOTION (ROM) OF RIGHT KNEE: Primary | ICD-10-CM

## 2022-04-14 DIAGNOSIS — Z47.89 AFTERCARE FOR ANTERIOR CRUCIATE LIGAMENT (ACL) REPAIR: ICD-10-CM

## 2022-04-14 DIAGNOSIS — R29.898 WEAKNESS OF BOTH LOWER EXTREMITIES: ICD-10-CM

## 2022-04-14 PROCEDURE — 97140 MANUAL THERAPY 1/> REGIONS: CPT | Mod: PN

## 2022-04-14 PROCEDURE — 97110 THERAPEUTIC EXERCISES: CPT | Mod: PN

## 2022-04-14 NOTE — PROGRESS NOTES
[facility]   Physical Therapy Treatment Note     Name: Evan Khoury  Clinic Number: [id number]    Therapy Diagnosis:   Encounter Diagnoses   Name Primary?    Decreased range of motion (ROM) of right knee Yes    Impaired gait and mobility     Weakness of right lower extremity     Weakness of both lower extremities     Aftercare for anterior cruciate ligament (ACL) repair      Physician: Luis Watkins, Jennifer    Visit Date: 4/14/2022    Physician Orders: PT Eval and Treat   1. Right knee ACL reconstruction with hamstring autograft   2. Right knee arthroscopic menisectomy versus meniscus repair   3. Right knee arthroscopic chondroplasty   4. Right knee arthroscopic synovectomy  Surgery Date: 3/8/2022  Medical Diagnosis from Referral: S83.511A (ICD-10-CM) - Complete tear of anterior cruciate ligament of right knee, initial encounter  Evaluation Date: 3/10/2022  Authorization Period Expiration: 4/1/2022  Plan of Care Expiration: 12/31/2022  Progress Note Due: 4/10/2022  Visit # / Visits authorized: 11 (12/20) (no limit)  FOTO: 4/4/2022 0/5  PTA:  0/5   POW: 4 (on 4/5/2022)    Time In: 915 am   Time Out: 1015 am   Total Billable Time: 60 minutes    Precautions: Weight bearing: (TTWB or 25% 0-4 wks per MD protocol for ACL/ meniscus repair)Range of Motion: limited to -10 degrees extension and 90 degrees flexion    PHYSICAL THERAPY:  The patient should begin physical therapy on postoperative day #3 and will be advanced to outpatient therapy as soon as Possible following discharge. Weight bearing:(TTWB or 25% 0-4 wks per MD protocol for ACL/ meniscus repair)Range of Motion: limited to -10 degrees extension and 90 degrees flexion     No CPM required due to procedure performed. To begin quad sets with a heel roll to obtain hyperextension, straight leg raise and heel slides with the heel supported in a closed-chain fashion     Immediate specific exercises should include:   Gait program should  "include the above stated weightbearing; in addition: active extension with a heel-to-toe gait working on maintaining extension     Immobilizer if present should be locked @-10 degrees with gait and allowed to flex to 90 degrees at rest.         Subjective     Pt states that he did not wear his brace to sleep because it doesn't feel good.  He states soreness at medial knee and swelling.  He was more compliant with home exercise program.  Response to previous treatment:  No adverse effects  Functional change: no change reported     Pain:3/10  Location: right knee      Objective   ROM: 4/14/2022    Flexion 100  deg PROM  Ext 3 deg hyper extension AROM    Amb into clinic with immob unlocked, B crutches.     Evan received therapeutic exercises to develop strength, endurance, ROM, flexibility, posture and core stabilization for 45 minutes including:     Evan received the treatments listed below:   Stick quads 3 min   Quad sets with ER 30 x 10"   PROM heel slides with sliding board and strap 20 x 5"  SLR with SAQ 40 x  reps  Side lying hip abd 2 x 15 reps, 2#  Side lying hip add 2 x 15 reps  SB DKTC - 2 x 15  SB bridge - 2 x 15  Supine hang x 5'  Gastroc stretch with towel 30" x 3  Passive knee flexion off EOB 5 x 20 sec   PRone passive flex 20 sec x 4  +TKE gtb 3 x 10     Utilized  pillow case and towel between skin and ice pack.       Evan received the following manual therapy techniques: Joint mobilizations were applied to the: R patella for  15  minutes, including:  All planes patella mob gr 2-3 supine, over 1/2 fr, with knee extension PROM      Home Exercises Provided and Patient Education Provided     Education provided:  Ed in wearing brace as ed, locked in ext isaura to protect meniscus.   Discussed possibility of sailing end of June, mostly just enjoying the ride. Wear immob to protect knee  Ed in finding PT in [place] when he goes for 6 weeks in May.   Locked immob prior to pt putting back on to leave clinic.   "   Written Home Exercises Provided: Patient instructed to cont prior HEP.  Exercises were reviewed and Evan was able to demonstrate them prior to the end of the session.  Evan demonstrated good  understanding of the education provided.     See EMR under Patient Instructions for exercises provided prior visit.    Assessment   The patient demonstrates appropriate quad activation with slr but fatigues with repetition.  He was encouraged to remain in his brace and unlock when NWB.  He was encouraged to perform short bouts of exercise throughout the day for quadriceps and knee extension.    Evan Is progressing well towards his goals.   Pt prognosis is Good.     Pt will continue to benefit from skilled outpatient physical therapy to address the deficits listed in the problem list box on initial evaluation, provide pt/family education and to maximize pt's level of independence in the home and community environment.     Pt's spiritual, cultural and educational needs considered and pt agreeable to plan of care and goals.    Anticipated barriers to physical therapy: none    Goals:  Short Term Goals (6 Weeks):   1. Patient to have full extension Right (equal to left) for normalized gait pattern Progressing, not met  2. Patient to have 120 degrees of passive left knee flexion for improved performance of ADL's such as sit<>stand transfers Progressing, not met  3. Patient to have decreased edema left knee  Progressing, not met  4. Patient to report 2/10 pain or less in left knee with ambulating community distances   met  5. Patient to be compliant with home program 5x's a week as noted by proper demonstration of exercises to therapist for improved management of condition Progressing, not met  6. Patient to ambulate full weight bearing Right lower extremity and normal gait pattern  Progressing, not met    Long Term Goals   -(12 Weeks):   1. Patient to have 4+/5 or greater strength in RLE for functional performance of ADL's such  as lifting  Progressing, not met2. Patient to descend one flight of stairs with alternating gait pattern without use of handrail and with proper LE alignment  3. Patient to have 135 degrees or greater Right knee flexion for return to ADL's including squatting  Progressing, not met  -(36 weeks):   4. Patient to have decreased subjective report of disability as noted by <20% limitation on FOTO knee questionnaire Progressing, not met  5. Patient to perform single leg squat without assistance and without valgus collapse to improve participation in recreational  Progressing, not met  6. Patient's RLE strength to be 5/5 for return to recreational activities and sport Progressing, not met     PLAN   Cont to progress towards goals set by PT.  Work to increase joint mobility and quad/ glute strength next visit.            Jules Andres, PT

## 2022-04-19 ENCOUNTER — CLINICAL SUPPORT (OUTPATIENT)
Dept: REHABILITATION | Facility: OTHER | Age: 62
End: 2022-04-19
Payer: COMMERCIAL

## 2022-04-19 DIAGNOSIS — M25.661 DECREASED RANGE OF MOTION (ROM) OF RIGHT KNEE: Primary | ICD-10-CM

## 2022-04-19 DIAGNOSIS — R26.89 IMPAIRED GAIT AND MOBILITY: ICD-10-CM

## 2022-04-19 DIAGNOSIS — Z47.89 AFTERCARE FOR ANTERIOR CRUCIATE LIGAMENT (ACL) REPAIR: ICD-10-CM

## 2022-04-19 DIAGNOSIS — R29.898 WEAKNESS OF BOTH LOWER EXTREMITIES: ICD-10-CM

## 2022-04-19 DIAGNOSIS — R29.898 WEAKNESS OF RIGHT LOWER EXTREMITY: ICD-10-CM

## 2022-04-19 PROCEDURE — 97110 THERAPEUTIC EXERCISES: CPT | Mod: PN | Performed by: PHYSICAL THERAPIST

## 2022-04-19 PROCEDURE — 97140 MANUAL THERAPY 1/> REGIONS: CPT | Mod: PN | Performed by: PHYSICAL THERAPIST

## 2022-04-19 NOTE — PROGRESS NOTES
[facility]   Physical Therapy Treatment Note     Name: Evan Khoury  Clinic Number: [id number]    Therapy Diagnosis:   Encounter Diagnoses   Name Primary?    Decreased range of motion (ROM) of right knee Yes    Impaired gait and mobility     Weakness of right lower extremity     Weakness of both lower extremities     Aftercare for anterior cruciate ligament (ACL) repair      Physician: Luis Watkins, Jennifer    Visit Date: 4/19/2022    Physician Orders: PT Eval and Treat   1. Right knee ACL reconstruction with hamstring autograft   2. Right knee arthroscopic menisectomy versus meniscus repair   3. Right knee arthroscopic chondroplasty   4. Right knee arthroscopic synovectomy  Surgery Date: 3/8/2022  Medical Diagnosis from Referral: S83.511A (ICD-10-CM) - Complete tear of anterior cruciate ligament of right knee, initial encounter  Evaluation Date: 3/10/2022  Authorization Period Expiration: 4/1/2022  Plan of Care Expiration: 12/31/2022  Progress Note Due: 4/10/2022  Visit # / Visits authorized: 11 (12/20) (no limit)  FOTO: 4/4/2022 0/5  PTA:  0/5   POW: 6 (on 4/18/2022)    Time In: 10:10 am   Time Out: 11:15 am   Total Billable Time: 45 minutes    Precautions: Weight bearing: transition to FWB        Subjective     Pt states going back into the office more and not able to prop/ ice throughout the day. Having more swelling and slept in the sleeve last night, but not wearing compression during the day. Going some without the crutches  He was more compliant with home exercise program.  Response to previous treatment:  No adverse effects  Functional change: no change reported     Pain: 0/10 at rest, 3/10 with activity   Location: right knee      Objective   ROM: 4/19/2022    Flexion 110  deg PROM  Ext 0 deg AROM and 3 deg hyper extension PROM    Amb into clinic with immob unlocked, B crutches.     Evan received therapeutic exercises to develop strength, endurance, ROM, flexibility,  "posture and core stabilization for 40 minutes including:     Evan received the treatments listed below:   Upright bike x 8 min, semi circles to full revolutions   Stick quads 3 min   Quad sets with ER 30 x 10"   PROM heel slides with sliding board and strap 20 x 5"  SLR with SAQ 40 x  reps  Side lying hip abd 2 x 15 reps, 2#  Side lying hip add 2 x 15 reps, 2#  SB DKTC - 2 x 15  SB bridge - 2 x 15  Supine hang x 5'  Gastroc stretch with towel 30" x 3  Passive knee flexion off EOB 5 x 20 sec   PRone passive flex 20 sec x 4  TKE gtb 3 x 10     Utilized pillow case and towel between skin and ice pack.       Evan received the following manual therapy techniques: Joint mobilizations were applied to the: R patella for  15  minutes, including:  All planes patella mob gr 2-3 supine, over 1/2 fr, with knee extension PROM    Home Exercises Provided and Patient Education Provided     Education provided:  Ed in use of unilateral crutch with progressing to WBAT. Use of compression for edema management. Clearance from surgeon before flying on plane this weekend. Discussed warning signs of DVT and when to seek care.      Written Home Exercises Provided: Patient instructed to cont prior HEP.  Exercises were reviewed and Evan was able to demonstrate them prior to the end of the session.  Evan demonstrated good  understanding of the education provided.     See EMR under Patient Instructions for exercises provided prior visit.    Assessment   Evan presents to clinic with increased LE edema 1+ pitting at the ankle. Education on continued use of compression stockings and activity modification. Demonstrating improvements in knee flexion Range of Motion and able to initiate upright bike high seat this session    Evan Is progressing well towards his goals.   Pt prognosis is Good.     Pt will continue to benefit from skilled outpatient physical therapy to address the deficits listed in the problem list box on initial evaluation, " provide pt/family education and to maximize pt's level of independence in the home and community environment.     Pt's spiritual, cultural and educational needs considered and pt agreeable to plan of care and goals.    Anticipated barriers to physical therapy: none    Goals:  Short Term Goals (6 Weeks):   1. Patient to have full extension Right (equal to left) for normalized gait pattern Progressing, not met  2. Patient to have 120 degrees of passive left knee flexion for improved performance of ADL's such as sit<>stand transfers Progressing, not met  3. Patient to have decreased edema left knee  Progressing, not met  4. Patient to report 2/10 pain or less in left knee with ambulating community distances   met  5. Patient to be compliant with home program 5x's a week as noted by proper demonstration of exercises to therapist for improved management of condition Progressing, not met  6. Patient to ambulate full weight bearing Right lower extremity and normal gait pattern  Progressing, not met    Long Term Goals   -(12 Weeks):   1. Patient to have 4+/5 or greater strength in RLE for functional performance of ADL's such as lifting  Progressing, not met2. Patient to descend one flight of stairs with alternating gait pattern without use of handrail and with proper LE alignment  3. Patient to have 135 degrees or greater Right knee flexion for return to ADL's including squatting  Progressing, not met  -(36 weeks):   4. Patient to have decreased subjective report of disability as noted by <20% limitation on FOTO knee questionnaire Progressing, not met  5. Patient to perform single leg squat without assistance and without valgus collapse to improve participation in recreational  Progressing, not met  6. Patient's RLE strength to be 5/5 for return to recreational activities and sport Progressing, not met     PLAN   Cont to progress Range of Motion and quad strength. Edema management and gait training          Yuliana  Juan Miguel, PT

## 2022-04-21 ENCOUNTER — CLINICAL SUPPORT (OUTPATIENT)
Dept: REHABILITATION | Facility: OTHER | Age: 62
End: 2022-04-21
Payer: COMMERCIAL

## 2022-04-21 ENCOUNTER — TELEPHONE (OUTPATIENT)
Dept: SPORTS MEDICINE | Facility: CLINIC | Age: 62
End: 2022-04-21
Payer: COMMERCIAL

## 2022-04-21 DIAGNOSIS — R29.898 WEAKNESS OF BOTH LOWER EXTREMITIES: ICD-10-CM

## 2022-04-21 DIAGNOSIS — M25.661 DECREASED RANGE OF MOTION (ROM) OF RIGHT KNEE: Primary | ICD-10-CM

## 2022-04-21 DIAGNOSIS — R26.89 IMPAIRED GAIT AND MOBILITY: ICD-10-CM

## 2022-04-21 DIAGNOSIS — R29.898 WEAKNESS OF RIGHT LOWER EXTREMITY: ICD-10-CM

## 2022-04-21 DIAGNOSIS — Z47.89 AFTERCARE FOR ANTERIOR CRUCIATE LIGAMENT (ACL) REPAIR: ICD-10-CM

## 2022-04-21 PROCEDURE — 97110 THERAPEUTIC EXERCISES: CPT | Mod: PN

## 2022-04-21 PROCEDURE — 97140 MANUAL THERAPY 1/> REGIONS: CPT | Mod: PN

## 2022-04-21 NOTE — TELEPHONE ENCOUNTER
----- Message from Magy Price sent at 4/21/2022 11:02 AM CDT -----  Type:  Patient Call Back    Who Called: Evan    What is the reqeust in detail: Pt is requesting a call back regardin his f/u appt. Please Advis e    Can the clinic reply by [facility]?    Best Call Back Number: [phone]

## 2022-04-21 NOTE — PROGRESS NOTES
[facility]   Physical Therapy Treatment Note     Name: Evan Khoury  Clinic Number: [id number]    Therapy Diagnosis:   Encounter Diagnoses   Name Primary?    Decreased range of motion (ROM) of right knee Yes    Impaired gait and mobility     Weakness of right lower extremity     Weakness of both lower extremities     Aftercare for anterior cruciate ligament (ACL) repair      Physician: Luis Watkins, Jennifer    Visit Date: 4/21/2022    Physician Orders: PT Eval and Treat   1. Right knee ACL reconstruction with hamstring autograft   2. Right knee arthroscopic menisectomy versus meniscus repair   3. Right knee arthroscopic chondroplasty   4. Right knee arthroscopic synovectomy  Surgery Date: 3/8/2022  Medical Diagnosis from Referral: S83.511A (ICD-10-CM) - Complete tear of anterior cruciate ligament of right knee, initial encounter  Evaluation Date: 3/10/2022  Surgical Date: 3/8/3033  Authorization Period Expiration: 7/1/2022  Plan of Care Expiration: 12/31/2022  Progress Note Due: 4/10/2022  Visit # / Visits authorized: 13/20 (no limit)  FOTO: 4/21/2022 1/5  PTA:  0/5   POW: 6 (on 4/18/2022)    Time In: 9:15 am   Time Out: 10:30 am   Total Billable Time: 45 minutes    Precautions: Weight bearing: transition to FWB        Subjective     Pt states he is not really happy with the knee. states he was having knee pain last night (pointed to medial fat pad) states he was walking without the knee brace last night and he went up the stairs. Patient stated he is traveling to [place] for work this Sunday and has some concerns. Also discussed going to [place] for festival    He was more compliant with home exercise program.  Response to previous treatment:  no adverse effects  Functional change: no change reported     Pain: 0/10 at rest, 3/10 with activity   Location: right knee      Objective     CMS Impairment/Limitation/Restriction for FOTO Knee Survey    Therapist reviewed FOTO scores  "for Evan Khoury on 4/21/2022.   FOTO documents entered into [employer] - see Media section.    Limitation Score: 45%  Category: Mobility    Current : CK = at least 40% but < 60% impaired, limited or restricted  Goal: CJ = at least 20% but < 40% impaired, limited or restricted       ROM: 4/21/2022    Flexion 115  deg PROM  Ext 0 deg AROM and 3 deg hyper extension PROM    Amb into clinic with immob unlocked, B crutches.     Evan received therapeutic exercises to develop strength, endurance, ROM, flexibility, posture and core stabilization for 40 minutes including:     Evan received the treatments listed below:   Upright bike x 10 min full revolutions   Stick quads 3 min   Quad sets 30 x 10"  Quad sets with ER 30 x 10"  PROM heel slides with sliding board and strap 20 x 5"  SLR with SAQ 40 x  reps  Side lying hip abd 2 x 15 reps, 2#  Side lying hip add 2 x 15 reps, 2#  SB DKTC - 2 x 15  SB bridge - 2 x 15  Supine hang x 5'  Gastroc stretch with towel 30" x 3  Passive knee flexion off EOB 5 x 20 sec   Prone passive flex 20 sec x 4  TKE gtb 3 x 10   +forward step ups 2 x 10 on 4" step    Utilized pillow case and towel between skin and ice pack.       Evan received the following manual therapy techniques: Joint mobilizations were applied to the: R patella for  15  minutes, including:  All planes patella mob gr 2-3 supine, over 1/2 fr, with knee extension PROM  Grades II and III tibial mobilizations to increase flexion    Home Exercises Provided and Patient Education Provided     Education provided:  Ed in use of unilateral crutch with progressing to WBAT. Use of compression for edema management. Clearance from surgeon before flying on plane this weekend. Discussed warning signs of DVT and when to seek care.      Written Home Exercises Provided: Patient instructed to cont prior HEP.  Exercises were reviewed and Evan was able to demonstrate them prior to the end of the session.  Evan demonstrated good  understanding of the " education provided.     See EMR under Patient Instructions for exercises provided prior visit.    Assessment   Decreased edema noted in R knee. Discussed use of compression socks for flying. Provided patient with 2 sized tubigrip for use. (D and F). Will continue/progress with closed chain quad strengthening.    Evan Is progressing well towards his goals.   Pt prognosis is Good.     Pt will continue to benefit from skilled outpatient physical therapy to address the deficits listed in the problem list box on initial evaluation, provide pt/family education and to maximize pt's level of independence in the home and community environment.     Pt's spiritual, cultural and educational needs considered and pt agreeable to plan of care and goals.    Anticipated barriers to physical therapy: none    Goals:  Short Term Goals (6 Weeks):   1. Patient to have full extension Right (equal to left) for normalized gait pattern Progressing, not met  2. Patient to have 120 degrees of passive left knee flexion for improved performance of ADL's such as sit<>stand transfers Progressing, not met  3. Patient to have decreased edema left knee  Progressing, not met  4. Patient to report 2/10 pain or less in left knee with ambulating community distances   met  5. Patient to be compliant with home program 5x's a week as noted by proper demonstration of exercises to therapist for improved management of condition Progressing, not met  6. Patient to ambulate full weight bearing Right lower extremity and normal gait pattern  Progressing, not met    Long Term Goals   -(12 Weeks):   1. Patient to have 4+/5 or greater strength in RLE for functional performance of ADL's such as lifting  Progressing, not met2. Patient to descend one flight of stairs with alternating gait pattern without use of handrail and with proper LE alignment  3. Patient to have 135 degrees or greater Right knee flexion for return to ADL's including squatting  Progressing, not  met  -(36 weeks):   4. Patient to have decreased subjective report of disability as noted by <20% limitation on FOTO knee questionnaire Progressing, not met  5. Patient to perform single leg squat without assistance and without valgus collapse to improve participation in recreational  Progressing, not met  6. Patient's RLE strength to be 5/5 for return to recreational activities and sport Progressing, not met     PLAN   Cont to progress Range of Motion and quad strength. Edema management and gait training          Luis Doherty, PT

## 2022-04-28 ENCOUNTER — CLINICAL SUPPORT (OUTPATIENT)
Dept: REHABILITATION | Facility: OTHER | Age: 62
End: 2022-04-28
Payer: COMMERCIAL

## 2022-04-28 DIAGNOSIS — M25.661 DECREASED RANGE OF MOTION (ROM) OF RIGHT KNEE: Primary | ICD-10-CM

## 2022-04-28 DIAGNOSIS — R29.898 WEAKNESS OF BOTH LOWER EXTREMITIES: ICD-10-CM

## 2022-04-28 DIAGNOSIS — R26.89 IMPAIRED GAIT AND MOBILITY: ICD-10-CM

## 2022-04-28 DIAGNOSIS — R29.898 WEAKNESS OF RIGHT LOWER EXTREMITY: ICD-10-CM

## 2022-04-28 DIAGNOSIS — Z47.89 AFTERCARE FOR ANTERIOR CRUCIATE LIGAMENT (ACL) REPAIR: ICD-10-CM

## 2022-04-28 PROCEDURE — 97110 THERAPEUTIC EXERCISES: CPT | Mod: PN | Performed by: INTERNAL MEDICINE

## 2022-04-28 PROCEDURE — 97140 MANUAL THERAPY 1/> REGIONS: CPT | Mod: PN | Performed by: INTERNAL MEDICINE

## 2022-04-29 NOTE — PLAN OF CARE
[facility]   Physical Therapy Treatment Note     Name: Evan Khoury  Clinic Number: [id number]    Therapy Diagnosis:   Encounter Diagnoses   Name Primary?    Decreased range of motion (ROM) of right knee Yes    Impaired gait and mobility     Weakness of right lower extremity     Weakness of both lower extremities     Aftercare for anterior cruciate ligament (ACL) repair      Physician: Luis Watkins, Jennifer    Visit Date: 4/28/2022      Physician Orders: PT Eval and Treat   1. Right knee ACL reconstruction with hamstring autograft   2. Right knee arthroscopic menisectomy versus meniscus repair   3. Right knee arthroscopic chondroplasty   4. Right knee arthroscopic synovectomy  Surgery Date: 3/8/2022  Medical Diagnosis from Referral: S83.511A (ICD-10-CM) - Complete tear of anterior cruciate ligament of right knee, initial encounter  Evaluation Date: 3/10/2022  Surgical Date: 3/8/3033  Authorization Period Expiration: 7/1/2022  Plan of Care Expiration: 12/31/2022  Progress Note Due: 5/27/2022  Visit # / Visits authorized: 14/20 (no limit)  FOTO: 4/21/2022 1/5  PTA:  0/5   POW: 6 (on 4/18/2022)    Time In: 9:15 am   Time Out: 10:30 am   Total Billable Time: 90 minutes    Precautions: Weight bearing: transition to FWB   4/27 7 weeks     Subjective     Pt states his knee feels great, loved the KT tape and wants it retaped. The tape helps others stay away from his leg in crowds.   He was more compliant with home exercise program.  Response to previous treatment:  no adverse effects  Functional change: improved gait    Pain: 0/10 at rest    Location: right knee      Objective     CMS Impairment/Limitation/Restriction for FOTO Knee Survey    Therapist reviewed FOTO scores for Evan Khoury on 4/21/2022.   FOTO documents entered into [employer] - see Media section.    Limitation Score: 45%  Category: Mobility    Current : CK = at least 40% but < 60% impaired, limited or restricted  Goal: CJ = at  "least 20% but < 40% impaired, limited or restricted       ROM: 4/28/2022      Flexion arom 125 , 135  deg PROM; 155  Prom L   Ext + 3 deg PROM;  L approx + 6 hyperext.     MMT R HE 4+, L 5    Amb I , not wearing brace.     Evan received therapeutic exercises to develop strength, endurance, ROM, flexibility, posture and core stabilization for 60 minutes including:     Evan received the treatments listed below:   Upright bike x 7  min full revolutions     Stick quads 3 min   Quad sets 30 x 10"  Quad sets with ER 30 x 10"  PROM heel slides with sliding board and strap 20 x 5"  SLR with SAQ  30  x  Reps  HS curls  3 x 10   Wall sits 25 sec x 3 ( less than 90 deg) with ed to stop if pain occurs in knee joint.   Side lying hip abd 2 x 15 reps, 2#  Side lying hip add 2 x 15 reps, 2#  SB DKTC - 2 x 15  SB bridge - 2 x 15   prone hang 3# for 5 min   Gastroc stretch with towel 30" x 3     Prone knee flex  strap 2 min   TKE gtb 3 x 10   + step ups 3 x 10 on 4" step tap down  Side steps otb 40 ft  X 2  Monster walks 40 ft x  2   U HR  3 x 10  gss slant  90 sec           Evan received the following manual therapy techniques: Joint mobilizations were applied to the: R patella for  15  minutes, including:  All planes patella mob gr 2-3 supine  Grades II and III tibial mobilizations to increase flexion  KT Tape Octopus m/ l knee     Home Exercises Provided and Patient Education Provided     Education provided:   remove tape by day 7     Written Home Exercises Provided: Patient instructed to cont prior HEP.  Exercises were reviewed and Evan was able to demonstrate them prior to the end of the session.  Evan demonstrated good  understanding of the education provided.     See EMR under Patient Instructions for exercises provided prior visit.    Assessment    Will continue/progress with closed chain quad strengthening. Progressing with rom and gait.     Evan Is progressing well towards his goals.   Pt prognosis is Good.     Pt " will continue to benefit from skilled outpatient physical therapy to address the deficits listed in the problem list box on initial evaluation, provide pt/family education and to maximize pt's level of independence in the home and community environment.     Pt's spiritual, cultural and educational needs considered and pt agreeable to plan of care and goals.    Anticipated barriers to physical therapy: none    Goals:  Short Term Goals (6 Weeks):   1. Patient to have full extension Right (equal to left) for normalized gait pattern  met  2. Patient to have 120 degrees of passive left knee flexion for improved performance of ADL's such as sit<>stand transfers  met  3. Patient to have decreased edema left knee   met  4. Patient to report 2/10 pain or less in left knee with ambulating community distances   met  5. Patient to be compliant with home program 5x's a week as noted by proper demonstration of exercises to therapist for improved management of condition   met  6. Patient to ambulate full weight bearing Right lower extremity and normal gait pattern     met    Long Term Goals   -(12 Weeks):   1. Patient to have 4+/5 or greater strength in RLE for functional performance of ADL's such as lifting  Progressing, not met2. Patient to descend one flight of stairs with alternating gait pattern without use of handrail and with proper LE alignment  3. Patient to have 135 degrees or greater Right knee flexion for return to ADL's including squatting  Progressing, not met  -(36 weeks):   4. Patient to have decreased subjective report of disability as noted by <20% limitation on FOTO knee questionnaire Progressing, not met  5. Patient to perform single leg squat without assistance and without valgus collapse to improve participation in recreational  Progressing, not met  6. Patient's RLE strength to be 5/5 for return to recreational activities and sport Progressing, not met     PLAN   Cont to progress Range of Motion and quad  strength. Edema management and gait training          Tammi Lopez, PT

## 2022-05-03 ENCOUNTER — CLINICAL SUPPORT (OUTPATIENT)
Dept: REHABILITATION | Facility: OTHER | Age: 62
End: 2022-05-03
Payer: COMMERCIAL

## 2022-05-03 DIAGNOSIS — R26.89 IMPAIRED GAIT AND MOBILITY: ICD-10-CM

## 2022-05-03 DIAGNOSIS — R29.898 WEAKNESS OF RIGHT LOWER EXTREMITY: ICD-10-CM

## 2022-05-03 DIAGNOSIS — R29.898 WEAKNESS OF BOTH LOWER EXTREMITIES: ICD-10-CM

## 2022-05-03 DIAGNOSIS — Z47.89 AFTERCARE FOR ANTERIOR CRUCIATE LIGAMENT (ACL) REPAIR: ICD-10-CM

## 2022-05-03 DIAGNOSIS — M25.661 DECREASED RANGE OF MOTION (ROM) OF RIGHT KNEE: Primary | ICD-10-CM

## 2022-05-03 PROCEDURE — 97110 THERAPEUTIC EXERCISES: CPT | Mod: PN | Performed by: INTERNAL MEDICINE

## 2022-05-03 PROCEDURE — 97140 MANUAL THERAPY 1/> REGIONS: CPT | Mod: PN | Performed by: INTERNAL MEDICINE

## 2022-05-03 NOTE — PROGRESS NOTES
[facility]   Physical Therapy Treatment Note      Name: Evan Khoury  Clinic Number: [id number]     Therapy Diagnosis:        Encounter Diagnoses   Name Primary?    Decreased range of motion (ROM) of right knee Yes    Impaired gait and mobility      Weakness of right lower extremity      Weakness of both lower extremities      Aftercare for anterior cruciate ligament (ACL) repair        Physician: Luis Watkins, Jennifer     Visit Date: 5/3/22      Physician Orders: PT Eval and Treat   1. Right knee ACL reconstruction with hamstring autograft   2. Right knee arthroscopic menisectomy versus meniscus repair   3. Right knee arthroscopic chondroplasty   4. Right knee arthroscopic synovectomy  Surgery Date: 3/8/2022  Medical Diagnosis from Referral: S83.511A (ICD-10-CM) - Complete tear of anterior cruciate ligament of right knee, initial encounter  Evaluation Date: 3/10/2022  Surgical Date: 3/8/3033  Authorization Period Expiration: 7/1/2022  Plan of Care Expiration: 12/31/2022  Progress Note Due: 5/27/2022  Visit # / Visits authorized: 15/20 (no limit)  FOTO: 4/21/2022 1/5  PTA:  0/5   POW: 6 (on 4/18/2022)     Time In: 9:10 am   Time Out: 10:15 am   Total Billable Time: 65 minutes     Precautions: Weight bearing: transition to FWB   4/27 7 weeks     Subjective      Pt a little sore from attending weekend music festival in [place].  He was more compliant with home exercise program.  Response to previous treatment:  no adverse effects  Functional change: improved gait     Pain: 0/10 at rest    Location: right knee       Objective      CMS Impairment/Limitation/Restriction for FOTO Knee Survey     Therapist reviewed FOTO scores for Evan Khoury on 4/21/2022.   FOTO documents entered into [employer] - see Media section.     Limitation Score: 45%  Category: Mobility     Current : CK = at least 40% but < 60% impaired, limited or restricted  Goal: CJ = at least 20% but < 40% impaired, limited  "or restricted         ROM: 4/28/2022        Flexion arom 132 ,PROM; 155  Prom L   Ext + 3 deg PROM;  L approx + 6 hyperext.      MMT R HE 4+, L 5     Amb I , not wearing brace.      Evan received therapeutic exercises to develop strength, endurance, ROM, flexibility, posture and core stabilization for 60 minutes including:      Evan received the treatments listed below:   Upright bike x 5  min full revolutions      Stick quads 3 min   Quad sets 30 x 10"  Quad sets with ER 30 x 10"  PROM heel slides with sliding board and strap 20 x 5"  SLR with SAQ  30  x  Reps  HS curls  3 x 10   LAQ 3 x 10   Wall sits 30 sec x 3 ( less than 90 deg) with ed to stop if pain occurs in knee joint.   U leg press 37# 3 x 10 emphasize eccentrics  Side lying hip abd 2 x 15 reps, 2#  Side lying hip add 2 x 15 reps, 2#  SB DKTC - 2 x 15  SB bridge - 2 x 15   prone hang 3# for 5 min   Gastroc stretch with towel 30" x 3     Prone knee flex  strap 2 min   TKE gtb 3 x 10    step ups 3 x f on 4" step tap down  Side steps otb 40 ft  X 2  Monster walks 40 ft x  2   U HR  3 x 10  gss slant  90 sec   +HE SLS 15x           Evan received the following manual therapy techniques: Joint mobilizations were applied to the: R patella for  15  minutes, including:  All planes patella mob gr 2-3 supine  Grades II and III tibial mobilizations to increase flexion  KT Tape Octopus m/ l knee     Cp x 10 min with extra layer pillowcase for decreasing edema.     Home Exercises Provided and Patient Education Provided      Education provided:   remove tape by day 7     Written Home Exercises Provided: Patient instructed to cont prior HEP.  Exercises were reviewed and Evan was able to demonstrate them prior to the end of the session.  Evan demonstrated good  understanding of the education provided.      See EMR under Patient Instructions for exercises provided prior visit.     Assessment    Will continue/progress with closed chain quad strengthening.  Mild edema " post activity this weekend.    Evan Is progressing well towards his goals.   Pt prognosis is Good.      Pt will continue to benefit from skilled outpatient physical therapy to address the deficits listed in the problem list box on initial evaluation, provide pt/family education and to maximize pt's level of independence in the home and community environment.      Pt's spiritual, cultural and educational needs considered and pt agreeable to plan of care and goals.     Anticipated barriers to physical therapy: none     Goals:  Short Term Goals (6 Weeks):   1. Patient to have full extension Right (equal to left) for normalized gait pattern  met  2. Patient to have 120 degrees of passive left knee flexion for improved performance of ADL's such as sit<>stand transfers  met  3. Patient to have decreased edema left knee   met  4. Patient to report 2/10 pain or less in left knee with ambulating community distances   met  5. Patient to be compliant with home program 5x's a week as noted by proper demonstration of exercises to therapist for improved management of condition   met  6. Patient to ambulate full weight bearing Right lower extremity and normal gait pattern     met     Long Term Goals   -(12 Weeks):   1. Patient to have 4+/5 or greater strength in RLE for functional performance of ADL's such as lifting  Progressing, not met2. Patient to descend one flight of stairs with alternating gait pattern without use of handrail and with proper LE alignment  3. Patient to have 135 degrees or greater Right knee flexion for return to ADL's including squatting  Progressing, not met  -(36 weeks):   4. Patient to have decreased subjective report of disability as noted by <20% limitation on FOTO knee questionnaire Progressing, not met  5. Patient to perform single leg squat without assistance and without valgus collapse to improve participation in recreational  Progressing, not met  6. Patient's RLE strength to be 5/5 for return to  recreational activities and sport Progressing, not met     PLAN   Cont to progress Range of Motion and quad strength. Edema management and gait training            Tammi Lopez, PT

## 2022-05-04 NOTE — PROGRESS NOTES
[facility]   Physical Therapy Treatment Note     Name: Evan Khoury  Clinic Number: [id number]    Therapy Diagnosis:   Encounter Diagnoses   Name Primary?    Decreased range of motion (ROM) of right knee Yes    Impaired gait and mobility     Weakness of right lower extremity     Weakness of both lower extremities     Aftercare for anterior cruciate ligament (ACL) repair      Physician: Luis Watkins, Jennifer    Visit Date: 5/5/2022  Physician Orders: PT Eval and Treat   1. Right knee ACL reconstruction with hamstring autograft   2. Right knee arthroscopic menisectomy versus meniscus repair   3. Right knee arthroscopic chondroplasty   4. Right knee arthroscopic synovectomy  Surgery Date: 3/8/2022  Medical Diagnosis from Referral: S83.511A (ICD-10-CM) - Complete tear of anterior cruciate ligament of right knee, initial encounter  Evaluation Date: 3/10/2022  Surgical Date: 3/8/3033  Authorization Period Expiration: 7/1/2022  Plan of Care Expiration: 12/31/2022  Progress Note Due: 5/27/2022  Visit # / Visits authorized: 15/20 (no limit)  FOTO: 4/21/2022 3/5  PTA:  0/5     5/3 - 8 weeks post op       Time In: 9:20 am   Time Out: 10:15 am   Total Billable Time: 40 minutes     Precautions: Weight bearing: transition to FWB    POW: 8 (on 5/2/2022)     Subjective      Pt again reports his R knee feels great today.    He was more compliant with home exercise program.  Response to previous treatment:  no adverse effects  Functional change: improved gait     Pain: 0/10 at rest    Location: right knee       Objective      Arrived to session with KT tape intact    CMS Impairment/Limitation/Restriction for FOTO Knee Survey     Therapist reviewed FOTO scores for Evan Khoury on 4/21/2022.   FOTO documents entered into [employer] - see Media section.     Limitation Score: 45%  Category: Mobility     Current : CK = at least 40% but < 60% impaired, limited or restricted  Goal: CJ = at least 20% but < 40%  "impaired, limited or restricted      5/5/2022  MicroFET:  R 50.5  L 58.2     ROM: 4/28/2022     Flexion arom 132 ,PROM; 155  Prom L   Ext + 3 deg PROM;  L approx + 6 hyperext.      MMT R HE 4+, L 5     Amb I , not wearing brace.      Evan received therapeutic exercises to develop strength, endurance, ROM, flexibility, posture and core stabilization for 45 minutes including:      Evan received the treatments listed below:   Upright bike x 5  min full revolutions      Stick quads 3 min   Quad sets 30 x 10"  Quad sets with ER 30 x 10"  PROM heel slides with sliding board and strap 20 x 5"  SLR with SAQ  30  reps  HS curls  3 x 10   LAQ 3 x 10   Wall sits 30 sec x 3 (less than 90 deg) with ed to stop if pain occurs in knee joint.   U leg press 37# 3 x 10 emphasize eccentrics  Side lying hip abd 2 x 15 reps, 2#  Side lying hip add 2 x 15 reps, 2#  SB DKTC - 2 x 15  SB bridge - 2 x 15  prone hang 3# for 5 min   Gastroc stretch with towel 30" x 3     Prone knee flex  strap 2 min   TKE gtb 3 x 10   step ups 3 x 10 on 4" step   Lateral tap downs 3 x 10 reps on 4" step  Side steps otb 40 ft x 4  Monster walks 40 ft x  4   U HR  3 x 10  gss slant  90 sec   HE SLS 15x     +SLS on foam 3 x 30"  +rebounder toss SLS on firm ground 3 x 30"  +rebounder toss SLS on foam 3 x 30"          Evan received the following manual therapy techniques: Joint mobilizations were applied to the: R patella for 00 minutes, including:  All planes patella mob gr 2-3 supine  Grades II and III tibial mobilizations to increase flexion  KT Tape Octopus m/ l knee      Home Exercises Provided and Patient Education Provided      Education provided:   remove tape by day 7     Written Home Exercises Provided: Patient instructed to cont prior HEP.  Exercises were reviewed and Evan was able to demonstrate them prior to the end of the session.  Evan demonstrated good  understanding of the education provided.      See EMR under Patient Instructions for " exercises provided prior visit.     Assessment   Improving strength in R quad progressing to strength in L quad with measurement with microFET. Progressed to proprioceptive and single leg balance exercises today.     Evan Is progressing well towards his goals.   Pt prognosis is Good.      Pt will continue to benefit from skilled outpatient physical therapy to address the deficits listed in the problem list box on initial evaluation, provide pt/family education and to maximize pt's level of independence in the home and community environment.      Pt's spiritual, cultural and educational needs considered and pt agreeable to plan of care and goals.     Anticipated barriers to physical therapy: none     Goals:  Short Term Goals (6 Weeks):   1. Patient to have full extension Right (equal to left) for normalized gait pattern  met  2. Patient to have 120 degrees of passive left knee flexion for improved performance of ADL's such as sit<>stand transfers  met  3. Patient to have decreased edema left knee   met  4. Patient to report 2/10 pain or less in left knee with ambulating community distances   met  5. Patient to be compliant with home program 5x's a week as noted by proper demonstration of exercises to therapist for improved management of condition   met  6. Patient to ambulate full weight bearing Right lower extremity and normal gait pattern     met     Long Term Goals   -(12 Weeks):   1. Patient to have 4+/5 or greater strength in RLE for functional performance of ADL's such as lifting  Progressing, not met2. Patient to descend one flight of stairs with alternating gait pattern without use of handrail and with proper LE alignment  3. Patient to have 135 degrees or greater Right knee flexion for return to ADL's including squatting  Progressing, not met  -(36 weeks):   4. Patient to have decreased subjective report of disability as noted by <20% limitation on FOTO knee questionnaire Progressing, not met  5. Patient to  perform single leg squat without assistance and without valgus collapse to improve participation in recreational  Progressing, not met  6. Patient's RLE strength to be 5/5 for return to recreational activities and sport Progressing, not met     PLAN   Cont to progress Range of Motion and quad strength. Edema management and gait training       Luis Doherty, PT

## 2022-05-05 ENCOUNTER — CLINICAL SUPPORT (OUTPATIENT)
Dept: REHABILITATION | Facility: OTHER | Age: 62
End: 2022-05-05
Payer: COMMERCIAL

## 2022-05-05 DIAGNOSIS — Z47.89 AFTERCARE FOR ANTERIOR CRUCIATE LIGAMENT (ACL) REPAIR: ICD-10-CM

## 2022-05-05 DIAGNOSIS — R29.898 WEAKNESS OF BOTH LOWER EXTREMITIES: ICD-10-CM

## 2022-05-05 DIAGNOSIS — M25.661 DECREASED RANGE OF MOTION (ROM) OF RIGHT KNEE: Primary | ICD-10-CM

## 2022-05-05 DIAGNOSIS — R26.89 IMPAIRED GAIT AND MOBILITY: ICD-10-CM

## 2022-05-05 DIAGNOSIS — R29.898 WEAKNESS OF RIGHT LOWER EXTREMITY: ICD-10-CM

## 2022-05-05 PROCEDURE — 97110 THERAPEUTIC EXERCISES: CPT | Mod: PN

## 2022-05-09 ENCOUNTER — CLINICAL SUPPORT (OUTPATIENT)
Dept: REHABILITATION | Facility: OTHER | Age: 62
End: 2022-05-09
Payer: COMMERCIAL

## 2022-05-09 DIAGNOSIS — R29.898 WEAKNESS OF RIGHT LOWER EXTREMITY: ICD-10-CM

## 2022-05-09 DIAGNOSIS — M25.661 DECREASED RANGE OF MOTION (ROM) OF RIGHT KNEE: Primary | ICD-10-CM

## 2022-05-09 DIAGNOSIS — R26.89 IMPAIRED GAIT AND MOBILITY: ICD-10-CM

## 2022-05-09 DIAGNOSIS — Z47.89 AFTERCARE FOR ANTERIOR CRUCIATE LIGAMENT (ACL) REPAIR: ICD-10-CM

## 2022-05-09 DIAGNOSIS — R29.898 WEAKNESS OF BOTH LOWER EXTREMITIES: ICD-10-CM

## 2022-05-09 PROCEDURE — 97110 THERAPEUTIC EXERCISES: CPT | Mod: PN,CQ

## 2022-05-09 NOTE — PROGRESS NOTES
"[facility]   Physical Therapy Treatment Note     Name: Evan Khoury  Clinic Number: [id number]    Therapy Diagnosis:   Encounter Diagnoses   Name Primary?    Decreased range of motion (ROM) of right knee Yes    Impaired gait and mobility     Weakness of right lower extremity     Weakness of both lower extremities     Aftercare for anterior cruciate ligament (ACL) repair      Physician: Luis Watkins, Jennifer    Visit Date: 5/9/2022  Physician Orders: PT Eval and Treat   1. Right knee ACL reconstruction with hamstring autograft   2. Right knee arthroscopic menisectomy versus meniscus repair   3. Right knee arthroscopic chondroplasty   4. Right knee arthroscopic synovectomy  Surgery Date: 3/8/2022  Medical Diagnosis from Referral: S83.511A (ICD-10-CM) - Complete tear of anterior cruciate ligament of right knee, initial encounter  Evaluation Date: 3/10/2022  Surgical Date: 3/8/3033  Authorization Period Expiration: 7/1/2022  Plan of Care Expiration: 12/31/2022  Progress Note Due: 5/27/2022  Visit # / Visits authorized: 16/20 (no limit)  FOTO: 5/9/2022  0/5  PTA:  1/5     5/3 - 8 weeks post op       Time In: 0903   Time Out: 1015  Total Billable Time: 44 minutes     Precautions: Weight bearing: transition to FWB    POW: 8 (on 5/2/2022)     Subjective      Pt reports w/ no c/o pn in R knee.    He was more compliant with home exercise program.  Response to previous treatment:  no adverse effects  Functional change: improved knee flexion mobility.       Pain: 0/10 at rest    Location: right knee       Objective      Arrived to session with KT tape intact not wearing brace    Evan received therapeutic exercises to develop strength, endurance, ROM, flexibility, posture and core stabilization for 39 minutes including:      Evan received the treatments listed below:   Upright bike x 5  min full revolutions    gss slant  2 min  +SLS on foam 3 x 30"  +rebounder toss SLS on foam 2 x 30  RDL 3 x " "10 R only   Hip hinge x 15   Side steps gtb 2 x 40 ft   Monster walks 2 x 40 ft  gtb   Lateral tap downs x 10 reps on 4" step, 2 x 1 6" step  step ups 30 x 8" step     Not performed today:   +rebounder toss SLS on firm ground 3 x 30"  Wall sits 30 sec x 3 (less than 90 deg) with ed to stop if pain occurs in knee joint.   U leg press 37# 3 x 10 emphasize eccentrics  U HR  3 x 10     Stick quads 3 min   Quad sets 30 x 10"  Quad sets with ER 30 x 10"  PROM heel slides with sliding board and strap 20 x 5"  SLR with SAQ  30  reps  HS curls  3 x 10   LAQ 3 x 10     Side lying hip abd 2 x 15 reps, 2#  Side lying hip add 2 x 15 reps, 2#  SB DKTC - 2 x 15  SB bridge - 2 x 15  prone hang 3# for 5 min   Gastroc stretch with towel 30" x 3     Prone knee flex  strap 2 min   TKE gtb 3 x 10        HE SLS 15x            Evan received the following manual therapy techniques: Joint mobilizations were applied to the: R hamstring for 5 minutes, including:  STM R hamstrings 5 min     All planes patella mob gr 2-3 supine  Grades II and III tibial mobilizations to increase flexion      KT Tape Octopus m/ l knee applied by PT today     Pt received 10 min cold [ack to R knee.      Home Exercises Provided and Patient Education Provided      Education provided:  Pt edu on proper exercise technique.       Written Home Exercises Provided: Patient instructed to cont prior HEP.  Exercises were reviewed and Evan was able to demonstrate them prior to the end of the session.  Evan demonstrated good  understanding of the education provided.      See EMR under Patient Instructions for exercises provided prior visit.     Assessment      Pt demonstrates some knee valgus with single leg exercises.  Pt julia tx well w/ no c/o pn.  Hamstring flexibility improved during session but not measured.  Pt showed increased strength balance during therex.      Evan Is progressing well towards his goals.   Pt prognosis is Good.      Pt will continue to benefit " from skilled outpatient physical therapy to address the deficits listed in the problem list box on initial evaluation, provide pt/family education and to maximize pt's level of independence in the home and community environment.      Pt's spiritual, cultural and educational needs considered and pt agreeable to plan of care and goals.     Anticipated barriers to physical therapy: none     Goals:  Short Term Goals (6 Weeks):   1. Patient to have full extension Right (equal to left) for normalized gait pattern  met  2. Patient to have 120 degrees of passive left knee flexion for improved performance of ADL's such as sit<>stand transfers  met  3. Patient to have decreased edema left knee   met  4. Patient to report 2/10 pain or less in left knee with ambulating community distances   met  5. Patient to be compliant with home program 5x's a week as noted by proper demonstration of exercises to therapist for improved management of condition   met  6. Patient to ambulate full weight bearing Right lower extremity and normal gait pattern     met     Long Term Goals   -(12 Weeks):   1. Patient to have 4+/5 or greater strength in RLE for functional performance of ADL's such as lifting  Progressing, not met2. Patient to descend one flight of stairs with alternating gait pattern without use of handrail and with proper LE alignment  3. Patient to have 135 degrees or greater Right knee flexion for return to ADL's including squatting  Progressing, not met  -(36 weeks):   4. Patient to have decreased subjective report of disability as noted by <20% limitation on FOTO knee questionnaire Progressing, not met  5. Patient to perform single leg squat without assistance and without valgus collapse to improve participation in recreational  Progressing, not met  6. Patient's RLE strength to be 5/5 for return to recreational activities and sport Progressing, not met     PLAN   Cont to progress Range of Motion and quad strength. Edema  management and gait training       Reji Busby, PTA

## 2022-05-12 ENCOUNTER — CLINICAL SUPPORT (OUTPATIENT)
Dept: REHABILITATION | Facility: OTHER | Age: 62
End: 2022-05-12
Payer: COMMERCIAL

## 2022-05-12 DIAGNOSIS — Z47.89 AFTERCARE FOR ANTERIOR CRUCIATE LIGAMENT (ACL) REPAIR: ICD-10-CM

## 2022-05-12 DIAGNOSIS — R26.89 IMPAIRED GAIT AND MOBILITY: ICD-10-CM

## 2022-05-12 DIAGNOSIS — M25.661 DECREASED RANGE OF MOTION (ROM) OF RIGHT KNEE: Primary | ICD-10-CM

## 2022-05-12 DIAGNOSIS — R29.898 WEAKNESS OF BOTH LOWER EXTREMITIES: ICD-10-CM

## 2022-05-12 DIAGNOSIS — R29.898 WEAKNESS OF RIGHT LOWER EXTREMITY: ICD-10-CM

## 2022-05-12 PROCEDURE — 97112 NEUROMUSCULAR REEDUCATION: CPT | Mod: PN | Performed by: INTERNAL MEDICINE

## 2022-05-12 PROCEDURE — 97110 THERAPEUTIC EXERCISES: CPT | Mod: PN | Performed by: INTERNAL MEDICINE

## 2022-05-12 PROCEDURE — 97140 MANUAL THERAPY 1/> REGIONS: CPT | Mod: PN | Performed by: INTERNAL MEDICINE

## 2022-05-12 NOTE — PROGRESS NOTES
[facility]   Physical Therapy Treatment Note     Name: Evan Khoury  Clinic Number: [id number]    Therapy Diagnosis:   Encounter Diagnoses   Name Primary?    Decreased range of motion (ROM) of right knee Yes    Impaired gait and mobility     Weakness of right lower extremity     Weakness of both lower extremities     Aftercare for anterior cruciate ligament (ACL) repair      Physician: Luis Watkins, Jennifer    Visit Date:5/11/22  Physician Orders: PT Eval and Treat   1. Right knee ACL reconstruction with hamstring autograft   2. Right knee arthroscopic menisectomy versus meniscus repair   3. Right knee arthroscopic chondroplasty   4. Right knee arthroscopic synovectomy  Surgery Date: 3/8/2022  Medical Diagnosis from Referral: S83.511A (ICD-10-CM) - Complete tear of anterior cruciate ligament of right knee, initial encounter  Evaluation Date: 3/10/2022  Surgical Date: 3/8/3033  Authorization Period Expiration: 7/1/2022  Plan of Care Expiration: 12/31/2022  Progress Note Due: 5/27/2022  Visit # / Visits authorized: 17/20 (no limit)  FOTO: 5/9/2022  0/5  PTA:  0/5     5/3 - 8 weeks post op       Time In: 0910  Time Out: 1015  Total Billable Time: 55 minutes     Precautions: Weight bearing: transition to FWB    POW: 8 (on 5/2/2022)     Subjective      Pt reports   3/10 medial knee pain from 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 Continues to think KT tape helps keep others from bumping into his knee.  Proximal hamstring tightness.  He was more compliant with home exercise program.  Response to previous treatment:  no adverse effects  Functional change:sore from increased activity       Pain: 3/10 at rest    Location: right knee       Objective      CMS Impairment/Limitation/Restriction for FOTO Knee Survey     Therapist reviewed FOTO scores for Evan Khoury on 5/9/2022.   FOTO documents entered into [employer] - see Media section.        Category:  "Mobility     Current : 28  Goal: CJ = at least 20% but < 40% impaired, limited or restricted      5/5/2022  MicroFET:  R 50.5  L 58.2     5/12/2022  Flexion arom 145 ,PROM; 155  Prom L   Ext + 3 deg PROM;  L approx + 6 hyperext.     5/12   MMT R HE 4+, L 5     Amb I     Arrived to session with KT tape intact     Evan received therapeutic exercises to develop strength, endurance, ROM, flexibility, posture and core stabilization for  30 minutes including:      Evan received the treatments listed below:   Upright bike x 5  min full revolutions seat 14   gss slant  2 min  Lateral tap downs x 10 reps on 4" step, 2 x 1 6" step  Wall sits 30 sec x 3 (less than 90 deg) with ed to stop if pain occurs in knee joint.   U leg press 37# 3 x 10 emphasize eccentrics  U HR  3 x 10   SLR with SAQ  30  reps  HS curls  3 x 10   LAQ 3 x 10 ttp patella tendon - ed for eccentric   prone hang 3# for 5 min  Prone knee flex  strap 2 min      Neuromuscular re- education for coordination, balance, motor control x 15 min:      U  Bridge march  - 2 x 15   HE  SLS 15x 2  Side steps gtb 2 x 40 ft   Monster walks 2 x 40 ft  gtb    SLS on foam 3 x 30"   rebounder toss SLS on foam 2 x 30                Evan received the following manual therapy techniques: Joint mobilizations were applied to the: patallar and  R hamstring for 10 minutes, including:  Stick hamstrings 3 min x 2 ( beginning and end of session per request)       All planes patella mob gr 2-3 supine  Grades II and III tibial mobilizations to increase flexion      KT Tape Octopus m/ l knee applied by PT today     Pt received 10 min cold pack to R knee with towel to protect skin.      Home Exercises Provided and Patient Education Provided      Education provided:  Pt edu to ice 10-20 min after prolonged standing/ walking.       Written Home Exercises Provided: Patient instructed to cont prior HEP.  Exercises were reviewed and Evan was able to demonstrate them prior to the end of the " session.  Evan demonstrated good  understanding of the education provided.      See EMR under Patient Instructions for exercises provided prior visit.     Assessment     Pt cont to make progress with quad exercises and rom.    Evan Is progressing well towards his goals.   Pt prognosis is Good.      Pt will continue to benefit from skilled outpatient physical therapy to address the deficits listed in the problem list box on initial evaluation, provide pt/family education and to maximize pt's level of independence in the home and community environment.      Pt's spiritual, cultural and educational needs considered and pt agreeable to plan of care and goals.     Anticipated barriers to physical therapy: none     Goals:  Short Term Goals (6 Weeks):   1. Patient to have full extension Right (equal to left) for normalized gait pattern  met  2. Patient to have 120 degrees of passive left knee flexion for improved performance of ADL's such as sit<>stand transfers  met  3. Patient to have decreased edema left knee   met  4. Patient to report 2/10 pain or less in left knee with ambulating community distances   met  5. Patient to be compliant with home program 5x's a week as noted by proper demonstration of exercises to therapist for improved management of condition   met  6. Patient to ambulate full weight bearing Right lower extremity and normal gait pattern     met     Long Term Goals   -(12 Weeks):   1. Patient to have 4+/5 or greater strength in RLE for functional performance of ADL's such as lifting  Progressing, not met2. Patient to descend one flight of stairs with alternating gait pattern without use of handrail and with proper LE alignment  3. Patient to have 135 degrees or greater Right knee flexion for return to ADL's including squatting  Progressing, not met  -(36 weeks):   4. Patient to have decreased subjective report of disability as noted by <20% limitation on FOTO knee questionnaire Progressing, not met  5.  Patient to perform single leg squat without assistance and without valgus collapse to improve participation in recreational  Progressing, not met  6. Patient's RLE strength to be 5/5 for return to recreational activities and sport Progressing, not met     PLAN   Cont to progress Range of Motion and quad strength. Edema management and gait training       Tammi Lopez, PT

## 2022-05-13 NOTE — PROGRESS NOTES
Subjective:          Chief Complaint: Evan Khoury is a 62 y.o. male who had concerns including Post-op Evaluation of the Right Knee (ACL).    Evan Khoury presents to our clinic for 9 week post operative evaluation of the below procedures. He is overall doing well. No continued pain or instability symptoms. Progressing appropriately with PT. He is going to [place] for 7 weeks starting next week.      DATE OF PROCEDURE: 3/8/2022     ATTENDING SURGEON: Surgeon(s) and Role:     * Maris Velazquez MD - Primary     * Cam Chakraborty MD - Resident - Assisting     Assistants:  Palmer Levy MD - Resident  Annelise Quevedo PA-C        PREOPERATIVE DIAGNOSIS:  Right  Tear, Medial meniscus, acute S83.249A and Anterior Cruciate Ligament Tear S83.510     POSTOPERATIVE DIAGNOSIS:   Right  Tear, Medial meniscus, acute S83.249A and Anterior Cruciate Ligament Tear S83.510     PROCEDURES(S) PERFORMED:   1. Right  Arthroscopy, anterior cruciate ligament reconstruction 64651  2.  Right  Arthroscopy, with meniscus repair (medial OR lateral) 02576      Review of Systems   Constitutional: Negative for chills, fever and night sweats.   HENT: Negative for congestion, hearing loss and sore throat.    Eyes: Negative for blurred vision, discharge, double vision and visual disturbance.   Cardiovascular: Negative for chest pain, leg swelling, palpitations and syncope.   Respiratory: Negative for cough and shortness of breath.    Endocrine: Negative for cold intolerance, heat intolerance and polyuria.   Hematologic/Lymphatic: Negative for bleeding problem.   Skin: Negative for dry skin and rash.   Musculoskeletal: Positive for joint pain and joint swelling. Negative for back pain, muscle cramps and muscle weakness.   Gastrointestinal: Negative for abdominal pain, melena, nausea and vomiting.   Genitourinary: Negative for hematuria.   Neurological: Negative for focal weakness, loss of balance, numbness and paresthesias.    Psychiatric/Behavioral: Negative for altered mental status.       Pain Related Questions  Over the past 3 days, what was your average pain during activity? (I.e. running, jogging, walking, climbing stairs, getting dressed, ect.): 0  Over the past 3 days, what was your highest pain level?: 0  Over the past 3 days, what was your lowest pain level? : 0    Other  How many nights a week are you awakened by your affected body part?: 0  Was the patient's HEIGHT measured or patient reported?: Patient Reported  Was the patient's WEIGHT measured or patient reported?: Measured      Objective:        General: Evan is well-developed, well-nourished, appears stated age, in no acute distress, alert and oriented to time, place and person.     General    Nursing note and vitals reviewed.  Constitutional: He is oriented to person, place, and time. He appears well-developed and well-nourished. No distress.   HENT:   Head: Normocephalic and atraumatic.   Nose: Nose normal.   Mouth/Throat: No oropharyngeal exudate.   Eyes: Conjunctivae and EOM are normal. Right eye exhibits no discharge. Left eye exhibits no discharge.   Neck: Neck supple.   Cardiovascular: Normal rate, regular rhythm and intact distal pulses.    Pulmonary/Chest: Effort normal and breath sounds normal. No respiratory distress.   Abdominal: Soft. Bowel sounds are normal. He exhibits no distension. There is no abdominal tenderness.   Neurological: He is alert and oriented to person, place, and time. He has normal reflexes. No cranial nerve deficit. Coordination normal.   Psychiatric: He has a normal mood and affect. His behavior is normal. Judgment and thought content normal.     General Musculoskeletal Exam   Gait: normal       Right Knee Exam     Inspection   Erythema: absent  Scars: present  Swelling: absent  Effusion: absent  Deformity: absent  Bruising: absent    Tenderness   The patient is tender to palpation of the condyle.    Range of Motion   Extension: 0    Flexion:  150 abnormal     Tests   Meniscus   Nik:  Medial - negative Lateral - negative  Ligament Examination Lachman: normal (-1 to 2mm) PCL-Posterior Drawer: normal (0 to 2mm)     MCL - Valgus: normal (0 to 2mm)  LCL - Varus: normalPivot Shift: normal (Equal)Reverse Pivot Shift: normal (Equal)Dial Test at 30 degrees: normal (< 5 degrees)Dial Test at 90 degrees: normal (< 5 degrees)  Posterior Sag Test: negative  Posterolateral Corner: unstable (>15 degrees difference)  Patella   Patellar apprehension: negative  Passive Patellar Tilt: neutral  Patellar Tracking: normal  Patellar Glide (quadrants): Lateral - 1   Medial - 2  Q-Angle at 90 degrees: normal  Patellar Grind: negative  J-Sign: none    Other   Meniscal Cyst: absent  Popliteal (Baker's) Cyst: absent  Sensation: normal    Comments:  Incision clean/dry/intact  No sign of infection  Mild swelling  Compartments soft  Neurovascular status intact in extremity      Left Knee Exam     Inspection   Erythema: absent  Scars: absent  Swelling: absent  Effusion: absent  Deformity: absent  Bruising: absent    Tenderness   The patient is experiencing no tenderness.     Range of Motion   Extension: 0   Flexion: 150     Tests   Meniscus   Nik:  Medial - negative Lateral - negative  Stability Lachman: normal (-1 to 2mm) PCL-Posterior Drawer: normal (0 to 2mm)  MCL - Valgus: normal (0 to 2mm)  LCL - Varus: normal (0 to 2mm)Pivot Shift: normal (Equal)Reverse Pivot Shift: normal (Equal)Dial Test at 30 degrees: normal (< 5 degrees)Dial Test at 90 degrees: normal (< 5 degrees)  Posterior Sag Test: negative  Posterolateral Corner: unstable (>15 degrees difference)  Patella   Patellar apprehension: negative  Passive Patellar Tilt: neutral  Patellar Tracking: normal  Patellar Glide (Quadrants): Lateral - 1 Medial - 2  Q-Angle at 90 degrees: normal  Patellar Grind: negative  J-Sign: J sign absent    Other   Meniscal Cyst: absent  Popliteal (Baker's) Cyst:  absent  Sensation: normal    Right Hip Exam     Tests   Mesha: negative  Left Hip Exam     Tests   Mesha: negative          Reflexes     Left Side  Achilles:  2+  Quadriceps:  2+    Right Side   Achilles:  2+  Quadriceps:  2+    Vascular Exam     Right Pulses  Dorsalis Pedis:      2+  Posterior Tibial:      2+        Left Pulses  Dorsalis Pedis:      2+  Posterior Tibial:      2+        Edema  Right Lower Leg: absent  Left Lower Leg: absent      Radiographic Findings 05/16/2022:    Interval ACL reconstruction.  I see no acute fracture.  Joint spaces are preserved.     Small suprapatellar joint effusion.  Diffuse soft tissue edema.     Impression:     Interval ACL reconstruction with no acute osseous abnormality seen.  Negative for patella helder  Xrays of the right knee were ordered and reviewed by me today. These findings were discussed and reviewed with the patient.        Assessment:       Encounter Diagnoses   Name Primary?    Aftercare for anterior cruciate ligament (ACL) repair Yes    Complete tear of anterior cruciate ligament of right knee, sequela     Decreased range of motion (ROM) of right knee     Acute pain of right knee           Plan:       1. IKDC, SF-12 and KOOS was filled out today in clinic.     RTC in 4 months with Dr. Maris Velazquez for post op visit. Patient will fill out IKDC, SF-12 and KOOS on return.    2. Medications: Refills of the following Rx were sent to patients preferred Pharmacy:  celecoxib (CELEBREX) 200 MG capsule    3. Physical Therapy: Continue/Begin: Continue at [facility]    4. HEP: N/A     5. Procedures/Procedural Planning: N/a    6. DME: N/a    7. Work/Sport Status: Return as tolerated    8. Visit Summary:                          [facility] patient questionnaires have been collected today.

## 2022-05-16 ENCOUNTER — HOSPITAL ENCOUNTER (OUTPATIENT)
Dept: RADIOLOGY | Facility: HOSPITAL | Age: 62
Discharge: HOME | End: 2022-05-16
Attending: ORTHOPAEDIC SURGERY
Payer: COMMERCIAL

## 2022-05-16 ENCOUNTER — OFFICE VISIT (OUTPATIENT)
Dept: SPORTS MEDICINE | Facility: CLINIC | Age: 62
End: 2022-05-16
Payer: COMMERCIAL

## 2022-05-16 VITALS
HEIGHT: 74 IN | HEART RATE: 56 BPM | DIASTOLIC BLOOD PRESSURE: 82 MMHG | SYSTOLIC BLOOD PRESSURE: 120 MMHG | BODY MASS INDEX: 25.11 KG/M2 | RESPIRATION RATE: 16 BRPM | WEIGHT: 195.63 LBS

## 2022-05-16 DIAGNOSIS — S83.511S COMPLETE TEAR OF ANTERIOR CRUCIATE LIGAMENT OF RIGHT KNEE, SEQUELA: ICD-10-CM

## 2022-05-16 DIAGNOSIS — M25.561 ACUTE PAIN OF RIGHT KNEE: ICD-10-CM

## 2022-05-16 DIAGNOSIS — Z47.89 AFTERCARE FOR ANTERIOR CRUCIATE LIGAMENT (ACL) REPAIR: ICD-10-CM

## 2022-05-16 DIAGNOSIS — Z47.89 AFTERCARE FOR ANTERIOR CRUCIATE LIGAMENT (ACL) REPAIR: Primary | ICD-10-CM

## 2022-05-16 DIAGNOSIS — M25.661 DECREASED RANGE OF MOTION (ROM) OF RIGHT KNEE: ICD-10-CM

## 2022-05-16 PROCEDURE — 73564 X-RAY EXAM KNEE 4 OR MORE: CPT | Mod: TC,50

## 2022-05-16 PROCEDURE — 3074F SYST BP LT 130 MM HG: CPT | Mod: CPTII,S$GLB,, | Performed by: ORTHOPAEDIC SURGERY

## 2022-05-16 PROCEDURE — 3079F DIAST BP 80-89 MM HG: CPT | Mod: CPTII,S$GLB,, | Performed by: ORTHOPAEDIC SURGERY

## 2022-05-16 PROCEDURE — 73564 X-RAY EXAM KNEE 4 OR MORE: CPT | Mod: 26,,, | Performed by: RADIOLOGY

## 2022-05-16 PROCEDURE — 99999 PR PBB SHADOW E&M-EST. PATIENT-LVL IV: ICD-10-PCS | Mod: PBBFAC,,, | Performed by: ORTHOPAEDIC SURGERY

## 2022-05-16 PROCEDURE — 1159F PR MEDICATION LIST DOCUMENTED IN MEDICAL RECORD: ICD-10-PCS | Mod: CPTII,S$GLB,, | Performed by: ORTHOPAEDIC SURGERY

## 2022-05-16 PROCEDURE — 99024 POSTOP FOLLOW-UP VISIT: CPT | Mod: S$GLB,,, | Performed by: ORTHOPAEDIC SURGERY

## 2022-05-16 PROCEDURE — 3008F PR BODY MASS INDEX (BMI) DOCUMENTED: ICD-10-PCS | Mod: CPTII,S$GLB,, | Performed by: ORTHOPAEDIC SURGERY

## 2022-05-16 PROCEDURE — 1159F MED LIST DOCD IN RCRD: CPT | Mod: CPTII,S$GLB,, | Performed by: ORTHOPAEDIC SURGERY

## 2022-05-16 PROCEDURE — 3074F PR MOST RECENT SYSTOLIC BLOOD PRESSURE < 130 MM HG: ICD-10-PCS | Mod: CPTII,S$GLB,, | Performed by: ORTHOPAEDIC SURGERY

## 2022-05-16 PROCEDURE — 99024 PR POST-OP FOLLOW-UP VISIT: ICD-10-PCS | Mod: S$GLB,,, | Performed by: ORTHOPAEDIC SURGERY

## 2022-05-16 PROCEDURE — 3008F BODY MASS INDEX DOCD: CPT | Mod: CPTII,S$GLB,, | Performed by: ORTHOPAEDIC SURGERY

## 2022-05-16 PROCEDURE — 99999 PR PBB SHADOW E&M-EST. PATIENT-LVL IV: CPT | Mod: PBBFAC,,, | Performed by: ORTHOPAEDIC SURGERY

## 2022-05-16 PROCEDURE — 1160F PR REVIEW ALL MEDS BY PRESCRIBER/CLIN PHARMACIST DOCUMENTED: ICD-10-PCS | Mod: CPTII,S$GLB,, | Performed by: ORTHOPAEDIC SURGERY

## 2022-05-16 PROCEDURE — 1160F RVW MEDS BY RX/DR IN RCRD: CPT | Mod: CPTII,S$GLB,, | Performed by: ORTHOPAEDIC SURGERY

## 2022-05-16 PROCEDURE — 3079F PR MOST RECENT DIASTOLIC BLOOD PRESSURE 80-89 MM HG: ICD-10-PCS | Mod: CPTII,S$GLB,, | Performed by: ORTHOPAEDIC SURGERY

## 2022-05-16 PROCEDURE — 73564 XR KNEE ORTHO BILAT WITH FLEXION: ICD-10-PCS | Mod: 26,,, | Performed by: RADIOLOGY

## 2022-05-16 RX ORDER — CELECOXIB 200 MG/1
200 CAPSULE ORAL DAILY
Qty: 40 CAPSULE | Refills: 2 | Status: SHIPPED | OUTPATIENT
Start: 2022-05-16 | End: 2022-09-12

## 2022-05-17 ENCOUNTER — CLINICAL SUPPORT (OUTPATIENT)
Dept: REHABILITATION | Facility: OTHER | Age: 62
End: 2022-05-17
Payer: COMMERCIAL

## 2022-05-17 DIAGNOSIS — S83.511S COMPLETE TEAR OF ANTERIOR CRUCIATE LIGAMENT OF RIGHT KNEE, SEQUELA: ICD-10-CM

## 2022-05-17 DIAGNOSIS — R29.898 WEAKNESS OF RIGHT LOWER EXTREMITY: ICD-10-CM

## 2022-05-17 DIAGNOSIS — R29.898 WEAKNESS OF BOTH LOWER EXTREMITIES: ICD-10-CM

## 2022-05-17 DIAGNOSIS — M25.661 DECREASED RANGE OF MOTION (ROM) OF RIGHT KNEE: Primary | ICD-10-CM

## 2022-05-17 DIAGNOSIS — Z47.89 AFTERCARE FOR ANTERIOR CRUCIATE LIGAMENT (ACL) REPAIR: ICD-10-CM

## 2022-05-17 DIAGNOSIS — R26.89 IMPAIRED GAIT AND MOBILITY: ICD-10-CM

## 2022-05-17 PROCEDURE — 97110 THERAPEUTIC EXERCISES: CPT | Mod: PN

## 2022-05-17 PROCEDURE — 97112 NEUROMUSCULAR REEDUCATION: CPT | Mod: PN

## 2022-05-17 NOTE — PROGRESS NOTES
[facility]   Physical Therapy Treatment Note     Name: Evan Khoury  Clinic Number: [id number]    Therapy Diagnosis:   Encounter Diagnoses   Name Primary?    Complete tear of anterior cruciate ligament of right knee, sequela     Decreased range of motion (ROM) of right knee Yes    Impaired gait and mobility     Weakness of right lower extremity     Weakness of both lower extremities     Aftercare for anterior cruciate ligament (ACL) repair      Physician: Luis Watkins, *    Visit Date: 5/17/2022  Physician Orders: PT Eval and Treat   1. Right knee ACL reconstruction with hamstring autograft   2. Right knee arthroscopic menisectomy versus meniscus repair   3. Right knee arthroscopic chondroplasty   4. Right knee arthroscopic synovectomy  Surgery Date: 3/8/2022  Medical Diagnosis from Referral: S83.511A (ICD-10-CM) - Complete tear of anterior cruciate ligament of right knee, initial encounter  Evaluation Date: 3/10/2022  Surgical Date: 3/8/3033  Authorization Period Expiration: 7/1/2022  Plan of Care Expiration: 12/31/2022  Progress Note Due: 5/27/2022  Visit # / Visits authorized: 17/20 (no limit)  FOTO: 5/9/2022  0/5  PTA:  0/5      Time In: 9:06  Time Out: 1022  Total Billable Time: 61 minutes     Precautions: Weight bearing: transition to FWB    POW: 10 (on 5/16/2022)     Subjective      Pt reports his R knee feels "alright" this morning. States he saw Dr. Velazquez yesterday and he gave him a list of [place]. Still getting tightness in his hamstrings. Will be leaving the country next Tuesday and will be gone for 7 weeks.    He was more compliant with home exercise program.  Response to previous treatment:  no adverse effects  Functional change: no new changes reported     Pain: 1/10 at rest    Location: right knee       Objective      CMS Impairment/Limitation/Restriction for FOTO Knee Survey     Therapist reviewed FOTO scores for Evan BROWN" "Peng on 5/9/2022.   FOTO documents entered into EPIC - see Media section.        Category: Mobility     Current : 28  Goal: CJ = at least 20% but < 40% impaired, limited or restricted      5/5/2022  MicroFET:  R 50.5  L 58.2     5/17/2022  Flexion arom 145 ,PROM; 155  Prom L   Ext + 3 deg PROM;  L approx + 6 hyperext.     5/12   MMT R HE 4+, L 5     Amb I     Arrived to session with KT tape intact     Evan received therapeutic exercises to develop strength, endurance, ROM, flexibility, posture and core stabilization for  40 minutes including:      Evan received the treatments listed below:   Upright bike x 5  min full revolutions seat 14  gss slant  2 min  Hamstring stretches on step 3 x 30"  Lateral tap downs 2 x 10 reps on 4" step, 2 x 1 6" step  Wall sits 30 sec x 3 (less than 90 deg) with ed to stop if pain occurs in knee joint.   U leg press 37.5# 3 x 10 emphasize eccentrics  U HR  3 x 10  SLR with SAQ  30  reps  HS curls  3 x 10   LAQ 3 x 10 ttp patella tendon - ed for eccentric   prone hang 3# for 5 min  Prone knee flex  strap 2 min      Neuromuscular re- education for coordination, balance, motor control x 15 min:      U  Bridge march  - 3 x 10 reps  HE  SLS 15 x 2  Side steps gtb 2 x 40 ft   Monster walks 2 x 40 ft  gtb   SLS on foam 3 x 30"  rebounder toss SLS on foam 2 x 30       Evan received the following manual therapy techniques: Joint mobilizations were applied to the: patallar and R hamstring for 6 minutes, including:  Stick hamstrings 3 min x 2 ( beginning and end of session per request)       All planes patella mob gr 2-3 supine  Grades II and III tibial mobilizations to increase flexion      KT Tape Octopus m/ l knee applied by PT today     Pt received 10 min cold pack to R knee with towel to protect skin.      Home Exercises Provided and Patient Education Provided      Education provided:  Pt education for basket weave/octopus KT tape technique reviewed by PT Yuliana Bullard while patient " received cold pack to R knee.      Written Home Exercises Provided: Patient instructed to cont prior HEP.  Exercises were reviewed and Evan was able to demonstrate them prior to the end of the session.  Evan demonstrated good  understanding of the education provided.      See EMR under Patient Instructions for exercises provided prior visit.     Assessment   Will continue with single leg balance and proprioceptive training.    Evan Is progressing well towards his goals.   Pt prognosis is Good.      Pt will continue to benefit from skilled outpatient physical therapy to address the deficits listed in the problem list box on initial evaluation, provide pt/family education and to maximize pt's level of independence in the home and community environment.      Pt's spiritual, cultural and educational needs considered and pt agreeable to plan of care and goals.     Anticipated barriers to physical therapy: none     Goals:  Short Term Goals (6 Weeks):   1. Patient to have full extension Right (equal to left) for normalized gait pattern  met  2. Patient to have 120 degrees of passive left knee flexion for improved performance of ADL's such as sit<>stand transfers  met  3. Patient to have decreased edema left knee   met  4. Patient to report 2/10 pain or less in left knee with ambulating community distances   met  5. Patient to be compliant with home program 5x's a week as noted by proper demonstration of exercises to therapist for improved management of condition   met  6. Patient to ambulate full weight bearing Right lower extremity and normal gait pattern     met     Long Term Goals   -(12 Weeks):   1. Patient to have 4+/5 or greater strength in RLE for functional performance of ADL's such as lifting  Progressing, not met2. Patient to descend one flight of stairs with alternating gait pattern without use of handrail and with proper LE alignment  3. Patient to have 135 degrees or greater Right knee flexion for return to  ADL's including squatting  Progressing, not met  -(36 weeks):   4. Patient to have decreased subjective report of disability as noted by <20% limitation on FOTO knee questionnaire Progressing, not met  5. Patient to perform single leg squat without assistance and without valgus collapse to improve participation in recreational  Progressing, not met  6. Patient's RLE strength to be 5/5 for return to recreational activities and sport Progressing, not met     PLAN   Cont to progress Range of Motion and quad strength. Edema management and gait training       Luis Doherty, PT

## 2022-05-19 ENCOUNTER — CLINICAL SUPPORT (OUTPATIENT)
Dept: REHABILITATION | Facility: OTHER | Age: 62
End: 2022-05-19
Payer: COMMERCIAL

## 2022-05-19 DIAGNOSIS — M25.661 DECREASED RANGE OF MOTION (ROM) OF RIGHT KNEE: Primary | ICD-10-CM

## 2022-05-19 DIAGNOSIS — R29.898 WEAKNESS OF BOTH LOWER EXTREMITIES: ICD-10-CM

## 2022-05-19 DIAGNOSIS — Z47.89 AFTERCARE FOR ANTERIOR CRUCIATE LIGAMENT (ACL) REPAIR: ICD-10-CM

## 2022-05-19 DIAGNOSIS — R26.89 IMPAIRED GAIT AND MOBILITY: ICD-10-CM

## 2022-05-19 DIAGNOSIS — R29.898 WEAKNESS OF RIGHT LOWER EXTREMITY: ICD-10-CM

## 2022-05-19 PROCEDURE — 97110 THERAPEUTIC EXERCISES: CPT | Mod: PN

## 2022-05-19 PROCEDURE — 97112 NEUROMUSCULAR REEDUCATION: CPT | Mod: PN

## 2022-05-19 NOTE — PROGRESS NOTES
"[facility] OUTPATIENT THERAPY AND WELLNESS   Physical Therapy Treatment Note     Name: Evan Khoury  Clinic Number: [id number]    Therapy Diagnosis:   Encounter Diagnoses   Name Primary?    Decreased range of motion (ROM) of right knee Yes    Impaired gait and mobility     Weakness of right lower extremity     Weakness of both lower extremities     Aftercare for anterior cruciate ligament (ACL) repair      Physician: Luis Watkins, Jennifer    Visit Date: 5/19/2022  Physician Orders: PT Eval and Treat   1. Right knee ACL reconstruction with hamstring autograft   2. Right knee arthroscopic menisectomy versus meniscus repair   3. Right knee arthroscopic chondroplasty   4. Right knee arthroscopic synovectomy  Surgery Date: 3/8/2022  Medical Diagnosis from Referral: S83.511A (ICD-10-CM) - Complete tear of anterior cruciate ligament of right knee, initial encounter  Evaluation Date: 3/10/2022  Surgical Date: 3/8/3033  Authorization Period Expiration: 7/1/2022  Plan of Care Expiration: 12/31/2022  Progress Note Due: 5/27/2022  Visit # / Visits authorized: 17/20 (no limit)  FOTO: 5/9/2022  4/5  PTA:  0/5      Time In: 9:25 am  Time Out: 10:28 am  Total Billable Time: 53 minutes     Precautions: Weight bearing: transition to FWB    POW: 10 (on 5/16/2022)     Subjective      Pt reports his R knee feels "alright" this morning. States he saw Dr. Velazquez yesterday and he gave him a list of exercises to do when [place] is [place]. Still getting tightness in his hamstrings. Will be leaving the country next Tuesday and will be gone for 7 weeks.    He was more compliant with home exercise program.  Response to previous treatment:  no adverse effects  Functional change: no new changes reported     Pain: 1/10 at rest    Location: right knee       Objective      CMS Impairment/Limitation/Restriction for FOTO Knee Survey     Therapist reviewed FOTO scores for Evan Khoury on 5/9/2022.   FOTO documents entered into EPIC - see Media " "section.        Category: Mobility     Current : 28  Goal: CJ = at least 20% but < 40% impaired, limited or restricted      5/5/2022  MicroFET:  R 50.5  L 58.2     5/19/2022  R knee AROM: 6 - 0 - 145 deg    PROM; 155  Prom R     5/12   MMT R HE 4+, L 5     Amb I     Arrived to session with KT tape intact     Evan received therapeutic exercises to develop strength, endurance, ROM, flexibility, posture and core stabilization for 32 minutes including:      Evan received the treatments listed below:   Upright bike x 5  min full revolutions seat 14  gss slant  2 min  Hamstring stretches on step 3 x 30"  Lateral tap downs 2 x 10 reps on 4" step,   Forward step ups 4" 2 x 10 reps  Wall sits 30 sec x 3 (less than 90 deg) with ed to stop if pain occurs in knee joint.   U leg press 37.5# 3 x 10 emphasize eccentrics  Mini squats with gtb  U HR  3 x 10  SLR with SAQ  30  reps  HS curls  3 x 10   LAQ 3 x 10 ttp patella tendon - ed for eccentric   prone hang 3# for 5 min  Prone knee flex strap 2 min      Neuromuscular re- education for coordination, balance, motor control x 15 min:      U  Bridge march  - 3 x 10 reps  HE  SLS 15 x 2  Side steps gtb 6 x 40 ft   Monster walks  x 40 ft  gtb   SLS on foam 3 x 30"  rebounder toss SLS on foam 3 x 30       Evan received the following manual therapy techniques: Joint mobilizations were applied to the: patallar and R hamstring for 6 minutes, including:  Stick hamstrings 3 min x 2 ( beginning and end of session per request)       All planes patella mob gr 2-3 supine  Grades II and III tibial mobilizations to increase flexion      KT Tape Octopus m/ l knee applied by PT today     Pt received 10 min cold pack to R knee with towel to protect skin.      Home Exercises Provided and Patient Education Provided      Education provided:  Patient brought in KT tape for use when he travels abroad.     Written Home Exercises Provided: Patient instructed to cont prior HEP.  Exercises were reviewed " and Evan was able to demonstrate them prior to the end of the session.  Evan demonstrated good  understanding of the education provided.      See EMR under Patient Instructions for exercises provided prior visit.     Assessment   Full ROM in R knee. R knee PROM limited by musculature Continuing to benefit from quad and hip strengthening. PT provided verbal and visual cues for mini squats.    Evan Is progressing well towards his goals.   Pt prognosis is Good.      Pt will continue to benefit from skilled outpatient physical therapy to address the deficits listed in the problem list box on initial evaluation, provide pt/family education and to maximize pt's level of independence in the home and community environment.      Pt's spiritual, cultural and educational needs considered and pt agreeable to plan of care and goals.     Anticipated barriers to physical therapy: none     Goals:  Short Term Goals (6 Weeks):   1. Patient to have full extension Right (equal to left) for normalized gait pattern  met  2. Patient to have 120 degrees of passive left knee flexion for improved performance of ADL's such as sit<>stand transfers  met  3. Patient to have decreased edema left knee   met  4. Patient to report 2/10 pain or less in left knee with ambulating community distances   met  5. Patient to be compliant with home program 5x's a week as noted by proper demonstration of exercises to therapist for improved management of condition   met  6. Patient to ambulate full weight bearing Right lower extremity and normal gait pattern     met     Long Term Goals   -(12 Weeks):   1. Patient to have 4+/5 or greater strength in RLE for functional performance of ADL's such as lifting  Progressing, not met2. Patient to descend one flight of stairs with alternating gait pattern without use of handrail and with proper LE alignment  3. Patient to have 135 degrees or greater Right knee flexion for return to ADL's including squatting   Progressing, not met  -(36 weeks):   4. Patient to have decreased subjective report of disability as noted by <20% limitation on FOTO knee questionnaire Progressing, not met  5. Patient to perform single leg squat without assistance and without valgus collapse to improve participation in recreational  Progressing, not met  6. Patient's RLE strength to be 5/5 for return to recreational activities and sport Progressing, not met     PLAN   Cont to progress Range of Motion and quad strength. Edema management and gait training       Luis Doherty, PT

## 2022-05-23 ENCOUNTER — CLINICAL SUPPORT (OUTPATIENT)
Dept: REHABILITATION | Facility: OTHER | Age: 62
End: 2022-05-23
Payer: COMMERCIAL

## 2022-05-23 DIAGNOSIS — R26.89 IMPAIRED GAIT AND MOBILITY: ICD-10-CM

## 2022-05-23 DIAGNOSIS — R29.898 WEAKNESS OF RIGHT LOWER EXTREMITY: ICD-10-CM

## 2022-05-23 DIAGNOSIS — M25.661 DECREASED RANGE OF MOTION (ROM) OF RIGHT KNEE: Primary | ICD-10-CM

## 2022-05-23 DIAGNOSIS — R29.898 WEAKNESS OF BOTH LOWER EXTREMITIES: ICD-10-CM

## 2022-05-23 DIAGNOSIS — Z47.89 AFTERCARE FOR ANTERIOR CRUCIATE LIGAMENT (ACL) REPAIR: ICD-10-CM

## 2022-05-23 PROCEDURE — 97110 THERAPEUTIC EXERCISES: CPT | Mod: PN | Performed by: INTERNAL MEDICINE

## 2022-05-23 PROCEDURE — 97112 NEUROMUSCULAR REEDUCATION: CPT | Mod: PN | Performed by: INTERNAL MEDICINE

## 2022-05-23 NOTE — PROGRESS NOTES
"[facility]   Physical Therapy Treatment Note     Name: Evan Khoury  Clinic Number: [id number]    Therapy Diagnosis:   Encounter Diagnoses   Name Primary?    Decreased range of motion (ROM) of right knee Yes    Impaired gait and mobility     Weakness of right lower extremity     Weakness of both lower extremities     Aftercare for anterior cruciate ligament (ACL) repair      Physician: Luis Watkins, Jennifer    Visit Date: 5/23/2022  Physician Orders: PT Eval and Treat   1. Right knee ACL reconstruction with hamstring autograft   2. Right knee arthroscopic menisectomy versus meniscus repair   3. Right knee arthroscopic chondroplasty   4. Right knee arthroscopic synovectomy  Surgery Date: 3/8/2022  Medical Diagnosis from Referral: S83.511A (ICD-10-CM) - Complete tear of anterior cruciate ligament of right knee, initial encounter  Evaluation Date: 3/10/2022  Surgical Date: 3/8/3033  Authorization Period Expiration: 7/1/2022  Plan of Care Expiration: 12/31/2022  Progress Note Due: 5/27/2022  Visit # / Visits authorized: 18/20 (no limit)  FOTO: 5/23/2022 1/5  PTA:  0/5      Time In: 9:37 am  Time Out: 10:58 am  Total Billable Time: 55 minutes     Precautions: Weight bearing: transition to FWB    POW: 11 (on 5/23/2022)     Subjective      Pt states his knee is "fine." states he has not been sleeping much the past few days getting ready for his trip. Reports he was going up and down stairs more this weekend. States he is having more "discomfort" than pain, particularly negotiating stairs.    He was more compliant with home exercise program.  Response to previous treatment:  fantastic  Functional change: no new changes reported     Pain: 1/10 at rest    Location: right knee       Objective      CMS Impairment/Limitation/Restriction for FOTO Knee Survey    Therapist reviewed FOTO scores for Evan Khoury on 5/23/2022.   FOTO documents entered into [employer] - see Media section.    Limitation " "Score: 24%  Category: Mobility    Current : CJ = at least 20% but < 40% impaired, limited or restricted  Goal: CJ = at least 20% but < 40% impaired, limited or restricted         5/5/2022  MicroFET:  R 50.5  L 58.2     5/23/2022  R knee AROM: 6 - 0 - 145 deg    PROM; 155  Prom R     5/12   MMT R HE 4+, L 5     Amb I     Arrived to session with KT tape intact     Evan received therapeutic exercises to develop strength, endurance, ROM, flexibility, posture and core stabilization for 46 minutes including:      Evan received the treatments listed below:   Upright bike x 5  min full revolutions seat 14  gss slant  2 min  Hamstring stretches on step 3 x 30"  Lateral tap downs 2 x 10 reps on 4" step,   Forward step ups 4" 2 x 10 reps  Wall sits 30 sec x 3 (less than 90 deg) with ed to stop if pain occurs in knee joint.   U leg press 37.5# 3 x 10 emphasize eccentrics  Mini squats with gtb  U HR  3 x 10  SLR with SAQ  30  reps  HS curls  3 x 10   LAQ 3 x 10 ttp patella tendon - ed for eccentric   prone hang 3# for 5 min  Prone knee flex strap 2 min      Neuromuscular re- education for coordination, balance, motor control x 15 min:      U  Bridge march  - 3 x 10 reps  Drinking birds 15 reps x 2  Side steps gtb 6 x 40 ft   Monster walks  x 40 ft  gtb   SLS on foam 3 x 30"  rebounder toss SLS on foam 3 x 30       Evan received the following manual therapy techniques: Joint mobilizations were applied to the: patallar and R hamstring for 00 minutes, including:  Stick hamstrings 3 min  (end of session per request)       All planes patella mob gr 2-3 supine  Grades II and III tibial mobilizations to increase flexion      KT Tape Octopus m/ l knee applied by PT today     Pt received 10 min cold pack to R knee with towel to protect skin.      Home Exercises Provided and Patient Education Provided      Education provided:  Reprinted HEP exercises for when he travels. LAQs, wall sits, monster walks, side stepping SL squat, forward " step ups, lateral step downs, bride marching, prone hangs, prone quad stretch, drinking birds.     Written Home Exercises Provided:  Patient instructed to cont prior HEP.  Exercises were reviewed and Evan was able to demonstrate them prior to the end of the session.  Evan demonstrated good understanding of the education provided.      See EMR under Patient Instructions for exercises provided 5/23/2022.     Assessment   Reprinted HEP for patient as he is traveling to [place] for extended period. Weakness noted with single leg squats.    Evan Is progressing well towards his goals.   Pt prognosis is Good.      Pt will continue to benefit from skilled outpatient physical therapy to address the deficits listed in the problem list box on initial evaluation, provide pt/family education and to maximize pt's level of independence in the home and community environment.      Pt's spiritual, cultural and educational needs considered and pt agreeable to plan of care and goals.     Anticipated barriers to physical therapy: none     Goals:  Short Term Goals (6 Weeks):   1. Patient to have full extension Right (equal to left) for normalized gait pattern  met  2. Patient to have 120 degrees of passive left knee flexion for improved performance of ADL's such as sit<>stand transfers  met  3. Patient to have decreased edema left knee   met  4. Patient to report 2/10 pain or less in left knee with ambulating community distances   met  5. Patient to be compliant with home program 5x's a week as noted by proper demonstration of exercises to therapist for improved management of condition   met  6. Patient to ambulate full weight bearing Right lower extremity and normal gait pattern     met     Long Term Goals   -(12 Weeks):   1. Patient to have 4+/5 or greater strength in RLE for functional performance of ADL's such as lifting  Progressing, not met2. Patient to descend one flight of stairs with alternating gait pattern without use of  handrail and with proper LE alignment  3. Patient to have 135 degrees or greater Right knee flexion for return to ADL's including squatting  Progressing, not met  -(36 weeks):   4. Patient to have decreased subjective report of disability as noted by <20% limitation on FOTO knee questionnaire Progressing, not met  5. Patient to perform single leg squat without assistance and without valgus collapse to improve participation in recreational  Progressing, not met  6. Patient's RLE strength to be 5/5 for return to recreational activities and sport Progressing, not met     PLAN   Cont to progress Range of Motion and quad strength. Edema management and gait training       Luis Doherty, PT

## 2022-05-23 NOTE — PROGRESS NOTES
"[facility]   Physical Therapy Treatment Note     Name: Evan Khoury  Clinic Number: [id number]    Therapy Diagnosis:   Encounter Diagnoses   Name Primary?    Decreased range of motion (ROM) of right knee Yes    Impaired gait and mobility     Weakness of right lower extremity     Weakness of both lower extremities     Aftercare for anterior cruciate ligament (ACL) repair      Physician: Luis Watkins, Jennifer    Visit Date: 5/23/2022  Physician Orders: PT Eval and Treat   1. Right knee ACL reconstruction with hamstring autograft   2. Right knee arthroscopic menisectomy versus meniscus repair   3. Right knee arthroscopic chondroplasty   4. Right knee arthroscopic synovectomy  Surgery Date: 3/8/2022  Medical Diagnosis from Referral: S83.511A (ICD-10-CM) - Complete tear of anterior cruciate ligament of right knee, initial encounter  Evaluation Date: 3/10/2022  Surgical Date: 3/8/3033  Authorization Period Expiration: 7/1/2022  Plan of Care Expiration: 12/31/2022  Progress Note Due: 6/27  Visit # / Visits authorized: 18/20 (no limit)  FOTO: 5/9/2022 4/5 , 5/23  PTA:  0/5      Time In: 9:30 am  Time Out: 10: 30  am  Total Billable Time: 60 minutes     Precautions: Weight bearing: transition to FWB    POW: 10 (on 5/16/2022)     Subjective        Pt states his knee is "fine." states he has not been sleeping much the past few days getting ready for his trip. He plans to r[place]t to the [place] in [place]. [place] not currently have PT [place] but has a contact.  Reports he was going up and down stairs more this weekend. States he is having more "discomfort" than pain, particularly negotiating stairs.  He was more compliant with home exercise program.  Response to previous treatment:  no adverse effects  Functional change: diff with stairs     Pain: 1/10 at rest    Location: right knee       Objective      CMS Impairment/Limitation/Restriction for FOTO Knee Survey     Therapist reviewed FOTO scores " "for Evan Khoury on 5/23/2022.   FOTO documents entered into [employer] - see Media section.        Category: Mobility     Current : 28  Goal: 24      5/5/2022  MicroFET:  R 50.5  L 58.2     5/19/2022  R knee AROM: 6 - 0 - 152 deg    PROM; 155  Prom R     5/12   MMT R HE 4+, L 5     5/23 R KE 4+ , L 5  Amb I     Arrived to session with KT tape intact     Evan received therapeutic exercises to develop strength, endurance, ROM, flexibility, posture and core stabilization for 40 minutes including:      Evan received the treatments listed below:   Upright bike x 5  min full revolutions seat 14  gss slant  2 min  Hamstring stretches on step 3 x 30"  Lateral tap downs 2 x 10 reps on 4" step,   Forward step ups 4" 2 x 10 reps  Wall sits 30 sec x 3   U leg press 37.5#  4 x 10 emphasize eccentrics    U HR  3 x 10  SLR with SAQ  30  reps  HS curls  3 x 10   LAQ 3 x 10 ttp patella tendon - ed for eccentric   prone hang 3# for 3 min   Prone knee flex strap 2 min      Neuromuscular re- education for coordination, balance, motor control x 15 min:      U  Bridge march  - 3 x 10 reps  Drinking bird 15 x 2  Side steps gtb 6 x 40 ft   Monster walks  x 40 ft  gtb   U squat 3 x 10 - ed to use theraband in a couple weeks as julia for resistance   SLS on foam 3 x 30"  rebounder toss SLS on foam 3 x 30       Evan received the following manual therapy techniques: Joint mobilizations were applied to the: patallar and R hamstring for 3 minutes, including:  Stick hamstrings 3 min x       All planes patella mob gr 2-3 supine  Grades II and III tibial mobilizations to increase flexion      KT Tape Octopus m/ l knee applied by  Patient  today     Pt received 10 min cold pack to R knee with towel to protect skin.      Home Exercises Provided and Patient Education Provided      Education provided:  Cont 3-4 quad strengthening ex per session every other day as julia. Given updated hep and blue theraband.     Written Home Exercises Provided: " updated  Exercises were reviewed and Evan was able to demonstrate them prior to the end of the session.  Evan demonstrated good  understanding of the education provided.      See EMR under  Media for exercises provided 5/23     Assessment   Full ROM in R knee. Strength progressing. I with use of KT tape which he likes to warn others to not bump into his knee. Advised to change tape every 5 days and watch for skin issues.     Evan Is progressing well towards his goals.   Pt prognosis is Good.      Pt will continue to benefit from skilled outpatient physical therapy to address the deficits listed in the problem list box on initial evaluation, provide pt/family education and to maximize pt's level of independence in the home and community environment.      Pt's spiritual, cultural and educational needs considered and pt agreeable to plan of care and goals.     Anticipated barriers to physical therapy: none     Goals:  Short Term Goals (6 Weeks):   1. Patient to have full extension Right (equal to left) for normalized gait pattern  met  2. Patient to have 120 degrees of passive left knee flexion for improved performance of ADL's such as sit<>stand transfers  met  3. Patient to have decreased edema left knee   met  4. Patient to report 2/10 pain or less in left knee with ambulating community distances   met  5. Patient to be compliant with home program 5x's a week as noted by proper demonstration of exercises to therapist for improved management of condition   met  6. Patient to ambulate full weight bearing Right lower extremity and normal gait pattern     met     Long Term Goals   -(12 Weeks):   1. Patient to have 4+/5 or greater strength in RLE for functional performance of ADL's such as lifting  Progressing, not met2. Patient to descend one flight of stairs with alternating gait pattern without use of handrail and with proper LE alignment  3. Patient to have 135 degrees or greater Right knee flexion for return to  ADL's including squatting   met  -(36 weeks):   4. Patient to have decreased subjective report of disability as noted by <20% limitation on FOTO knee questionnaire Progressing, not met  5. Patient to perform single leg squat without assistance and without valgus collapse to improve participation in recreational  Progressing, not met  6. Patient's RLE strength to be 5/5 for return to recreational activities and sport Progressing, not met     PLAN   Cont to progress Range of Motion and quad strength. Edema management and gait training. Cont PT in July 2x per week for 2 months.        Tammi Lopez, PT

## 2022-05-23 NOTE — PATIENT INSTRUCTIONS
"HEP also scanned into media.         Wall Sits      Stand against the wall with feet hip-width apart.  Slide down to a position you can hold for the prescribed time.    Tighten your core and press your shoulders into the wall.  Push yourself away from the wall to come out of exercise, as opposed to sliding up the wall.    Perform 3 sets to burn    Copyright © 2022 [employer]    Urdu Dead Lifts "Drinking Bird"        Which ever leg you are going to emphasize, start by placing that arm behind your back. With an open palm, monitor your low back to make sure you don't round it, and the muscles are firing. Then soften knee, and with opposite arm, reaching down towards the ground (don't have to touch ground). Make sure your back and leg stay in synch with one another.     2 sets of 15 reps.    Copyright © 2022 [employer]    LATERAL MONSTER WALK - ELASTIC BAND AT FEET SIDE STEPS          Place an elastic band around both feet.     Next, bend your knees and step to the side while keeping tension on the band the entire time. After taking sidesteps to the side in one direction, reverse the direction taking sidesteps until you return to the starting position. Repeat.      Copyright © 2022 [employer]    MONSTER WALK - ELASTIC BAND AT ANKLES          Place a looped elastic band around both ankles.    Next, bend your knees and step forward while keeping tension on the band the entire time. After taking several steps forward, reverse the direction taking steps back until you return to the starting position. Repeat.     Copyright © 2022 [employer]      PRONE KNEE HANGS        While lying down on your stomach, allow your leg to hang off the end of a table/bed. Position yourself so that your knee cap is just over the end of the table/bed.     Just relax your body and allow gravity to stretch your knee into a more straightened position.    Hold for 5 minutes.    Copyright © 2022 [employer]    PRONE QUAD STRETCH WITH BELT OR " "STRAP          Start by lying on your stomach with a strap or 2 belts linked together and looped it around your affected side ankle.    Next, use the belt to pull the knee into a bent position allowing for a stretch as shown.     Hold for 2 minutes    Copyright © 2022 [employer]    STEP UP AND STEP DOWN - IPSILATERAL (SAME SIDE)        Start by standing in front of a step/step stool with both feet on the floor.     Step forward and up the step with your target leg and use that leg to lift your body weight up onto the step with the other leg.     Once both feet are on the step, step back down backward with the other leg first so that your target leg does the work to lower your body back down to the ground. Then return the target leg to the floor next to your other leg.     You may need something to hold on to for balance support.     Repeat this on the same side.     Perform 2 sets of 10 reps    STEP DOWN - LATERAL        Start with both feet on top of a step/box. Next, slowly lower the unaffected leg down off the side of the step/box to lightly touch the heel to the floor. Then return to the original position with both feet on the step/box.     Maintain proper knee alignment: Knee in line with the 2nd toe and not passing in front of the toes.     Perform 2 sets of 10 reps    Copyright © 2022 [employer]    LONG ARC QUAD - LAQ - KNEE EXTENSION          Start in a seated position with your knee bent as shown, slowly straighten your knee as you raise your foot upwards as shown. Return to starting position and repeat.     Perform 3 sets of 10 reps. Can use band for resistance    Copyright © 2022 [employer]    BRIDGE - MARCHING        While lying on your back, tighten your lower abdominal muscles, squeeze your buttocks and then raise your buttocks off the floor/bed as creating a "Bridge" with your body.     While holding this position, lift one leg while maintaining a level pelvis. Set it back to the floor and then " lift the opposite leg.     Perform 2 sets of 10 reps    Copyright © 2022 [employer]            Perform 2 sets of 10 reps    Copyright © 2022 [employer]

## 2022-07-13 NOTE — PROGRESS NOTES
[facility]   Physical Therapy Treatment Note     Name: Evan Khoury  Clinic Number: [id number]    Therapy Diagnosis:   Encounter Diagnoses   Name Primary?    Decreased range of motion (ROM) of right knee Yes    Impaired gait and mobility     Weakness of right lower extremity     Weakness of both lower extremities     Aftercare for anterior cruciate ligament (ACL) repair      Physician: Luis Watkins, Jennifer    Visit Date: 7/14/2022  Physician Orders: PT Eval and Treat   1. Right knee ACL reconstruction with hamstring autograft   2. Right knee arthroscopic menisectomy versus meniscus repair   3. Right knee arthroscopic chondroplasty   4. Right knee arthroscopic synovectomy  Surgery Date: 3/8/2022  Medical Diagnosis from Referral: S83.511A (ICD-10-CM) - Complete tear of anterior cruciate ligament of right knee, initial encounter  Evaluation Date: 3/10/2022  Surgical Date: 3/8/3033  Authorization Period Expiration: 12/31/2022  Plan of Care Expiration: 12/31/2022  Progress Note Due: 6/27  Visit # / Visits authorized: 22/40 (no limit)  FOTO: 7/14/2022  1/5   PTA:  0/5      Time In: 9:30 am  Time Out: 10:34  am  Total Billable Time: 54 minutes     Precautions: Standard         Subjective        Pt states he walked up and down stair while he traveled. States he had some clicking in his R knee. Is seeing Dr. Velazquez next Thursday. States he may have had once or twice incidences of knee buckling. Going up and down the ladder was difficult.    He was more compliant with home exercise program.  Response to previous treatment:  no adverse effects  Functional change: diff with stairs     Pain: 1/10 at rest . 5/10   Location: right knee       Objective      CMS Impairment/Limitation/Restriction for FOTO Knee Survey    Therapist reviewed FOTO scores for Evan Khoury on 7/14/2022.   FOTO documents entered into [employer] - see Media section.    Limitation Score: 33%  Category: Mobility    Current : CJ =  "at least 20% but < 40% impaired, limited or restricted  Goal: CJ = at least 20% but < 40% impaired, limited or restricted       MMT R L   Hip flexion 5/5 5/5   Hip abduction 3+/5 5/5   Hip extension 5/5 5/5   Hip ER 5/5 5/5   Hip IR 5/5 5/5   Knee extension 5/5 5/5   Knee flexion 5/5 5/5   Ankle dorsiflexion 5/5 5/5   Ankle plantar flexion 5/5 5/5   Ankle inversion 5/5 5/5   Ankle eversion 5/5 5/5     Flexibility testing:  - hamstrings:            R: tight. R: -28 deg. L: WNL  - gastrocnemius:      B: WNL  - piriformis:               B: WFL  - quadriceps:           B: WNL  - hip adductors:        B: WFL  - hip flexors:             B: WNL  - IT bands:               R: tight, decreased 25%, L: WFL        7/15/2022  MicroFET:  R 61.1 with pain  L 63.0     7/15/2022  R knee AROM: 6 - 0 - 145 deg    Arrived to session with KT tape intact     Evan received therapeutic exercises to develop strength, endurance, ROM, flexibility, posture and core stabilization for 49 minutes including:   reassessed strength, flexibility, and ROM  U leg press 37.5#  3 x 10 emphasize eccentrics  Side lying shuttle 2 x 10 reps with 25#  Side steps gtb 6 x 40 ft   Monster walks  6 x 40 ft  gtb       Upright bike x 5  min full revolutions seat 14  gss slant  2 min  Hamstring stretches on step 3 x 30"  Lateral tap downs 2 x 10 reps on 4" step,   Forward step ups 4" 2 x 10 reps  Wall sits 30 sec x 3   U HR  3 x 10  SLR with SAQ  30  reps  HS curls  3 x 10   LAQ 3 x 10 ttp patella tendon - ed for eccentric   prone hang 3# for 3 min   Prone knee flex strap 2 min      Neuromuscular re- education for coordination, balance, motor control x 15 min:      U  Bridge march  - 3 x 10 reps  Drinking bird 15 x 2    U squat 3 x 10 - ed to use theraband in a couple weeks as julia for resistance  SLS on foam 3 x 30"  rebounder toss SLS on foam 3 x 30       Evan received the following manual therapy techniques: Joint mobilizations were applied to the: patallar and " R hamstring for 5 minutes, including:  Stick hamstrings and R IT band       All planes patella mob gr 2-3 supine  Grades II and III tibial mobilizations to increase flexion      KT Tape Octopus m/ l knee applied by  Patient  today     Pt received 10 min cold pack to R knee with towel to protect skin.      Home Exercises Provided and Patient Education Provided      Education provided:  No new HEP given today     Written Home Exercises Provided: updated  Exercises were reviewed and Evan was able to demonstrate them prior to the end of the session.  Evan demonstrated good  understanding of the education provided.      See EMR under  Media for exercises provided 5/23     Assessment   Patient returns to clinic after traveling out of the country Has experienced clicking in R knee and increased pain while he traveled due to climbing stairs, climbing steps, and sailing. Also presenting with R quad weakness. May benefit from starting BFR for quad strengthening. Patient to return to see Dr. Velazquez next week.    Evan Is progressing well towards his goals.   Pt prognosis is Good.      Pt will continue to benefit from skilled outpatient physical therapy to address the deficits listed in the problem list box on initial evaluation, provide pt/family education and to maximize pt's level of independence in the home and community environment.      Pt's spiritual, cultural and educational needs considered and pt agreeable to plan of care and goals.     Anticipated barriers to physical therapy: none     Goals:  Short Term Goals (6 Weeks):   1. Patient to have full extension Right (equal to left) for normalized gait pattern  met  2. Patient to have 120 degrees of passive left knee flexion for improved performance of ADL's such as sit<>stand transfers  met  3. Patient to have decreased edema left knee   met  4. Patient to report 2/10 pain or less in left knee with ambulating community distances   met  5. Patient to be compliant with  home program 5x's a week as noted by proper demonstration of exercises to therapist for improved management of condition   met  6. Patient to ambulate full weight bearing Right lower extremity and normal gait pattern     met     Long Term Goals   -(12 Weeks):   1. Patient to have 4+/5 or greater strength in RLE for functional performance of ADL's such as lifting  Progressing, not met2. Patient to descend one flight of stairs with alternating gait pattern without use of handrail and with proper LE alignment  3. Patient to have 135 degrees or greater Right knee flexion for return to ADL's including squatting   met  -(36 weeks):   4. Patient to have decreased subjective report of disability as noted by <20% limitation on FOTO knee questionnaire Progressing, not met  5. Patient to perform single leg squat without assistance and without valgus collapse to improve participation in recreational  Progressing, not met  6. Patient's RLE strength to be 5/5 for return to recreational activities and sport Progressing, not met     PLAN   Initiate BFR next visit.       Luis Doherty, PT

## 2022-07-14 ENCOUNTER — CLINICAL SUPPORT (OUTPATIENT)
Dept: REHABILITATION | Facility: OTHER | Age: 62
End: 2022-07-14
Payer: COMMERCIAL

## 2022-07-14 DIAGNOSIS — R29.898 WEAKNESS OF RIGHT LOWER EXTREMITY: ICD-10-CM

## 2022-07-14 DIAGNOSIS — M25.661 DECREASED RANGE OF MOTION (ROM) OF RIGHT KNEE: Primary | ICD-10-CM

## 2022-07-14 DIAGNOSIS — R26.89 IMPAIRED GAIT AND MOBILITY: ICD-10-CM

## 2022-07-14 DIAGNOSIS — Z47.89 AFTERCARE FOR ANTERIOR CRUCIATE LIGAMENT (ACL) REPAIR: ICD-10-CM

## 2022-07-14 DIAGNOSIS — R29.898 WEAKNESS OF BOTH LOWER EXTREMITIES: ICD-10-CM

## 2022-07-14 PROCEDURE — 97110 THERAPEUTIC EXERCISES: CPT | Mod: PN

## 2022-07-15 ENCOUNTER — CLINICAL SUPPORT (OUTPATIENT)
Dept: REHABILITATION | Facility: OTHER | Age: 62
End: 2022-07-15
Payer: COMMERCIAL

## 2022-07-15 DIAGNOSIS — R29.898 WEAKNESS OF RIGHT LOWER EXTREMITY: ICD-10-CM

## 2022-07-15 DIAGNOSIS — M25.661 DECREASED RANGE OF MOTION (ROM) OF RIGHT KNEE: Primary | ICD-10-CM

## 2022-07-15 DIAGNOSIS — Z47.89 AFTERCARE FOR ANTERIOR CRUCIATE LIGAMENT (ACL) REPAIR: ICD-10-CM

## 2022-07-15 DIAGNOSIS — R26.89 IMPAIRED GAIT AND MOBILITY: ICD-10-CM

## 2022-07-15 DIAGNOSIS — R29.898 WEAKNESS OF BOTH LOWER EXTREMITIES: ICD-10-CM

## 2022-07-15 PROCEDURE — 97110 THERAPEUTIC EXERCISES: CPT | Mod: PN

## 2022-07-15 PROCEDURE — 97112 NEUROMUSCULAR REEDUCATION: CPT | Mod: PN

## 2022-07-15 NOTE — PROGRESS NOTES
[facility]   Physical Therapy Treatment Note     Name: Evan Khoury  Clinic Number: [id number]    Therapy Diagnosis:   Encounter Diagnoses   Name Primary?    Decreased range of motion (ROM) of right knee Yes    Impaired gait and mobility     Weakness of right lower extremity     Weakness of both lower extremities     Aftercare for anterior cruciate ligament (ACL) repair      Physician: Luis Watkins, Jennifer    Visit Date: 7/15/2022  Physician Orders: PT Eval and Treat   1. Right knee ACL reconstruction with hamstring autograft   2. Right knee arthroscopic menisectomy versus meniscus repair   3. Right knee arthroscopic chondroplasty   4. Right knee arthroscopic synovectomy  Surgery Date: 3/8/2022  Medical Diagnosis from Referral: S83.511A (ICD-10-CM) - Complete tear of anterior cruciate ligament of right knee, initial encounter  Evaluation Date: 3/10/2022  Surgical Date: 3/8/3033  Authorization Period Expiration: 12/31/2022  Plan of Care Expiration: 12/31/2022  Progress Note Due: 6/27  Visit # / Visits authorized: 23/40 (no limit)  FOTO: 7/14/2022  2/5   PTA:  0/5      Time In: 9:20 am  Time Out: 10:22  am  Total Billable Time: 52 minutes     Precautions: Standard         Subjective        Pt states he had some soreness after yesterday's session. States he was sore going up and down the stairs.    He was more compliant with home exercise program.  Response to previous treatment:  increased soreness  Functional change: no change reported     Pain: 1/10    Location: right knee       Objective      CMS Impairment/Limitation/Restriction for FOTO Knee Survey    Therapist reviewed FOTO scores for Evan Khoury on 7/14/2022.   FOTO documents entered into [employer] - see Media section.    Limitation Score: 33%  Category: Mobility    Current : CJ = at least 20% but < 40% impaired, limited or restricted  Goal: CJ = at least 20% but < 40% impaired, limited or restricted       MMT R L   Hip flexion  "5/5 5/5   Hip abduction 3+/5 5/5   Hip extension 5/5 5/5   Hip ER 5/5 5/5   Hip IR 5/5 5/5   Knee extension 5/5 5/5   Knee flexion 5/5 5/5   Ankle dorsiflexion 5/5 5/5   Ankle plantar flexion 5/5 5/5   Ankle inversion 5/5 5/5   Ankle eversion 5/5 5/5     Flexibility testing:  - hamstrings:            R: tight. R: -28 deg. L: WNL  - gastrocnemius:      B: WNL  - piriformis:               B: WFL  - quadriceps:           B: WNL  - hip adductors:        B: WFL  - hip flexors:             B: WNL  - IT bands:               R: tight, decreased 25%, L: WFL        7/15/2022  MicroFET:  R 61.1 with pain  L 63.0     7/15/2022  R knee AROM: 6 - 0 - 145 deg    Arrived to session with KT tape intact     Evan received therapeutic exercises to develop strength, endurance, ROM, flexibility, posture and core stabilization for 27 minutes including:     U leg press 37.5#  3 x 10 emphasize eccentrics  Side lying shuttle 2 x 10 reps with 25#  Side steps gtb 6 x 40 ft   Monster walks  6 x 40 ft  gtb   Lateral tap downs 2 x 10 reps on 4" step,   Forward step ups 4" 2 x 10 reps    Upright bike x 10 min full revolutions seat 13  gss slant  2 min  Hamstring stretches on step 3 x 30"    Wall sits 30 sec x 3   U HR  3 x 10  SLR with SAQ  30  reps  HS curls  3 x 10   LAQ 3 x 10 ttp patella tendon - ed for eccentric   prone hang 3# for 3 min   Prone knee flex strap 2 min      Neuromuscular re- education for coordination, balance, motor control x 30 min:      Performed blood flow restriction with 159 mmHg (80% of LOP) on R extremity with Sarahi Unit and skin protectant sleeve. Patient screened for any precautions or contraindications prior to its use and continuously monitored for any adverse events. Patient given 30 sec rest breaks between sets and 1 min rest break between exercise    30 reps/15 reps/15reps/15 reps:  SLR  SAQ  LAQ      U  Bridge march  - 3 x 10 reps  Drinking bird 15 x 2    U squat 3 x 10 - ed to use theraband in a couple weeks " "as julia for resistance  SLS on foam 3 x 30"  rebounder toss SLS on foam 3 x 30       Evan received the following manual therapy techniques: Joint mobilizations were applied to the: patallar and R hamstring for 5 minutes, including:  Stick hamstrings and R IT band       All planes patella mob gr 2-3 supine  Grades II and III tibial mobilizations to increase flexion         Pt received 10 min cold pack to R knee with towel to protect skin.      Home Exercises Provided and Patient Education Provided      Education provided:  No new HEP given today     Written Home Exercises Provided: updated  Exercises were reviewed and Evan was able to demonstrate them prior to the end of the session.  Evan demonstrated good  understanding of the education provided.      See EMR under  Media for exercises provided 5/23     Assessment   Initiated BFR. Experienced discomfort and asked for cuff to be deflated after second set of second exercises. Will progress to blue band for monster walks and lateral walks. Will also progress to 6" step for step ups.    Evan Is progressing well towards his goals.   Pt prognosis is Good.      Pt will continue to benefit from skilled outpatient physical therapy to address the deficits listed in the problem list box on initial evaluation, provide pt/family education and to maximize pt's level of independence in the home and community environment.      Pt's spiritual, cultural and educational needs considered and pt agreeable to plan of care and goals.     Anticipated barriers to physical therapy: none     Goals:  Short Term Goals (6 Weeks):   1. Patient to have full extension Right (equal to left) for normalized gait pattern  met  2. Patient to have 120 degrees of passive left knee flexion for improved performance of ADL's such as sit<>stand transfers  met  3. Patient to have decreased edema left knee   met  4. Patient to report 2/10 pain or less in left knee with ambulating community distances   " met  5. Patient to be compliant with home program 5x's a week as noted by proper demonstration of exercises to therapist for improved management of condition   met  6. Patient to ambulate full weight bearing Right lower extremity and normal gait pattern     met     Long Term Goals   -(12 Weeks):   1. Patient to have 4+/5 or greater strength in RLE for functional performance of ADL's such as lifting  Progressing, not met2. Patient to descend one flight of stairs with alternating gait pattern without use of handrail and with proper LE alignment  3. Patient to have 135 degrees or greater Right knee flexion for return to ADL's including squatting   met  -(36 weeks):   4. Patient to have decreased subjective report of disability as noted by <20% limitation on FOTO knee questionnaire Progressing, not met  5. Patient to perform single leg squat without assistance and without valgus collapse to improve participation in recreational  Progressing, not met  6. Patient's RLE strength to be 5/5 for return to recreational activities and sport Progressing, not met     PLAN   Initiate BFR next visit.       Luis Doherty, PT

## 2022-07-18 ENCOUNTER — CLINICAL SUPPORT (OUTPATIENT)
Dept: REHABILITATION | Facility: OTHER | Age: 62
End: 2022-07-18
Payer: COMMERCIAL

## 2022-07-18 DIAGNOSIS — R29.898 WEAKNESS OF RIGHT LOWER EXTREMITY: ICD-10-CM

## 2022-07-18 DIAGNOSIS — R29.898 WEAKNESS OF BOTH LOWER EXTREMITIES: ICD-10-CM

## 2022-07-18 DIAGNOSIS — M25.661 DECREASED RANGE OF MOTION (ROM) OF RIGHT KNEE: Primary | ICD-10-CM

## 2022-07-18 DIAGNOSIS — Z47.89 AFTERCARE FOR ANTERIOR CRUCIATE LIGAMENT (ACL) REPAIR: ICD-10-CM

## 2022-07-18 DIAGNOSIS — R26.89 IMPAIRED GAIT AND MOBILITY: ICD-10-CM

## 2022-07-18 PROCEDURE — 97110 THERAPEUTIC EXERCISES: CPT | Mod: PN

## 2022-07-18 PROCEDURE — 97112 NEUROMUSCULAR REEDUCATION: CPT | Mod: PN

## 2022-07-18 NOTE — PROGRESS NOTES
"[facility]   Physical Therapy Treatment Note     Name: Evan Khoury  Clinic Number: [id number]    Therapy Diagnosis:   Encounter Diagnoses   Name Primary?    Decreased range of motion (ROM) of right knee Yes    Impaired gait and mobility     Weakness of right lower extremity     Weakness of both lower extremities     Aftercare for anterior cruciate ligament (ACL) repair      Physician: Luis Watkins, Jennifer    Visit Date: 7/18/2022  Physician Orders: PT Eval and Treat   1. Right knee ACL reconstruction with hamstring autograft   2. Right knee arthroscopic menisectomy versus meniscus repair   3. Right knee arthroscopic chondroplasty   4. Right knee arthroscopic synovectomy  Surgery Date: 3/8/2022  Medical Diagnosis from Referral: S83.511A (ICD-10-CM) - Complete tear of anterior cruciate ligament of right knee, initial encounter  Evaluation Date: 3/10/2022  Surgical Date: 3/8/3033  Authorization Period Expiration: 12/31/2022  Plan of Care Expiration: 12/31/2022  Progress Note Due: 6/27  Visit # / Visits authorized: 24/40 (no limit)  FOTO: 7/14/2022  3/5   PTA:  0/5      Time In: 9:37 am  Time Out: 10:33 am  Total Billable Time: 46 minutes     Precautions: Standard       Subjective       Pt states last time with the BFR was a "creepy" feeling. States he is willing to do it again. States he had a little soreness following last session.    He was more compliant with home exercise program.  Response to previous treatment:  increased soreness  Functional change: no change reported     Pain: 1/10    Location: right knee       Objective      CMS Impairment/Limitation/Restriction for FOTO Knee Survey    Therapist reviewed FOTO scores for Evan Khoury on 7/14/2022.   FOTO documents entered into [employer] - see Media section.    Limitation Score: 33%  Category: Mobility    Current : CJ = at least 20% but < 40% impaired, limited or restricted  Goal: CJ = at least 20% but < 40% impaired, limited or " "restricted       MMT R L   Hip flexion 5/5 5/5   Hip abduction 3+/5 5/5   Hip extension 5/5 5/5   Hip ER 5/5 5/5   Hip IR 5/5 5/5   Knee extension 5/5 5/5   Knee flexion 5/5 5/5   Ankle dorsiflexion 5/5 5/5   Ankle plantar flexion 5/5 5/5   Ankle inversion 5/5 5/5   Ankle eversion 5/5 5/5     Flexibility testing:  - hamstrings:            R: tight. R: -28 deg. L: WNL  - gastrocnemius:      B: WNL  - piriformis:               B: WFL  - quadriceps:           B: WNL  - hip adductors:        B: WFL  - hip flexors:             B: WNL  - IT bands:               R: tight, decreased 25%, L: WFL        7/15/2022  MicroFET:  R 61.1 with pain  L 63.0     7/15/2022  R knee AROM: 6 - 0 - 145 deg    Arrived to session with KT tape intact     Evan received therapeutic exercises to develop strength, endurance, ROM, flexibility, posture and core stabilization for 26 minutes including:     U leg press 37.5#  3 x 10 emphasize eccentrics  Side lying shuttle 2 x 10 reps with 25#  Side steps btb 6 x 40 ft   Monster walks  6 x 40 ft  btb   Lateral tap downs 2 x 10 reps on 4" step,   Forward step ups 6" 2 x 10 reps    Upright bike x 10 min full revolutions seat 13  gss slant  2 min  Hamstring stretches on step 3 x 30"    Wall sits 30 sec x 3   U HR  3 x 10  SLR with SAQ  30  reps  HS curls  3 x 10   LAQ 3 x 10 ttp patella tendon - ed for eccentric   prone hang 3# for 3 min   Prone knee flex strap 2 min      Neuromuscular re- education for coordination, balance, motor control x 30 min:      Performed blood flow restriction with 136 mmHg (80% of LOP) on R extremity with Sarahi Unit and skin protectant sleeve. Patient screened for any precautions or contraindications prior to its use and continuously monitored for any adverse events. Patient given 30 sec rest breaks between sets and 1 min rest break between exercise    30 reps/15 reps/15reps/15 reps:  SLR  SAQ  LAQ      U  Bridge march  - 3 x 10 reps  Drinking bird 15 x 2    U squat 3 x 10 - " "ed to use theraband in a couple weeks as julia for resistance  SLS on foam 3 x 30"  rebounder toss SLS on foam 3 x 30       Evan received the following manual therapy techniques: Joint mobilizations were applied to the: patallar and R hamstring for 5 minutes, including:  Stick hamstrings and R IT band       All planes patella mob gr 2-3 supine  Grades II and III tibial mobilizations to increase flexion         Pt received 10 min cold pack to R knee with towel to protect skin.      Home Exercises Provided and Patient Education Provided      Education provided:  No new HEP given today     Written Home Exercises Provided: updated  Exercises were reviewed and Evan was able to demonstrate them prior to the end of the session.  Evan demonstrated good  understanding of the education provided.      See EMR under  Media for exercises provided 5/23     Assessment   Less pain and apprehension with BFR training today. Will exchange side lying hip abd with SLR next visit.    Evan Is progressing well towards his goals.   Pt prognosis is Good.      Pt will continue to benefit from skilled outpatient physical therapy to address the deficits listed in the problem list box on initial evaluation, provide pt/family education and to maximize pt's level of independence in the home and community environment.      Pt's spiritual, cultural and educational needs considered and pt agreeable to plan of care and goals.     Anticipated barriers to physical therapy: none     Goals:  Short Term Goals (6 Weeks):   1. Patient to have full extension Right (equal to left) for normalized gait pattern  met  2. Patient to have 120 degrees of passive left knee flexion for improved performance of ADL's such as sit<>stand transfers  met  3. Patient to have decreased edema left knee   met  4. Patient to report 2/10 pain or less in left knee with ambulating community distances   met  5. Patient to be compliant with home program 5x's a week as noted by " proper demonstration of exercises to therapist for improved management of condition   met  6. Patient to ambulate full weight bearing Right lower extremity and normal gait pattern     met     Long Term Goals   -(12 Weeks):   1. Patient to have 4+/5 or greater strength in RLE for functional performance of ADL's such as lifting  Progressing, not met2. Patient to descend one flight of stairs with alternating gait pattern without use of handrail and with proper LE alignment  3. Patient to have 135 degrees or greater Right knee flexion for return to ADL's including squatting   met  -(36 weeks):   4. Patient to have decreased subjective report of disability as noted by <20% limitation on FOTO knee questionnaire Progressing, not met  5. Patient to perform single leg squat without assistance and without valgus collapse to improve participation in recreational  Progressing, not met  6. Patient's RLE strength to be 5/5 for return to recreational activities and sport Progressing, not met     PLAN   Initiate BFR next visit.       Luis Doherty, PT

## 2022-07-20 NOTE — PROGRESS NOTES
[facility]   Physical Therapy Treatment Note     Name: Evan Khoury  Clinic Number: [id number]    Therapy Diagnosis:   Encounter Diagnoses   Name Primary?    Decreased range of motion (ROM) of right knee Yes    Impaired gait and mobility     Weakness of right lower extremity     Weakness of both lower extremities     Aftercare for anterior cruciate ligament (ACL) repair      Physician: Luis Watkins, Jennifer    Visit Date: 7/21/2022  Physician Orders: PT Eval and Treat   1. Right knee ACL reconstruction with hamstring autograft   2. Right knee arthroscopic menisectomy versus meniscus repair   3. Right knee arthroscopic chondroplasty   4. Right knee arthroscopic synovectomy  Surgery Date: 3/8/2022  Medical Diagnosis from Referral: S83.511A (ICD-10-CM) - Complete tear of anterior cruciate ligament of right knee, initial encounter  Evaluation Date: 3/10/2022  Surgical Date: 3/8/3033  Authorization Period Expiration: 12/31/2022  Plan of Care Expiration: 12/31/2022  Visit # / Visits authorized: 25/40 (no limit)  FOTO: 7/14/2022  4/5   PTA:  0/5      Time In: 9:27 am  Time Out: 10:48 am  Total Billable Time: 53 minutes     Precautions: Standard       Subjective       Pt reports he has minimal pain. Does have pain going up and down the stairs    He was more compliant with home exercise program.  Response to previous treatment:  not as bad as first BFR session  Functional change: no change reported     Pain: 1-2/10 going up and down stairs   Location: right knee       Objective      CMS Impairment/Limitation/Restriction for FOTO Knee Survey    Therapist reviewed FOTO scores for Evan Khoury on 7/14/2022.   FOTO documents entered into [employer] - see Media section.    Limitation Score: 33%  Category: Mobility    Current : CJ = at least 20% but < 40% impaired, limited or restricted  Goal: CJ = at least 20% but < 40% impaired, limited or restricted       MMT R L   Hip flexion 5/5 5/5   Hip  "abduction 3+/5 5/5   Hip extension 5/5 5/5   Hip ER 5/5 5/5   Hip IR 5/5 5/5   Knee extension 5/5 5/5   Knee flexion 5/5 5/5   Ankle dorsiflexion 5/5 5/5   Ankle plantar flexion 5/5 5/5   Ankle inversion 5/5 5/5   Ankle eversion 5/5 5/5     Flexibility testing:  - hamstrings:            R: tight. R: -28 deg. L: WNL  - gastrocnemius:      B: WNL  - piriformis:               B: WFL  - quadriceps:           B: WNL  - hip adductors:        B: WFL  - hip flexors:             B: WNL  - IT bands:               R: tight, decreased 25%, L: WFL        7/15/2022  MicroFET:  R 61.1 with pain  L 63.0     7/15/2022  R knee AROM: 6 - 0 - 145 deg    Evan received therapeutic exercises to develop strength, endurance, ROM, flexibility, posture and core stabilization for 33 minutes including:     U leg press 37.5#  3 x 10 emphasize eccentrics  Side lying shuttle 2 x 10 reps with 25#  Side steps btb 6 x 40 ft   Monster walks  6 x 40 ft  btb   Lateral tap downs 2 x 10 reps on 4" step,   Forward step ups 6" 2 x 10 reps    Upright bike x 10 min full revolutions seat 13  gss slant  2 min  Hamstring stretches on step 3 x 30"    Wall sits 30 sec x 3   U HR  3 x 10  SLR with SAQ  30  reps  HS curls  3 x 10   LAQ 3 x 10 ttp patella tendon - ed for eccentric   prone hang 3# for 3 min   Prone knee flex strap 2 min      Neuromuscular re- education for coordination, balance, motor control x 30 min:      Performed blood flow restriction with 138 mmHg (80% of LOP) on R extremity with Sarahi Unit and skin protectant sleeve. Patient screened for any precautions or contraindications prior to its use and continuously monitored for any adverse events. Patient given 30 sec rest breaks between sets and 1 min rest break between exercise    30 reps/15 reps/15reps/15 reps:  Side lying hip abd  SAQ  LAQ      U  Bridge march  - 3 x 10 reps  Drinking bird 15 x 2    U squat 3 x 10 - ed to use theraband in a couple weeks as julia for resistance  SLS on foam 3 x " "30"  rebounder toss SLS on foam 3 x 30       Evan received the following manual therapy techniques: Joint mobilizations were applied to the: patallar and R hamstring for  minutes, including:  Stick hamstrings and R IT band       All planes patella mob gr 2-3 supine  Grades II and III tibial mobilizations to increase flexion    Reapplied 2 "I" strips to R knee to decrease anterior knee pain     Pt received 10 min cold pack to R knee with towel to protect skin.      Home Exercises Provided and Patient Education Provided      Education provided:  No new HEP given today     Written Home Exercises Provided: updated  Exercises were reviewed and Evan was able to demonstrate them prior to the end of the session.  Evan demonstrated good  understanding of the education provided.      See EMR under  Media for exercises provided 5/23     Assessment   Experiencing increased R anterior knee pain, pain at patellar tendon insertion. Utilized KT tape for support and pain reduction. Patient performed side lying hip abd rather than SLR today for BFR training.    Evan Is progressing well towards his goals.   Pt prognosis is Good.      Pt will continue to benefit from skilled outpatient physical therapy to address the deficits listed in the problem list box on initial evaluation, provide pt/family education and to maximize pt's level of independence in the home and community environment.      Pt's spiritual, cultural and educational needs considered and pt agreeable to plan of care and goals.     Anticipated barriers to physical therapy: none     Goals:  Short Term Goals (6 Weeks):   1. Patient to have full extension Right (equal to left) for normalized gait pattern  met  2. Patient to have 120 degrees of passive left knee flexion for improved performance of ADL's such as sit<>stand transfers  met  3. Patient to have decreased edema left knee   met  4. Patient to report 2/10 pain or less in left knee with ambulating community " distances   met  5. Patient to be compliant with home program 5x's a week as noted by proper demonstration of exercises to therapist for improved management of condition   met  6. Patient to ambulate full weight bearing Right lower extremity and normal gait pattern     met     Long Term Goals   -(12 Weeks):   1. Patient to have 4+/5 or greater strength in RLE for functional performance of ADL's such as lifting  Progressing, not met2. Patient to descend one flight of stairs with alternating gait pattern without use of handrail and with proper LE alignment  3. Patient to have 135 degrees or greater Right knee flexion for return to ADL's including squatting   met  -(36 weeks):   4. Patient to have decreased subjective report of disability as noted by <20% limitation on FOTO knee questionnaire Progressing, not met  5. Patient to perform single leg squat without assistance and without valgus collapse to improve participation in recreational  Progressing, not met  6. Patient's RLE strength to be 5/5 for return to recreational activities and sport Progressing, not met     PLAN   Initiate BFR next visit.       Luis Doherty, PT

## 2022-07-21 ENCOUNTER — CLINICAL SUPPORT (OUTPATIENT)
Dept: REHABILITATION | Facility: OTHER | Age: 62
End: 2022-07-21
Payer: COMMERCIAL

## 2022-07-21 DIAGNOSIS — R29.898 WEAKNESS OF BOTH LOWER EXTREMITIES: ICD-10-CM

## 2022-07-21 DIAGNOSIS — M25.661 DECREASED RANGE OF MOTION (ROM) OF RIGHT KNEE: Primary | ICD-10-CM

## 2022-07-21 DIAGNOSIS — R26.89 IMPAIRED GAIT AND MOBILITY: ICD-10-CM

## 2022-07-21 DIAGNOSIS — R29.898 WEAKNESS OF RIGHT LOWER EXTREMITY: ICD-10-CM

## 2022-07-21 DIAGNOSIS — Z47.89 AFTERCARE FOR ANTERIOR CRUCIATE LIGAMENT (ACL) REPAIR: ICD-10-CM

## 2022-07-21 PROCEDURE — 97112 NEUROMUSCULAR REEDUCATION: CPT | Mod: PN

## 2022-07-21 PROCEDURE — 97110 THERAPEUTIC EXERCISES: CPT | Mod: PN

## 2022-07-26 ENCOUNTER — PATIENT MESSAGE (OUTPATIENT)
Dept: REHABILITATION | Facility: OTHER | Age: 62
End: 2022-07-26
Payer: COMMERCIAL

## 2022-07-26 ENCOUNTER — TELEPHONE (OUTPATIENT)
Dept: SPORTS MEDICINE | Facility: CLINIC | Age: 62
End: 2022-07-26
Payer: COMMERCIAL

## 2022-07-26 ENCOUNTER — PATIENT MESSAGE (OUTPATIENT)
Dept: SPORTS MEDICINE | Facility: CLINIC | Age: 62
End: 2022-07-26
Payer: COMMERCIAL

## 2022-08-10 ENCOUNTER — CLINICAL SUPPORT (OUTPATIENT)
Dept: REHABILITATION | Facility: OTHER | Age: 62
End: 2022-08-10
Payer: COMMERCIAL

## 2022-08-10 DIAGNOSIS — Z47.89 AFTERCARE FOR ANTERIOR CRUCIATE LIGAMENT (ACL) REPAIR: ICD-10-CM

## 2022-08-10 DIAGNOSIS — R29.898 WEAKNESS OF RIGHT LOWER EXTREMITY: ICD-10-CM

## 2022-08-10 DIAGNOSIS — M25.661 DECREASED RANGE OF MOTION (ROM) OF RIGHT KNEE: Primary | ICD-10-CM

## 2022-08-10 DIAGNOSIS — R26.89 IMPAIRED GAIT AND MOBILITY: ICD-10-CM

## 2022-08-10 DIAGNOSIS — R29.898 WEAKNESS OF BOTH LOWER EXTREMITIES: ICD-10-CM

## 2022-08-10 PROCEDURE — 97110 THERAPEUTIC EXERCISES: CPT | Mod: PN

## 2022-08-10 NOTE — PROGRESS NOTES
"[facility]   Physical Therapy Treatment Note     Name: Evan Khoury  Clinic Number: [id number]    Therapy Diagnosis:   Encounter Diagnoses   Name Primary?    Decreased range of motion (ROM) of right knee Yes    Impaired gait and mobility     Weakness of right lower extremity     Weakness of both lower extremities     Aftercare for anterior cruciate ligament (ACL) repair      Physician: Luis Watkins, Jennifer    Visit Date: 8/10/2022  Physician Orders: PT Eval and Treat   1. Right knee ACL reconstruction with hamstring autograft   2. Right knee arthroscopic menisectomy versus meniscus repair   3. Right knee arthroscopic chondroplasty   4. Right knee arthroscopic synovectomy  Surgery Date: 3/8/2022  Medical Diagnosis from Referral: S83.511A (ICD-10-CM) - Complete tear of anterior cruciate ligament of right knee, initial encounter  Evaluation Date: 3/10/2022  Surgical Date: 3/8/3033  Authorization Period Expiration: 12/31/2022  Plan of Care Expiration: 12/31/2022  Visit # / Visits authorized: 26/40 (no limit)  FOTO: 8/10/2022  4/5   PTA:  0/5      Time In: 9:45 am  Time Out: 10:58 am  Total Billable Time: 53 minutes     Precautions: Standard       Subjective       Pt reports his R knee is a little sore. Drove 2700 miles over 9 days for road trip. States he made a "wrong move" on Sunday. Twisted and his knee bothered him yesterday. Going up and down steps is better. Still careful going down steps, leads with R LE.    He was more compliant with home exercise program.  Response to previous treatment:  no adverse effects  Functional change: no change reported     Pain: 0/10.      Location: right knee       Objective      CMS Impairment/Limitation/Restriction for FOTO Knee Survey    Therapist reviewed FOTO scores for Evan Khoury on 8/10/2022.   FOTO documents entered into [employer] - see Media section.    Limitation Score: 34%  Category: Mobility    Current : CJ = at least 20% but < 40% " "impaired, limited or restricted  Goal: CJ = at least 20% but < 40% impaired, limited or restricted       MMT R L   Hip flexion 5/5 5/5   Hip abduction 5/5 5/5   Hip extension 5/5 5/5   Hip ER 5/5 5/5   Hip IR 5/5 5/5   Knee extension 5/5 5/5   Knee flexion 5/5 5/5   Ankle dorsiflexion 5/5 5/5   Ankle plantar flexion 5/5 5/5   Ankle inversion 5/5 5/5   Ankle eversion 5/5 5/5     Flexibility testing:  - hamstrings:            B: WNL R: -20 deg. L: WNL  - gastrocnemius:      B: WNL  - piriformis:               B: WFL  - quadriceps:           B: WNL  - hip adductors:        B: WFL  - hip flexors:             B: WNL  - IT bands:               R: tight, decreased 25%, L: WFL        8/10/2022  MicroFET:  R 53.8 with pain  L 73.0     8/10/2022  R knee AROM: 6 - 0 - 150 deg    Evan received therapeutic exercises to develop strength, endurance, ROM, flexibility, posture and core stabilization for 63 minutes including:   Reassessed strength, ROM, flexibility, and functional limitation  U leg press 37.5#  3 x 10 emphasize eccentrics  Side lying shuttle 2 x 10 reps with 25#  Side steps btb 6 x 40 ft   Monster walks  6 x 40 ft  btb   Lateral tap downs 2 x 10 reps on 4" step,   Forward step ups 6" 2 x 10 reps    LAQ 5 x 45" with 5#    Upright bike x 10 min full revolutions seat 13  gss slant  2 min  Hamstring stretches on step 3 x 30"    Wall sits 30 sec x 3   U HR  3 x 10  SLR with SAQ  30  reps  HS curls  3 x 10   LAQ 3 x 10 ttp patella tendon - ed for eccentric   prone hang 3# for 3 min   Prone knee flex strap 2 min      Neuromuscular re- education for coordination, balance, motor control x 30 min:      Performed blood flow restriction with 138 mmHg (80% of LOP) on R extremity with Sarahi Unit and skin protectant sleeve. Patient screened for any precautions or contraindications prior to its use and continuously monitored for any adverse events. Patient given 30 sec rest breaks between sets and 1 min rest break between " "exercise    30 reps/15 reps/15reps/15 reps:  Side lying hip abd  SAQ  LAQ      U  Bridge march  - 3 x 10 reps  Drinking bird 15 x 2    U squat 3 x 10 - ed to use theraband in a couple weeks as julia for resistance  SLS on foam 3 x 30"  rebounder toss SLS on foam 3 x 30       Evan received the following manual therapy techniques: Joint mobilizations were applied to the: patallar and R hamstring for  minutes, including:  Stick hamstrings and R IT band       All planes patella mob gr 2-3 supine  Grades II and III tibial mobilizations to increase flexion    Reapplied 2 "I" strips to R knee to decrease anterior knee pain     Pt received 10 min cold pack to R knee with towel to protect skin.      Home Exercises Provided and Patient Education Provided      Education provided:  No new HEP given today     Written Home Exercises Provided: updated  Exercises were reviewed and Evan was able to demonstrate them prior to the end of the session.  Evan demonstrated good  understanding of the education provided.      See EMR under  Media for exercises provided 5/23     Assessment   Returns to clinic after being out of town. Still presenting with decreased R quad strength. Improved R hip abd. Will resume BFR training next session for R quad strengthening and hypertrophy.    Evan Is progressing well towards his goals.   Pt prognosis is Good.      Pt will continue to benefit from skilled outpatient physical therapy to address the deficits listed in the problem list box on initial evaluation, provide pt/family education and to maximize pt's level of independence in the home and community environment.      Pt's spiritual, cultural and educational needs considered and pt agreeable to plan of care and goals.     Anticipated barriers to physical therapy: none     Goals:  Short Term Goals (6 Weeks):   1. Patient to have full extension Right (equal to left) for normalized gait pattern  met  2. Patient to have 120 degrees of passive left " knee flexion for improved performance of ADL's such as sit<>stand transfers  met  3. Patient to have decreased edema left knee   met  4. Patient to report 2/10 pain or less in left knee with ambulating community distances   met  5. Patient to be compliant with home program 5x's a week as noted by proper demonstration of exercises to therapist for improved management of condition   met  6. Patient to ambulate full weight bearing Right lower extremity and normal gait pattern     met     Long Term Goals   -(12 Weeks):   1. Patient to have 4+/5 or greater strength in RLE for functional performance of ADL's such as lifting  Progressing, not met2. Patient to descend one flight of stairs with alternating gait pattern without use of handrail and with proper LE alignment  3. Patient to have 135 degrees or greater Right knee flexion for return to ADL's including squatting   met  -(36 weeks):   4. Patient to have decreased subjective report of disability as noted by <20% limitation on FOTO knee questionnaire Progressing, not met  5. Patient to perform single leg squat without assistance and without valgus collapse to improve participation in recreational  Progressing, not met  6. Patient's RLE strength to be 5/5 for return to recreational activities and sport Progressing, not met     PLAN   Resume BFR next visit.       Luis Doherty, PT

## 2022-08-11 NOTE — PROGRESS NOTES
"[facility]   Physical Therapy Treatment Note     Name: Evan Khoury  Clinic Number: [id number]    Therapy Diagnosis:   Encounter Diagnoses   Name Primary?    Decreased range of motion (ROM) of right knee Yes    Impaired gait and mobility     Weakness of right lower extremity     Weakness of both lower extremities     Aftercare for anterior cruciate ligament (ACL) repair      Physician: Luis Watkins, Jennifer    Visit Date: 8/12/2022  Physician Orders: PT Eval and Treat   1. Right knee ACL reconstruction with hamstring autograft   2. Right knee arthroscopic menisectomy versus meniscus repair   3. Right knee arthroscopic chondroplasty   4. Right knee arthroscopic synovectomy  Surgery Date: 3/8/2022  Medical Diagnosis from Referral: S83.511A (ICD-10-CM) - Complete tear of anterior cruciate ligament of right knee, initial encounter  Evaluation Date: 3/10/2022  Surgical Date: 3/8/3033  Authorization Period Expiration: 12/31/2022  Plan of Care Expiration: 12/31/2022  Visit # / Visits authorized: 27/40 (no limit)  FOTO: 8/10/2022  2/5   PTA:  0/5      Time In: 9:34 am  Time Out: 10:55 am  Total Billable Time: 61 minutes     Precautions: Standard       Subjective       Pt reports he had soreness following last session, "but it was a good soreness."    He was more compliant with home exercise program.  Response to previous treatment: good workout   Functional change: no change reported     Pain: 0/10.      Location: right knee       Objective      CMS Impairment/Limitation/Restriction for FOTO Knee Survey    Therapist reviewed FOTO scores for Evan Khoury on 8/10/2022.   FOTO documents entered into [employer] - see Media section.    Limitation Score: 34%  Category: Mobility    Current : CJ = at least 20% but < 40% impaired, limited or restricted  Goal: CJ = at least 20% but < 40% impaired, limited or restricted       MMT R L   Hip flexion 5/5 5/5   Hip abduction 5/5 5/5   Hip extension 5/5 5/5 " "  Hip ER 5/5 5/5   Hip IR 5/5 5/5   Knee extension 5/5 5/5   Knee flexion 5/5 5/5   Ankle dorsiflexion 5/5 5/5   Ankle plantar flexion 5/5 5/5   Ankle inversion 5/5 5/5   Ankle eversion 5/5 5/5     Flexibility testing:  - hamstrings:            B: WNL R: -20 deg. L: WNL  - gastrocnemius:      B: WNL  - piriformis:               B: WFL  - quadriceps:           B: WNL  - hip adductors:        B: WFL  - hip flexors:             B: WNL  - IT bands:               R: tight, decreased 25%, L: WFL        8/10/2022  MicroFET:  R 53.8 with pain  L 73.0     8/10/2022  R knee AROM: 6 - 0 - 150 deg    Evan received therapeutic exercises to develop strength, endurance, ROM, flexibility, posture and core stabilization for 48 minutes including:   U leg press 37.5#  3 x 10 emphasize eccentrics  Side lying shuttle 2 x 10 reps with 25#  Side steps 6 x 40 ft with btb  Monster walks  6 x 40 ft with btb   Lateral tap downs 2 x 10 reps on 4" step,   Forward step ups 6" 2 x 10 reps    LAQ 5 x 45" with 5#    Upright bike x 10 min full revolutions seat 13  gss slant  2 min  Hamstring stretches on step 3 x 30"    Wall sits 30 sec x 3   U HR  3 x 10  SLR with SAQ  30  reps  HS curls  3 x 10   LAQ 3 x 10 ttp patella tendon - ed for eccentric   prone hang 3# for 3 min   Prone knee flex strap 2 min      Neuromuscular re- education for coordination, balance, motor control x 15 min:      Performed blood flow restriction with 144 mmHg (80% of LOP) on R extremity with Sarahi Unit and skin protectant sleeve. Patient screened for any precautions or contraindications prior to its use and continuously monitored for any adverse events. Patient given 30 sec rest breaks between sets and 1 min rest break between exercise    30 reps/15 reps/15reps/15 reps:  Side lying hip abd  SAQ  LAQ      U  Bridge march  - 3 x 10 reps  Drinking bird 15 x 2    U squat 3 x 10 - ed to use theraband in a couple weeks as julia for resistance  SLS on foam 3 x 30"  rebounder toss " "SLS on foam 3 x 30       Evan received the following manual therapy techniques: Joint mobilizations were applied to the: patallar and R hamstring for 8 minutes, including:  Stick hamstrings and R IT band  Stick Medial Gastroc     All planes patella mob gr 2-3 supine  Grades II and III tibial mobilizations to increase flexion    Reapplied 2 "I" strips to R knee with single decompressive "I" strip to decrease anterior knee pain     Pt received 10 min cold pack to R knee with towel to protect skin.      Home Exercises Provided and Patient Education Provided      Education provided:  No new HEP given today     Written Home Exercises Provided: updated  Exercises were reviewed and Evan was able to demonstrate them prior to the end of the session.  Evan demonstrated good  understanding of the education provided.      See EMR under  Media for exercises provided 5/23     Assessment   Resumed BFR training today. Patient tolerated exercise well with moderate fatigue near end of session. Continue focus on strengthening quadriceps.     Evan Is progressing well towards his goals.   Pt prognosis is Good.      Pt will continue to benefit from skilled outpatient physical therapy to address the deficits listed in the problem list box on initial evaluation, provide pt/family education and to maximize pt's level of independence in the home and community environment.      Pt's spiritual, cultural and educational needs considered and pt agreeable to plan of care and goals.     Anticipated barriers to physical therapy: none     Goals:  Short Term Goals (6 Weeks):   1. Patient to have full extension Right (equal to left) for normalized gait pattern  met  2. Patient to have 120 degrees of passive left knee flexion for improved performance of ADL's such as sit<>stand transfers  met  3. Patient to have decreased edema left knee   met  4. Patient to report 2/10 pain or less in left knee with ambulating community distances   met  5. Patient " to be compliant with home program 5x's a week as noted by proper demonstration of exercises to therapist for improved management of condition   met  6. Patient to ambulate full weight bearing Right lower extremity and normal gait pattern     met     Long Term Goals   -(12 Weeks):   1. Patient to have 4+/5 or greater strength in RLE for functional performance of ADL's such as lifting  Progressing, not met2. Patient to descend one flight of stairs with alternating gait pattern without use of handrail and with proper LE alignment  3. Patient to have 135 degrees or greater Right knee flexion for return to ADL's including squatting   met  -(36 weeks):   4. Patient to have decreased subjective report of disability as noted by <20% limitation on FOTO knee questionnaire Progressing, not met  5. Patient to perform single leg squat without assistance and without valgus collapse to improve participation in recreational  Progressing, not met  6. Patient's RLE strength to be 5/5 for return to recreational activities and sport Progressing, not met     PLAN   Resume BFR next visit.       Luis Doherty, PT [facility]   Physical Therapy Treatment Note     Name: Evan BROWN terrell  [facility] Number: [id number]    Therapy Diagnosis:   Encounter Diagnoses   Name Primary?    Decreased range of motion (ROM) of right knee Yes    Impaired gait and mobility     Weakness of right lower extremity     Weakness of both lower extremities     Aftercare for anterior cruciate ligament (ACL) repair      Physician: Luis Watkins, *    Visit Date: 8/12/2022  Physician Orders: PT Eval and Treat   1. Right knee ACL reconstruction with hamstring autograft   2. Right knee arthroscopic menisectomy versus meniscus repair   3. Right knee arthroscopic chondroplasty   4. Right knee arthroscopic synovectomy  Surgery Date: 3/8/2022  Medical Diagnosis from Referral: S83.511A (ICD-10-CM) - Complete tear of  "anterior cruciate ligament of right knee, initial encounter  Evaluation Date: 3/10/2022  Surgical Date: 3/8/3033  Authorization Period Expiration: 12/31/2022  Plan of Care Expiration: 12/31/2022  Visit # / Visits authorized: 26/40 (no limit)  FOTO: 8/10/2022  4/5   PTA:  0/5      Time In: 9:45 am  Time Out: 10:58 am  Total Billable Time: 53 minutes     Precautions: Standard       Subjective       Pt reports his R knee is a little sore. Drove 2700 miles over 9 days for road trip. States he made a "wrong move" on Sunday. Twisted and his knee bothered him yesterday. Going up and down steps is better. Still careful going down steps, leads with R LE.    He was more compliant with home exercise program.  Response to previous treatment:  no adverse effects  Functional change: no change reported     Pain: 0/10.      Location: right knee       Objective      CMS Impairment/Limitation/Restriction for FOTO Knee Survey    Therapist reviewed FOTO scores for Evan Khoury on 8/10/2022.   FOTO documents entered into [employer] - see Media section.    Limitation Score: 34%  Category: Mobility    Current : CJ = at least 20% but < 40% impaired, limited or restricted  Goal: CJ = at least 20% but < 40% impaired, limited or restricted       MMT R L   Hip flexion 5/5 5/5   Hip abduction 5/5 5/5   Hip extension 5/5 5/5   Hip ER 5/5 5/5   Hip IR 5/5 5/5   Knee extension 5/5 5/5   Knee flexion 5/5 5/5   Ankle dorsiflexion 5/5 5/5   Ankle plantar flexion 5/5 5/5   Ankle inversion 5/5 5/5   Ankle eversion 5/5 5/5     Flexibility testing:  - hamstrings:            B: WNL R: -20 deg. L: WNL  - gastrocnemius:      B: WNL  - piriformis:               B: WFL  - quadriceps:           B: WNL  - hip adductors:        B: WFL  - hip flexors:             B: WNL  - IT bands:               R: tight, decreased 25%, L: WFL        8/10/2022  MicroFET:  R 53.8 with pain  L 73.0     8/10/2022  R knee AROM: 6 - 0 - 150 deg    Evan received therapeutic exercises to " "develop strength, endurance, ROM, flexibility, posture and core stabilization for 63 minutes including:   Reassessed strength, ROM, flexibility, and functional limitation  U leg press 37.5#  3 x 10 emphasize eccentrics  Side lying shuttle 2 x 10 reps with 25#  Side steps btb 6 x 40 ft   Monster walks  6 x 40 ft  btb   Lateral tap downs 2 x 10 reps on 4" step,   Forward step ups 6" 2 x 10 reps    LAQ 5 x 45" with 5#    Upright bike x 10 min full revolutions seat 13  gss slant  2 min  Hamstring stretches on step 3 x 30"    Wall sits 30 sec x 3   U HR  3 x 10  SLR with SAQ  30  reps  HS curls  3 x 10   LAQ 3 x 10 ttp patella tendon - ed for eccentric   prone hang 3# for 3 min   Prone knee flex strap 2 min      Neuromuscular re- education for coordination, balance, motor control x 30 min:      Performed blood flow restriction with 138 mmHg (80% of LOP) on R extremity with Sarahi Unit and skin protectant sleeve. Patient screened for any precautions or contraindications prior to its use and continuously monitored for any adverse events. Patient given 30 sec rest breaks between sets and 1 min rest break between exercise    30 reps/15 reps/15reps/15 reps:  Side lying hip abd  SAQ  LAQ      U  Bridge march  - 3 x 10 reps  Drinking bird 15 x 2    U squat 3 x 10 - ed to use theraband in a couple weeks as julia for resistance  SLS on foam 3 x 30"  rebounder toss SLS on foam 3 x 30       Evan received the following manual therapy techniques: Joint mobilizations were applied to the: patallar and R hamstring for  minutes, including:  Stick hamstrings and R IT band       All planes patella mob gr 2-3 supine  Grades II and III tibial mobilizations to increase flexion    Reapplied 2 "I" strips to R knee to decrease anterior knee pain     Pt received 10 min cold pack to R knee with towel to protect skin.      Home Exercises Provided and Patient Education Provided      Education provided:  No new HEP given today     Written Home " Exercises Provided: updated  Exercises were reviewed and Evan was able to demonstrate them prior to the end of the session.  Evan demonstrated good  understanding of the education provided.      See EMR under  Media for exercises provided 5/23     Assessment   Resumed BFR today. Applied KT tape to R knee to decrease anterior R knee pain.     Evan Is progressing well towards his goals.   Pt prognosis is Good.      Pt will continue to benefit from skilled outpatient physical therapy to address the deficits listed in the problem list box on initial evaluation, provide pt/family education and to maximize pt's level of independence in the home and community environment.      Pt's spiritual, cultural and educational needs considered and pt agreeable to plan of care and goals.     Anticipated barriers to physical therapy: none     Goals:  Short Term Goals (6 Weeks):   1. Patient to have full extension Right (equal to left) for normalized gait pattern  met  2. Patient to have 120 degrees of passive left knee flexion for improved performance of ADL's such as sit<>stand transfers  met  3. Patient to have decreased edema left knee   met  4. Patient to report 2/10 pain or less in left knee with ambulating community distances   met  5. Patient to be compliant with home program 5x's a week as noted by proper demonstration of exercises to therapist for improved management of condition   met  6. Patient to ambulate full weight bearing Right lower extremity and normal gait pattern     met     Long Term Goals   -(12 Weeks):   1. Patient to have 4+/5 or greater strength in RLE for functional performance of ADL's such as lifting  Progressing, not met2. Patient to descend one flight of stairs with alternating gait pattern without use of handrail and with proper LE alignment  3. Patient to have 135 degrees or greater Right knee flexion for return to ADL's including squatting   met  -(36 weeks):   4. Patient to have decreased  subjective report of disability as noted by <20% limitation on FOTO knee questionnaire Progressing, not met  5. Patient to perform single leg squat without assistance and without valgus collapse to improve participation in recreational  Progressing, not met  6. Patient's RLE strength to be 5/5 for return to recreational activities and sport Progressing, not met     PLAN   Resume BFR next visit.       Luis Doherty, PT

## 2022-08-12 ENCOUNTER — CLINICAL SUPPORT (OUTPATIENT)
Dept: REHABILITATION | Facility: OTHER | Age: 62
End: 2022-08-12
Payer: COMMERCIAL

## 2022-08-12 DIAGNOSIS — Z47.89 AFTERCARE FOR ANTERIOR CRUCIATE LIGAMENT (ACL) REPAIR: ICD-10-CM

## 2022-08-12 DIAGNOSIS — M25.661 DECREASED RANGE OF MOTION (ROM) OF RIGHT KNEE: Primary | ICD-10-CM

## 2022-08-12 DIAGNOSIS — R26.89 IMPAIRED GAIT AND MOBILITY: ICD-10-CM

## 2022-08-12 DIAGNOSIS — R29.898 WEAKNESS OF BOTH LOWER EXTREMITIES: ICD-10-CM

## 2022-08-12 DIAGNOSIS — R29.898 WEAKNESS OF RIGHT LOWER EXTREMITY: ICD-10-CM

## 2022-08-12 PROCEDURE — 97112 NEUROMUSCULAR REEDUCATION: CPT | Mod: PN

## 2022-08-12 PROCEDURE — 97140 MANUAL THERAPY 1/> REGIONS: CPT | Mod: PN

## 2022-08-12 PROCEDURE — 97110 THERAPEUTIC EXERCISES: CPT | Mod: PN

## 2022-08-18 NOTE — PROGRESS NOTES
[facility]   Physical Therapy Treatment Note     Name: Evan Khoury  Clinic Number: [id number]    Therapy Diagnosis:   Encounter Diagnoses   Name Primary?    Decreased range of motion (ROM) of right knee Yes    Impaired gait and mobility     Weakness of right lower extremity     Weakness of both lower extremities     Aftercare for anterior cruciate ligament (ACL) repair      Physician: Luis Watkins, Jennifer    Visit Date: 8/19/2022  Physician Orders: PT Eval and Treat   1. Right knee ACL reconstruction with hamstring autograft   2. Right knee arthroscopic menisectomy versus meniscus repair   3. Right knee arthroscopic chondroplasty   4. Right knee arthroscopic synovectomy  Surgery Date: 3/8/2022  Medical Diagnosis from Referral: S83.511A (ICD-10-CM) - Complete tear of anterior cruciate ligament of right knee, initial encounter  Evaluation Date: 3/10/2022  Surgical Date: 3/8/3033  Authorization Period Expiration: 12/31/2022  Plan of Care Expiration: 12/31/2022  Visit # / Visits authorized: 28/40 (no limit)  FOTO: 8/10/2022  3/5   PTA:  0/5      Time In: 9:20 am  Time Out: 10:45 am  Total Billable Time: 65 minutes     Precautions: Standard       Subjective       Pt denies pain at the start of session    He was more compliant with home exercise program.  Response to previous treatment: good   Functional change: no change reported today     Pain: 0/10.      Location: right knee       Objective      CMS Impairment/Limitation/Restriction for FOTO Knee Survey    Therapist reviewed FOTO scores for Evan Khoury on 8/10/2022.   FOTO documents entered into [employer] - see Media section.    Limitation Score: 34%  Category: Mobility    Current : CJ = at least 20% but < 40% impaired, limited or restricted  Goal: CJ = at least 20% but < 40% impaired, limited or restricted       MMT R L   Hip flexion 5/5 5/5   Hip abduction 5/5 5/5   Hip extension 5/5 5/5   Hip ER 5/5 5/5   Hip IR 5/5 5/5   Knee  "extension 5/5 5/5   Knee flexion 5/5 5/5   Ankle dorsiflexion 5/5 5/5   Ankle plantar flexion 5/5 5/5   Ankle inversion 5/5 5/5   Ankle eversion 5/5 5/5     Flexibility testing:  - hamstrings:            B: WNL R: -20 deg. L: WNL  - gastrocnemius:      B: WNL  - piriformis:               B: WFL  - quadriceps:           B: WNL  - hip adductors:        B: WFL  - hip flexors:             B: WNL  - IT bands:               R: tight, decreased 25%, L: WFL        8/10/2022  MicroFET:  R 53.8 with pain  L 73.0     8/10/2022  R knee AROM: 6 - 0 - 150 deg    Evan received therapeutic exercises to develop strength, endurance, ROM, flexibility, posture and core stabilization for 15 minutes including:     +SL squat 2 x 10 reps with UE support    gastroc stretch on incline 3 x 30"  Soleus stretch on incline 3 x 30"    U leg press 37.5#  3 x 10 emphasize eccentrics  Side lying shuttle 2 x 10 reps with 25#    Side steps 6 x 40 ft with btb  Monster walks  6 x 40 ft with btb   Lateral tap downs 2 x 10 reps on 4" step,   Forward step ups 6" 2 x 10 reps    LAQ 5 x 45" with 5#    Upright bike x 10 min full revolutions seat 13  gss slant  2 min  Hamstring stretches on step 3 x 30"    Wall sits 30 sec x 3   U HR  3 x 10  SLR with SAQ  30  reps  HS curls  3 x 10   LAQ 3 x 10 ttp patella tendon - ed for eccentric   prone hang 3# for 3 min   Prone knee flex strap 2 min      Neuromuscular re- education for coordination, balance, motor control x 30 min:      Performed blood flow restriction with 137 mmHg (80% of LOP) on R extremity with Sarahi Unit and skin protectant sleeve. Patient screened for any precautions or contraindications prior to its use and continuously monitored for any adverse events. Patient given 30 sec rest breaks between sets and 1 min rest break between exercise    30 reps/15 reps/15reps/15 reps:  Side lying hip abd  SAQ  LAQ  Mini squats    U  Bridge march  - 3 x 10 reps    Drinking bird 2 x 15 reps with UE support  +1/2 " "star taps x 10 reps    U squat 3 x 10 - ed to use theraband in a couple weeks as julia for resistance  SLS on foam 3 x 30"  rebounder toss SLS on foam 3 x 30       Evan received the following manual therapy techniques: Joint mobilizations were applied to the: patallar and R hamstring for 5 minutes, including:  Stick hamstrings and R IT band  Stick Medial Gastroc     All planes patella mob gr 2-3 supine  Grades II and III tibial mobilizations to increase flexion    Reapplied 2 "I" strips to R knee with single decompressive "I" strip to decrease anterior knee pain     Pt received 10 min cold pack to R knee with towel to protect skin.      Home Exercises Provided and Patient Education Provided      Education provided:  No new HEP given today     Written Home Exercises Provided: updated  Exercises were reviewed and Evan was able to demonstrate them prior to the end of the session.  Evan demonstrated good  understanding of the education provided.      See EMR under  Media for exercises provided 5/23     Assessment   Patient presenting with R hip and quad weakness with standing balance and strengthening exercises. Will continue to benefit from BFR training. Added mini squats with BFR training today. Good training effect noted today with exercises.    Evan Is progressing well towards his goals.   Pt prognosis is Good.      Pt will continue to benefit from skilled outpatient physical therapy to address the deficits listed in the problem list box on initial evaluation, provide pt/family education and to maximize pt's level of independence in the home and community environment.      Pt's spiritual, cultural and educational needs considered and pt agreeable to plan of care and goals.     Anticipated barriers to physical therapy: none     Goals:  Short Term Goals (6 Weeks):   1. Patient to have full extension Right (equal to left) for normalized gait pattern  met  2. Patient to have 120 degrees of passive left knee flexion " for improved performance of ADL's such as sit<>stand transfers  met  3. Patient to have decreased edema left knee   met  4. Patient to report 2/10 pain or less in left knee with ambulating community distances   met  5. Patient to be compliant with home program 5x's a week as noted by proper demonstration of exercises to therapist for improved management of condition   met  6. Patient to ambulate full weight bearing Right lower extremity and normal gait pattern     met     Long Term Goals   -(12 Weeks):   1. Patient to have 4+/5 or greater strength in RLE for functional performance of ADL's such as lifting  Progressing, not met2. Patient to descend one flight of stairs with alternating gait pattern without use of handrail and with proper LE alignment  3. Patient to have 135 degrees or greater Right knee flexion for return to ADL's including squatting   met  -(36 weeks):   4. Patient to have decreased subjective report of disability as noted by <20% limitation on FOTO knee questionnaire Progressing, not met  5. Patient to perform single leg squat without assistance and without valgus collapse to improve participation in recreational  Progressing, not met  6. Patient's RLE strength to be 5/5 for return to recreational activities and sport Progressing, not met     PLAN   Continue with BFR and strengthening.       Luis Doherty, PT

## 2022-08-19 ENCOUNTER — CLINICAL SUPPORT (OUTPATIENT)
Dept: REHABILITATION | Facility: OTHER | Age: 62
End: 2022-08-19
Payer: COMMERCIAL

## 2022-08-19 DIAGNOSIS — Z47.89 AFTERCARE FOR ANTERIOR CRUCIATE LIGAMENT (ACL) REPAIR: ICD-10-CM

## 2022-08-19 DIAGNOSIS — M25.661 DECREASED RANGE OF MOTION (ROM) OF RIGHT KNEE: Primary | ICD-10-CM

## 2022-08-19 DIAGNOSIS — R29.898 WEAKNESS OF BOTH LOWER EXTREMITIES: ICD-10-CM

## 2022-08-19 DIAGNOSIS — R29.898 WEAKNESS OF RIGHT LOWER EXTREMITY: ICD-10-CM

## 2022-08-19 DIAGNOSIS — R26.89 IMPAIRED GAIT AND MOBILITY: ICD-10-CM

## 2022-08-19 PROCEDURE — 97112 NEUROMUSCULAR REEDUCATION: CPT | Mod: PN

## 2022-08-19 PROCEDURE — 97110 THERAPEUTIC EXERCISES: CPT | Mod: PN

## 2022-08-21 ENCOUNTER — PATIENT MESSAGE (OUTPATIENT)
Dept: REHABILITATION | Facility: OTHER | Age: 62
End: 2022-08-21
Payer: COMMERCIAL

## 2022-08-22 NOTE — PROGRESS NOTES
[facility]   Physical Therapy Treatment Note     Name: Evan Khoury  Clinic Number: [id number]    Therapy Diagnosis:   Encounter Diagnoses   Name Primary?    Decreased range of motion (ROM) of right knee Yes    Impaired gait and mobility     Weakness of right lower extremity     Weakness of both lower extremities     Aftercare for anterior cruciate ligament (ACL) repair      Physician: Luis Watkins, Jennifer    Visit Date: 8/23/2022  Physician Orders: PT Eval and Treat   1. Right knee ACL reconstruction with hamstring autograft   2. Right knee arthroscopic menisectomy versus meniscus repair   3. Right knee arthroscopic chondroplasty   4. Right knee arthroscopic synovectomy  Surgery Date: 3/8/2022  Medical Diagnosis from Referral: S83.511A (ICD-10-CM) - Complete tear of anterior cruciate ligament of right knee, initial encounter  Evaluation Date: 3/10/2022  Surgical Date: 3/8/3033  Authorization Period Expiration: 12/31/2022  Plan of Care Expiration: 12/31/2022  Visit # / Visits authorized: 28/40 (no limit)  FOTO: 8/10/2022  3/5   PTA:  0/5      Time In: 9:15 am  Time Out: 10:36 am  Total Billable Time: 61 minutes     Precautions: Standard       Subjective       Pt states he felt challenged by last workout session and felt really good after it.    He was more compliant with home exercise program.  Response to previous treatment: good challenging exercise   Functional change: no change reported today     Pain: 0/10.      Location: right knee       Objective      CMS Impairment/Limitation/Restriction for FOTO Knee Survey    Therapist reviewed FOTO scores for Evan Khoury on 8/10/2022.   FOTO documents entered into [employer] - see Media section.    Limitation Score: 34%  Category: Mobility    Current : CJ = at least 20% but < 40% impaired, limited or restricted  Goal: CJ = at least 20% but < 40% impaired, limited or restricted       MMT R L   Hip flexion 5/5 5/5   Hip abduction 5/5 5/5  "  Hip extension 5/5 5/5   Hip ER 5/5 5/5   Hip IR 5/5 5/5   Knee extension 5/5 5/5   Knee flexion 5/5 5/5   Ankle dorsiflexion 5/5 5/5   Ankle plantar flexion 5/5 5/5   Ankle inversion 5/5 5/5   Ankle eversion 5/5 5/5     Flexibility testing:  - hamstrings:            B: WNL R: -20 deg. L: WNL  - gastrocnemius:      B: WNL  - piriformis:               B: WFL  - quadriceps:           B: WNL  - hip adductors:        B: WFL  - hip flexors:             B: WNL  - IT bands:               R: tight, decreased 25%, L: WFL      8/10/2022  MicroFET:  R 53.8 with pain  L 73.0     8/10/2022  R knee AROM: 6 - 0 - 150 deg    Evan received therapeutic exercises to develop strength, endurance, ROM, flexibility, posture and core stabilization for 26 minutes including:     SL squat 2 x 10 reps with UE support    gastroc stretch on incline x 90"  Soleus stretch on incline x 90"    U leg press 37.5#  3 x 10 emphasize eccentrics  Side lying shuttle 2 x 10 reps with 25#    Side steps 6 x 40 ft with btb  Monster walks  6 x 40 ft with btb   Lateral tap downs 2 x 10 reps on 4" step,   Forward step ups 6" 2 x 10 reps    LAQ 5 x 45" with 5#    Upright bike x 10 min full revolutions seat 13  gss slant  2 min  Hamstring stretches on step 3 x 30"    Wall sits 30 sec x 3   U HR  3 x 10  SLR with SAQ  30  reps  HS curls  3 x 10   LAQ 3 x 10 ttp patella tendon - ed for eccentric   prone hang 3# for 3 min   Prone knee flex strap 2 min      Neuromuscular re-education for coordination, balance, motor control x 30 min:      Performed blood flow restriction with 134 mmHg (80% of LOP) on R extremity with Sarahi Unit and skin protectant sleeve. Patient screened for any precautions or contraindications prior to its use and continuously monitored for any adverse events. Patient given 30 sec rest breaks between sets and 1 min rest break between exercise    30 reps/15 reps/15reps/15 reps:  Side lying hip abd with 1#  SAQ with 1#  LAQ with 1#  Mini squats    U  " "Bridge march  - 3 x 10 reps    Drinking bird 2 x 15 reps with UE support  1/2 star taps x 10 reps    U squat 3 x 10 - ed to use theraband in a couple weeks as julia for resistance  SLS on foam 3 x 30"  rebounder toss SLS on foam 3 x 30       Evan received the following manual therapy techniques: Joint mobilizations were applied to the: patallar and R hamstring for 5 minutes, including:  Stick hamstrings and R IT band  Stick Medial Gastroc     All planes patella mob gr 2-3 supine  Grades II and III tibial mobilizations to increase flexion    Reapplied 2 "I" strips to R knee with single decompressive "I" strip to decrease anterior knee pain     Pt received 10 min cold pack to R knee with towel to protect skin.      Home Exercises Provided and Patient Education Provided     Education provided:   No new HEP given today    Written Home Exercises Provided: Patient instructed to cont prior HEP.  Exercises were reviewed and Evan was able to demonstrate them prior to the end of the session.  Evan demonstrated good  understanding of the education provided.     See EMR under Patient Instructions for exercises provided prior visit.       Assessment   Utilized 1# weight today, will increased to 2# next session. R LE weakness noted with single leg squats, Utilized support for UE    Evan Is progressing well towards his goals.   Pt prognosis is Good.      Pt will continue to benefit from skilled outpatient physical therapy to address the deficits listed in the problem list box on initial evaluation, provide pt/family education and to maximize pt's level of independence in the home and community environment.      Pt's spiritual, cultural and educational needs considered and pt agreeable to plan of care and goals.     Anticipated barriers to physical therapy: none     Goals:  Short Term Goals (6 Weeks):   1. Patient to have full extension Right (equal to left) for normalized gait pattern  met  2. Patient to have 120 degrees of " passive left knee flexion for improved performance of ADL's such as sit<>stand transfers  met  3. Patient to have decreased edema left knee   met  4. Patient to report 2/10 pain or less in left knee with ambulating community distances   met  5. Patient to be compliant with home program 5x's a week as noted by proper demonstration of exercises to therapist for improved management of condition   met  6. Patient to ambulate full weight bearing Right lower extremity and normal gait pattern     met     Long Term Goals   -(12 Weeks):   1. Patient to have 4+/5 or greater strength in RLE for functional performance of ADL's such as lifting  Progressing, not met2. Patient to descend one flight of stairs with alternating gait pattern without use of handrail and with proper LE alignment  3. Patient to have 135 degrees or greater Right knee flexion for return to ADL's including squatting   met  -(36 weeks):   4. Patient to have decreased subjective report of disability as noted by <20% limitation on FOTO knee questionnaire Progressing, not met  5. Patient to perform single leg squat without assistance and without valgus collapse to improve participation in recreational  Progressing, not met  6. Patient's RLE strength to be 5/5 for return to recreational activities and sport Progressing, not met     PLAN   Continue with BFR, proprioceptive training, and LE strengthening.       Luis Doherty, PT

## 2022-08-23 ENCOUNTER — CLINICAL SUPPORT (OUTPATIENT)
Dept: REHABILITATION | Facility: OTHER | Age: 62
End: 2022-08-23
Payer: COMMERCIAL

## 2022-08-23 DIAGNOSIS — R29.898 WEAKNESS OF BOTH LOWER EXTREMITIES: ICD-10-CM

## 2022-08-23 DIAGNOSIS — R29.898 WEAKNESS OF RIGHT LOWER EXTREMITY: ICD-10-CM

## 2022-08-23 DIAGNOSIS — M25.661 DECREASED RANGE OF MOTION (ROM) OF RIGHT KNEE: Primary | ICD-10-CM

## 2022-08-23 DIAGNOSIS — Z47.89 AFTERCARE FOR ANTERIOR CRUCIATE LIGAMENT (ACL) REPAIR: ICD-10-CM

## 2022-08-23 DIAGNOSIS — R26.89 IMPAIRED GAIT AND MOBILITY: ICD-10-CM

## 2022-08-23 PROCEDURE — 97110 THERAPEUTIC EXERCISES: CPT | Mod: PN

## 2022-08-23 PROCEDURE — 97112 NEUROMUSCULAR REEDUCATION: CPT | Mod: PN

## 2022-08-24 NOTE — PROGRESS NOTES
[facility]   Physical Therapy Treatment Note     Name: Evan Khoury  Clinic Number: [id number]    Therapy Diagnosis:   Encounter Diagnoses   Name Primary?    Decreased range of motion (ROM) of right knee Yes    Impaired gait and mobility     Weakness of right lower extremity     Weakness of both lower extremities     Aftercare for anterior cruciate ligament (ACL) repair      Physician: Luis Watkins, Jennifer    Visit Date: 8/25/2022  Physician Orders: PT Eval and Treat   1. Right knee ACL reconstruction with hamstring autograft   2. Right knee arthroscopic menisectomy versus meniscus repair   3. Right knee arthroscopic chondroplasty   4. Right knee arthroscopic synovectomy  Surgery Date: 3/8/2022  Medical Diagnosis from Referral: S83.511A (ICD-10-CM) - Complete tear of anterior cruciate ligament of right knee, initial encounter  Evaluation Date: 3/10/2022  Surgical Date: 3/8/3033  Authorization Period Expiration: 12/31/2022  Plan of Care Expiration: 12/31/2022  Visit # / Visits authorized: 29/40 (no limit)  FOTO: 8/10/2022  4/5   PTA:  0/5      Time In: 9:25 am  Time Out: 10:35 am  Total Billable Time: 60 minutes     Precautions: Standard       Subjective       Pt states he felt challenged by last workout session and felt really good after it.    He was more compliant with home exercise program.  Response to previous treatment: good challenging exercise   Functional change: no change reported today     Pain: 0/10.      Location: right knee       Objective      CMS Impairment/Limitation/Restriction for FOTO Knee Survey    Therapist reviewed FOTO scores for Evan Khoury on 8/10/2022.   FOTO documents entered into [employer] - see Media section.    Limitation Score: 34%  Category: Mobility    Current : CJ = at least 20% but < 40% impaired, limited or restricted  Goal: CJ = at least 20% but < 40% impaired, limited or restricted       MMT R L   Hip flexion 5/5 5/5   Hip abduction 5/5 5/5  "  Hip extension 5/5 5/5   Hip ER 5/5 5/5   Hip IR 5/5 5/5   Knee extension 5/5 5/5   Knee flexion 5/5 5/5   Ankle dorsiflexion 5/5 5/5   Ankle plantar flexion 5/5 5/5   Ankle inversion 5/5 5/5   Ankle eversion 5/5 5/5     Flexibility testing:  - hamstrings:            B: WNL R: -20 deg. L: WNL  - gastrocnemius:      B: WNL  - piriformis:               B: WFL  - quadriceps:           B: WNL  - hip adductors:        B: WFL  - hip flexors:             B: WNL  - IT bands:               R: tight, decreased 25%, L: WFL      8/10/2022  MicroFET:  R 53.8 with pain  L 73.0     8/10/2022  R knee AROM: 6 - 0 - 150 deg    Evan received therapeutic exercises to develop strength, endurance, ROM, flexibility, posture and core stabilization for 25 minutes including:     SL squat 2 x 10 reps with UE support    gastroc stretch on incline x 90"  Soleus stretch on incline x 90"    U leg press 37.5#  3 x 10 emphasize eccentrics  Side lying shuttle 2 x 10 reps with 25#    Side steps 6 x 40 ft with btb  Monster walks  6 x 40 ft with btb   Lateral tap downs 2 x 10 reps on 4" step,   Forward step ups 6" 2 x 10 reps    LAQ 5 x 45" with 5#    Upright bike x 10 min full revolutions seat 13  gss slant  2 min  Hamstring stretches on step 3 x 30"    Wall sits 30 sec x 3   U HR  3 x 10  SLR with SAQ  30  reps  HS curls  3 x 10   LAQ 3 x 10 ttp patella tendon - ed for eccentric   prone hang 3# for 3 min   Prone knee flex strap 2 min      Neuromuscular re-education for coordination, balance, motor control x 30 min:      Performed blood flow restriction with 134 mmHg (80% of LOP) on R extremity with Sarahi Unit and skin protectant sleeve. Patient screened for any precautions or contraindications prior to its use and continuously monitored for any adverse events. Patient given 30 sec rest breaks between sets and 1 min rest break between exercise    30 reps/15 reps/15reps/15 reps:  Side lying hip abd with 2#  SAQ with 2#  LAQ with 2#  Mini squats    U  " "Bridge march  - 3 x 10 reps    Drinking bird 2 x 15 reps with UE support  1/2 star taps x 10 reps    U squat 3 x 10 - ed to use theraband in a couple weeks as julia for resistance  SLS on foam 3 x 30"  rebounder toss SLS on foam 3 x 30       Evan received the following manual therapy techniques: Joint mobilizations were applied to the: patallar and R hamstring for 5 minutes, including:  Stick hamstrings and R IT band  stm with the Stick to R Gastroc     All planes patella mob gr 2-3 supine  Grades II and III tibial mobilizations to increase flexion    Reapplied 2 "I" strips to R knee with single decompressive "I" strip to decrease anterior knee pain     Pt received 10 min cold pack to R knee with towel to protect skin.      Home Exercises Provided and Patient Education Provided     Education provided:   No new HEP given today    Written Home Exercises Provided: Patient instructed to cont prior HEP.  Exercises were reviewed and Evan was able to demonstrate them prior to the end of the session.  Evan demonstrated good  understanding of the education provided.     See EMR under Patient Instructions for exercises provided prior visit.       Assessment   Increased weight for BFR exercises today. Good training effect noted by patient.     Evan Is progressing well towards his goals.   Pt prognosis is Good.      Pt will continue to benefit from skilled outpatient physical therapy to address the deficits listed in the problem list box on initial evaluation, provide pt/family education and to maximize pt's level of independence in the home and community environment.      Pt's spiritual, cultural and educational needs considered and pt agreeable to plan of care and goals.     Anticipated barriers to physical therapy: none     Goals:  Short Term Goals (6 Weeks):   1. Patient to have full extension Right (equal to left) for normalized gait pattern  met  2. Patient to have 120 degrees of passive left knee flexion for improved " performance of ADL's such as sit<>stand transfers  met  3. Patient to have decreased edema left knee   met  4. Patient to report 2/10 pain or less in left knee with ambulating community distances   met  5. Patient to be compliant with home program 5x's a week as noted by proper demonstration of exercises to therapist for improved management of condition   met  6. Patient to ambulate full weight bearing Right lower extremity and normal gait pattern     met     Long Term Goals   -(12 Weeks):   1. Patient to have 4+/5 or greater strength in RLE for functional performance of ADL's such as lifting  Progressing, not met2. Patient to descend one flight of stairs with alternating gait pattern without use of handrail and with proper LE alignment  3. Patient to have 135 degrees or greater Right knee flexion for return to ADL's including squatting   met  -(36 weeks):   4. Patient to have decreased subjective report of disability as noted by <20% limitation on FOTO knee questionnaire Progressing, not met  5. Patient to perform single leg squat without assistance and without valgus collapse to improve participation in recreational  Progressing, not met  6. Patient's RLE strength to be 5/5 for return to recreational activities and sport Progressing, not met     PLAN   Continue with BFR, proprioceptive training, and LE strengthening.       Luis Doherty, PT

## 2022-08-25 ENCOUNTER — CLINICAL SUPPORT (OUTPATIENT)
Dept: REHABILITATION | Facility: OTHER | Age: 62
End: 2022-08-25
Payer: COMMERCIAL

## 2022-08-25 DIAGNOSIS — R26.89 IMPAIRED GAIT AND MOBILITY: ICD-10-CM

## 2022-08-25 DIAGNOSIS — R29.898 WEAKNESS OF RIGHT LOWER EXTREMITY: ICD-10-CM

## 2022-08-25 DIAGNOSIS — R29.898 WEAKNESS OF BOTH LOWER EXTREMITIES: ICD-10-CM

## 2022-08-25 DIAGNOSIS — M25.661 DECREASED RANGE OF MOTION (ROM) OF RIGHT KNEE: Primary | ICD-10-CM

## 2022-08-25 DIAGNOSIS — Z47.89 AFTERCARE FOR ANTERIOR CRUCIATE LIGAMENT (ACL) REPAIR: ICD-10-CM

## 2022-08-25 PROCEDURE — 97110 THERAPEUTIC EXERCISES: CPT | Mod: PN

## 2022-08-25 PROCEDURE — 97112 NEUROMUSCULAR REEDUCATION: CPT | Mod: PN

## 2022-08-29 NOTE — PROGRESS NOTES
[facility]   Physical Therapy Treatment Note     Name: Evan Khoury  Clinic Number: [id number]    Therapy Diagnosis:   No diagnosis found.    Physician: Luis Watkins, *    Visit Date: 8/30/2022  Physician Orders: PT Eval and Treat   1. Right knee ACL reconstruction with hamstring autograft   2. Right knee arthroscopic menisectomy versus meniscus repair   3. Right knee arthroscopic chondroplasty   4. Right knee arthroscopic synovectomy  Surgery Date: 3/8/2022  Medical Diagnosis from Referral: S83.511A (ICD-10-CM) - Complete tear of anterior cruciate ligament of right knee, initial encounter  Evaluation Date: 3/10/2022  Surgical Date: 3/8/3033  Authorization Period Expiration: 12/31/2022  Plan of Care Expiration: 12/31/2022  Visit # / Visits authorized: 29/40 (no limit)  FOTO: 8/30/2022 1/5   PTA:  0/5      Time In: 9:15 am  Time Out: 10:35 am  Total Billable Time:  minutes     Precautions: Standard       Subjective       Pt states he felt challenged by last workout session and felt really good after it.    He was more compliant with home exercise program.  Response to previous treatment: good challenging exercise   Functional change: no change reported today     Pain: 0/10.      Location: right knee       Objective      CMS Impairment/Limitation/Restriction for FOTO Knee Survey    Therapist reviewed FOTO scores for Evan Khoury on 8/30/2022.   FOTO documents entered into [employer] - see Media section.    Limitation Score: 34%  Category: Mobility    Current : CJ = at least 20% but < 40% impaired, limited or restricted  Goal: CJ = at least 20% but < 40% impaired, limited or restricted       MMT R L   Hip flexion 5/5 5/5   Hip abduction 5/5 5/5   Hip extension 5/5 5/5   Hip ER 5/5 5/5   Hip IR 5/5 5/5   Knee extension 5/5 5/5   Knee flexion 5/5 5/5   Ankle dorsiflexion 5/5 5/5   Ankle plantar flexion 5/5 5/5   Ankle inversion 5/5 5/5   Ankle eversion 5/5 5/5     Flexibility testing:  -  "hamstrings:            B: WNL R: -20 deg. L: WNL  - gastrocnemius:      B: WNL  - piriformis:               B: WFL  - quadriceps:           B: WNL  - hip adductors:        B: WFL  - hip flexors:             B: WNL  - IT bands:               R: tight, decreased 25%, L: WFL      8/10/2022  MicroFET:  R 53.8 with pain  L 73.0     8/10/2022  R knee AROM: 6 - 0 - 150 deg    Evan received therapeutic exercises to develop strength, endurance, ROM, flexibility, posture and core stabilization for 25 minutes including:     SL squat 2 x 10 reps with UE support    gastroc stretch on incline x 90"  Soleus stretch on incline x 90"    U leg press 37.5#  3 x 10 emphasize eccentrics  Side lying shuttle 2 x 10 reps with 25#    Side steps 6 x 40 ft with btb  Monster walks  6 x 40 ft with btb   Lateral tap downs 2 x 10 reps on 4" step,   Forward step ups 6" 2 x 10 reps    LAQ 5 x 45" with 5#    Upright bike x 10 min full revolutions seat 13  gss slant  2 min  Hamstring stretches on step 3 x 30"    Wall sits 30 sec x 3   U HR  3 x 10  SLR with SAQ  30  reps  HS curls  3 x 10   LAQ 3 x 10 ttp patella tendon - ed for eccentric   prone hang 3# for 3 min   Prone knee flex strap 2 min      Neuromuscular re-education for coordination, balance, motor control x 30 min:      Performed blood flow restriction with 134 mmHg (80% of LOP) on R extremity with Sarahi Unit and skin protectant sleeve. Patient screened for any precautions or contraindications prior to its use and continuously monitored for any adverse events. Patient given 30 sec rest breaks between sets and 1 min rest break between exercise    30 reps/15 reps/15reps/15 reps:  Side lying hip abd with 2#  SAQ with 2#  LAQ with 2#  Mini squats    U  Bridge march  - 3 x 10 reps    Drinking bird 2 x 15 reps with UE support  1/2 star taps x 10 reps    U squat 3 x 10 - ed to use theraband in a couple weeks as julia for resistance  SLS on foam 3 x 30"  rebounder toss SLS on foam 3 x 30       Evan " "received the following manual therapy techniques: Joint mobilizations were applied to the: patallar and R hamstring for 5 minutes, including:  Stick hamstrings and R IT band  stm with the Stick to R Gastroc     All planes patella mob gr 2-3 supine  Grades II and III tibial mobilizations to increase flexion    Reapplied 2 "I" strips to R knee with single decompressive "I" strip to decrease anterior knee pain     Pt received 10 min cold pack to R knee with towel to protect skin.      Home Exercises Provided and Patient Education Provided     Education provided:   No new HEP given today    Written Home Exercises Provided: Patient instructed to cont prior HEP.  Exercises were reviewed and Evan was able to demonstrate them prior to the end of the session.  Evan demonstrated good  understanding of the education provided.     See EMR under Patient Instructions for exercises provided prior visit.       Assessment   Increased weight for BFR exercises today. Good training effect noted by patient.     Evan Is progressing well towards his goals.   Pt prognosis is Good.      Pt will continue to benefit from skilled outpatient physical therapy to address the deficits listed in the problem list box on initial evaluation, provide pt/family education and to maximize pt's level of independence in the home and community environment.      Pt's spiritual, cultural and educational needs considered and pt agreeable to plan of care and goals.     Anticipated barriers to physical therapy: none     Goals:  Short Term Goals (6 Weeks):   1. Patient to have full extension Right (equal to left) for normalized gait pattern  met  2. Patient to have 120 degrees of passive left knee flexion for improved performance of ADL's such as sit<>stand transfers  met  3. Patient to have decreased edema left knee   met  4. Patient to report 2/10 pain or less in left knee with ambulating community distances   met  5. Patient to be compliant with home program " 5x's a week as noted by proper demonstration of exercises to therapist for improved management of condition   met  6. Patient to ambulate full weight bearing Right lower extremity and normal gait pattern     met     Long Term Goals   -(12 Weeks):   1. Patient to have 4+/5 or greater strength in RLE for functional performance of ADL's such as lifting  Progressing, not met2. Patient to descend one flight of stairs with alternating gait pattern without use of handrail and with proper LE alignment  3. Patient to have 135 degrees or greater Right knee flexion for return to ADL's including squatting   met  -(36 weeks):   4. Patient to have decreased subjective report of disability as noted by <20% limitation on FOTO knee questionnaire Progressing, not met  5. Patient to perform single leg squat without assistance and without valgus collapse to improve participation in recreational  Progressing, not met  6. Patient's RLE strength to be 5/5 for return to recreational activities and sport Progressing, not met     PLAN   Continue with BFR, proprioceptive training, and LE strengthening.       Luis Doherty, PT

## 2022-08-30 ENCOUNTER — CLINICAL SUPPORT (OUTPATIENT)
Dept: REHABILITATION | Facility: OTHER | Age: 62
End: 2022-08-30
Payer: COMMERCIAL

## 2022-08-30 DIAGNOSIS — M25.661 DECREASED RANGE OF MOTION (ROM) OF RIGHT KNEE: Primary | ICD-10-CM

## 2022-08-30 DIAGNOSIS — R29.898 WEAKNESS OF RIGHT LOWER EXTREMITY: ICD-10-CM

## 2022-08-30 DIAGNOSIS — R26.89 IMPAIRED GAIT AND MOBILITY: ICD-10-CM

## 2022-08-30 DIAGNOSIS — Z47.89 AFTERCARE FOR ANTERIOR CRUCIATE LIGAMENT (ACL) REPAIR: ICD-10-CM

## 2022-08-30 DIAGNOSIS — R29.898 WEAKNESS OF BOTH LOWER EXTREMITIES: ICD-10-CM

## 2022-08-30 PROCEDURE — 97112 NEUROMUSCULAR REEDUCATION: CPT | Mod: PN | Performed by: PHYSICAL THERAPIST

## 2022-08-30 PROCEDURE — 97110 THERAPEUTIC EXERCISES: CPT | Mod: PN | Performed by: PHYSICAL THERAPIST

## 2022-08-30 PROCEDURE — 97140 MANUAL THERAPY 1/> REGIONS: CPT | Mod: PN | Performed by: PHYSICAL THERAPIST

## 2022-08-30 NOTE — PROGRESS NOTES
[facility]   Physical Therapy Treatment Note      Name: Evan Khoury  Clinic Number: [id number]     Therapy Diagnosis:   1. Decreased range of motion (ROM) of right knee        2. Impaired gait and mobility        3. Weakness of right lower extremity        4. Weakness of both lower extremities        5. Aftercare for anterior cruciate ligament (ACL) repair               Physician: Luis Watkins, Jennifer     Visit Date: 8/30/2022  Physician Orders: PT Eval and Treat   1. Right knee ACL reconstruction with hamstring autograft   2. Right knee arthroscopic menisectomy versus meniscus repair   3. Right knee arthroscopic chondroplasty   4. Right knee arthroscopic synovectomy  Surgery Date: 3/8/2022  Medical Diagnosis from Referral: S83.511A (ICD-10-CM) - Complete tear of anterior cruciate ligament of right knee, initial encounter  Evaluation Date: 3/10/2022  Surgical Date: 3/8/3033  Authorization Period Expiration: 12/31/2022  Plan of Care Expiration: 12/31/2022  Visit # / Visits authorized: 29/40 (no limit)  FOTO: 8/30/2022  1/5   PTA:  0/5      Time In: 9:20 am  Time Out: 10:45 am  Total Billable Time: 55 minutes     Precautions: Standard        Subjective       Pt states having pain for the first time in a while yesterday. Pain below the knee cap and worse after the tape came off. Noticed it with prolonged standing.      He was more compliant with home exercise program.  Response to previous treatment: good challenging exercise   Functional change: no change reported today     Pain: 0/10, 3/10 at worst  Location: right knee infrapatellar region     Objective      CMS Impairment/Limitation/Restriction for FOTO Knee Survey     Therapist reviewed FOTO scores for Evan Khoury on 8/30/2022.   FOTO documents entered into [employer] - see Media section.     Limitation Score: 29%  Category: Mobility     Current : CJ = at least 20% but < 40% impaired, limited or restricted  Goal: CJ = at least 20% but <  "40% impaired, limited or restricted         8/10/2022  MicroFET:  R 53.8 with pain  L 73.0     8/10/2022  R knee AROM: 6 - 0 - 150 deg     Evan received therapeutic exercises to develop strength, endurance, ROM, flexibility, posture and core stabilization for 20 minutes including:      SL squat 2 x 10 reps with UE support     gastroc stretch on incline x 90"  Soleus stretch on incline x 90"     U leg press 37.5#  3 x 10 emphasize eccentrics  Side lying shuttle 2 x 10 reps with 25#     Side steps 6 x 40 ft with btb  Monster walks  6 x 40 ft with btb   Lateral tap downs 2 x 10 reps on 4" step,   Forward step ups 6" 2 x 10 reps     LAQ 5 x 45" with 5#     Upright bike x 10 min full revolutions seat 13  gss slant  2 min  Hamstring stretches on step 3 x 30"     Wall sits 30 sec x 3   U HR  3 x 10  SLR with SAQ  30  reps  HS curls  3 x 10   LAQ 3 x 10 ttp patella tendon - ed for eccentric   prone hang 3# for 3 min   Prone knee flex strap 2 min      Neuromuscular re-education for coordination, balance, motor control x 25 min:       Performed blood flow restriction with 134 mmHg (80% of LOP) on R extremity with Sarahi Unit and skin protectant sleeve. Patient screened for any precautions or contraindications prior to its use and continuously monitored for any adverse events. Patient given 30 sec rest breaks between sets and 1 min rest break between exercise     30 reps/15 reps/15reps/15 reps:  Side lying hip abd with 2#  SAQ with 2#  LAQ with 2#  Mini squats- deferred 2/2 pain     Without BFR:  U  Bridge march  - 3 x 10 reps     Drinking bird 2 x 15 reps with UE support  1/2 star taps x 10 reps     U squat 3 x 10 - ed to use theraband in a couple weeks as julia for resistance  SLS on foam 3 x 30"  rebounder toss SLS on foam 3 x 30        Evan received the following manual therapy techniques: Joint mobilizations were applied to the: patallar and R hamstring for 15 minutes, including:  Stick hamstrings and R IT band  stm with " "the Stick to R Gastroc  iaSTM / scrapping quads and IT band   Cross friction distal quad  All planes patella mob gr 2-3 supine  Grades II and III tibial mobilizations to increase flexion     Reapplied 2 "I" strips to R knee with single decompressive "I" strip to decrease anterior knee pain     Pt received 10 min cold pack to R knee with towel to protect skin.       Home Exercises Provided and Patient Education Provided      Education provided:   No new HEP given today     Written Home Exercises Provided: Patient instructed to cont prior HEP.  Exercises were reviewed and Evan was able to demonstrate them prior to the end of the session.  Evan demonstrated good  understanding of the education provided.      See EMR under Patient Instructions for exercises provided prior visit.        Assessment   Pt reporting exacerbation of inferior patellar pain with mini squats and deferred this session. Good tolerance to manual techniques with improved soft tissue mobility and symptoms following. Progressing with decreased subjective report of disability as noted by recent FOTO scores.      Evan Is progressing well towards his goals.   Pt prognosis is Good.      Pt will continue to benefit from skilled outpatient physical therapy to address the deficits listed in the problem list box on initial evaluation, provide pt/family education and to maximize pt's level of independence in the home and community environment.      Pt's spiritual, cultural and educational needs considered and pt agreeable to plan of care and goals.     Anticipated barriers to physical therapy: none     Goals:  Short Term Goals (6 Weeks):   1. Patient to have full extension Right (equal to left) for normalized gait pattern  met  2. Patient to have 120 degrees of passive left knee flexion for improved performance of ADL's such as sit<>stand transfers  met  3. Patient to have decreased edema left knee   met  4. Patient to report 2/10 pain or less in left knee " with ambulating community distances   met  5. Patient to be compliant with home program 5x's a week as noted by proper demonstration of exercises to therapist for improved management of condition   met  6. Patient to ambulate full weight bearing Right lower extremity and normal gait pattern     met     Long Term Goals   -(12 Weeks):   1. Patient to have 4+/5 or greater strength in RLE for functional performance of ADL's such as lifting  Progressing, not met2. Patient to descend one flight of stairs with alternating gait pattern without use of handrail and with proper LE alignment  3. Patient to have 135 degrees or greater Right knee flexion for return to ADL's including squatting   met  -(36 weeks):   4. Patient to have decreased subjective report of disability as noted by <20% limitation on FOTO knee questionnaire Progressing, not met  5. Patient to perform single leg squat without assistance and without valgus collapse to improve participation in recreational  Progressing, not met  6. Patient's RLE strength to be 5/5 for return to recreational activities and sport Progressing, not met     PLAN   Continue with BFR, proprioceptive training, and LE strengthening.        Yuliana Bullard, PT

## 2022-09-01 NOTE — PROGRESS NOTES
[facility]   Physical Therapy Treatment Note      Name: Evan Khoury  Clinic Number: [id number]     Therapy Diagnosis:   1. Decreased range of motion (ROM) of right knee        2. Impaired gait and mobility        3. Weakness of right lower extremity        4. Weakness of both lower extremities        5. Aftercare for anterior cruciate ligament (ACL) repair            Physician: Luis Watkins, Jennifer     Visit Date: 8/30/2022  Physician Orders: PT Eval and Treat   1. Right knee ACL reconstruction with hamstring autograft   2. Right knee arthroscopic menisectomy versus meniscus repair   3. Right knee arthroscopic chondroplasty   4. Right knee arthroscopic synovectomy  Surgery Date: 3/8/2022  Medical Diagnosis from Referral: S83.511A (ICD-10-CM) - Complete tear of anterior cruciate ligament of right knee, initial encounter  Evaluation Date: 3/10/2022  Surgical Date: 3/8/3033  Authorization Period Expiration: 12/31/2022  Plan of Care Expiration: 12/31/2022  Visit # / Visits authorized: 30/40 (no limit)  FOTO: 8/30/2022  2/5   PTA:  0/5      Time In: 8:35 am  Time Out:  10:01 am  Total Billable Time: 75 minutes     Precautions: Standard        Subjective       Pt states his R anterior knee and just above the knee cap is bothering him.      He was more compliant with home exercise program.  Response to previous treatment: good session  Functional change: no change reported today     Pain: 0/10, 3/10 at worst  Location: right knee infrapatellar region     Objective      CMS Impairment/Limitation/Restriction for FOTO Knee Survey     Therapist reviewed FOTO scores for Evan Khoury on 8/30/2022.   FOTO documents entered into [employer] - see Media section.     Limitation Score: 29%  Category: Mobility     Current : CJ = at least 20% but < 40% impaired, limited or restricted  Goal: CJ = at least 20% but < 40% impaired, limited or restricted         8/10/2022  MicroFET:  R 53.8 with pain  L 73.0    "  8/10/2022  R knee AROM: 6 - 0 - 150 deg     Evan received therapeutic exercises to develop strength, endurance, ROM, flexibility, posture and core stabilization for 25 minutes including:      SL squat 2 x 10 reps with UE support     gastroc stretch on incline x 90"  Soleus stretch on incline x 90"     U leg press 37.5#  3 x 10 emphasize eccentrics  Side lying shuttle 3 x 10 reps with 25#     Side steps 6 x 40 ft with btb  Monster walks  6 x 40 ft with btb   Lateral tap downs 2 x 10 reps on 4" step,   Forward step ups 6" 2 x 10 reps     LAQ 5 x 45" with 5#     Upright bike x 10 min full revolutions seat 13  gss slant  2 min  Hamstring stretches on step 3 x 30"     Wall sits 30 sec x 3   U HR  3 x 10  SLR with SAQ  30  reps  HS curls  3 x 10   LAQ 3 x 10 ttp patella tendon - ed for eccentric   prone hang 3# for 3 min   Prone knee flex strap 2 min      Neuromuscular re-education for coordination, balance, motor control x 30 min:       Performed blood flow restriction with 134 mmHg (80% of LOP) on R extremity with Sarahi Unit and skin protectant sleeve. Patient screened for any precautions or contraindications prior to its use and continuously monitored for any adverse events. Patient given 30 sec rest breaks between sets and 1 min rest break between exercise     30 reps/15 reps/15reps/15 reps:  Side lying hip abd with 2#  SAQ with 2#  LAQ with 2#  Mini squats- deferred 2/2 pain     Without BFR:  U  Bridge march  - 3 x 10 reps     Drinking bird 2 x 15 reps with UE support  1/2 star taps x 10 reps     U squat 3 x 10 - ed to use theraband in a couple weeks as julia for resistance  SLS on foam 3 x 30"  rebounder toss SLS on foam 3 x 30        Evan received the following manual therapy techniques: Joint mobilizations were applied to the: patallar and R hamstring for 10 minutes, including:  Stick hamstrings and R IT band  stm with the Stick to R Gastroc  IASTM / scrapping quads and IT band   Cross friction distal quad  All " "planes patella mob gr 2-3 supine  Grades II and III tibial mobilizations to increase flexion     Reapplied 2 "I" strips to R knee with single decompressive "I" strip to decrease anterior knee pain     Pt received 10 min cold pack to R knee with towel to protect skin.       Home Exercises Provided and Patient Education Provided      Education provided:   No new HEP given today     Written Home Exercises Provided: Patient instructed to cont prior HEP.  Exercises were reviewed and Evan was able to demonstrate them prior to the end of the session.  Evan demonstrated good  understanding of the education provided.      See EMR under Patient Instructions for exercises provided prior visit.        Assessment   Will add shuttle with BFR next session. Still requiring UE support for SL squats and drinking birds for proper technique.     Evan Is progressing well towards his goals.   Pt prognosis is Good.      Pt will continue to benefit from skilled outpatient physical therapy to address the deficits listed in the problem list box on initial evaluation, provide pt/family education and to maximize pt's level of independence in the home and community environment.      Pt's spiritual, cultural and educational needs considered and pt agreeable to plan of care and goals.     Anticipated barriers to physical therapy: none     Goals:  Short Term Goals (6 Weeks):   1. Patient to have full extension Right (equal to left) for normalized gait pattern  met  2. Patient to have 120 degrees of passive left knee flexion for improved performance of ADL's such as sit<>stand transfers  met  3. Patient to have decreased edema left knee   met  4. Patient to report 2/10 pain or less in left knee with ambulating community distances   met  5. Patient to be compliant with home program 5x's a week as noted by proper demonstration of exercises to therapist for improved management of condition   met  6. Patient to ambulate full weight bearing Right " lower extremity and normal gait pattern     met     Long Term Goals   -(12 Weeks):   1. Patient to have 4+/5 or greater strength in RLE for functional performance of ADL's such as lifting  Progressing, not met2. Patient to descend one flight of stairs with alternating gait pattern without use of handrail and with proper LE alignment  3. Patient to have 135 degrees or greater Right knee flexion for return to ADL's including squatting   met  -(36 weeks):   4. Patient to have decreased subjective report of disability as noted by <20% limitation on FOTO knee questionnaire Progressing, not met  5. Patient to perform single leg squat without assistance and without valgus collapse to improve participation in recreational  Progressing, not met  6. Patient's RLE strength to be 5/5 for return to recreational activities and sport Progressing, not met     PLAN   Continue with BFR, proprioceptive training, and LE strengthening.        Luis Doherty, PT

## 2022-09-02 ENCOUNTER — CLINICAL SUPPORT (OUTPATIENT)
Dept: REHABILITATION | Facility: OTHER | Age: 62
End: 2022-09-02
Payer: COMMERCIAL

## 2022-09-02 DIAGNOSIS — M25.661 DECREASED RANGE OF MOTION (ROM) OF RIGHT KNEE: Primary | ICD-10-CM

## 2022-09-02 DIAGNOSIS — R29.898 WEAKNESS OF RIGHT LOWER EXTREMITY: ICD-10-CM

## 2022-09-02 DIAGNOSIS — R26.89 IMPAIRED GAIT AND MOBILITY: ICD-10-CM

## 2022-09-02 DIAGNOSIS — Z47.89 AFTERCARE FOR ANTERIOR CRUCIATE LIGAMENT (ACL) REPAIR: ICD-10-CM

## 2022-09-02 DIAGNOSIS — R29.898 WEAKNESS OF BOTH LOWER EXTREMITIES: ICD-10-CM

## 2022-09-02 PROCEDURE — 97110 THERAPEUTIC EXERCISES: CPT | Mod: PN

## 2022-09-02 PROCEDURE — 97112 NEUROMUSCULAR REEDUCATION: CPT | Mod: PN

## 2022-09-02 PROCEDURE — 97140 MANUAL THERAPY 1/> REGIONS: CPT | Mod: PN

## 2022-09-06 ENCOUNTER — CLINICAL SUPPORT (OUTPATIENT)
Dept: REHABILITATION | Facility: OTHER | Age: 62
End: 2022-09-06
Payer: COMMERCIAL

## 2022-09-06 DIAGNOSIS — R29.898 WEAKNESS OF RIGHT LOWER EXTREMITY: ICD-10-CM

## 2022-09-06 DIAGNOSIS — R26.89 IMPAIRED GAIT AND MOBILITY: ICD-10-CM

## 2022-09-06 DIAGNOSIS — R29.898 WEAKNESS OF BOTH LOWER EXTREMITIES: ICD-10-CM

## 2022-09-06 DIAGNOSIS — Z47.89 AFTERCARE FOR ANTERIOR CRUCIATE LIGAMENT (ACL) REPAIR: ICD-10-CM

## 2022-09-06 DIAGNOSIS — M25.661 DECREASED RANGE OF MOTION (ROM) OF RIGHT KNEE: Primary | ICD-10-CM

## 2022-09-06 PROCEDURE — 97110 THERAPEUTIC EXERCISES: CPT | Mod: PN

## 2022-09-06 PROCEDURE — 97112 NEUROMUSCULAR REEDUCATION: CPT | Mod: PN

## 2022-09-06 NOTE — PROGRESS NOTES
[facility]   Physical Therapy Treatment Note      Name: Evan Khoury  Clinic Number: [id number]     Therapy Diagnosis:   1. Decreased range of motion (ROM) of right knee        2. Impaired gait and mobility        3. Weakness of right lower extremity        4. Weakness of both lower extremities        5. Aftercare for anterior cruciate ligament (ACL) repair              Physician: Luis Watkins, Jennifer     Visit Date: 8/30/2022  Physician Orders: PT Eval and Treat   1. Right knee ACL reconstruction with hamstring autograft   2. Right knee arthroscopic menisectomy versus meniscus repair   3. Right knee arthroscopic chondroplasty   4. Right knee arthroscopic synovectomy  Surgery Date: 3/8/2022  Medical Diagnosis from Referral: S83.511A (ICD-10-CM) - Complete tear of anterior cruciate ligament of right knee, initial encounter  Evaluation Date: 3/10/2022  Surgical Date: 3/8/3033  Authorization Period Expiration: 12/31/2022  Plan of Care Expiration: 12/31/2022  Visit # / Visits authorized: 31/40 (no limit)  FOTO: 8/30/2022  3/5   PTA:  0/5      Time In: 8:37 am  Time Out: 9:52 am  Total Billable Time: 65 minutes     Precautions: Standard        Subjective       Pt states his R anterior knee and just above the knee cap is bothering him.      He was more compliant with home exercise program.  Response to previous treatment: good session  Functional change: no change reported today     Pain: 0/10, 3/10 at worst  Location: right knee infrapatellar region     Objective      CMS Impairment/Limitation/Restriction for FOTO Knee Survey     Therapist reviewed FOTO scores for Evan Khoury on 8/30/2022.   FOTO documents entered into [employer] - see Media section.     Limitation Score: 29%  Category: Mobility     Current : CJ = at least 20% but < 40% impaired, limited or restricted  Goal: CJ = at least 20% but < 40% impaired, limited or restricted         8/10/2022  MicroFET:  R 53.8 with pain  L 73.0    "  8/10/2022  R knee AROM: 6 - 0 - 150 deg     Evan received therapeutic exercises to develop strength, endurance, ROM, flexibility, posture and core stabilization for 20 minutes including:      SL squat 2 x 10 reps with UE support     gastroc stretch on incline x 90"  Soleus stretch on incline x 90"     U leg press 37.5#  3 x 10 emphasize eccentrics  Side lying shuttle 3 x 10 reps with 25#     Side steps 6 x 40 ft with btb  Monster walks  6 x 40 ft with btb   Lateral tap downs 2 x 10 reps on 4" step,   Forward step ups 6" 2 x 10 reps     LAQ 5 x 45" with 5#     Upright bike x 10 min full revolutions seat 13  Hamstring stretches on step 3 x 30"     Wall sits 30 sec x 3   U HR  3 x 10  SLR with SAQ  30  reps  HS curls  3 x 10   LAQ 3 x 10 ttp patella tendon - ed for eccentric   prone hang 3# for 3 min   Prone knee flex strap 2 min      Neuromuscular re-education for coordination, balance, motor control x 40 min:       Performed blood flow restriction with 138 mmHg (80% of LOP) on R extremity with Sarahi Unit and skin protectant sleeve. Patient screened for any precautions or contraindications prior to its use and continuously monitored for any adverse events. Patient given 30 sec rest breaks between sets and 1 min rest break between exercise     30 reps/15 reps/15reps/15 reps:    Single leg shuttle with 37.5# (1 black, 1 red band)  Side lying hip abd with 3#  SAQ with 3#  LAQ with 3#  Mini squats     Without BFR:  U  Bridge march  - 3 x 10 reps     Drinking bird 2 x 15 reps with UE support  1/2 star taps x 10 reps     U squat 3 x 10 - ed to use theraband in a couple weeks as julia for resistance  SLS on foam 3 x 30"  rebounder toss SLS on foam 3 x 30        Evan received the following manual therapy techniques: Joint mobilizations were applied to the: patallar and R hamstring for 5 minutes, including:  Stick hamstrings and R IT band  stm with the Stick to R Gastroc  IASTM / scrapping quads and IT band   Cross friction " "distal quad  All planes patella mob gr 2-3 supine  Grades II and III tibial mobilizations to increase flexion     Reapplied 2 "I" strips to R knee with single decompressive "I" strip to decrease anterior knee pain     Pt received 10 min cold pack to R knee with towel to protect skin.       Home Exercises Provided and Patient Education Provided      Education provided:   No new HEP given today     Written Home Exercises Provided: Patient instructed to cont prior HEP.  Exercises were reviewed and Evan was able to demonstrate them prior to the end of the session.  Evan demonstrated good  understanding of the education provided.      See EMR under Patient Instructions for exercises provided prior visit.        Assessment   Added shuttle with BFR next session. Patient performed star taps without UE sport today. Will continue with proprioception training and quad/glut strengthening.     Evan Is progressing well towards his goals.   Pt prognosis is Good.      Pt will continue to benefit from skilled outpatient physical therapy to address the deficits listed in the problem list box on initial evaluation, provide pt/family education and to maximize pt's level of independence in the home and community environment.      Pt's spiritual, cultural and educational needs considered and pt agreeable to plan of care and goals.     Anticipated barriers to physical therapy: none     Goals:  Short Term Goals (6 Weeks):   1. Patient to have full extension Right (equal to left) for normalized gait pattern  met  2. Patient to have 120 degrees of passive left knee flexion for improved performance of ADL's such as sit<>stand transfers  met  3. Patient to have decreased edema left knee   met  4. Patient to report 2/10 pain or less in left knee with ambulating community distances   met  5. Patient to be compliant with home program 5x's a week as noted by proper demonstration of exercises to therapist for improved management of condition   " met  6. Patient to ambulate full weight bearing Right lower extremity and normal gait pattern     met     Long Term Goals   -(12 Weeks):   1. Patient to have 4+/5 or greater strength in RLE for functional performance of ADL's such as lifting  Progressing, not met2. Patient to descend one flight of stairs with alternating gait pattern without use of handrail and with proper LE alignment  3. Patient to have 135 degrees or greater Right knee flexion for return to ADL's including squatting   met  -(36 weeks):   4. Patient to have decreased subjective report of disability as noted by <20% limitation on FOTO knee questionnaire Progressing, not met  5. Patient to perform single leg squat without assistance and without valgus collapse to improve participation in recreational  Progressing, not met  6. Patient's RLE strength to be 5/5 for return to recreational activities and sport Progressing, not met     PLAN   Continue with BFR, proprioceptive training, and LE strengthening.        Luis Doherty, PT

## 2022-09-07 NOTE — PROGRESS NOTES
[facility]   Physical Therapy Treatment Note      Name: Evan Khoury  Clinic Number: [id number]     Therapy Diagnosis:   1. Decreased range of motion (ROM) of right knee        2. Impaired gait and mobility        3. Weakness of right lower extremity        4. Weakness of both lower extremities        5. Aftercare for anterior cruciate ligament (ACL) repair                Physician: Luis Watkins, *     Visit Date: 8/30/2022  Physician Orders: PT Eval and Treat   1. Right knee ACL reconstruction with hamstring autograft   2. Right knee arthroscopic menisectomy versus meniscus repair   3. Right knee arthroscopic chondroplasty   4. Right knee arthroscopic synovectomy  Surgery Date: 3/8/2022  Medical Diagnosis from Referral: S83.511A (ICD-10-CM) - Complete tear of anterior cruciate ligament of right knee, initial encounter  Evaluation Date: 3/10/2022  Surgical Date: 3/8/3033  Authorization Period Expiration: 12/31/2022  Plan of Care Expiration: 12/31/2022  Visit # / Visits authorized: 32/40 (no limit)  FOTO: 8/30/2022  4/5   PTA:  0/5      Time In: 8:20 am  Time Out: 9:25 am  Total Billable Time:  65 minutes     Precautions: Standard        Subjective       Pt denied anterior knee pain this morning     He was more compliant with home exercise program.  Response to previous treatment: felt great  Functional change: no change reported today     Pain: 0/10, 3/10 at worst  Location: right knee infrapatellar region     Objective      CMS Impairment/Limitation/Restriction for FOTO Knee Survey     Therapist reviewed FOTO scores for Evan Khoury on 8/30/2022.   FOTO documents entered into [employer] - see Media section.     Limitation Score: 29%  Category: Mobility     Current : CJ = at least 20% but < 40% impaired, limited or restricted  Goal: CJ = at least 20% but < 40% impaired, limited or restricted         8/10/2022  MicroFET:  R 53.8 with pain  L 73.0     8/10/2022  R knee AROM: 6 - 0 - 150  "deg     Evan received therapeutic exercises to develop strength, endurance, ROM, flexibility, posture and core stabilization for 25 minutes including:      SL squat 2 x 10 reps with UE support     gastroc stretch on incline x 90"  Soleus stretch on incline x 90"     U leg press 37.5#  3 x 10 emphasize eccentrics  Side lying shuttle 3 x 10 reps with 25#     Side steps 6 x 40 ft with btb  Monster walks  6 x 40 ft with btb   Lateral tap downs 2 x 10 reps on 4" step,   Forward step ups 6" 2 x 10 reps     LAQ 5 x 45" with 5#     Upright bike x 10 min full revolutions seat 13  Hamstring stretches on step 3 x 30"     Wall sits 30 sec x 3   U HR  3 x 10  SLR with SAQ  30  reps  HS curls  3 x 10   LAQ 3 x 10 ttp patella tendon - ed for eccentric   prone hang 3# for 3 min   Prone knee flex strap 2 min      Neuromuscular re-education for coordination, balance, motor control x 40 min:       Performed blood flow restriction with 149 mmHg (80% of LOP) on R extremity with Sarahi Unit and skin protectant sleeve. Patient screened for any precautions or contraindications prior to its use and continuously monitored for any adverse events. Patient given 30 sec rest breaks between sets and 1 min rest break between exercise     30 reps/15 reps/15reps/15 reps:    Single leg shuttle with 37.5# (1 black, 1 red band)  Side lying hip abd with 3#  SAQ with 3#  LAQ with 3#  Mini squats     Without BFR:  U  Bridge march  - 3 x 10 reps     Drinking bird 2 x 15 reps with UE support  1/2 star taps x 10 reps     U squat 3 x 10 - ed to use theraband in a couple weeks as julia for resistance  SLS on foam 3 x 30"  rebounder toss SLS on foam 3 x 30        Evan received the following manual therapy techniques: Joint mobilizations were applied to the: patallar and R hamstring for 5 minutes, including:  Stick hamstrings and R IT band  stm with the Stick to R Gastroc  IASTM / scrapping quads and IT band   Cross friction distal quad  All planes patella mob gr " "2-3 supine  Grades II and III tibial mobilizations to increase flexion     Reapplied 2 "I" strips to R knee with single decompressive "I" strip to decrease anterior knee pain     Pt received 10 min cold pack to R knee with towel to protect skin.       Home Exercises Provided and Patient Education Provided      Education provided:   No new HEP given today     Written Home Exercises Provided: Patient instructed to cont prior HEP.  Exercises were reviewed and Evan was able to demonstrate them prior to the end of the session.  Evan demonstrated good  understanding of the education provided.      See EMR under Patient Instructions for exercises provided prior visit.        Assessment   Will reassess quad strength with microFET next visit.     Evan Is progressing well towards his goals.   Pt prognosis is Good.      Pt will continue to benefit from skilled outpatient physical therapy to address the deficits listed in the problem list box on initial evaluation, provide pt/family education and to maximize pt's level of independence in the home and community environment.      Pt's spiritual, cultural and educational needs considered and pt agreeable to plan of care and goals.     Anticipated barriers to physical therapy: none     Goals:  Short Term Goals (6 Weeks):   1. Patient to have full extension Right (equal to left) for normalized gait pattern  met  2. Patient to have 120 degrees of passive left knee flexion for improved performance of ADL's such as sit<>stand transfers  met  3. Patient to have decreased edema left knee   met  4. Patient to report 2/10 pain or less in left knee with ambulating community distances   met  5. Patient to be compliant with home program 5x's a week as noted by proper demonstration of exercises to therapist for improved management of condition   met  6. Patient to ambulate full weight bearing Right lower extremity and normal gait pattern     met     Long Term Goals   -(12 Weeks):   1. " Patient to have 4+/5 or greater strength in RLE for functional performance of ADL's such as lifting  Progressing, not met2. Patient to descend one flight of stairs with alternating gait pattern without use of handrail and with proper LE alignment  3. Patient to have 135 degrees or greater Right knee flexion for return to ADL's including squatting   met  -(36 weeks):   4. Patient to have decreased subjective report of disability as noted by <20% limitation on FOTO knee questionnaire Progressing, not met  5. Patient to perform single leg squat without assistance and without valgus collapse to improve participation in recreational  Progressing, not met  6. Patient's RLE strength to be 5/5 for return to recreational activities and sport Progressing, not met     PLAN   Continue with BFR, proprioceptive training, and LE strengthening.        Luis Doherty, PT                   Simple: Patient demonstrates quick and easy understanding

## 2022-09-08 ENCOUNTER — CLINICAL SUPPORT (OUTPATIENT)
Dept: REHABILITATION | Facility: OTHER | Age: 62
End: 2022-09-08
Payer: COMMERCIAL

## 2022-09-08 DIAGNOSIS — R26.89 IMPAIRED GAIT AND MOBILITY: ICD-10-CM

## 2022-09-08 DIAGNOSIS — R29.898 WEAKNESS OF BOTH LOWER EXTREMITIES: ICD-10-CM

## 2022-09-08 DIAGNOSIS — R29.898 WEAKNESS OF RIGHT LOWER EXTREMITY: ICD-10-CM

## 2022-09-08 DIAGNOSIS — Z47.89 AFTERCARE FOR ANTERIOR CRUCIATE LIGAMENT (ACL) REPAIR: ICD-10-CM

## 2022-09-08 DIAGNOSIS — M25.661 DECREASED RANGE OF MOTION (ROM) OF RIGHT KNEE: Primary | ICD-10-CM

## 2022-09-08 PROCEDURE — 97112 NEUROMUSCULAR REEDUCATION: CPT | Mod: PN

## 2022-09-08 PROCEDURE — 97110 THERAPEUTIC EXERCISES: CPT | Mod: PN

## 2022-09-12 ENCOUNTER — HOSPITAL ENCOUNTER (OUTPATIENT)
Dept: RADIOLOGY | Facility: HOSPITAL | Age: 62
Discharge: HOME | End: 2022-09-12
Attending: ORTHOPAEDIC SURGERY
Payer: COMMERCIAL

## 2022-09-12 ENCOUNTER — OFFICE VISIT (OUTPATIENT)
Dept: SPORTS MEDICINE | Facility: CLINIC | Age: 62
End: 2022-09-12
Payer: COMMERCIAL

## 2022-09-12 VITALS
TEMPERATURE: 98 F | BODY MASS INDEX: 25.56 KG/M2 | DIASTOLIC BLOOD PRESSURE: 72 MMHG | HEART RATE: 60 BPM | HEIGHT: 74 IN | WEIGHT: 199.19 LBS | SYSTOLIC BLOOD PRESSURE: 118 MMHG

## 2022-09-12 DIAGNOSIS — M24.561 BILATERAL KNEE CONTRACTURES: Primary | ICD-10-CM

## 2022-09-12 DIAGNOSIS — M94.261 CHONDROMALACIA OF RIGHT KNEE: ICD-10-CM

## 2022-09-12 DIAGNOSIS — M24.562 BILATERAL KNEE CONTRACTURES: Primary | ICD-10-CM

## 2022-09-12 DIAGNOSIS — S83.511S COMPLETE TEAR OF ANTERIOR CRUCIATE LIGAMENT OF RIGHT KNEE, SEQUELA: ICD-10-CM

## 2022-09-12 DIAGNOSIS — M24.562 BILATERAL KNEE CONTRACTURES: ICD-10-CM

## 2022-09-12 DIAGNOSIS — Z47.89 AFTERCARE FOR ANTERIOR CRUCIATE LIGAMENT (ACL) REPAIR: ICD-10-CM

## 2022-09-12 DIAGNOSIS — M24.561 BILATERAL KNEE CONTRACTURES: ICD-10-CM

## 2022-09-12 PROCEDURE — 99999 PR PBB SHADOW E&M-EST. PATIENT-LVL IV: CPT | Mod: PBBFAC,,, | Performed by: ORTHOPAEDIC SURGERY

## 2022-09-12 PROCEDURE — 1159F MED LIST DOCD IN RCRD: CPT | Mod: CPTII,S$GLB,, | Performed by: ORTHOPAEDIC SURGERY

## 2022-09-12 PROCEDURE — 73564 X-RAY EXAM KNEE 4 OR MORE: CPT | Mod: TC,50

## 2022-09-12 PROCEDURE — 97110 PR THERAPEUTIC EXERCISES: ICD-10-PCS | Mod: S$GLB,,, | Performed by: ORTHOPAEDIC SURGERY

## 2022-09-12 PROCEDURE — 3074F SYST BP LT 130 MM HG: CPT | Mod: CPTII,S$GLB,, | Performed by: ORTHOPAEDIC SURGERY

## 2022-09-12 PROCEDURE — 99214 PR OFFICE/OUTPT VISIT, EST, LEVL IV, 30-39 MIN: ICD-10-PCS | Mod: S$GLB,,, | Performed by: ORTHOPAEDIC SURGERY

## 2022-09-12 PROCEDURE — 73564 XR KNEE ORTHO BILAT WITH FLEXION: ICD-10-PCS | Mod: 26,,, | Performed by: INTERNAL MEDICINE

## 2022-09-12 PROCEDURE — 3074F PR MOST RECENT SYSTOLIC BLOOD PRESSURE < 130 MM HG: ICD-10-PCS | Mod: CPTII,S$GLB,, | Performed by: ORTHOPAEDIC SURGERY

## 2022-09-12 PROCEDURE — 1159F PR MEDICATION LIST DOCUMENTED IN MEDICAL RECORD: ICD-10-PCS | Mod: CPTII,S$GLB,, | Performed by: ORTHOPAEDIC SURGERY

## 2022-09-12 PROCEDURE — 73564 X-RAY EXAM KNEE 4 OR MORE: CPT | Mod: 26,,, | Performed by: INTERNAL MEDICINE

## 2022-09-12 PROCEDURE — 3078F PR MOST RECENT DIASTOLIC BLOOD PRESSURE < 80 MM HG: ICD-10-PCS | Mod: CPTII,S$GLB,, | Performed by: ORTHOPAEDIC SURGERY

## 2022-09-12 PROCEDURE — 3008F BODY MASS INDEX DOCD: CPT | Mod: CPTII,S$GLB,, | Performed by: ORTHOPAEDIC SURGERY

## 2022-09-12 PROCEDURE — 3078F DIAST BP <80 MM HG: CPT | Mod: CPTII,S$GLB,, | Performed by: ORTHOPAEDIC SURGERY

## 2022-09-12 PROCEDURE — 3008F PR BODY MASS INDEX (BMI) DOCUMENTED: ICD-10-PCS | Mod: CPTII,S$GLB,, | Performed by: ORTHOPAEDIC SURGERY

## 2022-09-12 PROCEDURE — 97110 THERAPEUTIC EXERCISES: CPT | Mod: S$GLB,,, | Performed by: ORTHOPAEDIC SURGERY

## 2022-09-12 PROCEDURE — 99999 PR PBB SHADOW E&M-EST. PATIENT-LVL IV: ICD-10-PCS | Mod: PBBFAC,,, | Performed by: ORTHOPAEDIC SURGERY

## 2022-09-12 PROCEDURE — 99214 OFFICE O/P EST MOD 30 MIN: CPT | Mod: S$GLB,,, | Performed by: ORTHOPAEDIC SURGERY

## 2022-09-12 RX ORDER — CELECOXIB 200 MG/1
200 CAPSULE ORAL 2 TIMES DAILY
Qty: 60 CAPSULE | Refills: 2 | Status: SHIPPED | OUTPATIENT
Start: 2022-09-12 | End: 2022-10-12

## 2022-09-12 NOTE — PROGRESS NOTES
Subjective:          Chief Complaint: Evan Khoury is a 62 y.o. male who had concerns including Post-op Evaluation of the Right Knee.    Evan Khoury presents to our clinic for 6 month post operative evaluation of the below procedures. He is overall doing well. No continued pain or instability symptoms. Progressing appropriately with PT.       DATE OF PROCEDURE: 3/8/2022     ATTENDING SURGEON: Surgeon(s) and Role:     * Maris Velazquez MD - Primary     * Cam Chakraborty MD - Resident - Assisting     Assistants:  Palmer Levy MD - Resident  Annelise Quevedo PA-C        PREOPERATIVE DIAGNOSIS:  Right  Tear, Medial meniscus, acute S83.249A and Anterior Cruciate Ligament Tear S83.510     POSTOPERATIVE DIAGNOSIS:   Right  Tear, Medial meniscus, acute S83.249A and Anterior Cruciate Ligament Tear S83.510     PROCEDURES(S) PERFORMED:   1. Right  Arthroscopy, anterior cruciate ligament reconstruction 24869  2.  Right  Arthroscopy, with meniscus repair (medial OR lateral) 04085      Review of Systems   Constitutional: Negative for chills, fever and night sweats.   HENT:  Negative for congestion, hearing loss and sore throat.    Eyes:  Negative for blurred vision, discharge, double vision and visual disturbance.   Cardiovascular:  Negative for chest pain, leg swelling, palpitations and syncope.   Respiratory:  Negative for cough and shortness of breath.    Endocrine: Negative for cold intolerance, heat intolerance and polyuria.   Hematologic/Lymphatic: Negative for bleeding problem.   Skin:  Negative for dry skin and rash.   Musculoskeletal:  Positive for joint pain and joint swelling. Negative for back pain, muscle cramps and muscle weakness.   Gastrointestinal:  Negative for abdominal pain, melena, nausea and vomiting.   Genitourinary:  Negative for hematuria.   Neurological:  Negative for focal weakness, loss of balance, numbness and paresthesias.   Psychiatric/Behavioral:  Negative for altered mental status.                   Objective:        General: Evan is well-developed, well-nourished, appears stated age, in no acute distress, alert and oriented to time, place and person.     General    Nursing note and vitals reviewed.  Constitutional: He is oriented to person, place, and time. He appears well-developed and well-nourished. No distress.   HENT:   Head: Normocephalic and atraumatic.   Nose: Nose normal.   Mouth/Throat: No oropharyngeal exudate.   Eyes: Conjunctivae and EOM are normal. Right eye exhibits no discharge. Left eye exhibits no discharge.   Neck: Neck supple.   Cardiovascular:  Normal rate, regular rhythm and intact distal pulses.            Pulmonary/Chest: Effort normal and breath sounds normal. No respiratory distress.   Abdominal: Soft. Bowel sounds are normal. He exhibits no distension. There is no abdominal tenderness.   Neurological: He is alert and oriented to person, place, and time. He has normal reflexes. No cranial nerve deficit. Coordination normal.   Psychiatric: He has a normal mood and affect. His behavior is normal. Judgment and thought content normal.     General Musculoskeletal Exam   Gait: normal       Right Knee Exam     Inspection   Erythema: absent  Scars: present  Swelling: absent  Effusion: absent  Deformity: absent  Bruising: absent    Tenderness   The patient is tender to palpation of the tibial tubercle.    Range of Motion   Extension:  0   Flexion:  150 abnormal     Tests   Meniscus   Nik:  Medial - negative Lateral - negative  Ligament Examination   Lachman: normal (-1 to 2mm)   PCL-Posterior Drawer: normal (0 to 2mm)     MCL - Valgus: normal (0 to 2mm)  LCL - Varus: normal  Pivot Shift: normal (Equal)  Reverse Pivot Shift: normal (Equal)  Dial Test at 30 degrees: normal (< 5 degrees)  Dial Test at 90 degrees: normal (< 5 degrees)  Posterior Sag Test: negative  Posterolateral Corner: stable  Patella   Patellar apprehension: negative  Passive Patellar Tilt: neutral  Patellar  Tracking: normal  Patellar Glide (quadrants): Lateral - 1   Medial - 2  Q-Angle at 90 degrees: normal  Patellar Grind: negative  J-Sign: none    Other   Meniscal Cyst: absent  Popliteal (Baker's) Cyst: absent  Sensation: normal    Comments:  Incision clean/dry/intact  No sign of infection  Mild swelling  Compartments soft  Neurovascular status intact in extremity      Left Knee Exam     Inspection   Erythema: absent  Scars: present  Swelling: absent  Effusion: absent  Deformity: absent  Bruising: absent    Tenderness   The patient is experiencing no tenderness.     Range of Motion   Extension:  0   Flexion:  150     Tests   Meniscus   Nik:  Medial - negative Lateral - negative  Stability   Lachman: normal (-1 to 2mm)   PCL-Posterior Drawer: normal (0 to 2mm)  MCL - Valgus: normal (0 to 2mm)  LCL - Varus: normal (0 to 2mm)  Pivot Shift: normal (Equal)  Reverse Pivot Shift: normal (Equal)  Dial Test at 30 degrees: normal (< 5 degrees)  Dial Test at 90 degrees: normal (< 5 degrees)  Posterior Sag Test: negative  Posterolateral Corner: stable  Patella   Patellar apprehension: negative  Passive Patellar Tilt: neutral  Patellar Tracking: normal  Patellar Glide (Quadrants): Lateral - 1 Medial - 2  Q-Angle at 90 degrees: normal  Patellar Grind: negative  J-Sign: J sign absent    Other   Meniscal Cyst: absent  Popliteal (Baker's) Cyst: absent  Sensation: normal    Right Hip Exam     Tests   Mesha: negative  Left Hip Exam     Tests   Mesha: negative          Muscle Strength   Right Lower Extremity   Hip Abduction: 5/5   Quadriceps:  4/5   Hamstrin/5   Left Lower Extremity   Hip Abduction: 5/5   Quadriceps:  5/5   Hamstrin/5     Reflexes     Left Side  Achilles:  2+  Quadriceps:  2+    Right Side   Achilles:  2+  Quadriceps:  2+    Vascular Exam     Right Pulses  Dorsalis Pedis:      2+  Posterior Tibial:      2+        Left Pulses  Dorsalis Pedis:      2+  Posterior Tibial:      2+        Edema  Right Lower Leg:  absent  Left Lower Leg: absent    Radiographic Findings 09/12/2022:    Interval ACL reconstruction.  I see no acute fracture.  Joint spaces are preserved.     Small suprapatellar joint effusion.  Diffuse soft tissue edema.     Impression:     Interval ACL reconstruction with no acute osseous abnormality seen.  Negative for patella helder  Xrays of the right knee were ordered and reviewed by me today. These findings were discussed and reviewed with the patient.    X-ray Knee Ortho Bilateral with Flexion  Narrative: EXAMINATION:  XR KNEE ORTHO BILAT WITH FLEXION    CLINICAL HISTORY:  Contracture, right knee    TECHNIQUE:  AP standing of both knees, PA flexion standing views of both knees, and Merchant views of both knees were performed.  Lateral views of both knees were also performed.    COMPARISON:  May 16, 2022    FINDINGS:  There are surgical changes of the right and left knee relating to anterior cruciate ligament reconstruction, appearance unchanged compared to previous imaging.  There is no fracture or osseous destructive process.  No significant degree of joint space narrowing is noted.  Minimal osteophytic spurring present about the left knee.  Impression: Stable surgical changes    Electronically signed by: Modesta Casanova MD  Date:    09/12/2022  Time:    10:12          Assessment:       Encounter Diagnoses   Name Primary?    Bilateral knee contractures Yes    Aftercare for anterior cruciate ligament (ACL) repair     Complete tear of anterior cruciate ligament of right knee, sequela     Chondromalacia of right knee           Plan:       1. IKDC, SF-12 and KOOS was filled out today in clinic.     RTC in 3 months with Dr. Maris Velazquez for post op visit. Patient will fill out IKDC, SF-12 and KOOS on return.    2. Medications: Refills of the following Rx were sent to patients preferred Pharmacy:  celecoxib (CELEBREX) 200 MG capsule    3. Physical Therapy: Continue/Begin: Continue at [facility]    4. HEP: N/A  HEP  [id number] [address], instructed and demonstrated a core HEP. The patient then demonstrated understanding of exercises and proper technique. This program was performed for 20 minutes.      5. Procedures/Procedural Planning: N/a    6. DME: N/a    7. Work/Sport Status: Return as tolerated    8. Visit Summary: Follow up in 3 months                         [facility] patient questionnaires have been collected today.

## 2022-09-12 NOTE — PROGRESS NOTES
[facility]   Physical Therapy Treatment Note      Name: Evan Khoury  Clinic Number: [id number]     Therapy Diagnosis:   1. Decreased range of motion (ROM) of right knee        2. Impaired gait and mobility        3. Weakness of right lower extremity        4. Weakness of both lower extremities        5. Aftercare for anterior cruciate ligament (ACL) repair                  Physician: Luis Watkins, *     Visit Date: 8/30/2022  Physician Orders: PT Eval and Treat   1. Right knee ACL reconstruction with hamstring autograft   2. Right knee arthroscopic menisectomy versus meniscus repair   3. Right knee arthroscopic chondroplasty   4. Right knee arthroscopic synovectomy  Surgery Date: 3/8/2022  Medical Diagnosis from Referral: S83.511A (ICD-10-CM) - Complete tear of anterior cruciate ligament of right knee, initial encounter  Evaluation Date: 3/10/2022  Surgical Date: 3/8/3033  Authorization Period Expiration: 12/31/2022  Plan of Care Expiration: 12/31/2022  Visit # / Visits authorized: 33/40 (no limit)  FOTO: 8/30/2022  5/5   PTA:  0/5      Time In: 8:04 am  Time Out: 9:23 am  Total Billable Time:  75 minutes     Precautions: Standard        Subjective       Pt states he saw Dr. Velazquez yesterday. Reported B anterior knee pain with mini squats     He was more compliant with home exercise program.  Response to previous treatment: felt great  Functional change: no change reported today     Pain: 0/10, 3/10 at worst  Location: right knee infrapatellar region     Objective      CMS Impairment/Limitation/Restriction for FOTO Knee Survey     Therapist reviewed FOTO scores for Evan Khoury on 8/30/2022.   FOTO documents entered into [employer] - see Media section.     Limitation Score: 29%  Category: Mobility     Current : CJ = at least 20% but < 40% impaired, limited or restricted  Goal: CJ = at least 20% but < 40% impaired, limited or restricted         8/10/2022  MicroFET:  R 53.8 with pain  L  "73.0     8/10/2022  R knee AROM: 6 - 0 - 150 deg     Evan received therapeutic exercises to develop strength, endurance, ROM, flexibility, posture and core stabilization for 30 minutes including:      SL squat 2 x 10 reps with UE support     gastroc stretch on incline x 90"  Soleus stretch on incline x 90"     U leg press 37.5#  3 x 10 emphasize eccentrics  Side lying shuttle 3 x 10 reps with 25#     Side steps 6 x 40 ft with btb  Monster walks  6 x 40 ft with btb   Lateral tap downs 2 x 10 reps on 4" step,   Forward step ups 6" 2 x 10 reps       Upright bike x 10 min full revolutions seat 13  Hamstring stretches on step 3 x 30"     Wall sits 30 sec x 3   U HR  3 x 10  prone hang 3# for 3 min   Prone knee flex strap 2 min      Neuromuscular re-education for coordination, balance, motor control x 45 min:       Performed blood flow restriction with 135 mmHg (80% of LOP) on R extremity with Sarahi Unit and skin protectant sleeve. Patient screened for any precautions or contraindications prior to its use and continuously monitored for any adverse events. Patient given 30 sec rest breaks between sets and 1 min rest break between exercise     30 reps/15 reps/15reps/15 reps:    Single leg shuttle with 50# (2 black bands)  Side lying hip abd with 3#  SAQ with 3#  LAQ with 3#  Mini squats     Without BFR:  U  Bridge march  - 3 x 10 reps     Drinking bird 2 x 15 reps with UE support  1/2 star taps x 10 reps     U squat 3 x 10 - ed to use theraband in a couple weeks as julia for resistance  SLS on foam 3 x 30"  rebounder toss SLS on foam 3 x 30        Evan received the following manual therapy techniques: Joint mobilizations were applied to the: patallar and R hamstring for 5 minutes, including:  Stick hamstrings and R IT band  stm with the Stick to R Gastroc  IASTM / scrapping quads and IT band   Cross friction distal quad  All planes patella mob gr 2-3 supine  Grades II and III tibial mobilizations to increase flexion   " "  Reapplied 2 "I" strips to R knee with single decompressive "I" strip to decrease anterior knee pain     Pt received 10 min cold pack to R knee with towel to protect skin.       Home Exercises Provided and Patient Education Provided      Education provided:   No new HEP given today     Written Home Exercises Provided: Patient instructed to cont prior HEP.  Exercises were reviewed and Evan was able to demonstrate them prior to the end of the session.  Evan demonstrated good  understanding of the education provided.      See EMR under Patient Instructions for exercises provided prior visit.        Assessment   Increased resistance on shuttle today. R hip weakness noted with single leg squats resulting in knee instability.     Evan Is progressing well towards his goals.   Pt prognosis is Good.      Pt will continue to benefit from skilled outpatient physical therapy to address the deficits listed in the problem list box on initial evaluation, provide pt/family education and to maximize pt's level of independence in the home and community environment.      Pt's spiritual, cultural and educational needs considered and pt agreeable to plan of care and goals.     Anticipated barriers to physical therapy: none     Goals:  Short Term Goals (6 Weeks):   1. Patient to have full extension Right (equal to left) for normalized gait pattern  met  2. Patient to have 120 degrees of passive left knee flexion for improved performance of ADL's such as sit<>stand transfers  met  3. Patient to have decreased edema left knee   met  4. Patient to report 2/10 pain or less in left knee with ambulating community distances   met  5. Patient to be compliant with home program 5x's a week as noted by proper demonstration of exercises to therapist for improved management of condition   met  6. Patient to ambulate full weight bearing Right lower extremity and normal gait pattern     met     Long Term Goals   -(12 Weeks):   1. Patient to have " 4+/5 or greater strength in RLE for functional performance of ADL's such as lifting  Progressing, not met2. Patient to descend one flight of stairs with alternating gait pattern without use of handrail and with proper LE alignment  3. Patient to have 135 degrees or greater Right knee flexion for return to ADL's including squatting   met  -(36 weeks):   4. Patient to have decreased subjective report of disability as noted by <20% limitation on FOTO knee questionnaire Progressing, not met  5. Patient to perform single leg squat without assistance and without valgus collapse to improve participation in recreational  Progressing, not met  6. Patient's RLE strength to be 5/5 for return to recreational activities and sport Progressing, not met     PLAN   Continue with BFR, proprioceptive training, and LE strengthening.        Luis Doherty, PT

## 2022-09-13 ENCOUNTER — CLINICAL SUPPORT (OUTPATIENT)
Dept: REHABILITATION | Facility: OTHER | Age: 62
End: 2022-09-13
Payer: COMMERCIAL

## 2022-09-13 DIAGNOSIS — M25.661 DECREASED RANGE OF MOTION (ROM) OF RIGHT KNEE: Primary | ICD-10-CM

## 2022-09-13 DIAGNOSIS — R29.898 WEAKNESS OF BOTH LOWER EXTREMITIES: ICD-10-CM

## 2022-09-13 DIAGNOSIS — R26.89 IMPAIRED GAIT AND MOBILITY: ICD-10-CM

## 2022-09-13 DIAGNOSIS — Z47.89 AFTERCARE FOR ANTERIOR CRUCIATE LIGAMENT (ACL) REPAIR: ICD-10-CM

## 2022-09-13 DIAGNOSIS — R29.898 WEAKNESS OF RIGHT LOWER EXTREMITY: ICD-10-CM

## 2022-09-13 PROCEDURE — 97112 NEUROMUSCULAR REEDUCATION: CPT | Mod: PN

## 2022-09-13 PROCEDURE — 97110 THERAPEUTIC EXERCISES: CPT | Mod: PN

## 2022-09-15 ENCOUNTER — PATIENT MESSAGE (OUTPATIENT)
Dept: REHABILITATION | Facility: OTHER | Age: 62
End: 2022-09-15
Payer: COMMERCIAL

## 2022-09-16 ENCOUNTER — CLINICAL SUPPORT (OUTPATIENT)
Dept: REHABILITATION | Facility: OTHER | Age: 62
End: 2022-09-16
Payer: COMMERCIAL

## 2022-09-16 DIAGNOSIS — R26.89 IMPAIRED GAIT AND MOBILITY: ICD-10-CM

## 2022-09-16 DIAGNOSIS — R29.898 WEAKNESS OF BOTH LOWER EXTREMITIES: ICD-10-CM

## 2022-09-16 DIAGNOSIS — M25.661 DECREASED RANGE OF MOTION (ROM) OF RIGHT KNEE: Primary | ICD-10-CM

## 2022-09-16 DIAGNOSIS — R29.898 WEAKNESS OF RIGHT LOWER EXTREMITY: ICD-10-CM

## 2022-09-16 DIAGNOSIS — Z47.89 AFTERCARE FOR ANTERIOR CRUCIATE LIGAMENT (ACL) REPAIR: ICD-10-CM

## 2022-09-16 PROCEDURE — 97110 THERAPEUTIC EXERCISES: CPT | Mod: PN

## 2022-09-16 PROCEDURE — 97112 NEUROMUSCULAR REEDUCATION: CPT | Mod: PN

## 2022-09-16 NOTE — PROGRESS NOTES
[facility]   Physical Therapy Treatment Note      Name: Evan Khoury  Clinic Number: [id number]     Therapy Diagnosis:   1. Decreased range of motion (ROM) of right knee        2. Impaired gait and mobility        3. Weakness of right lower extremity        4. Weakness of both lower extremities        5. Aftercare for anterior cruciate ligament (ACL) repair                    Physician: Luis Watkins, *     Visit Date: 8/30/2022  Physician Orders: PT Eval and Treat   1. Right knee ACL reconstruction with hamstring autograft   2. Right knee arthroscopic menisectomy versus meniscus repair   3. Right knee arthroscopic chondroplasty   4. Right knee arthroscopic synovectomy  Surgery Date: 3/8/2022  Medical Diagnosis from Referral: S83.511A (ICD-10-CM) - Complete tear of anterior cruciate ligament of right knee, initial encounter  Evaluation Date: 3/10/2022  Surgical Date: 3/8/3033  Authorization Period Expiration: 12/31/2022  Plan of Care Expiration: 12/31/2022  Visit # / Visits authorized: 34/40 (no limit)  FOTO: 9/16/2022  1/5   PTA:  0/5      Time In: 9:25 am  Time Out: 10:45 am  Total Billable Time:  75 minutes     Precautions: Standard        Subjective       Pt states some sessions the BFR is more uncomfortable than other days.     He was more compliant with home exercise program.  Response to previous treatment: no adverse effects  Functional change: no change reported today     Pain: 0/10, 3/10 at worst  Location: right knee infrapatellar region     Objective      CMS Impairment/Limitation/Restriction for FOTO Knee Survey     Therapist reviewed FOTO scores for Evan Khoury on 9/16/2022.   FOTO documents entered into [employer] - see Media section.     Limitation Score: 34%  Category: Mobility     Current : CJ = at least 20% but < 40% impaired, limited or restricted  Goal: CJ = at least 20% but < 40% impaired, limited or restricted      9/16/2022:  R 75.3   L 98.4    "  8/10/2022  MicroFET:  R 53.8 with pain  L 73.0     8/10/2022  R knee AROM: 6 - 0 - 150 deg     Evan received therapeutic exercises to develop strength, endurance, ROM, flexibility, posture and core stabilization for 25 minutes including:      SL squat 2 x 10 reps with UE support     gastroc stretch on incline x 90"  Soleus stretch on incline x 90"     U leg press 37.5#  3 x 10 emphasize eccentrics  Side lying shuttle 3 x 10 reps with 25#     Side steps 6 x 40 ft with btb  Monster walks  6 x 40 ft with btb   Lateral tap downs 2 x 10 reps on 4" step,   Forward step ups 6" 2 x 10 reps       Upright bike x 10 min full revolutions seat 13  Hamstring stretches on step 3 x 30"     Wall sits 30 sec x 3   U HR  3 x 10  prone hang 3# for 3 min   Prone knee flex strap 2 min      Neuromuscular re-education for coordination, balance, motor control x 45 min:       Performed blood flow restriction with 143 mmHg (80% of LOP) on R extremity with Sarahi Unit and skin protectant sleeve. Patient screened for any precautions or contraindications prior to its use and continuously monitored for any adverse events. Patient given 30 sec rest breaks between sets and 1 min rest break between exercise     30 reps/15 reps/15reps/15 reps:    Single leg shuttle with 50# (2 black bands)  Side lying hip abd with 3#  SAQ with 3#  LAQ with 3#  Mini squats     Without BFR:  U  Bridge march  - 3 x 10 reps     Drinking bird 2 x 15 reps with UE support  1/2 star taps x 10 reps     U squat 3 x 10 - ed to use theraband in a couple weeks as julia for resistance  SLS on foam 3 x 30"  rebounder toss SLS on foam 3 x 30        Evan received the following manual therapy techniques: Joint mobilizations were applied to the: patallar and R hamstring for 5 minutes, including:  Stick hamstrings and R IT band  stm with the Stick to R Gastroc  IASTM / scrapping quads and IT band   Cross friction distal quad  All planes patella mob gr 2-3 supine  Grades II and III " "tibial mobilizations to increase flexion     Reapplied 2 "I" strips to R knee with single decompressive "I" strip to decrease anterior knee pain     Pt received 10 min cold pack to R knee with towel to protect skin.       Home Exercises Provided and Patient Education Provided      Education provided:   No new HEP given today     Written Home Exercises Provided: Patient instructed to cont prior HEP.  Exercises were reviewed and Evan was able to demonstrate them prior to the end of the session.  Evan demonstrated good  understanding of the education provided.      See EMR under Patient Instructions for exercises provided prior visit.        Assessment   Pain in R knee with strength testing today. More discomfort today with BFR than last session.     Evan Is progressing well towards his goals.   Pt prognosis is Good.      Pt will continue to benefit from skilled outpatient physical therapy to address the deficits listed in the problem list box on initial evaluation, provide pt/family education and to maximize pt's level of independence in the home and community environment.      Pt's spiritual, cultural and educational needs considered and pt agreeable to plan of care and goals.     Anticipated barriers to physical therapy: none     Goals:  Short Term Goals (6 Weeks):   1. Patient to have full extension Right (equal to left) for normalized gait pattern  met  2. Patient to have 120 degrees of passive left knee flexion for improved performance of ADL's such as sit<>stand transfers  met  3. Patient to have decreased edema left knee   met  4. Patient to report 2/10 pain or less in left knee with ambulating community distances   met  5. Patient to be compliant with home program 5x's a week as noted by proper demonstration of exercises to therapist for improved management of condition   met  6. Patient to ambulate full weight bearing Right lower extremity and normal gait pattern     met     Long Term Goals   -(12 Weeks): "   1. Patient to have 4+/5 or greater strength in RLE for functional performance of ADL's such as lifting  Progressing, not met2. Patient to descend one flight of stairs with alternating gait pattern without use of handrail and with proper LE alignment  3. Patient to have 135 degrees or greater Right knee flexion for return to ADL's including squatting   met  -(36 weeks):   4. Patient to have decreased subjective report of disability as noted by <20% limitation on FOTO knee questionnaire Progressing, not met  5. Patient to perform single leg squat without assistance and without valgus collapse to improve participation in recreational  Progressing, not met  6. Patient's RLE strength to be 5/5 for return to recreational activities and sport Progressing, not met     PLAN   Continue with BFR, proprioceptive training, and LE strengthening.        Luis Doherty, PT

## 2022-09-21 ENCOUNTER — CLINICAL SUPPORT (OUTPATIENT)
Dept: REHABILITATION | Facility: OTHER | Age: 62
End: 2022-09-21
Payer: COMMERCIAL

## 2022-09-21 DIAGNOSIS — R29.898 WEAKNESS OF BOTH LOWER EXTREMITIES: ICD-10-CM

## 2022-09-21 DIAGNOSIS — Z47.89 AFTERCARE FOR ANTERIOR CRUCIATE LIGAMENT (ACL) REPAIR: ICD-10-CM

## 2022-09-21 DIAGNOSIS — M25.661 DECREASED RANGE OF MOTION (ROM) OF RIGHT KNEE: Primary | ICD-10-CM

## 2022-09-21 DIAGNOSIS — R26.89 IMPAIRED GAIT AND MOBILITY: ICD-10-CM

## 2022-09-21 DIAGNOSIS — R29.898 WEAKNESS OF RIGHT LOWER EXTREMITY: ICD-10-CM

## 2022-09-21 PROCEDURE — 97110 THERAPEUTIC EXERCISES: CPT | Mod: PN | Performed by: PHYSICAL THERAPIST

## 2022-09-21 NOTE — PROGRESS NOTES
[facility]   Physical Therapy Treatment Note      Name: Evan Khoury  Clinic Number: [id number]     Therapy Diagnosis:   1. Decreased range of motion (ROM) of right knee        2. Impaired gait and mobility        3. Weakness of right lower extremity        4. Weakness of both lower extremities        5. Aftercare for anterior cruciate ligament (ACL) repair            Physician: Luis Watkins, Jennifer     Visit Date: 8/30/2022  Physician Orders: PT Eval and Treat   1. Right knee ACL reconstruction with hamstring autograft   2. Right knee arthroscopic menisectomy versus meniscus repair   3. Right knee arthroscopic chondroplasty   4. Right knee arthroscopic synovectomy  Surgery Date: 3/8/2022  Medical Diagnosis from Referral: S83.511A (ICD-10-CM) - Complete tear of anterior cruciate ligament of right knee, initial encounter  Evaluation Date: 3/10/2022  Surgical Date: 3/8/3033  Authorization Period Expiration: 12/31/2022  Plan of Care Expiration: 12/31/2022  Visit # / Visits authorized: 34/40 (no limit)  FOTO: 9/16/2022  1/5   PTA:  0/5      Time In: 8:20 am  Time Out: 09:30 am  Total Billable Time:  60 minutes     Precautions: Standard        Subjective       Pt states he has been more claustrophobic in the BFR lately. Reports no current pain and minimal front of the knee when he does have pain. Will be leaving out of town for 3 weeks soon     He was more compliant with home exercise program.  Response to previous treatment: no adverse effects  Functional change: no change reported today     Pain: 0/10, 3/10 at worst  Location: right knee infrapatellar region     Objective      CMS Impairment/Limitation/Restriction for FOTO Knee Survey     Therapist reviewed FOTO scores for Evan Khoury on 9/16/2022.   FOTO documents entered into [employer] - see Media section.     Limitation Score: 34%  Category: Mobility     Current : CJ = at least 20% but < 40% impaired, limited or restricted  Goal: CJ = at  "least 20% but < 40% impaired, limited or restricted      9/21/2022    Effusion: zero    Functional alignment: single limb squat/ step down from 4" box    L: 54 sec  R: 53 sec    Dynamic valgus collapse and anterior knee pain B    Single limb bridges: L: 16 x, R 15 x : 93%    Calf raises: L: 26, R: 26 ; 100%    Single leg press 100# from 90 deg 30 x ea--> will test 1.5x body weight at later date    Balance:    EO:  L: 47 sec  R: 60 sec    EC:  L: 6 sec  R: 3 sec    Anterior reach Y balance:    L: 69 cm  R: 67.5 cm    9/16/2022:  R 75.3   L 98.4     8/10/2022  MicroFET:  R 53.8 with pain  L 73.0     8/10/2022  R knee AROM: 6 - 0 - 150 deg     Evan received therapeutic exercises to develop strength, endurance, ROM, flexibility, posture and core stabilization for 55 minutes including:      SL squat from low box x30 reps ea with UE support     gastroc stretch on incline x 90"  Soleus stretch on incline x 90"     U leg press 100#  x30   Side lying shuttle 3 x 10 reps with 25#     Side steps 6 x 40 ft with btb  Monster walks  6 x 40 ft with btb   Lateral tap downs 2 x 10 reps on 4" step,   Forward step ups 6" 2 x 10 reps       Upright bike x 5 min full revolutions seat 13, L3 hills program  Hamstring stretches on step 3 x 30"     Wall sits 30 sec x 3   U HR  x 26 ea max reps to faitgue  prone hang 3# for 3 min   Prone knee flex strap 2 min      U  Bridge from box - x16 L, x15 R reps- max set to fatigue and cramping reported L hamstrings      Drinking bird 2 x 15 reps with UE support  1/2 star taps x 10 reps     U squat 3 x 10   SLS on foam 3 x 30"  rebounder toss SLS on foam 3 x 30        Evan received the following manual therapy techniques: Joint mobilizations were applied to the: patallar and R hamstring for 5 minutes, including:  Stick hamstrings and R IT band  stm with the Stick to R Gastroc/ hamstrings   IASTM / scrapping quads and IT band   Cross friction distal quad  All planes patella mob gr 2-3 supine  Grades " "II and III tibial mobilizations to increase flexion     Reapplied 2 "I" strips to R knee with single decompressive "I" strip to decrease anterior knee pain     Pt received 10 min cold pack to R knee with towel to protect skin.       Home Exercises Provided and Patient Education Provided      Education provided:   No new HEP given today     Written Home Exercises Provided: Patient instructed to cont prior HEP.  Exercises were reviewed and Evan was able to demonstrate them prior to the end of the session.  Evan demonstrated good  understanding of the education provided.      See EMR under Patient Instructions for exercises provided prior visit.        Assessment   Evan presents to PT 6 months s/p ACL recon. Functional testing assessing neuromuscular control, balance, and strength compared to non-operative side. Patient's strength >85% uninvolved side with testing. Patient able to tolerate heavy resistance strength training for muscle hypertrophy and discontinued BFR this visit. Will continue working on single limb squat to depth of at least 60 degrees without valgus collapse or pain prior to return to run progression.      Evan Is progressing well towards his goals.   Pt prognosis is Good.      Pt will continue to benefit from skilled outpatient physical therapy to address the deficits listed in the problem list box on initial evaluation, provide pt/family education and to maximize pt's level of independence in the home and community environment.      Pt's spiritual, cultural and educational needs considered and pt agreeable to plan of care and goals.     Anticipated barriers to physical therapy: none     Goals:  Short Term Goals (6 Weeks):   1. Patient to have full extension Right (equal to left) for normalized gait pattern  met  2. Patient to have 120 degrees of passive left knee flexion for improved performance of ADL's such as sit<>stand transfers  met  3. Patient to have decreased edema left knee   met  4. " Patient to report 2/10 pain or less in left knee with ambulating community distances   met  5. Patient to be compliant with home program 5x's a week as noted by proper demonstration of exercises to therapist for improved management of condition   met  6. Patient to ambulate full weight bearing Right lower extremity and normal gait pattern     met     Long Term Goals   -(12 Weeks):   1. Patient to have 4+/5 or greater strength in RLE for functional performance of ADL's such as lifting  met  2. Patient to descend one flight of stairs with alternating gait pattern without use of handrail and with proper LE alignment- met  3. Patient to have 135 degrees or greater Right knee flexion for return to ADL's including squatting   met  -(36 weeks):   4. Patient to have decreased subjective report of disability as noted by <20% limitation on FOTO knee questionnaire Progressing, not met  5. Patient to perform single leg squat without assistance and without valgus collapse to improve participation in recreational  Progressing, not met  6. Patient's RLE strength to be 5/5 for return to recreational activities and sport Progressing, not met     PLAN     Hold BFR and initiated heavy resistance training and intermittent return to jogging program when tolerated.         Yuliana Bullard, PT

## 2022-09-22 NOTE — PROGRESS NOTES
[facility]   Physical Therapy Treatment Note      Name: Evan Khoury  Clinic Number: [id number]     Therapy Diagnosis:   1. Decreased range of motion (ROM) of right knee        2. Impaired gait and mobility        3. Weakness of right lower extremity        4. Weakness of both lower extremities        5. Aftercare for anterior cruciate ligament (ACL) repair              Physician: Luis Watkins, Jennifer     Visit Date: 8/30/2022  Physician Orders: PT Eval and Treat   1. Right knee ACL reconstruction with hamstring autograft   2. Right knee arthroscopic menisectomy versus meniscus repair   3. Right knee arthroscopic chondroplasty   4. Right knee arthroscopic synovectomy  Surgery Date: 3/8/2022  Medical Diagnosis from Referral: S83.511A (ICD-10-CM) - Complete tear of anterior cruciate ligament of right knee, initial encounter  Evaluation Date: 3/10/2022  Surgical Date: 3/8/3033  Authorization Period Expiration: 12/31/2022  Plan of Care Expiration: 12/31/2022  Visit # / Visits authorized: 35/40 (no limit)  FOTO: 9/16/2022  135   PTA:  0/5      Time In: 8:30 am  Time Out: 9:35 am  Total Billable Time:  45 minutes     Precautions: Standard        Subjective       Pt states he saw some low level anterior knee pain last night     He was more compliant with home exercise program.  Response to previous treatment: good workout  Functional change: no change reported today     Pain: 1/10, 3/10 at worst  Location: right knee infrapatellar region     Objective      CMS Impairment/Limitation/Restriction for FOTO Knee Survey     Therapist reviewed FOTO scores for Evan Khoury on 9/16/2022.   FOTO documents entered into [employer] - see Media section.     Limitation Score: 34%  Category: Mobility     Current : CJ = at least 20% but < 40% impaired, limited or restricted  Goal: CJ = at least 20% but < 40% impaired, limited or restricted      9/21/2022    Effusion: zero    Functional alignment: single limb  "squat/ step down from 4" box    L: 54 sec  R: 53 sec    Dynamic valgus collapse and anterior knee pain B    Single limb bridges: L: 16 x, R 15 x : 93%    Calf raises: L: 26, R: 26 ; 100%    Single leg press 100# from 90 deg 30 x ea--> will test 1.5x body weight at later date    Balance:    EO:  L: 47 sec  R: 60 sec    EC:  L: 6 sec  R: 3 sec    Anterior reach Y balance:    L: 69 cm  R: 67.5 cm    9/16/2022:  R 75.3   L 98.4      Evan received therapeutic exercises to develop strength, endurance, ROM, flexibility, posture and core stabilization for 42 minutes including:      Shuttle 3 x 10 reps with 5 black bands  Shuttle single leg 3 x 10 reps with 3 black bands, 1 red band  Side lying shuttle 3 x 10 reps with 2 black bands    SL squat from low box x 30 reps ea with UE support    +Nordic hamstring curl x 10 reps       gastroc stretch on incline x 90"  Soleus stretch on incline x 90"       Side steps 6 x 40 ft with btb  Monster walks  6 x 40 ft with btb   Lateral tap downs 2 x 10 reps on 4" step,   Forward step ups 6" 2 x 10 reps       Upright bike x 7 min full revolutions seat 13, L4 hills program  Hamstring stretches on step 3 x 30"     Wall sits 30 sec x 3   U HR  x 26 ea max reps to faitgue  prone hang 3# for 3 min   Prone knee flex strap 2 min      U  Bridge from box 2 x 10 reps     Drinking bird 2 x 15 reps with UE support  1/2 star taps x 10 reps       SLS on foam 3 x 30"  rebounder toss SLS on foam 3 x 30        Evan received the following manual therapy techniques: Joint mobilizations were applied to the: patallar and R hamstring for 10 minutes, including:    stm with the Stick to R Gastroc/ hamstrings   IASTM / scrapping quads and IT band   Cross friction distal quad  All planes patella mob gr 2-3 supine  Grades II and III tibial mobilizations to increase flexion     Reapplied 2 "I" strips to R knee with single decompressive "I" strip to decrease anterior knee pain     Pt received 10 min cold pack to R " knee with towel to protect skin.       Home Exercises Provided and Patient Education Provided      Education provided:   No new HEP given today     Written Home Exercises Provided: Patient instructed to cont prior HEP.  Exercises were reviewed and Evan was able to demonstrate them prior to the end of the session.  Evan demonstrated good  understanding of the education provided.      See EMR under Patient Instructions for exercises provided prior visit.        Assessment   PT provided CGA with SL squats today. PT also braced LEs for Nordic hamstring curls. Experienced B hamstring cramping with Nordic curls. Patient unable to go forward much with curls.      Evan Is progressing well towards his goals.   Pt prognosis is Good.      Pt will continue to benefit from skilled outpatient physical therapy to address the deficits listed in the problem list box on initial evaluation, provide pt/family education and to maximize pt's level of independence in the home and community environment.      Pt's spiritual, cultural and educational needs considered and pt agreeable to plan of care and goals.     Anticipated barriers to physical therapy: none     Goals:  Short Term Goals (6 Weeks):   1. Patient to have full extension Right (equal to left) for normalized gait pattern  met  2. Patient to have 120 degrees of passive left knee flexion for improved performance of ADL's such as sit<>stand transfers  met  3. Patient to have decreased edema left knee   met  4. Patient to report 2/10 pain or less in left knee with ambulating community distances   met  5. Patient to be compliant with home program 5x's a week as noted by proper demonstration of exercises to therapist for improved management of condition   met  6. Patient to ambulate full weight bearing Right lower extremity and normal gait pattern     met     Long Term Goals   -(12 Weeks):   1. Patient to have 4+/5 or greater strength in RLE for functional performance of ADL's such  as lifting  met  2. Patient to descend one flight of stairs with alternating gait pattern without use of handrail and with proper LE alignment- met  3. Patient to have 135 degrees or greater Right knee flexion for return to ADL's including squatting   met  -(36 weeks):   4. Patient to have decreased subjective report of disability as noted by <20% limitation on FOTO knee questionnaire Progressing, not met  5. Patient to perform single leg squat without assistance and without valgus collapse to improve participation in recreational  Progressing, not met  6. Patient's RLE strength to be 5/5 for return to recreational activities and sport Progressing, not met     PLAN     Hold BFR and initiated heavy resistance training and intermittent return to jogging program when tolerated.         Luis Doherty, PT

## 2022-09-23 ENCOUNTER — CLINICAL SUPPORT (OUTPATIENT)
Dept: REHABILITATION | Facility: OTHER | Age: 62
End: 2022-09-23
Payer: COMMERCIAL

## 2022-09-23 DIAGNOSIS — Z47.89 AFTERCARE FOR ANTERIOR CRUCIATE LIGAMENT (ACL) REPAIR: ICD-10-CM

## 2022-09-23 DIAGNOSIS — R29.898 WEAKNESS OF RIGHT LOWER EXTREMITY: ICD-10-CM

## 2022-09-23 DIAGNOSIS — M25.661 DECREASED RANGE OF MOTION (ROM) OF RIGHT KNEE: Primary | ICD-10-CM

## 2022-09-23 DIAGNOSIS — R29.898 WEAKNESS OF BOTH LOWER EXTREMITIES: ICD-10-CM

## 2022-09-23 DIAGNOSIS — R26.89 IMPAIRED GAIT AND MOBILITY: ICD-10-CM

## 2022-09-23 PROCEDURE — 97140 MANUAL THERAPY 1/> REGIONS: CPT | Mod: PN

## 2022-09-23 PROCEDURE — 97110 THERAPEUTIC EXERCISES: CPT | Mod: PN

## 2022-09-26 ENCOUNTER — CLINICAL SUPPORT (OUTPATIENT)
Dept: REHABILITATION | Facility: OTHER | Age: 62
End: 2022-09-26
Payer: COMMERCIAL

## 2022-09-26 DIAGNOSIS — R26.89 IMPAIRED GAIT AND MOBILITY: ICD-10-CM

## 2022-09-26 DIAGNOSIS — R29.898 WEAKNESS OF BOTH LOWER EXTREMITIES: ICD-10-CM

## 2022-09-26 DIAGNOSIS — Z47.89 AFTERCARE FOR ANTERIOR CRUCIATE LIGAMENT (ACL) REPAIR: ICD-10-CM

## 2022-09-26 DIAGNOSIS — M25.661 DECREASED RANGE OF MOTION (ROM) OF RIGHT KNEE: Primary | ICD-10-CM

## 2022-09-26 DIAGNOSIS — R29.898 WEAKNESS OF RIGHT LOWER EXTREMITY: ICD-10-CM

## 2022-09-26 PROCEDURE — 97110 THERAPEUTIC EXERCISES: CPT | Mod: PN

## 2022-09-26 NOTE — PROGRESS NOTES
"[facility]   Physical Therapy Treatment Note      Name: Evan Khoury  Clinic Number: [id number]     Therapy Diagnosis:   1. Decreased range of motion (ROM) of right knee        2. Impaired gait and mobility        3. Weakness of right lower extremity        4. Weakness of both lower extremities        5. Aftercare for anterior cruciate ligament (ACL) repair                Physician: Luis Watkins, Jennifer     Visit Date: 8/30/2022  Physician Orders: PT Eval and Treat   1. Right knee ACL reconstruction with hamstring autograft   2. Right knee arthroscopic menisectomy versus meniscus repair   3. Right knee arthroscopic chondroplasty   4. Right knee arthroscopic synovectomy  Surgery Date: 3/8/2022  Medical Diagnosis from Referral: S83.511A (ICD-10-CM) - Complete tear of anterior cruciate ligament of right knee, initial encounter  Evaluation Date: 3/10/2022  Surgical Date: 3/8/3033  Authorization Period Expiration: 12/31/2022  Plan of Care Expiration: 12/31/2022  Visit # / Visits authorized: [id number] (no limit)  FOTO: 9/16/2022  4/5   PTA:  0/5      Time In: 8:50 am  Time Out: 9:30 am  Total Billable Time:  40 minutes     Precautions: Standard        Subjective       Pt denies pain.     He was more compliant with home exercise program.  Response to previous treatment: dis have some knee soreness, but very minimal.  Functional change: no change reported today     Pain: 0/10  Location: right knee infrapatellar region     Objective      CMS Impairment/Limitation/Restriction for FOTO Knee Survey     Therapist reviewed FOTO scores for Evan Khoury on 9/16/2022.   FOTO documents entered into [employer] - see Media section.     Limitation Score: 34%  Category: Mobility     Current : CJ = at least 20% but < 40% impaired, limited or restricted  Goal: CJ = at least 20% but < 40% impaired, limited or restricted      9/21/2022    Effusion: zero    Functional alignment: single limb squat/ step down from 4" " "box    L: 54 sec  R: 53 sec    Dynamic valgus collapse and anterior knee pain B    Single limb bridges: L: 16 x, R 15 x : 93%    Calf raises: L: 26, R: 26 ; 100%    Single leg press 100# from 90 deg 30 x ea--> will test 1.5x body weight at later date    Balance:    EO:  L: 47 sec  R: 60 sec    EC:  L: 6 sec  R: 3 sec    Anterior reach Y balance:    L: 69 cm  R: 67.5 cm    9/16/2022:  R 75.3   L 98.4      Evan received therapeutic exercises to develop strength, endurance, ROM, flexibility, posture and core stabilization for  minutes including:      Shuttle 3 x 10 reps with 5 black bands  Shuttle single leg 3 x 10 reps with 3 black bands  Side lying shuttle 3 x 10 reps with 2 black bands    SL squat from low box x 30 reps ea with UE support    Nordic hamstring curl x 10 reps       gastroc stretch on incline x 90"  Soleus stretch on incline x 90"       Side steps 6 x 40 ft with btb  Monster walks  6 x 40 ft with btb   Lateral tap downs 2 x 10 reps on 4" step,   Forward step ups 6" 2 x 10 reps       Upright bike x 7 min full revolutions seat 13, L4 hills program  Hamstring stretches on step 3 x 30"     Wall sits 30 sec x 3   U HR  x 26 ea max reps to faitgue  prone hang 3# for 3 min   Prone knee flex strap 2 min      U  Bridge from box 2 x 10 reps     Drinking bird 2 x 15 reps with UE support  1/2 star taps x 10 reps       SLS on foam 3 x 30"  rebounder toss SLS on foam 3 x 30        Evan received the following manual therapy techniques: Joint mobilizations were applied to the: patallar and R hamstring for 10 minutes, including:    stm with the Stick to R Gastroc/ hamstrings. R IT band   IASTM / scrapping quads and IT band   Cross friction distal quad  All planes patella mob gr 2-3 supine  Grades II and III tibial mobilizations to increase flexion     Reapplied 2"I" strips to R IT band with two decompressive "I" strip to decrease anterior knee pain     Pt received 00 min cold pack to R knee with towel to protect skin. "       Home Exercises Provided and Patient Education Provided      Education provided:   No new HEP given today     Written Home Exercises Provided: Patient instructed to cont prior HEP.  Exercises were reviewed and Evan was able to demonstrate them prior to the end of the session.  Evan demonstrated good  understanding of the education provided.      See EMR under Patient Instructions for exercises provided prior visit.        Assessment   Increased tightness and tender to palpation in R IT band. Applied KT tape to decrease IT tension. Struggled with resistance for shuttle today. Lowered resistance for single leg today. Has one more visit prior to his going out of town.     Evan Is progressing well towards his goals.   Pt prognosis is Good.      Pt will continue to benefit from skilled outpatient physical therapy to address the deficits listed in the problem list box on initial evaluation, provide pt/family education and to maximize pt's level of independence in the home and community environment.      Pt's spiritual, cultural and educational needs considered and pt agreeable to plan of care and goals.     Anticipated barriers to physical therapy: none     Goals:  Short Term Goals (6 Weeks):   1. Patient to have full extension Right (equal to left) for normalized gait pattern  met  2. Patient to have 120 degrees of passive left knee flexion for improved performance of ADL's such as sit<>stand transfers  met  3. Patient to have decreased edema left knee   met  4. Patient to report 2/10 pain or less in left knee with ambulating community distances   met  5. Patient to be compliant with home program 5x's a week as noted by proper demonstration of exercises to therapist for improved management of condition   met  6. Patient to ambulate full weight bearing Right lower extremity and normal gait pattern     met     Long Term Goals   -(12 Weeks):   1. Patient to have 4+/5 or greater strength in RLE for functional  performance of ADL's such as lifting  met  2. Patient to descend one flight of stairs with alternating gait pattern without use of handrail and with proper LE alignment- met  3. Patient to have 135 degrees or greater Right knee flexion for return to ADL's including squatting   met  -(36 weeks):   4. Patient to have decreased subjective report of disability as noted by <20% limitation on FOTO knee questionnaire Progressing, not met  5. Patient to perform single leg squat without assistance and without valgus collapse to improve participation in recreational  Progressing, not met  6. Patient's RLE strength to be 5/5 for return to recreational activities and sport Progressing, not met     PLAN     Hold BFR and initiated heavy resistance training and intermittent return to jogging program when tolerated.         Luis Doherty, PT

## 2022-09-28 ENCOUNTER — CLINICAL SUPPORT (OUTPATIENT)
Dept: REHABILITATION | Facility: OTHER | Age: 62
End: 2022-09-28
Payer: COMMERCIAL

## 2022-09-28 DIAGNOSIS — R29.898 WEAKNESS OF RIGHT LOWER EXTREMITY: ICD-10-CM

## 2022-09-28 DIAGNOSIS — M25.661 DECREASED RANGE OF MOTION (ROM) OF RIGHT KNEE: Primary | ICD-10-CM

## 2022-09-28 DIAGNOSIS — R26.89 IMPAIRED GAIT AND MOBILITY: ICD-10-CM

## 2022-09-28 DIAGNOSIS — R29.898 WEAKNESS OF BOTH LOWER EXTREMITIES: ICD-10-CM

## 2022-09-28 DIAGNOSIS — Z47.89 AFTERCARE FOR ANTERIOR CRUCIATE LIGAMENT (ACL) REPAIR: ICD-10-CM

## 2022-09-28 PROCEDURE — 97140 MANUAL THERAPY 1/> REGIONS: CPT | Mod: PN | Performed by: PHYSICAL THERAPIST

## 2022-09-28 PROCEDURE — 97112 NEUROMUSCULAR REEDUCATION: CPT | Mod: PN | Performed by: PHYSICAL THERAPIST

## 2022-09-28 PROCEDURE — 97110 THERAPEUTIC EXERCISES: CPT | Mod: PN | Performed by: PHYSICAL THERAPIST

## 2022-09-28 NOTE — PROGRESS NOTES
[facility]   Physical Therapy Treatment Note      Name: Evan Khoury  Clinic Number: [id number]     Therapy Diagnosis:   1. Decreased range of motion (ROM) of right knee        2. Impaired gait and mobility        3. Weakness of right lower extremity        4. Weakness of both lower extremities        5. Aftercare for anterior cruciate ligament (ACL) repair                Physician: Luis Watkins, *     Visit Date: 8/30/2022  Physician Orders: PT Eval and Treat   1. Right knee ACL reconstruction with hamstring autograft   2. Right knee arthroscopic menisectomy versus meniscus repair   3. Right knee arthroscopic chondroplasty   4. Right knee arthroscopic synovectomy  Surgery Date: 3/8/2022  Medical Diagnosis from Referral: S83.511A (ICD-10-CM) - Complete tear of anterior cruciate ligament of right knee, initial encounter  Evaluation Date: 3/10/2022  Surgical Date: 3/8/3033  Authorization Period Expiration: 12/31/2022  Plan of Care Expiration: 12/31/2022  Visit # / Visits authorized: [id number] (no limit)  FOTO: 9/16/2022  5/5 - issued next visit  PTA:  0/5      Time In: 8:25 am  Time Out: 9:30 am  Total Billable Time:  55 minutes     Precautions: Standard        Subjective       Pt reports the rolling pin and taping really helped for tightness R knee/ IT band area. Denies since last visit. More of a tightness. Will be leaving for work trip Friday.      He was more compliant with home exercise program.  Response to previous treatment: great session  Functional change: no change reported today     Pain: 0/10  Location: right knee infrapatellar region     Objective      CMS Impairment/Limitation/Restriction for FOTO Knee Survey     Therapist reviewed FOTO scores for Evan Khoury on 9/16/2022.   FOTO documents entered into [employer] - see Media section.     Limitation Score: 34%  Category: Mobility     Current : CJ = at least 20% but < 40% impaired, limited or restricted  Goal: CJ = at least 20%  "but < 40% impaired, limited or restricted      9/21/2022    Effusion: zero    Functional alignment: single limb squat/ step down from 4" box    L: 54 sec  R: 53 sec    Dynamic valgus collapse and anterior knee pain B    Single limb bridges: L: 16 x, R 15 x : 93%    Calf raises: L: 26, R: 26 ; 100%    Single leg press 100# from 90 deg 30 x ea--> will test 1.5x body weight at later date    Balance:    EO:  L: 47 sec  R: 60 sec    EC:  L: 6 sec  R: 3 sec    Anterior reach Y balance:    L: 69 cm  R: 67.5 cm    9/16/2022:  R 75.3   L 98.4      Evan received therapeutic exercises to develop strength, endurance, ROM, flexibility, posture and core stabilization for 25 minutes including:      Piriformis str 30" x 2  IT band/ TFL str 30" x 2 with strap  Single limb bridging 2 x 10 (figure 4)  Shuttle 3 x 10 reps with 5 black bands  Shuttle single leg 3 x 10 reps with 3 black bands  Side lying shuttle 3 x 10 reps with 2 black bands    SL squat 2 x 15 reps ea with UE support    Nordic hamstring curl x 10 reps    gastroc stretch on incline x 90"  Soleus stretch on incline x 90"    Side steps 3 x 40 ft with Gtb  Monster walks  2 x 40 ft with Gtb   Retro monster waks 40' - stopped 2/2  anterior knee pain  Lateral tap downs 2 x 10 reps on 4" step,   Forward step ups 6" 2 x 10 reps       Upright bike x 7 min full revolutions seat 13, L4 hills program  Hamstring stretches on step 3 x 30"     Wall sits 30 sec x 3   U HR  x 26 ea max reps to faitgue  prone hang 3# for 3 min   Prone knee flex strap 2 min      U  Bridge from box 2 x 10 reps     Neuromuscular re-education x 15 min for improved proprioception, coordination, balance including:  Drinking bird 2 x 15 reps with UE support  1/2 star taps x 10 reps       SLS on foam 3 x 30"- added BTB pull downs 'Itis"  rebounder toss SLS on foam 3 x 30        Evan received the following manual therapy techniques: Joint mobilizations were applied to the: patallar and R hamstring for 15 " "minutes, including:    stm with the Stick to R Gastroc/ hamstrings. R IT band   IASTM / scrapping quads and IT band   Cross friction distal quad  All planes patella mob gr 2-3 supine  Grades II and III tibial mobilizations to increase flexion   Patella Y strip and medial decompression for L knee pain  Reapplied 2"I" strips to R IT band with two decompressive "I" strip to decrease anterior knee pain     Pt received 00 min cold pack to R knee with towel to protect skin.       Home Exercises Provided and Patient Education Provided      Education provided:   No new HEP given today     Written Home Exercises Provided: Patient instructed to cont prior HEP.  Exercises were reviewed and Evan was able to demonstrate them prior to the end of the session.  Evan demonstrated good  understanding of the education provided.      See EMR under Patient Instructions for exercises provided prior visit.        Assessment   Denies with exacerbation of B anterior knee pain with progression of posterior chain strengthening and retro walking. Pain infrapatellar region and application of kinesiotaping for short term relief. Updated HEP and core stabilization exercise to perform while out of town. Recommended to contact office if any questions or concerns arise.      Evan Is progressing well towards his goals.   Pt prognosis is Good.      Pt will continue to benefit from skilled outpatient physical therapy to address the deficits listed in the problem list box on initial evaluation, provide pt/family education and to maximize pt's level of independence in the home and community environment.      Pt's spiritual, cultural and educational needs considered and pt agreeable to plan of care and goals.     Anticipated barriers to physical therapy: none     Goals:  Short Term Goals (6 Weeks):   1. Patient to have full extension Right (equal to left) for normalized gait pattern  met  2. Patient to have 120 degrees of passive left knee flexion for " improved performance of ADL's such as sit<>stand transfers  met  3. Patient to have decreased edema left knee   met  4. Patient to report 2/10 pain or less in left knee with ambulating community distances   met  5. Patient to be compliant with home program 5x's a week as noted by proper demonstration of exercises to therapist for improved management of condition   met  6. Patient to ambulate full weight bearing Right lower extremity and normal gait pattern     met     Long Term Goals   -(12 Weeks):   1. Patient to have 4+/5 or greater strength in RLE for functional performance of ADL's such as lifting  met  2. Patient to descend one flight of stairs with alternating gait pattern without use of handrail and with proper LE alignment- met  3. Patient to have 135 degrees or greater Right knee flexion for return to ADL's including squatting   met  -(36 weeks):   4. Patient to have decreased subjective report of disability as noted by <20% limitation on FOTO knee questionnaire Progressing, not met  5. Patient to perform single leg squat without assistance and without valgus collapse to improve participation in recreational  Progressing, not met  6. Patient's RLE strength to be 5/5 for return to recreational activities and sport Progressing, not met     PLAN     Continue heavy resistance training as tolerated. Manual therapy prn for pain modulation         Yuliana Bullard, PT

## 2022-10-19 ENCOUNTER — CLINICAL SUPPORT (OUTPATIENT)
Dept: REHABILITATION | Facility: OTHER | Age: 62
End: 2022-10-19
Payer: COMMERCIAL

## 2022-10-19 DIAGNOSIS — R29.898 WEAKNESS OF RIGHT LOWER EXTREMITY: ICD-10-CM

## 2022-10-19 DIAGNOSIS — Z47.89 AFTERCARE FOR ANTERIOR CRUCIATE LIGAMENT (ACL) REPAIR: ICD-10-CM

## 2022-10-19 DIAGNOSIS — R29.898 WEAKNESS OF BOTH LOWER EXTREMITIES: ICD-10-CM

## 2022-10-19 DIAGNOSIS — R26.89 IMPAIRED GAIT AND MOBILITY: ICD-10-CM

## 2022-10-19 DIAGNOSIS — M25.661 DECREASED RANGE OF MOTION (ROM) OF RIGHT KNEE: Primary | ICD-10-CM

## 2022-10-19 PROCEDURE — 97110 THERAPEUTIC EXERCISES: CPT | Mod: PN

## 2022-10-19 NOTE — PROGRESS NOTES
[facility]   Physical Therapy Treatment Note      Name: Evan Khoury  Clinic Number: [id number]     Therapy Diagnosis:   1. Decreased range of motion (ROM) of right knee        2. Impaired gait and mobility        3. Weakness of right lower extremity        4. Weakness of both lower extremities        5. Aftercare for anterior cruciate ligament (ACL) repair              Physician: Luis Watkins, Jennifer     Visit Date: 8/30/2022  Physician Orders: PT Eval and Treat   1. Right knee ACL reconstruction with hamstring autograft   2. Right knee arthroscopic menisectomy versus meniscus repair   3. Right knee arthroscopic chondroplasty   4. Right knee arthroscopic synovectomy  Surgery Date: 3/8/2022  Medical Diagnosis from Referral: S83.511A (ICD-10-CM) - Complete tear of anterior cruciate ligament of right knee, initial encounter  Evaluation Date: 3/10/2022  Surgical Date: 3/8/3033  Authorization Period Expiration: 12/31/2022  Plan of Care Expiration: 12/31/2022  Visit # / Visits authorized: 37/40 (no limit)  PTA:  0/5      Time In: 9:07 am  Time Out: 10:16 am  Total Billable Time: 54 minutes     Precautions: Standard        Subjective       Pt reports he has not done much in terms of exercises over the past 3 weeks. Has been getting very little sleep at night while on his business trip.    He was not very compliant with home exercise program while on business trip.  Response to previous treatment: no adverse effects  Functional change: no change reported today     Pain: 1/10  Location: right knee infrapatellar region     Objective      CMS Impairment/Limitation/Restriction for FOTO Knee Survey     Therapist reviewed FOTO scores for Evan Khoury on 9/16/2022.   FOTO documents entered into [employer] - see Media section.     Limitation Score: 34%  Category: Mobility     Current : CJ = at least 20% but < 40% impaired, limited or restricted  Goal: CJ = at least 20% but < 40% impaired, limited or  "restricted      9/21/2022    Effusion: zero    Functional alignment: single limb squat/ step down from 4" box    L: 54 sec  R: 53 sec    Dynamic valgus collapse and anterior knee pain B    Single limb bridges: L: 16 x, R 15 x : 93%    Calf raises: L: 26, R: 26 ; 100%    Single leg press 100# from 90 deg 30 x ea--> will test 1.5x body weight at later date    Balance:    EO:  L: 47 sec  R: 60 sec    EC:  L: 6 sec  R: 3 sec    Anterior reach Y balance:    L: 69 cm  R: 67.5 cm    9/16/2022:  R 75.3   L 98.4      Evan received therapeutic exercises to develop strength, endurance, ROM, flexibility, posture and core stabilization for 49 minutes including:      Captfuentes Mavis 2 x 15 reps each LE  Side lying hip circles 20 reps cw, 20 reps ccw  Side lying hip abd 2 x 12 reps with 3#  Piriformis str 30" x 2  IT band/ TFL str 30" x 2 with strap  Single limb bridging 2 x 10 (figure 4)  Shuttle 3 x 10 reps with 4 black bands, 1 red band  Shuttle single leg 3 x 10 reps with 2 black bands, 1 red band  Side lying shuttle 3 x 10 reps with 1 black band, 1 red band    SL squat x 10 reps ea with UE support    gastroc stretch on incline x 90"  Soleus stretch on incline x 90"    Side steps 3 x 40 ft with btb  Monster walks  2 x 40 ft with btb     Lateral tap downs 2 x 10 reps on 4" step,   Forward step ups 6" 2 x 10 reps       Upright bike x 5 min full revolutions seat 13, L4 hills program  Hamstring stretches on step 3 x 30"     Wall sits 30 sec x 3   U HR  x 26 ea max reps to faitgue  prone hang 3# for 3 min   Prone knee flex strap 2 min      Neuromuscular re-education x 5 min for improved proprioception, coordination, balance including:  Iso BOSU hold R LE  3 x 30"  Drinking bird 2 x 15 reps with UE support  1/2 star taps x 10 reps     R side planks 3 sets to burn    SLS on foam 3 x 30"- added BTB pull downs 'Itis"  rebounder toss SLS on foam 3 x 30        Evan received the following manual therapy techniques: Joint mobilizations " "were applied to the: patallar and R hamstring for 5 minutes, including:    stm with the Stick to R Gastroc/ hamstrings. R IT band   IASTM / scrapping quads and IT band   Cross friction distal quad  All planes patella mob gr 2-3 supine  Grades II and III tibial mobilizations to increase flexion   Patella Y strip and medial decompression for L knee pain  Reapplied 2"I" strips to R IT band with two decompressive "I" strip to decrease anterior knee pain     Pt received 10 min cold pack to R knee with towel to protect skin.       Home Exercises Provided and Patient Education Provided      Education provided:   Side lying hip abd, side lying hip circles, st to stand, mini squats     Written Home Exercises Provided: Patient instructed to cont prior HEP.  Exercises were reviewed and Evan was able to demonstrate them prior to the end of the session.  Evan demonstrated good  understanding of the education provided.      See EMR under Patient Instructions for exercises provided prior visit.        Assessment   Return to clinic after traveling for 3 weeks due to business. Weakness in hip abductors and R quad present with SL squat attempt. Resumed side lying hip abd and added hip circles and isometric abd push against physioball at the wall. Will continue with hip abd and quad strengthening to progress to running.     Evan Is progressing well towards his goals.   Pt prognosis is Good.      Pt will continue to benefit from skilled outpatient physical therapy to address the deficits listed in the problem list box on initial evaluation, provide pt/family education and to maximize pt's level of independence in the home and community environment.      Pt's spiritual, cultural and educational needs considered and pt agreeable to plan of care and goals.     Anticipated barriers to physical therapy: none     Goals:  Short Term Goals (6 Weeks):   1. Patient to have full extension Right (equal to left) for normalized gait pattern  " met  2. Patient to have 120 degrees of passive left knee flexion for improved performance of ADL's such as sit<>stand transfers  met  3. Patient to have decreased edema left knee   met  4. Patient to report 2/10 pain or less in left knee with ambulating community distances   met  5. Patient to be compliant with home program 5x's a week as noted by proper demonstration of exercises to therapist for improved management of condition   met  6. Patient to ambulate full weight bearing Right lower extremity and normal gait pattern     met     Long Term Goals   -(12 Weeks):   1. Patient to have 4+/5 or greater strength in RLE for functional performance of ADL's such as lifting  met  2. Patient to descend one flight of stairs with alternating gait pattern without use of handrail and with proper LE alignment- met  3. Patient to have 135 degrees or greater Right knee flexion for return to ADL's including squatting   met  -(36 weeks):   4. Patient to have decreased subjective report of disability as noted by <20% limitation on FOTO knee questionnaire Progressing, not met  5. Patient to perform single leg squat without assistance and without valgus collapse to improve participation in recreational  Progressing, not met  6. Patient's RLE strength to be 5/5 for return to recreational activities and sport Progressing, not met     PLAN     Continue heavy resistance training as tolerated. Manual therapy prn for pain modulation         Luis Dhoerty, PT

## 2022-10-20 NOTE — PROGRESS NOTES
[facility]   Physical Therapy Treatment Note      Name: Evan Khoury  Clinic Number: [id number]     Therapy Diagnosis:   1. Decreased range of motion (ROM) of right knee        2. Impaired gait and mobility        3. Weakness of right lower extremity        4. Weakness of both lower extremities        5. Aftercare for anterior cruciate ligament (ACL) repair          Physician: Luis Watkins, Jennifer     Visit Date: 8/30/2022  Physician Orders: PT Eval and Treat   1. Right knee ACL reconstruction with hamstring autograft   2. Right knee arthroscopic menisectomy versus meniscus repair   3. Right knee arthroscopic chondroplasty   4. Right knee arthroscopic synovectomy  Surgery Date: 3/8/2022  Medical Diagnosis from Referral: S83.511A (ICD-10-CM) - Complete tear of anterior cruciate ligament of right knee, initial encounter  Evaluation Date: 3/10/2022  Surgical Date: 3/8/3033  Authorization Period Expiration: 12/31/2022  Plan of Care Expiration: 12/31/2022  Visit # / Visits authorized: 38/40 (no limit)  FOTO: 1/5. 10/21/2022  PTA:  0/5      Time In: 9:22 am  Time Out: 10:31 am  Total Billable Time: 59 minutes     Precautions: Standard        Subjective       Pt reports he has not done much in terms of exercises over the past 3 weeks. Has been getting very little sleep at night while on his business trip.    He was not very compliant with home exercise program while on business trip.  Response to previous treatment: felt good. Good workout  Functional change: no change reported today     Pain: 1/10  Location: right knee infrapatellar region     Objective      CMS Impairment/Limitation/Restriction for FOTO Knee Survey     Therapist reviewed FOTO scores for Evan Khoury on 10/21/2022.   FOTO documents entered into [employer] - see Media section.     Limitation Score: 34%  Category: Mobility     Current : CJ = at least 20% but < 40% impaired, limited or restricted  Goal: CJ = at least 20% but < 40%  "impaired, limited or restricted      9/21/2022    Effusion: zero    Functional alignment: single limb squat/ step down from 4" box    L: 54 sec  R: 53 sec    Dynamic valgus collapse and anterior knee pain B    Single limb bridges: L: 16 x, R 15 x : 93%    Calf raises: L: 26, R: 26 ; 100%    Single leg press 100# from 90 deg 30 x ea--> will test 1.5x body weight at later date    Balance:    EO:  L: 47 sec  R: 60 sec    EC:  L: 6 sec  R: 3 sec    Anterior reach Y balance:    L: 69 cm  R: 67.5 cm    9/16/2022:  R 75.3   L 98.4      Evan received therapeutic exercises to develop strength, endurance, ROM, flexibility, posture and core stabilization for 54 minutes including:      Captain Gamble 2 x 15 reps each LE  Side lying hip circles 20 reps cw, 20 reps ccw  Side lying hip abd 2 x 12 reps with 3#  Piriformis str 30" x 2  IT band/ TFL str 30" x 2 with strap  Single limb bridging 2 x 10 (figure 4)  U  Bridge from box 2 x 10 reps    Shuttle 3 x 10 reps with 4 black bands, 1 red band  Shuttle single leg 3 x 10 reps with 2 black bands, 1 red band  Side lying shuttle 3 x 10 reps with 1 black band, 1 red band    SL squat 2 x 10 reps ea with UE support    gastroc stretch on incline x 90"  Soleus stretch on incline x 90"    Side steps 3 laps with btb  Monster walks  3 laps with btb     Lateral tap downs 2 x 10 reps on 4" step,   Forward step ups 6" 2 x 10 reps       Upright bike x 5 min full revolutions seat 13, L4 hills program  Hamstring stretches on step 3 x 30"     Wall sits 30 sec x 3   U HR  x 26 ea max reps to faitgue  prone hang 3# for 3 min   Prone knee flex strap 2 min      Neuromuscular re-education x 0 min for improved proprioception, coordination, balance including:  Iso BOSU hold R LE  3 x 30"  Drinking bird 2 x 15 reps with UE support  1/2 star taps x 10 reps     R side planks 3 sets to burn    SLS on foam 3 x 30"- added BTB pull downs 'Itis"  rebounder toss SLS on foam 3 x 30        Evan received the " "following manual therapy techniques: Joint mobilizations were applied to the: patallar and R hamstring for 5 minutes, including:    stm with the Stick to R Gastroc/ hamstrings. R IT band   IASTM / scrapping quads and IT band   Cross friction distal quad  All planes patella mob gr 2-3 supine  Grades II and III tibial mobilizations to increase flexion   Patella Y strip and medial decompression for L knee pain  Reapplied 2"I" strips to R IT band with two decompressive "I" strip to decrease anterior knee pain     Pt received 10 min cold pack to R knee with towel to protect skin.       Home Exercises Provided and Patient Education Provided      Education provided:   Side lying hip abd, side lying hip circles, st to stand, mini squats     Written Home Exercises Provided: Patient instructed to cont prior HEP.  Exercises were reviewed and Evan was able to demonstrate them prior to the end of the session.  Evan demonstrated good  understanding of the education provided.      See EMR under Patient Instructions for exercises provided prior visit.        Assessment   Instructed patient to utilize B UEs for SL squat to promote the correct movement pattern. Improved movement pattern with this with less rotation at the R hip and valgus at his R knee.     Evan Is progressing well towards his goals.   Pt prognosis is Good.      Pt will continue to benefit from skilled outpatient physical therapy to address the deficits listed in the problem list box on initial evaluation, provide pt/family education and to maximize pt's level of independence in the home and community environment.      Pt's spiritual, cultural and educational needs considered and pt agreeable to plan of care and goals.     Anticipated barriers to physical therapy: none     Goals:  Short Term Goals (6 Weeks):   1. Patient to have full extension Right (equal to left) for normalized gait pattern  met  2. Patient to have 120 degrees of passive left knee flexion for " improved performance of ADL's such as sit<>stand transfers  met  3. Patient to have decreased edema left knee   met  4. Patient to report 2/10 pain or less in left knee with ambulating community distances   met  5. Patient to be compliant with home program 5x's a week as noted by proper demonstration of exercises to therapist for improved management of condition   met  6. Patient to ambulate full weight bearing Right lower extremity and normal gait pattern     met     Long Term Goals   -(12 Weeks):   1. Patient to have 4+/5 or greater strength in RLE for functional performance of ADL's such as lifting  met  2. Patient to descend one flight of stairs with alternating gait pattern without use of handrail and with proper LE alignment- met  3. Patient to have 135 degrees or greater Right knee flexion for return to ADL's including squatting   met  -(36 weeks):   4. Patient to have decreased subjective report of disability as noted by <20% limitation on FOTO knee questionnaire Progressing, not met  5. Patient to perform single leg squat without assistance and without valgus collapse to improve participation in recreational  Progressing, not met  6. Patient's RLE strength to be 5/5 for return to recreational activities and sport Progressing, not met     PLAN     Continue heavy resistance training as tolerated. Manual therapy prn for pain modulation         Luis Doherty, PT

## 2022-10-21 ENCOUNTER — CLINICAL SUPPORT (OUTPATIENT)
Dept: REHABILITATION | Facility: OTHER | Age: 62
End: 2022-10-21
Payer: COMMERCIAL

## 2022-10-21 DIAGNOSIS — R26.89 IMPAIRED GAIT AND MOBILITY: ICD-10-CM

## 2022-10-21 DIAGNOSIS — R29.898 WEAKNESS OF BOTH LOWER EXTREMITIES: ICD-10-CM

## 2022-10-21 DIAGNOSIS — M25.661 DECREASED RANGE OF MOTION (ROM) OF RIGHT KNEE: Primary | ICD-10-CM

## 2022-10-21 DIAGNOSIS — Z47.89 AFTERCARE FOR ANTERIOR CRUCIATE LIGAMENT (ACL) REPAIR: ICD-10-CM

## 2022-10-21 DIAGNOSIS — R29.898 WEAKNESS OF RIGHT LOWER EXTREMITY: ICD-10-CM

## 2022-10-21 PROCEDURE — 97110 THERAPEUTIC EXERCISES: CPT | Mod: PN

## 2022-10-25 ENCOUNTER — CLINICAL SUPPORT (OUTPATIENT)
Dept: REHABILITATION | Facility: OTHER | Age: 62
End: 2022-10-25
Payer: COMMERCIAL

## 2022-10-25 DIAGNOSIS — M25.661 DECREASED RANGE OF MOTION (ROM) OF RIGHT KNEE: Primary | ICD-10-CM

## 2022-10-25 DIAGNOSIS — R29.898 WEAKNESS OF RIGHT LOWER EXTREMITY: ICD-10-CM

## 2022-10-25 DIAGNOSIS — Z47.89 AFTERCARE FOR ANTERIOR CRUCIATE LIGAMENT (ACL) REPAIR: ICD-10-CM

## 2022-10-25 DIAGNOSIS — R29.898 WEAKNESS OF BOTH LOWER EXTREMITIES: ICD-10-CM

## 2022-10-25 DIAGNOSIS — R26.89 IMPAIRED GAIT AND MOBILITY: ICD-10-CM

## 2022-10-25 PROCEDURE — 97110 THERAPEUTIC EXERCISES: CPT | Mod: PN | Performed by: PHYSICAL THERAPIST

## 2022-10-25 PROCEDURE — 97112 NEUROMUSCULAR REEDUCATION: CPT | Mod: PN | Performed by: PHYSICAL THERAPIST

## 2022-10-25 NOTE — PROGRESS NOTES
[facility]   Physical Therapy Treatment Note      Name: Evan Khoury  Clinic Number: [id number]     Therapy Diagnosis:   1. Decreased range of motion (ROM) of right knee        2. Impaired gait and mobility        3. Weakness of right lower extremity        4. Weakness of both lower extremities        5. Aftercare for anterior cruciate ligament (ACL) repair          Physician: Luis Watkins, Jennifer     Visit Date: 8/30/2022  Physician Orders: PT Eval and Treat   1. Right knee ACL reconstruction with hamstring autograft   2. Right knee arthroscopic menisectomy versus meniscus repair   3. Right knee arthroscopic chondroplasty   4. Right knee arthroscopic synovectomy  Surgery Date: 3/8/2022  Medical Diagnosis from Referral: S83.511A (ICD-10-CM) - Complete tear of anterior cruciate ligament of right knee, initial encounter  Evaluation Date: 3/10/2022  Surgical Date: 3/8/3033  Authorization Period Expiration: 12/31/2022  Plan of Care Expiration: 12/31/2022  Visit # / Visits authorized: 38/40 (no limit)  FOTO: 1/5. 10/21/2022  PTA:  0/5      Time In: 9:20 am  Time Out: 10:30 am  Total Billable Time: 55 minutes     Precautions: Standard        Subjective       Pt reports feeling a set back while on a business trip. He was working long days without a chance to do his Home Program. Having some tightness inner thigh R     He was not very compliant with home exercise program while on business trip.  Response to previous treatment: felt good. Good workout  Functional change: no change reported today     Pain: 1/10  Location: right knee infrapatellar region     Objective      10/25/2022    Selective Functional Movement Assessment:  FN: functional, non-painful  FP: functional, painful  DP: dysfunctional, painful  DN: dysfunctional, non-painful    Multi-Segmental Flexion: DN (fingertips 3/4 tibia)  Multi-Segmental Extension: DN (ASIS does not pass toes)  Multi-Segmental Rotation:  "  Right:FN  Left:FN  Single Leg Stance:  Right:DN (< 10 sec EC)  Left:FN  Overhead Deep Squat: DN (posterior LOB with depth > 90 deg)      CMS Impairment/Limitation/Restriction for FOTO Knee Survey     Therapist reviewed FOTO scores for Evan Khoury on 10/21/2022.   FOTO documents entered into [employer] - see Media section.     Limitation Score: 34%  Category: Mobility     Current : CJ = at least 20% but < 40% impaired, limited or restricted  Goal: CJ = at least 20% but < 40% impaired, limited or restricted      9/21/2022    Effusion: zero    Functional alignment: single limb squat/ step down from 4" box    L: 54 sec  R: 53 sec    Dynamic valgus collapse and anterior knee pain B    Single limb bridges: L: 16 x, R 15 x : 93%    Calf raises: L: 26, R: 26 ; 100%    Single leg press 100# from 90 deg 30 x ea--> will test 1.5x body weight at later date    Balance:    EO:  L: 47 sec  R: 60 sec    EC:  L: 6 sec  R: 3 sec    Anterior reach Y balance:    L: 69 cm  R: 67.5 cm    9/16/2022:  R 75.3   L 98.4      Evan received therapeutic exercises to develop strength, endurance, ROM, flexibility, posture and core stabilization for 40 minutes including:      SFMA assessment x 5 min  Dynamic stretching: HS swoops (LOB, L anterior knee pain), knee hugs 40' x 2  Captain Morgans 2 x 15 reps each LE  Side lying hip circles 20 reps cw, 20 reps ccw  Side lying hip abd 2 x 12 reps with 3#  Piriformis str 30" x 2  IT band/ TFL str 30" x 2 with strap  Single limb bridging 2 x 10 (figure 4)  U  Bridge from box 2 x 10 reps    Shuttle 3 x 10 reps with 4 black bands, 1 red band  Shuttle single leg 30" x 4 ea with 2 black bands, 1 red band  Side lying shuttle 3 x 10 reps with 1 black band, 1 red band    SL squat 2 x 10 reps ea with UE support    gastroc stretch on incline x 90"  Soleus stretch on incline x 90"    Side steps 3 laps with btb  Monster walks  3 laps with btb     Lateral tap downs 2 x 10 reps on 4" step,   Forward step ups 6" 2 x 10 " "reps       Upright bike x 5 min full revolutions seat 13, L4 hills program  Hamstring stretches on step 3 x 30"     Wall sits 30 sec x 3   U HR  x 26 ea max reps to faitgue  prone hang 3# for 3 min   Prone knee flex strap 2 min      Neuromuscular re-education x 15 min for improved proprioception, coordination, balance including:  Iso BOSU hold R LE  3 x 30"  Drinking bird 2 x 15 reps with UE support, 10#  1/2 star taps x 5 reps     R side planks 3 sets to burn    SLS on foam 3 x 30"- added BTB pull downs 'Itis"  rebounder toss SLS on foam 3 x 30  M/L wobble board x 2 min  Downhill mini squat 1/2 foam rolls 30" x 3- future     Evan received the following manual therapy techniques: Joint mobilizations were applied to the: patallar and R hamstring for 5 minutes, including:    stm with the Stick to R Gastroc/ hamstrings  IASTM / scrapping quads and IT band   Cross friction distal quad  All planes patella mob gr 2-3 supine  Grades II and III tibial mobilizations to increase flexion   Patella Y strip and medial decompression for L knee pain  Reapplied 2"I" strips to R IT band with two decompressive "I" strip to decrease anterior knee pain     Pt received 10 min cold pack to R knee with towel to protect skin.       Home Exercises Provided and Patient Education Provided      Education provided:   Continued HEP; education in consistency with high intensity resistance exercises for strength gains     Written Home Exercises Provided: Patient instructed to cont prior HEP.  Exercises were reviewed and Evan was able to demonstrate them prior to the end of the session.  Evan demonstrated good  understanding of the education provided.      See EMR under Patient Instructions for exercises provided prior visit.        Assessment   Patient demonstrating decreased motor control and stability with deep overhead squats as noted by posterior loss of balance. Improved depth with hand held assist and initiated deep squatting with FMT " band assist in pain free depth. VC/TC for technique and avoidance of patellofemoral pain.      Evan Is progressing well towards his goals.   Pt prognosis is Good.      Pt will continue to benefit from skilled outpatient physical therapy to address the deficits listed in the problem list box on initial evaluation, provide pt/family education and to maximize pt's level of independence in the home and community environment.      Pt's spiritual, cultural and educational needs considered and pt agreeable to plan of care and goals.     Anticipated barriers to physical therapy: none     Goals:  Short Term Goals (6 Weeks):   1. Patient to have full extension Right (equal to left) for normalized gait pattern  met  2. Patient to have 120 degrees of passive left knee flexion for improved performance of ADL's such as sit<>stand transfers  met  3. Patient to have decreased edema left knee   met  4. Patient to report 2/10 pain or less in left knee with ambulating community distances   met  5. Patient to be compliant with home program 5x's a week as noted by proper demonstration of exercises to therapist for improved management of condition   met  6. Patient to ambulate full weight bearing Right lower extremity and normal gait pattern     met     Long Term Goals   -(12 Weeks):   1. Patient to have 4+/5 or greater strength in RLE for functional performance of ADL's such as lifting  met  2. Patient to descend one flight of stairs with alternating gait pattern without use of handrail and with proper LE alignment- met  3. Patient to have 135 degrees or greater Right knee flexion for return to ADL's including squatting   met  -(36 weeks):   4. Patient to have decreased subjective report of disability as noted by <20% limitation on FOTO knee questionnaire Progressing, not met  5. Patient to perform single leg squat without assistance and without valgus collapse to improve participation in recreational  Progressing, not met  6.  Patient's RLE strength to be 5/5 for return to recreational activities and sport Progressing, not met     PLAN     Continue heavy resistance training and balance activities as tolerated for return to recreational activities and skiing. Manual therapy prn for pain modulation         Yuliana Bullard, PT

## 2022-10-27 ENCOUNTER — CLINICAL SUPPORT (OUTPATIENT)
Dept: REHABILITATION | Facility: OTHER | Age: 62
End: 2022-10-27
Payer: COMMERCIAL

## 2022-10-27 DIAGNOSIS — R26.89 IMPAIRED GAIT AND MOBILITY: ICD-10-CM

## 2022-10-27 DIAGNOSIS — Z47.89 AFTERCARE FOR ANTERIOR CRUCIATE LIGAMENT (ACL) REPAIR: ICD-10-CM

## 2022-10-27 DIAGNOSIS — M25.661 DECREASED RANGE OF MOTION (ROM) OF RIGHT KNEE: Primary | ICD-10-CM

## 2022-10-27 DIAGNOSIS — R29.898 WEAKNESS OF BOTH LOWER EXTREMITIES: ICD-10-CM

## 2022-10-27 DIAGNOSIS — R29.898 WEAKNESS OF RIGHT LOWER EXTREMITY: ICD-10-CM

## 2022-10-27 PROCEDURE — 97110 THERAPEUTIC EXERCISES: CPT | Mod: PN

## 2022-10-27 PROCEDURE — 97112 NEUROMUSCULAR REEDUCATION: CPT | Mod: PN

## 2022-10-27 NOTE — PROGRESS NOTES
[facility]   Physical Therapy Treatment Note      Name: Evan Khoury  Clinic Number: [id number]     Therapy Diagnosis:   1. Decreased range of motion (ROM) of right knee        2. Impaired gait and mobility        3. Weakness of right lower extremity        4. Weakness of both lower extremities        5. Aftercare for anterior cruciate ligament (ACL) repair            Physician: Luis Watkins, *     Visit Date: 8/30/2022  Physician Orders: PT Eval and Treat   1. Right knee ACL reconstruction with hamstring autograft   2. Right knee arthroscopic menisectomy versus meniscus repair   3. Right knee arthroscopic chondroplasty   4. Right knee arthroscopic synovectomy  Surgery Date: 3/8/2022  Medical Diagnosis from Referral: S83.511A (ICD-10-CM) - Complete tear of anterior cruciate ligament of right knee, initial encounter  Evaluation Date: 3/10/2022  Surgical Date: 3/8/3033  Authorization Period Expiration: 12/31/2022  Plan of Care Expiration: 12/31/2022  Visit # / Visits authorized: 39/40 (no limit)  FOTO: 3/5. 10/21/2022  PTA:  0/5      Time In: 9:20 am  Time Out: 10:12 am  Total Billable Time: 42 minutes     Precautions: Standard        Subjective       Pt reports tightness in his R hamstrings today.    He was not compliant with home exercise program .  Response to previous treatment: some soreness  Functional change: no change reported today     Pain: 1/10  Location: right knee infrapatellar region     Objective      10/25/2022    Selective Functional Movement Assessment:  FN: functional, non-painful  FP: functional, painful  DP: dysfunctional, painful  DN: dysfunctional, non-painful    Multi-Segmental Flexion: DN (fingertips 3/4 tibia)  Multi-Segmental Extension: DN (ASIS does not pass toes)  Multi-Segmental Rotation:   Right:FN  Left:FN  Single Leg Stance:  Right:DN (< 10 sec EC)  Left:FN  Overhead Deep Squat: DN (posterior LOB with depth > 90 deg)      CMS  "Impairment/Limitation/Restriction for FOTO Knee Survey     Therapist reviewed FOTO scores for Evan Khoury on 10/21/2022.   FOTO documents entered into [employer] - see Media section.     Limitation Score: 34%  Category: Mobility     Current : CJ = at least 20% but < 40% impaired, limited or restricted  Goal: CJ = at least 20% but < 40% impaired, limited or restricted      9/21/2022    Effusion: zero    Functional alignment: single limb squat/ step down from 4" box    L: 54 sec  R: 53 sec    Dynamic valgus collapse and anterior knee pain B    Single limb bridges: L: 16 x, R 15 x : 93%    Calf raises: L: 26, R: 26 ; 100%    Single leg press 100# from 90 deg 30 x ea--> will test 1.5x body weight at later date    Balance:    EO:  L: 47 sec  R: 60 sec    EC:  L: 6 sec  R: 3 sec    Anterior reach Y balance:    L: 69 cm  R: 67.5 cm    9/16/2022:  R 75.3   L 98.4      Evan received therapeutic exercises to develop strength, endurance, ROM, flexibility, posture and core stabilization for 27 minutes including:      Dynamic stretching: HS swoops (LOB, L anterior knee pain), knee hugs 40' x 2  Captain Morgans 2 x 15 reps each LE  Side lying hip circles 20 reps cw, 20 reps ccw  Side lying hip abd 2 x 12 reps with 4#  Piriformis str 30" x 2  IT band/ TFL str 30" x 2 with strap  Single limb bridging 2 x 10 (figure 4)  U  Bridge from box 2 x 10 reps  SAQ 3 x 10 reps with 4#  Prone quad stretch x 2'    Shuttle 3 x 10 reps with 4 black bands, 1 red band  Shuttle single leg 30" x 4 ea with 2 black bands, 1 red band  Side lying shuttle 3 x 10 reps with 1 black band, 1 red band    SL squat 2 x 10 reps ea with UE support    gastroc stretch on incline x 90"  Soleus stretch on incline x 90"    Side steps 3 laps with btb  Monster walks  3 laps with btb     Lateral tap downs 2 x 10 reps on 4" step,   Forward step ups 6" 2 x 10 reps       Upright bike x 5 min full revolutions seat 13, L4 hills program  Hamstring stretches on step 3 x 30"   " "  Wall sits 30 sec x 3   U HR  x 26 ea max reps to faitgue  prone hang 3# for 3 min   Prone knee flex strap 2 min      Neuromuscular re-education x 3 min for improved proprioception, coordination, balance including:  Iso BOSU hold R LE  3 x 30"  Drinking bird 2 x 15 reps with UE support, 10#  1/2 star taps x 5 reps     R side planks 3 sets to burn    SLS on foam 3 x 30"- added BTB pull downs 'Itis"  rebounder toss SLS on foam 3 x 30  M/L wobble board x 2 min  Downhill mini squat 1/2 foam rolls 30" x 3     Evan received the following manual therapy techniques: Joint mobilizations were applied to the: patallar and R hamstring for 12 minutes, including:    stm with the Stick to R Gastroc/ hamstrings/IT band/lateral quad  IASTM / scrapping quads and IT band   Cross friction distal quad  All planes patella mob gr 2-3 supine  Grades II and III tibial mobilizations to increase flexion   Patella Y strip and medial decompression for L knee pain  Reapplied 2"I" strips to R IT band with two decompressive "I" strip to decrease anterior knee pain     Pt received 10 min cold pack to R knee with towel to protect skin.       Home Exercises Provided and Patient Education Provided      Education provided:   Continued HEP; education in consistency with high intensity resistance exercises for strength gains     Written Home Exercises Provided: Patient instructed to cont prior HEP.  Exercises were reviewed and Evan was able to demonstrate them prior to the end of the session.  Evan demonstrated good  understanding of the education provided.      See EMR under Patient Instructions for exercises provided prior visit.        Assessment   Added mini squats on downhill . PT provided verbal cues for correct form. Continues to present with increased tenderness and tone in R IT band and R lateral quad.     Evan Is progressing well towards his goals.   Pt prognosis is Good.      Pt will continue to benefit from skilled outpatient physical " therapy to address the deficits listed in the problem list box on initial evaluation, provide pt/family education and to maximize pt's level of independence in the home and community environment.      Pt's spiritual, cultural and educational needs considered and pt agreeable to plan of care and goals.     Anticipated barriers to physical therapy: none     Goals:  Short Term Goals (6 Weeks):   1. Patient to have full extension Right (equal to left) for normalized gait pattern  met  2. Patient to have 120 degrees of passive left knee flexion for improved performance of ADL's such as sit<>stand transfers  met  3. Patient to have decreased edema left knee   met  4. Patient to report 2/10 pain or less in left knee with ambulating community distances   met  5. Patient to be compliant with home program 5x's a week as noted by proper demonstration of exercises to therapist for improved management of condition   met  6. Patient to ambulate full weight bearing Right lower extremity and normal gait pattern     met     Long Term Goals   -(12 Weeks):   1. Patient to have 4+/5 or greater strength in RLE for functional performance of ADL's such as lifting  met  2. Patient to descend one flight of stairs with alternating gait pattern without use of handrail and with proper LE alignment- met  3. Patient to have 135 degrees or greater Right knee flexion for return to ADL's including squatting   met  -(36 weeks):   4. Patient to have decreased subjective report of disability as noted by <20% limitation on FOTO knee questionnaire Progressing, not met  5. Patient to perform single leg squat without assistance and without valgus collapse to improve participation in recreational  Progressing, not met  6. Patient's RLE strength to be 5/5 for return to recreational activities and sport Progressing, not met     PLAN     Continue heavy resistance training and balance activities as tolerated for return to recreational activities and skiing.  Manual therapy prn for pain modulation         Luis Doherty, PT

## 2022-10-31 ENCOUNTER — CLINICAL SUPPORT (OUTPATIENT)
Dept: REHABILITATION | Facility: OTHER | Age: 62
End: 2022-10-31
Payer: COMMERCIAL

## 2022-10-31 DIAGNOSIS — R29.898 WEAKNESS OF BOTH LOWER EXTREMITIES: ICD-10-CM

## 2022-10-31 DIAGNOSIS — Z47.89 AFTERCARE FOR ANTERIOR CRUCIATE LIGAMENT (ACL) REPAIR: ICD-10-CM

## 2022-10-31 DIAGNOSIS — M25.661 DECREASED RANGE OF MOTION (ROM) OF RIGHT KNEE: Primary | ICD-10-CM

## 2022-10-31 DIAGNOSIS — R26.89 IMPAIRED GAIT AND MOBILITY: ICD-10-CM

## 2022-10-31 DIAGNOSIS — R29.898 WEAKNESS OF RIGHT LOWER EXTREMITY: ICD-10-CM

## 2022-10-31 PROCEDURE — 97112 NEUROMUSCULAR REEDUCATION: CPT | Mod: PN | Performed by: PHYSICAL THERAPIST

## 2022-10-31 PROCEDURE — 97140 MANUAL THERAPY 1/> REGIONS: CPT | Mod: PN | Performed by: PHYSICAL THERAPIST

## 2022-10-31 PROCEDURE — 97110 THERAPEUTIC EXERCISES: CPT | Mod: PN | Performed by: PHYSICAL THERAPIST

## 2022-10-31 NOTE — PROGRESS NOTES
[facility]   Physical Therapy Treatment Note      Name: Evan Khoury  Clinic Number: [id number]     Therapy Diagnosis:   1. Decreased range of motion (ROM) of right knee        2. Impaired gait and mobility        3. Weakness of right lower extremity        4. Weakness of both lower extremities        5. Aftercare for anterior cruciate ligament (ACL) repair            Physician: Luis Watkins, Jennifer     Visit Date: 8/30/2022  Physician Orders: PT Eval and Treat   1. Right knee ACL reconstruction with hamstring autograft   2. Right knee arthroscopic menisectomy versus meniscus repair   3. Right knee arthroscopic chondroplasty   4. Right knee arthroscopic synovectomy  Surgery Date: 3/8/2022  Medical Diagnosis from Referral: S83.511A (ICD-10-CM) - Complete tear of anterior cruciate ligament of right knee, initial encounter  Evaluation Date: 3/10/2022  Surgical Date: 3/8/3033  Authorization Period Expiration: 12/31/2022  Plan of Care Expiration: 12/31/2022  Visit # / Visits authorized: 39/40 (no limit)  FOTO: 3/5. 10/21/2022  PTA:  0/5      Time In: 8:05 am  Time Out: 0910 am  Total Billable Time: 55 minutes     Precautions: Standard        Subjective       Pt reports tightness in his R hamstrings today. Went to [place] over the weekend and in car for 6 hr drive. R leg feeling stronger than L and occasional anterior knee pain    He was not compliant with home exercise program .  Response to previous treatment: some soreness  Functional change: no change reported today     Pain: 3/10  Location: L anterior knee and R hamstring     Objective        CMS Impairment/Limitation/Restriction for FOTO Knee Survey     Therapist reviewed FOTO scores for Evan Khoury on 10/21/2022.   FOTO documents entered into [employer] - see Media section.     Limitation Score: 34%  Category: Mobility     Current : CJ = at least 20% but < 40% impaired, limited or restricted  Goal: CJ = at least 20% but < 40% impaired,  "limited or restricted      9/21/2022, 10/25 last reassessment       Evan received therapeutic exercises to develop strength, endurance, ROM, flexibility, posture and core stabilization for 25 minutes including:      Dynamic stretching: HS swoops (LOB, L anterior knee pain), knee hugs 40' x 2  Captain Morgans 2 x 15 reps each LE  Side lying hip circles 20 reps cw, 20 reps ccw  Side lying hip abd 2 x 12 reps with 4#  Piriformis str 30" x 2  IT band/ TFL str 30" x 2 with strap  Single limb bridging 2 x 10 (figure 4)  U  Bridge from box 2 x 10 reps  SAQ 3 x 10 reps with 4#  Prone quad stretch x 2'  Prone hamstring curls 3# 3 x 15    Shuttle 3 x 10 reps with 4 black bands, 1 red band  Shuttle single leg iso holds 30" x 4 ea with 100#  Side lying shuttle 3 x 10 reps with 1 black band, 1 red band    SL squat 2 x 10 reps ea with UE support    gastroc stretch on incline x 90"  Soleus stretch on incline x 90"    Side steps 3 laps with btb  Monster walks  3 laps with btb     Lateral tap downs 2 x 15 reps ea on 6" step  Forward step ups 6" 2 x 10 reps       Upright bike x 6 min full revolutions seat 13, L4 hills program  Hamstring stretches on step 3 x 30"     Wall sits 30 sec x 3   U HR  x 26 ea max reps to faitgue  prone hang 3# for 3 min   Prone knee flex strap 2 min      Neuromuscular re-education x 10 min for improved proprioception, coordination, balance including:  Iso BOSU hold R LE  3 x 30"  Dead lifts 20# from step 2 x 15  Drinking bird 2 x 15 reps with UE support, 10#  1/2 star taps x 5 reps  R side planks 3 sets to burn  SLS on foam 3 x 30"- added BTB pull downs 'Itis"  rebounder toss SLS on foam 3 x 30  M/L wobble board x 2 min  Downhill mini squat 1/2 foam rolls 3 x 15     Evan received the following manual therapy techniques: Joint mobilizations were applied to the: patallar and R hamstring for 15 minutes, including:    stm to R Gastroc/ hamstrings/IT band/lateral quad  IASTM / scrapping hamstrings   Cross " "friction distal quad  All planes patella mob gr 2-3 supine  Grades II and III tibial mobilizations to increase flexion   Patella Y strip and medial decompression for L knee pain  Reapplied 2"I" strips to R IT band with two decompressive "I" strip to decrease anterior knee pain     Pt received 10 min cold pack to R knee with towel to protect skin.       Home Exercises Provided and Patient Education Provided      Education provided:   Continued HEP; education in consistency with high intensity resistance exercises for strength gains     Written Home Exercises Provided: Patient instructed to cont prior HEP.  Exercises were reviewed and Evan was able to demonstrate them prior to the end of the session.  Evan demonstrated good  understanding of the education provided.      See EMR under Patient Instructions for exercises provided prior visit.        Assessment   Decreased soft tissue mobility R hamstrings mid belly between semimembranosus and biceps femoris. Good response to manual techniques with improved symptoms and flexibility following. Pt with decreased eccentric control quadriceps B, L>R. To benefit from SL strengthening on B for return to recreation and decreased risk of re-injury.      Evan Is progressing well towards his goals.   Pt prognosis is Good.      Pt will continue to benefit from skilled outpatient physical therapy to address the deficits listed in the problem list box on initial evaluation, provide pt/family education and to maximize pt's level of independence in the home and community environment.      Pt's spiritual, cultural and educational needs considered and pt agreeable to plan of care and goals.     Anticipated barriers to physical therapy: none     Goals:  Short Term Goals (6 Weeks):   1. Patient to have full extension Right (equal to left) for normalized gait pattern  met  2. Patient to have 120 degrees of passive left knee flexion for improved performance of ADL's such as sit<>stand " transfers  met  3. Patient to have decreased edema left knee   met  4. Patient to report 2/10 pain or less in left knee with ambulating community distances   met  5. Patient to be compliant with home program 5x's a week as noted by proper demonstration of exercises to therapist for improved management of condition   met  6. Patient to ambulate full weight bearing Right lower extremity and normal gait pattern     met     Long Term Goals   -(12 Weeks):   1. Patient to have 4+/5 or greater strength in RLE for functional performance of ADL's such as lifting  met  2. Patient to descend one flight of stairs with alternating gait pattern without use of handrail and with proper LE alignment- met  3. Patient to have 135 degrees or greater Right knee flexion for return to ADL's including squatting   met  -(36 weeks):   4. Patient to have decreased subjective report of disability as noted by <20% limitation on FOTO knee questionnaire Progressing, not met  5. Patient to perform single leg squat without assistance and without valgus collapse to improve participation in recreational  Progressing, not met  6. Patient's RLE strength to be 5/5 for return to recreational activities and sport Progressing, not met     PLAN     Continue heavy resistance training and balance activities as tolerated for return to recreational activities and skiing. Manual therapy prn for pain modulation         Yuliana Bullard, PT

## 2022-11-02 ENCOUNTER — CLINICAL SUPPORT (OUTPATIENT)
Dept: REHABILITATION | Facility: OTHER | Age: 62
End: 2022-11-02
Payer: COMMERCIAL

## 2022-11-02 DIAGNOSIS — R29.898 WEAKNESS OF BOTH LOWER EXTREMITIES: ICD-10-CM

## 2022-11-02 DIAGNOSIS — M25.661 DECREASED RANGE OF MOTION (ROM) OF RIGHT KNEE: Primary | ICD-10-CM

## 2022-11-02 DIAGNOSIS — R26.89 IMPAIRED GAIT AND MOBILITY: ICD-10-CM

## 2022-11-02 DIAGNOSIS — R29.898 WEAKNESS OF RIGHT LOWER EXTREMITY: ICD-10-CM

## 2022-11-02 DIAGNOSIS — Z47.89 AFTERCARE FOR ANTERIOR CRUCIATE LIGAMENT (ACL) REPAIR: ICD-10-CM

## 2022-11-02 PROCEDURE — 97140 MANUAL THERAPY 1/> REGIONS: CPT | Mod: PN

## 2022-11-02 PROCEDURE — 97110 THERAPEUTIC EXERCISES: CPT | Mod: PN

## 2022-11-02 NOTE — PROGRESS NOTES
"[facility]   Physical Therapy Treatment Note     Name: Evan Khoury  Clinic Number: [id number]    Therapy Diagnosis:   Encounter Diagnoses   Name Primary?    Decreased range of motion (ROM) of right knee Yes    Impaired gait and mobility     Weakness of right lower extremity     Weakness of both lower extremities     Aftercare for anterior cruciate ligament (ACL) repair      Physician: Luis Watkins, Jennifer    Visit Date: 11/2/2022  Physician Orders: PT Eval and Treat   1. Right knee ACL reconstruction with hamstring autograft   2. Right knee arthroscopic menisectomy versus meniscus repair   3. Right knee arthroscopic chondroplasty   4. Right knee arthroscopic synovectomy  Surgery Date: 3/8/2022  Medical Diagnosis from Referral: S83.511A (ICD-10-CM) - Complete tear of anterior cruciate ligament of right knee, initial encounter  Evaluation Date: 3/10/2022  Surgical Date: 3/8/3033  Authorization Period Expiration: 12/31/2022  Plan of Care Expiration: 12/31/2022  Visit # / Visits authorized: 40/40 (no limit)  FOTO: 4/5. 10/21/2022  PTA:  0/5      Time In: 9:10 am  Time Out: 10:27 am  Total Billable Time: 61 minutes     Precautions: Standard        Subjective       Pt had anterior knee pain and leg soreness yesterday. He feels like he is making progress and strengthening his quads.    He was not compliant with home exercise program .  Response to previous treatment: "it was great."  Functional change: no change reported today     Pain: 0/10  Location: L anterior knee and R hamstring     Objective        CMS Impairment/Limitation/Restriction for FOTO Knee Survey     Therapist reviewed FOTO scores for Evan Khoury on 10/21/2022.   FOTO documents entered into [employer] - see Media section.     Limitation Score: 34%  Category: Mobility     Current : CJ = at least 20% but < 40% impaired, limited or restricted  Goal: CJ = at least 20% but < 40% impaired, limited or restricted      9/21/2022, 10/25 " "last reassessment       Evan received therapeutic exercises to develop strength, endurance, ROM, flexibility, posture and core stabilization for 52 minutes including:      Dynamic stretching: HS swoops (LOB, L anterior knee pain), knee hugs 40' x 2  Captain Morgans 2 x 15 reps each LE  Side lying hip circles 20 reps cw, 20 reps ccw with 2#  Side lying hip abd 2 x 12 reps with 4#  Piriformis str 30" x 2  IT band/ TFL str 30" x 2 with strap  Single limb bridging 2 x 10 (figure 4)  U  Bridge from box 2 x 10 reps  SAQ 3 x 10 reps with 4#  Prone quad stretch x 2'  Prone hamstring curls 3# 3 x 15    Shuttle 3 x 10 reps with 4 black bands, 1 red band  Shuttle single leg iso holds 30" x 4 ea with 100#  Side lying shuttle 3 x 10 reps with 1 black band, 1 red band    SL squat 2 x 10 reps ea with UE support    gastroc stretch on incline x 90"  Soleus stretch on incline x 90"    Side steps 3 laps with btb  Monster walks  3 laps with btb     Lateral tap downs 2 x 15 reps ea on 6" step  Forward step ups 6" 2 x 10 reps    Upright bike x 6 min full revolutions seat 13, L4 hills program  Hamstring stretches on step 3 x 30"     Wall sits 30 sec x 3   U HR  x 26 ea max reps to faitgue  prone hang 3# for 3 min   Prone knee flex strap 2 min      Neuromuscular re-education x 5 min for improved proprioception, coordination, balance including:  Iso BOSU hold R LE  3 x 30"  Dead lifts 20# from step 2 x 15  Drinking bird 2 x 15 reps with UE support, 10#  1/2 star taps x 5 reps  R side planks 3 sets to burn  SLS on foam 3 x 30"- added BTB pull downs 'Itis"  rebounder toss SLS on foam 3 x 30"  M/L wobble board x 2 min  Downhill mini squat 1/2 foam rolls 3 x 15     Evan received the following manual therapy techniques: Joint mobilizations were applied to the: patallar and R hamstring for 10 minutes, including:    stm to R Gastroc/ hamstrings/IT band/lateral quad  IASTM / scrapping hamstrings   Cross friction distal quad  All planes patella " "mob gr 2-3 supine  Grades II and III tibial mobilizations to increase flexion   Patella Y strip and medial decompression for L knee pain  Reapplied 2"I" strips to R IT band with two decompressive "I" strip to decrease anterior knee pain     Pt received 10 min cold pack to R knee with towel to protect skin.       Home Exercises Provided and Patient Education Provided      Education provided:   Continued HEP; education in consistency with high intensity resistance exercises for strength gains     Written Home Exercises Provided: Patient instructed to cont prior HEP.  Exercises were reviewed and Evan was able to demonstrate them prior to the end of the session.  Evan demonstrated good  understanding of the education provided.      See EMR under Patient Instructions for exercises provided prior visit.        Assessment   Arrived with report of increased anterior knee pain. R hamstrings  and sore from long trip over the weekend.      Evan Is progressing well towards his goals.   Pt prognosis is Good.      Pt will continue to benefit from skilled outpatient physical therapy to address the deficits listed in the problem list box on initial evaluation, provide pt/family education and to maximize pt's level of independence in the home and community environment.      Pt's spiritual, cultural and educational needs considered and pt agreeable to plan of care and goals.     Anticipated barriers to physical therapy: none     Goals:  Short Term Goals (6 Weeks):   1. Patient to have full extension Right (equal to left) for normalized gait pattern  met  2. Patient to have 120 degrees of passive left knee flexion for improved performance of ADL's such as sit<>stand transfers  met  3. Patient to have decreased edema left knee   met  4. Patient to report 2/10 pain or less in left knee with ambulating community distances   met  5. Patient to be compliant with home program 5x's a week as noted by proper demonstration of " exercises to therapist for improved management of condition   met  6. Patient to ambulate full weight bearing Right lower extremity and normal gait pattern     met     Long Term Goals   -(12 Weeks):   1. Patient to have 4+/5 or greater strength in RLE for functional performance of ADL's such as lifting  met  2. Patient to descend one flight of stairs with alternating gait pattern without use of handrail and with proper LE alignment- met  3. Patient to have 135 degrees or greater Right knee flexion for return to ADL's including squatting   met  -(36 weeks):   4. Patient to have decreased subjective report of disability as noted by <20% limitation on FOTO knee questionnaire Progressing, not met  5. Patient to perform single leg squat without assistance and without valgus collapse to improve participation in recreational  Progressing, not met  6. Patient's RLE strength to be 5/5 for return to recreational activities and sport Progressing, not met     PLAN     Continue heavy resistance training and balance activities as tolerated for return to recreational activities and skiing. Manual therapy prn for pain modulation         Luis Doherty, PT

## 2022-11-07 ENCOUNTER — CLINICAL SUPPORT (OUTPATIENT)
Dept: REHABILITATION | Facility: OTHER | Age: 62
End: 2022-11-07
Payer: COMMERCIAL

## 2022-11-07 DIAGNOSIS — R29.898 WEAKNESS OF BOTH LOWER EXTREMITIES: ICD-10-CM

## 2022-11-07 DIAGNOSIS — R26.89 IMPAIRED GAIT AND MOBILITY: ICD-10-CM

## 2022-11-07 DIAGNOSIS — Z47.89 AFTERCARE FOR ANTERIOR CRUCIATE LIGAMENT (ACL) REPAIR: ICD-10-CM

## 2022-11-07 DIAGNOSIS — M25.661 DECREASED RANGE OF MOTION (ROM) OF RIGHT KNEE: Primary | ICD-10-CM

## 2022-11-07 DIAGNOSIS — R29.898 WEAKNESS OF RIGHT LOWER EXTREMITY: ICD-10-CM

## 2022-11-07 PROCEDURE — 97110 THERAPEUTIC EXERCISES: CPT | Mod: PN

## 2022-11-07 PROCEDURE — 97140 MANUAL THERAPY 1/> REGIONS: CPT | Mod: PN

## 2022-11-07 NOTE — PROGRESS NOTES
"[facility]   Physical Therapy Treatment Note     Name: Evan Khoury  Clinic Number: [id number]    Therapy Diagnosis:   Encounter Diagnoses   Name Primary?    Decreased range of motion (ROM) of right knee Yes    Impaired gait and mobility     Weakness of right lower extremity     Weakness of both lower extremities     Aftercare for anterior cruciate ligament (ACL) repair        Physician: Luis Watkins, Jennifer    Visit Date: 11/7/2022  Physician Orders: PT Eval and Treat   1. Right knee ACL reconstruction with hamstring autograft   2. Right knee arthroscopic menisectomy versus meniscus repair   3. Right knee arthroscopic chondroplasty   4. Right knee arthroscopic synovectomy  Surgery Date: 3/8/2022  Medical Diagnosis from Referral: S83.511A (ICD-10-CM) - Complete tear of anterior cruciate ligament of right knee, initial encounter  Evaluation Date: 3/10/2022  Surgical Date: 3/8/3033  Authorization Period Expiration: 12/31/2022  Plan of Care Expiration: 12/31/2022  Visit # / Visits authorized: 41 (no limit)  FOTO: 5/5. 10/21/2022  PTA:  0/5      Time In: 2:30 pm  Time Out: 3:25 pm  Total Billable Time: 45 minutes     Precautions: Standard        Subjective       Pt reports his knee can feels sore at time and can feel week. States since he returned from his work trip in [place] he has noted that    He was not compliant with home exercise program .  Response to previous treatment: "it was great."  Functional change: no change      Pain: 1/10  Location: R anterior knee and R hamstring     Objective        CMS Impairment/Limitation/Restriction for FOTO Knee Survey     Therapist reviewed FOTO scores for Evan Khoury on 10/21/2022.   FOTO documents entered into [employer] - see Media section.     Limitation Score: 34%  Category: Mobility     Current : CJ = at least 20% but < 40% impaired, limited or restricted  Goal: CJ = at least 20% but < 40% impaired, limited or restricted      9/21/2022, 10/25 last " "reassessment       Evan received therapeutic exercises to develop strength, endurance, ROM, flexibility, posture and core stabilization for 35 minutes including:      Dynamic stretching: toy soldiers, knee hugs, knee pulls, piriformis, lunges 40' x 2  +lateral lunges x 10 reps  +lunges x 10 reps  Captain Mavis 2 x 15 reps each LE  Side lying hip circles 20 reps cw, 20 reps ccw with 2#  Side lying hip abd 2 x 12 reps with 4#  Piriformis str 30" x 2  IT band/ TFL str 30" x 2 with strap  Single limb bridging 2 x 10 (figure 4)  U  Bridge from box 2 x 10 reps  SAQ 3 x 10 reps with 4#  Prone quad stretch x 2'  Prone hamstring curls 3# 3 x 15    Shuttle 3 x 10 reps with 4 black bands, 1 red band  Shuttle single leg iso holds 30" x 4 ea with 100#  Side lying shuttle 3 x 10 reps with 1 black band, 1 red band    SL squat 2 x 10 reps ea with UE support    gastroc stretch on incline x 90"  Soleus stretch on incline x 90"    Side steps 3 laps with btb  Monster walks  3 laps with btb     Lateral tap downs 2 x 15 reps ea on 6" step  Forward step ups 6" 2 x 10 reps    Upright bike x 6 min full revolutions seat 13, L4 hills program  Hamstring stretches on step 3 x 30"  Iso BOSU hold R LE  3 x 30"  Wall sits 30 sec x 3   U HR  x 26 ea max reps to faitgue  prone hang 3# for 3 min   Prone knee flex strap 2 min      Neuromuscular re-education x 00 min for improved proprioception, coordination, balance including:    Dead lifts 20# from step 2 x 15  Drinking bird 2 x 15 reps with UE support, 10#  1/2 star taps x 5 reps  R side planks 3 sets to burn  SLS on foam 3 x 30"- added BTB pull downs 'Itis"  rebounder toss SLS on foam 3 x 30"  M/L wobble board x 2 min  Downhill mini squat 1/2 foam rolls 3 x 15     Evan received the following manual therapy techniques: Joint mobilizations were applied to the: patallar and R hamstring for 10 minutes, including:    stm to R Gastroc/ hamstrings/IT band/lateral quad  IASTM / scrapping hamstrings " "  Cross friction distal quad  All planes patella mob gr 2-3 supine  Grades II and III tibial mobilizations to increase flexion   Patella Y strip and medial decompression for L knee pain  Reapplied 2"I" strips to R IT band with two decompressive "I" strip to decrease anterior knee pain     Pt received 10 min cold pack to R knee with towel to protect skin.       Home Exercises Provided and Patient Education Provided      Education provided:   Continued HEP; education in consistency with high intensity resistance exercises for strength gains     Written Home Exercises Provided: Patient instructed to cont prior HEP.  Exercises were reviewed and Evan was able to demonstrate them prior to the end of the session.  Evan demonstrated good understanding of the education provided.      See EMR under Patient Instructions for exercises provided prior visit.        Assessment   Performed lunges today with SBA. Weakness noted, but did not increase anterior knee pain. Did experience medial knee pain with piriformis pulls with dynamic warm ups. Will continue with LE strengthening and progress to plyometrics to return to running.     Evan Is progressing well towards his goals.   Pt prognosis is Good.      Pt will continue to benefit from skilled outpatient physical therapy to address the deficits listed in the problem list box on initial evaluation, provide pt/family education and to maximize pt's level of independence in the home and community environment.      Pt's spiritual, cultural and educational needs considered and pt agreeable to plan of care and goals.     Anticipated barriers to physical therapy: none     Goals:  Short Term Goals (6 Weeks):   1. Patient to have full extension Right (equal to left) for normalized gait pattern  met  2. Patient to have 120 degrees of passive left knee flexion for improved performance of ADL's such as sit<>stand transfers  met  3. Patient to have decreased edema left knee   met  4. Patient " to report 2/10 pain or less in left knee with ambulating community distances   met  5. Patient to be compliant with home program 5x's a week as noted by proper demonstration of exercises to therapist for improved management of condition   met  6. Patient to ambulate full weight bearing Right lower extremity and normal gait pattern     met     Long Term Goals   -(12 Weeks):   1. Patient to have 4+/5 or greater strength in RLE for functional performance of ADL's such as lifting  met  2. Patient to descend one flight of stairs with alternating gait pattern without use of handrail and with proper LE alignment- met  3. Patient to have 135 degrees or greater Right knee flexion for return to ADL's including squatting   met  -(36 weeks):   4. Patient to have decreased subjective report of disability as noted by <20% limitation on FOTO knee questionnaire Progressing, not met  5. Patient to perform single leg squat without assistance and without valgus collapse to improve participation in recreational  Progressing, not met  6. Patient's RLE strength to be 5/5 for return to recreational activities and sport Progressing, not met     PLAN     Continue heavy resistance training and balance activities as tolerated for return to recreational activities and skiing. Manual therapy prn for pain modulation         Luis Doherty, PT

## 2022-11-10 ENCOUNTER — CLINICAL SUPPORT (OUTPATIENT)
Dept: REHABILITATION | Facility: OTHER | Age: 62
End: 2022-11-10
Payer: COMMERCIAL

## 2022-11-10 DIAGNOSIS — R29.898 WEAKNESS OF RIGHT LOWER EXTREMITY: ICD-10-CM

## 2022-11-10 DIAGNOSIS — Z47.89 AFTERCARE FOR ANTERIOR CRUCIATE LIGAMENT (ACL) REPAIR: ICD-10-CM

## 2022-11-10 DIAGNOSIS — M25.661 DECREASED RANGE OF MOTION (ROM) OF RIGHT KNEE: Primary | ICD-10-CM

## 2022-11-10 DIAGNOSIS — R26.89 IMPAIRED GAIT AND MOBILITY: ICD-10-CM

## 2022-11-10 DIAGNOSIS — R29.898 WEAKNESS OF BOTH LOWER EXTREMITIES: ICD-10-CM

## 2022-11-10 PROCEDURE — 97110 THERAPEUTIC EXERCISES: CPT | Mod: PN

## 2022-11-10 NOTE — PROGRESS NOTES
[facility]   Physical Therapy Treatment Note     Name: Evan Khoury  Clinic Number: [id number]    Therapy Diagnosis:   Encounter Diagnoses   Name Primary?    Decreased range of motion (ROM) of right knee Yes    Impaired gait and mobility     Weakness of right lower extremity     Weakness of both lower extremities     Aftercare for anterior cruciate ligament (ACL) repair        Physician: Luis Watkins, Jennifer    Visit Date: 11/10/2022  Physician Orders: PT Eval and Treat   1. Right knee ACL reconstruction with hamstring autograft   2. Right knee arthroscopic menisectomy versus meniscus repair   3. Right knee arthroscopic chondroplasty   4. Right knee arthroscopic synovectomy  Surgery Date: 3/8/2022  Medical Diagnosis from Referral: S83.511A (ICD-10-CM) - Complete tear of anterior cruciate ligament of right knee, initial encounter  Evaluation Date: 3/10/2022  Surgical Date: 3/8/3033  Authorization Period Expiration: 12/31/2022  Plan of Care Expiration: 12/31/2022  Visit # / Visits authorized: 42 (no limit)  FOTO: 1/5. 11/10/2022  PTA:  0/5      Time In: 9:18 am  Time Out: 10:10 am  Total Billable Time: 42 minutes     Precautions: Standard        Subjective       Pt reports he felt like his knee was not very sturdy yesterday. Felt the best he has felt in a while on Monday and Tuesday.    He was not compliant with home exercise program .  Response to previous treatment: good session, no soreness or pain  Functional change: no change      Pain: 0/10  Location: R anterior knee and R hamstring     Objective        CMS Impairment/Limitation/Restriction for FOTO Knee Survey     Therapist reviewed FOTO scores for Evan Khoury on 11/10/2022.   FOTO documents entered into [employer] - see Media section.     Limitation Score: 34%  Category: Mobility     Current : CJ = at least 20% but < 40% impaired, limited or restricted  Goal: CJ = at least 20% but < 40% impaired, limited or restricted      9/21/2022,  "10/25 last reassessment       Evan received therapeutic exercises to develop strength, endurance, ROM, flexibility, posture and core stabilization for 42 minutes including:      Dynamic stretching: toy soldiers, knee hugs, knee pulls, piriformis, lunges 40' x 2  lateral lunges x 10 reps  lunges x 10 reps  Captain Morgans 2 x 15 reps each LE  Side lying hip circles 20 reps cw, 20 reps ccw with 2#  Side lying hip abd 2 x 12 reps with 4#    +2 leg jump in place 3 x 30 reps (90 sec rest between sets)  +2 leg jump forward/backwards 3 x 30 reps (90 sec rest between sets)  +2 leg hop side to side 3 x 30 reps (90 sec rest between sets)    Piriformis str 30" x 2  IT band/ TFL str 30" x 2 with strap  Single limb bridging 2 x 10 (figure 4)  U  Bridge from box 2 x 10 reps  SAQ 3 x 10 reps with 4#  Prone quad stretch x 2'  Prone hamstring curls 3# 3 x 15    Shuttle 3 x 10 reps with 4 black bands, 1 red band  Shuttle single leg iso holds 30" x 4 ea with 100#  Side lying shuttle 3 x 10 reps with 1 black band, 1 red band    SL squat 2 x 10 reps ea with UE support    gastroc stretch on incline x 90"  Soleus stretch on incline x 90"    Hamstring stretches x 90"    Side steps 3 laps with btb  Monster walks  3 laps with btb     Lateral tap downs 2 x 15 reps ea on 6" step  Forward step ups 6" 2 x 10 reps    Upright bike x 6 min full revolutions seat 13, L4 hills program  Hamstring stretches on step 3 x 30"  Iso BOSU hold R LE  3 x 30"  Wall sits 30 sec x 3   U HR  x 26 ea max reps to faitgue  prone hang 3# for 3 min   Prone knee flex strap 2 min      Neuromuscular re-education x 00 min for improved proprioception, coordination, balance including:    Dead lifts 20# from step 2 x 15  Drinking bird 2 x 15 reps with UE support, 10#  1/2 star taps x 5 reps  R side planks 3 sets to burn  SLS on foam 3 x 30"- added BTB pull downs 'Itis"  rebounder toss SLS on foam 3 x 30"  M/L wobble board x 2 min  Downhill mini squat 1/2 foam rolls 3 x 15   " "  Evan received the following manual therapy techniques: Joint mobilizations were applied to the: patallar and R hamstring for 00 minutes, including:    stm to R Gastroc/ hamstrings/IT band/lateral quad  IASTM / scrapping hamstrings   Cross friction distal quad  All planes patella mob gr 2-3 supine  Grades II and III tibial mobilizations to increase flexion   Patella Y strip and medial decompression for L knee pain  Reapplied 2"I" strips to R IT band with two decompressive "I" strip to decrease anterior knee pain     Pt received 10 min cold pack to R knee with towel to protect skin.       Home Exercises Provided and Patient Education Provided      Education provided:   Continued HEP; education in consistency with high intensity resistance exercises for strength gains     Written Home Exercises Provided: Patient instructed to cont prior HEP.  Exercises were reviewed and Evan was able to demonstrate them prior to the end of the session.  Evan demonstrated good understanding of the education provided.      See EMR under Patient Instructions for exercises provided prior visit.        Assessment   Initiated double leg plyometric jumping. No evidence of valgus. Initially had medial knee pain, but quickly subsided. Will continue with double leg plyometrics and progress to single leg to progress to running     Evan Is progressing well towards his goals.   Pt prognosis is Good.      Pt will continue to benefit from skilled outpatient physical therapy to address the deficits listed in the problem list box on initial evaluation, provide pt/family education and to maximize pt's level of independence in the home and community environment.      Pt's spiritual, cultural and educational needs considered and pt agreeable to plan of care and goals.     Anticipated barriers to physical therapy: none     Goals:  Short Term Goals (6 Weeks):   1. Patient to have full extension Right (equal to left) for normalized gait pattern  " met  2. Patient to have 120 degrees of passive left knee flexion for improved performance of ADL's such as sit<>stand transfers  met  3. Patient to have decreased edema left knee   met  4. Patient to report 2/10 pain or less in left knee with ambulating community distances   met  5. Patient to be compliant with home program 5x's a week as noted by proper demonstration of exercises to therapist for improved management of condition   met  6. Patient to ambulate full weight bearing Right lower extremity and normal gait pattern     met     Long Term Goals   -(12 Weeks):   1. Patient to have 4+/5 or greater strength in RLE for functional performance of ADL's such as lifting  met  2. Patient to descend one flight of stairs with alternating gait pattern without use of handrail and with proper LE alignment- met  3. Patient to have 135 degrees or greater Right knee flexion for return to ADL's including squatting   met  -(36 weeks):   4. Patient to have decreased subjective report of disability as noted by <20% limitation on FOTO knee questionnaire Progressing, not met  5. Patient to perform single leg squat without assistance and without valgus collapse to improve participation in recreational  Progressing, not met  6. Patient's RLE strength to be 5/5 for return to recreational activities and sport Progressing, not met     PLAN     Continue heavy resistance training and balance activities as tolerated for return to recreational activities and skiing. Manual therapy prn for pain modulation         Luis Doherty, PT

## 2022-11-14 ENCOUNTER — CLINICAL SUPPORT (OUTPATIENT)
Dept: REHABILITATION | Facility: OTHER | Age: 62
End: 2022-11-14
Payer: COMMERCIAL

## 2022-11-14 DIAGNOSIS — R29.898 WEAKNESS OF BOTH LOWER EXTREMITIES: ICD-10-CM

## 2022-11-14 DIAGNOSIS — R29.898 WEAKNESS OF RIGHT LOWER EXTREMITY: ICD-10-CM

## 2022-11-14 DIAGNOSIS — Z47.89 AFTERCARE FOR ANTERIOR CRUCIATE LIGAMENT (ACL) REPAIR: ICD-10-CM

## 2022-11-14 DIAGNOSIS — R26.89 IMPAIRED GAIT AND MOBILITY: ICD-10-CM

## 2022-11-14 DIAGNOSIS — M25.661 DECREASED RANGE OF MOTION (ROM) OF RIGHT KNEE: Primary | ICD-10-CM

## 2022-11-14 PROCEDURE — 97110 THERAPEUTIC EXERCISES: CPT | Mod: PN | Performed by: PHYSICAL THERAPIST

## 2022-11-14 NOTE — PROGRESS NOTES
[facility]   Physical Therapy Treatment Note     Name: Evan Khoury  Clinic Number: [id number]    Therapy Diagnosis:   Encounter Diagnoses   Name Primary?    Decreased range of motion (ROM) of right knee Yes    Impaired gait and mobility     Weakness of right lower extremity     Weakness of both lower extremities     Aftercare for anterior cruciate ligament (ACL) repair        Physician: Luis Watkins, Jennifer    Visit Date: 11/14/2022  Physician Orders: PT Eval and Treat   1. Right knee ACL reconstruction with hamstring autograft   2. Right knee arthroscopic menisectomy versus meniscus repair   3. Right knee arthroscopic chondroplasty   4. Right knee arthroscopic synovectomy  Surgery Date: 3/8/2022  Medical Diagnosis from Referral: S83.511A (ICD-10-CM) - Complete tear of anterior cruciate ligament of right knee, initial encounter  Evaluation Date: 3/10/2022  Surgical Date: 3/8/3033  Authorization Period Expiration: 12/31/2022  Plan of Care Expiration: 12/31/2022  Visit # / Visits authorized: 49 /60  FOTO: 1/5. 11/10/2022  PTA:  0/5      Time In: 7:15 am  Time Out: 0830 am  Total Billable Time: 55 minutes     Precautions: Standard        Subjective       Pt feeling great after the hoping and accomplished. Sudden medial knee pain the other day with no UMER and no pain at rest at current.     He was not compliant with home exercise program .  Response to previous treatment: good session, no soreness or pain  Functional change: no change      Pain: 0/10  Location: R anterior knee and R hamstring     Objective      11/14/22: anterior drawer: negative    CMS Impairment/Limitation/Restriction for FOTO Knee Survey     Therapist reviewed FOTO scores for Evan Khoury on 11/10/2022.   FOTO documents entered into [employer] - see Media section.     Limitation Score: 34%  Category: Mobility     Current : CJ = at least 20% but < 40% impaired, limited or restricted  Goal: CJ = at least 20% but < 40%  "impaired, limited or restricted      9/21/2022, 10/25 last reassessment       Evan received therapeutic exercises to develop strength, endurance, ROM, flexibility, posture and core stabilization for 60 minutes including:      Dynamic stretching: toy soldiers, knee hugs, knee pulls, piriformis, lunges 40' x 2  Treadmill walking x 10 min (3 min 3.0 mph, gradual increase incline to 5 and 4.2 mph, 2 min cool down)  lateral lunges x 10 reps  lunges x 10 reps  Captain Morgans 2 x 15 reps each LE  Side lying hip circles 20 reps cw, 20 reps ccw with 2#  Side lying hip abd 2 x 12 reps with 4#  Modified NHE with physioball 2 x 10 (stir the pot last rep 5 x ea)    Shuttle jumps 1 cord bottom  2 leg jump in place 3 x 30 reps (90 sec rest between sets)  2 leg jump forward/backwards 3 x 30 reps (90 sec rest between sets)  2 leg hop side to side 3 x 30 reps (90 sec rest between sets)    Piriformis str 30" x 2  IT band/ TFL str 30" x 2 with strap  Single limb bridging 2 x 10 (figure 4)  U  Bridge x15 reps  SAQ 3 x 10 reps with 4#  Prone quad stretch x 2'  Prone hamstring curls 3# 3 x 15    Shuttle 3 x 10 reps with 4 black bands, 1 red band  Shuttle single leg iso holds 30" x 4 ea with 100#  Side lying shuttle 3 x 10 reps with 1 black band, 1 red band    SL squat 2 x 10 reps ea with UE support    gastroc stretch on incline x 90"  Soleus stretch on incline x 90"  Hamstring stretches x 90"    Side steps 3 laps with purple soft band  Monster walks  3 laps with pink soft band (1 set backwards stopped 2/2 L anterior knee pain)    Lateral tap downs 2 x 15 reps ea on 6" step  Forward step ups 6" 2 x 10 reps    Upright bike x 6 min full revolutions seat 13, L4 hills program  Hamstring stretches on step 3 x 30"  Iso BOSU hold R LE  3 x 30"  Wall sits 30 sec x 3   U HR  x 26 ea max reps to faitgue  prone hang 3# for 3 min   Prone knee flex strap 2 min      Neuromuscular re-education x 00 min for improved proprioception, coordination, balance " "including:    Dead lifts 20# from step 2 x 15  Drinking bird 2 x 15 reps with UE support, 10#  1/2 star taps x 5 reps  R side planks 3 sets to burn  SLS on foam 3 x 30"- added BTB pull downs 'Itis"  rebounder toss SLS on foam 3 x 30"  M/L wobble board x 2 min  Downhill mini squat 1/2 foam rolls 3 x 15     Evan received the following manual therapy techniques: Joint mobilizations were applied to the: patallar and R hamstring for 00 minutes, including:    stm to R Gastroc/ hamstrings/IT band/lateral quad  IASTM / scrapping hamstrings   Cross friction distal quad  All planes patella mob gr 2-3 supine  Grades II and III tibial mobilizations to increase flexion   Patella Y strip and medial decompression for L knee pain  Reapplied 2"I" strips to R IT band with two decompressive "I" strip to decrease anterior knee pain     Pt received 10 min cold pack to R knee with towel to protect skin.       Home Exercises Provided and Patient Education Provided      Education provided:   Issued return to run progression and education in brisk walking and DL plyometrics per tolerance; will wait for walk/jog program. Recommended biodex testing with next MD appointment.     Written Home Exercises Provided: Patient instructed to cont prior HEP.  Exercises were reviewed and Evan was able to demonstrate them prior to the end of the session.  Evan demonstrated good understanding of the education provided.      See EMR under Patient Instructions for exercises provided prior visit.        Assessment   Good tolerance to continued plyometrics with good knee flexion/ absorption with landing. No dynamic knee valgus noted. Initially mild anterior L knee pain that subsided with subsequent reps.      Evan Is progressing well towards his goals.   Pt prognosis is Good.      Pt will continue to benefit from skilled outpatient physical therapy to address the deficits listed in the problem list box on initial evaluation, provide pt/family education and " to maximize pt's level of independence in the home and community environment.      Pt's spiritual, cultural and educational needs considered and pt agreeable to plan of care and goals.     Anticipated barriers to physical therapy: none     Goals:  Short Term Goals (6 Weeks):   1. Patient to have full extension Right (equal to left) for normalized gait pattern  met  2. Patient to have 120 degrees of passive left knee flexion for improved performance of ADL's such as sit<>stand transfers  met  3. Patient to have decreased edema left knee   met  4. Patient to report 2/10 pain or less in left knee with ambulating community distances   met  5. Patient to be compliant with home program 5x's a week as noted by proper demonstration of exercises to therapist for improved management of condition   met  6. Patient to ambulate full weight bearing Right lower extremity and normal gait pattern     met     Long Term Goals   -(12 Weeks):   1. Patient to have 4+/5 or greater strength in RLE for functional performance of ADL's such as lifting  met  2. Patient to descend one flight of stairs with alternating gait pattern without use of handrail and with proper LE alignment- met  3. Patient to have 135 degrees or greater Right knee flexion for return to ADL's including squatting   met  -(36 weeks):   4. Patient to have decreased subjective report of disability as noted by <20% limitation on FOTO knee questionnaire Progressing, not met  5. Patient to perform single leg squat without assistance and without valgus collapse to improve participation in recreational  Progressing, not met  6. Patient's RLE strength to be 5/5 for return to recreational activities and sport Progressing, not met     PLAN     Continue heavy resistance training and balance activities as tolerated for return to recreational activities and skiing. Manual therapy prn for pain modulation         Yuliana Bullard, PT

## 2022-11-17 ENCOUNTER — CLINICAL SUPPORT (OUTPATIENT)
Dept: REHABILITATION | Facility: OTHER | Age: 62
End: 2022-11-17
Payer: COMMERCIAL

## 2022-11-17 DIAGNOSIS — R29.898 WEAKNESS OF BOTH LOWER EXTREMITIES: ICD-10-CM

## 2022-11-17 DIAGNOSIS — R26.89 IMPAIRED GAIT AND MOBILITY: ICD-10-CM

## 2022-11-17 DIAGNOSIS — M25.661 DECREASED RANGE OF MOTION (ROM) OF RIGHT KNEE: Primary | ICD-10-CM

## 2022-11-17 DIAGNOSIS — R29.898 WEAKNESS OF RIGHT LOWER EXTREMITY: ICD-10-CM

## 2022-11-17 DIAGNOSIS — Z47.89 AFTERCARE FOR ANTERIOR CRUCIATE LIGAMENT (ACL) REPAIR: ICD-10-CM

## 2022-11-17 PROCEDURE — 97110 THERAPEUTIC EXERCISES: CPT | Mod: PN

## 2022-11-17 NOTE — PROGRESS NOTES
"[facility]   Physical Therapy Treatment Note     Name: Evan Khoury  Clinic Number: [id number]    Therapy Diagnosis:   Encounter Diagnoses   Name Primary?    Decreased range of motion (ROM) of right knee Yes    Impaired gait and mobility     Weakness of right lower extremity     Weakness of both lower extremities     Aftercare for anterior cruciate ligament (ACL) repair        Physician: Luis Watkins, Jennifer    Visit Date: 11/17/2022  Physician Orders: PT Eval and Treat   1. Right knee ACL reconstruction with hamstring autograft   2. Right knee arthroscopic menisectomy versus meniscus repair   3. Right knee arthroscopic chondroplasty   4. Right knee arthroscopic synovectomy  Surgery Date: 3/8/2022  Medical Diagnosis from Referral: S83.511A (ICD-10-CM) - Complete tear of anterior cruciate ligament of right knee, initial encounter  Evaluation Date: 3/10/2022  Surgical Date: 3/8/3033  Authorization Period Expiration: 12/31/2022  Plan of Care Expiration: 12/31/2022  Visit # / Visits authorized: 50 /60  FOTO: 3/5. 11/10/2022  PTA:  0/5      Time In: 9:20 am  Time Out: 10:20 am  Total Billable Time: 50 minutes     Precautions: Standard        Subjective       Pt states his knee feels fine. Denies any episodes of instability or anterior knee pain since last session.    He was not compliant with home exercise program .  Response to previous treatment: "It was awesome."  Functional change: no change      Pain: 0/10  Location: R anterior knee and R hamstring     Objective      11/14/22: anterior drawer: negative    CMS Impairment/Limitation/Restriction for FOTO Knee Survey     Therapist reviewed FOTO scores for Evan Khoury on 11/10/2022.   FOTO documents entered into [employer] - see Media section.     Limitation Score: 34%  Category: Mobility     Current : CJ = at least 20% but < 40% impaired, limited or restricted  Goal: CJ = at least 20% but < 40% impaired, limited or restricted      9/21/2022, " "10/25 last reassessment       Evan received therapeutic exercises to develop strength, endurance, ROM, flexibility, posture and core stabilization for 50 minutes including:      Dynamic warm ups: toy soldiers, knee hugs, knee pulls, piriformis, lunges 40' x 2  Treadmill walking x 10 min (3 min 3.0 mph, gradual increase incline to 5 and 4.2 mph, 2 min cool down)  lateral lunges x 10 reps  lunges x 10 reps  Captain Morgans 2 x 15 reps each LE  Side lying hip circles 20 reps cw, 20 reps ccw with 2#  Side lying hip abd 2 x 12 reps with 4#  Modified NHE with physioball 2 x 10 (stir the pot last rep 5 x ea)    Shuttle jumps 1 cord bottom  2 leg jump in place 3 x 30 reps (90 sec rest between sets)  2 leg jump forward/backwards 3 x 30 reps (90 sec rest between sets)  2 leg hop side to side 3 x 30 reps (90 sec rest between sets)    Piriformis str 30" x 2  IT band/ TFL str 30" x 2 with strap  Single limb bridging 2 x 10 (figure 4)  U  Bridge x 15 reps  SAQ 3 x 10 reps with 4#  Prone quad stretch x 2'  Prone hamstring curls 3# 3 x 15    Shuttle 3 x 10 reps with 4 black bands, 1 red band  Shuttle single leg iso holds 30" x 4 ea with 100#  Side lying shuttle 3 x 10 reps with 1 black band, 1 red band    SL squat 2 x 10 reps ea with UE support    gastroc stretch on incline x 90"  Soleus stretch on incline x 90"  Hamstring stretches x 90"    Side steps 3 laps with purple soft band  Monster walks  3 laps with pink soft band (1 set backwards stopped 2/2 L anterior knee pain)    Lateral tap downs 2 x 15 reps ea on 6" step  Forward step ups 6" 2 x 10 reps    Upright bike x 6 min full revolutions seat 13, L4 hills program  Hamstring stretches on step 3 x 30"  Iso BOSU hold R LE  3 x 30"  Wall sits 30 sec x 3   U HR  x 26 ea max reps to faitgue  prone hang 3# for 3 min   Prone knee flex strap 2 min      Neuromuscular re-education x 00 min for improved proprioception, coordination, balance including:    Dead lifts 20# from step 2 x " "15  Drinking bird 2 x 15 reps with UE support, 10#  1/2 star taps x 5 reps  R side planks 3 sets to burn  SLS on foam 3 x 30"- added BTB pull downs 'Itis"  rebounder toss SLS on foam 3 x 30"  M/L wobble board x 2 min  Downhill mini squat 1/2 foam rolls 3 x 15     Evan received the following manual therapy techniques: Joint mobilizations were applied to the: patallar and R hamstring for 00 minutes, including:    stm to R Gastroc/ hamstrings/IT band/lateral quad  IASTM / scrapping hamstrings   Cross friction distal quad  All planes patella mob gr 2-3 supine  Grades II and III tibial mobilizations to increase flexion   Patella Y strip and medial decompression for L knee pain  Reapplied 2"I" strips to R IT band with two decompressive "I" strip to decrease anterior knee pain     Pt received 10 min cold pack to R knee with towel to protect skin.       Home Exercises Provided and Patient Education Provided      Education provided:   No new HEP given     Written Home Exercises Provided: Patient instructed to cont prior HEP.  Exercises were reviewed and Evan was able to demonstrate them prior to the end of the session.  Evan demonstrated good understanding of the education provided.      See EMR under Patient Instructions for exercises provided prior visit.        Assessment   Able to perform jumping today without increased pain or instability. Will continue with plyometrics to progress to running.     Evan Is progressing well towards his goals.   Pt prognosis is Good.      Pt will continue to benefit from skilled outpatient physical therapy to address the deficits listed in the problem list box on initial evaluation, provide pt/family education and to maximize pt's level of independence in the home and community environment.      Pt's spiritual, cultural and educational needs considered and pt agreeable to plan of care and goals.     Anticipated barriers to physical therapy: none     Goals:  Short Term Goals (6 Weeks): "   1. Patient to have full extension Right (equal to left) for normalized gait pattern  met  2. Patient to have 120 degrees of passive left knee flexion for improved performance of ADL's such as sit<>stand transfers  met  3. Patient to have decreased edema left knee   met  4. Patient to report 2/10 pain or less in left knee with ambulating community distances   met  5. Patient to be compliant with home program 5x's a week as noted by proper demonstration of exercises to therapist for improved management of condition   met  6. Patient to ambulate full weight bearing Right lower extremity and normal gait pattern     met     Long Term Goals   -(12 Weeks):   1. Patient to have 4+/5 or greater strength in RLE for functional performance of ADL's such as lifting  met  2. Patient to descend one flight of stairs with alternating gait pattern without use of handrail and with proper LE alignment- met  3. Patient to have 135 degrees or greater Right knee flexion for return to ADL's including squatting   met  -(36 weeks):   4. Patient to have decreased subjective report of disability as noted by <20% limitation on FOTO knee questionnaire Progressing, not met  5. Patient to perform single leg squat without assistance and without valgus collapse to improve participation in recreational  Progressing, not met  6. Patient's RLE strength to be 5/5 for return to recreational activities and sport Progressing, not met     PLAN     Continue heavy resistance training and balance activities as tolerated for return to recreational activities and skiing. Manual therapy prn for pain modulation         Luis Doherty, PT

## 2022-11-21 NOTE — PROGRESS NOTES
[facility]   Physical Therapy Treatment Note     Name: Evan Khoury  Clinic Number: [id number]    Therapy Diagnosis:   Encounter Diagnoses   Name Primary?    Decreased range of motion (ROM) of right knee Yes    Impaired gait and mobility     Weakness of right lower extremity     Weakness of both lower extremities     Aftercare for anterior cruciate ligament (ACL) repair        Physician: Luis Watkins, Jennifer    Visit Date: 11/22/2022  Physician Orders: PT Eval and Treat   1. Right knee ACL reconstruction with hamstring autograft   2. Right knee arthroscopic menisectomy versus meniscus repair   3. Right knee arthroscopic chondroplasty   4. Right knee arthroscopic synovectomy  Surgery Date: 3/8/2022  Medical Diagnosis from Referral: S83.511A (ICD-10-CM) - Complete tear of anterior cruciate ligament of right knee, initial encounter  Evaluation Date: 3/10/2022  Surgical Date: 3/8/3033  Authorization Period Expiration: 12/31/2022  Plan of Care Expiration: 12/31/2022  Visit # / Visits authorized: 51 /60  FOTO: 4/5. 11/10/2022  PTA:  0/5      Time In: 9:17 am  Time Out: 10:37 am  Total Billable Time: 60 minutes     Precautions: Standard        Subjective       Pt states his knee feels fine. Denies any episodes of instability or anterior knee pain since last session.    He was not compliant with home exercise program .  Response to previous treatment: good session  Functional change: no change      Pain: 0/10  Location: R anterior knee and R hamstring     Objective      11/14/22: anterior drawer: negative    CMS Impairment/Limitation/Restriction for FOTO Knee Survey     Therapist reviewed FOTO scores for Evan Khoury on 11/10/2022.   FOTO documents entered into [employer] - see Media section.     Limitation Score: 34%  Category: Mobility     Current : CJ = at least 20% but < 40% impaired, limited or restricted  Goal: CJ = at least 20% but < 40% impaired, limited or restricted      9/21/2022, 10/25  "last reassessment       Evan received therapeutic exercises to develop strength, endurance, ROM, flexibility, posture and core stabilization for 65 minutes including:      Dynamic warm ups: toy soldiers, knee hugs, knee pulls, piriformis, lunges 40' x 2  Treadmill walking x 10 min (3 min 3.0 mph, gradual increase incline to 5 and 4.2 mph, 2 min cool down)  lateral lunges x 10 reps  lunges x 10 reps  Captain Morgans 2 x 15 reps each LE  Side lying hip circles 20 reps cw, 20 reps ccw with 2#  Side lying hip abd 2 x 12 reps with 4#  Modified NHE with physioball 2 x 10 (stir the pot last rep 5 x ea)    Shuttle jumps 1 cord bottom 3 x 30"  2 leg jump in place 3 x 30 reps (90 sec rest between sets)  2 leg jump forward/backwards 3 x 30 reps (90 sec rest between sets)  2 leg hop side to side 3 x 30 reps (90 sec rest between sets)  +short broad jumps with emphasis on soft landing 2 x 40 ft    Piriformis str 30" x 2  IT band/ TFL str 30" x 2 with strap  Single limb bridging 2 x 10 (figure 4)  U  Bridge x 15 reps  SAQ 3 x 10 reps with 4#  Prone quad stretch x 2'  Prone hamstring curls 3# 3 x 15    Shuttle 3 x 10 reps with 4 black bands, 1 red band  Shuttle single leg iso holds 30" x 4 ea with 100#  Side lying shuttle 3 x 10 reps with 1 black band, 1 red band    SL squat 3 x 10 reps ea with UE support    gastroc stretch on incline x 90"  Soleus stretch on incline x 90"  Hamstring stretches x 90"    Side steps 3 laps with purple soft band  Monster walks  3 laps with pink soft band (1 set backwards stopped 2/2 L anterior knee pain)    Lateral tap downs 2 x 15 reps ea on 6" step  Forward step ups 6" 2 x 10 reps    Upright bike x 6 min full revolutions seat 13, L4 hills program  Hamstring stretches on step 3 x 30"  Iso BOSU hold R LE  3 x 30"  Wall sits 30 sec x 3   U HR  x 26 ea max reps to faitgue  prone hang 3# for 3 min   Prone knee flex strap 2 min      Neuromuscular re-education x 00 min for improved proprioception, " "coordination, balance including:    Dead lifts 20# from step 2 x 15  Drinking bird 2 x 15 reps with UE support, 10#  1/2 star taps x 5 reps  R side planks 3 sets to burn  SLS on foam 3 x 30"- added BTB pull downs 'Itis"  rebounder toss SLS on foam 3 x 30"  M/L wobble board x 2 min  Downhill mini squat 1/2 foam rolls 3 x 15     Evan received the following manual therapy techniques: Joint mobilizations were applied to the: patallar and R hamstring for 5 minutes, including:    stm to R Gastroc/ hamstrings/IT band/lateral quad  IASTM / scrapping hamstrings   Cross friction distal quad  All planes patella mob gr 2-3 supine  Grades II and III tibial mobilizations to increase flexion   Patella Y strip and medial decompression for L knee pain  Reapplied 2"I" strips to R IT band with two decompressive "I" strip to decrease anterior knee pain     Pt received 10 min cold pack to R knee with towel to protect skin.       Home Exercises Provided and Patient Education Provided      Education provided:   No new HEP given     Written Home Exercises Provided: Patient instructed to cont prior HEP.  Exercises were reviewed and Evan was able to demonstrate them prior to the end of the session.  Evan demonstrated good understanding of the education provided.      See EMR under Patient Instructions for exercises provided prior visit.        Assessment   Did experience some transient medial R knee pain that quickly passed. Added forward small broad jumps with emphasis on landing.     Evan Is progressing well towards his goals.   Pt prognosis is Good.      Pt will continue to benefit from skilled outpatient physical therapy to address the deficits listed in the problem list box on initial evaluation, provide pt/family education and to maximize pt's level of independence in the home and community environment.      Pt's spiritual, cultural and educational needs considered and pt agreeable to plan of care and goals.     Anticipated " barriers to physical therapy: none     Goals:  Short Term Goals (6 Weeks):   1. Patient to have full extension Right (equal to left) for normalized gait pattern  met  2. Patient to have 120 degrees of passive left knee flexion for improved performance of ADL's such as sit<>stand transfers  met  3. Patient to have decreased edema left knee   met  4. Patient to report 2/10 pain or less in left knee with ambulating community distances   met  5. Patient to be compliant with home program 5x's a week as noted by proper demonstration of exercises to therapist for improved management of condition   met  6. Patient to ambulate full weight bearing Right lower extremity and normal gait pattern     met     Long Term Goals   -(12 Weeks):   1. Patient to have 4+/5 or greater strength in RLE for functional performance of ADL's such as lifting  met  2. Patient to descend one flight of stairs with alternating gait pattern without use of handrail and with proper LE alignment- met  3. Patient to have 135 degrees or greater Right knee flexion for return to ADL's including squatting   met  -(36 weeks):   4. Patient to have decreased subjective report of disability as noted by <20% limitation on FOTO knee questionnaire Progressing, not met  5. Patient to perform single leg squat without assistance and without valgus collapse to improve participation in recreational  Progressing, not met  6. Patient's RLE strength to be 5/5 for return to recreational activities and sport Progressing, not met     PLAN     Continue heavy resistance training and balance activities as tolerated for return to recreational activities and skiing. Manual therapy prn for pain modulation         Luis Doherty, PT

## 2022-11-22 ENCOUNTER — CLINICAL SUPPORT (OUTPATIENT)
Dept: REHABILITATION | Facility: OTHER | Age: 62
End: 2022-11-22
Payer: COMMERCIAL

## 2022-11-22 DIAGNOSIS — R26.89 IMPAIRED GAIT AND MOBILITY: ICD-10-CM

## 2022-11-22 DIAGNOSIS — M25.661 DECREASED RANGE OF MOTION (ROM) OF RIGHT KNEE: Primary | ICD-10-CM

## 2022-11-22 DIAGNOSIS — Z47.89 AFTERCARE FOR ANTERIOR CRUCIATE LIGAMENT (ACL) REPAIR: ICD-10-CM

## 2022-11-22 DIAGNOSIS — R29.898 WEAKNESS OF RIGHT LOWER EXTREMITY: ICD-10-CM

## 2022-11-22 DIAGNOSIS — R29.898 WEAKNESS OF BOTH LOWER EXTREMITIES: ICD-10-CM

## 2022-11-22 PROCEDURE — 97110 THERAPEUTIC EXERCISES: CPT | Mod: PN

## 2022-11-29 ENCOUNTER — CLINICAL SUPPORT (OUTPATIENT)
Dept: REHABILITATION | Facility: OTHER | Age: 62
End: 2022-11-29
Payer: COMMERCIAL

## 2022-11-29 DIAGNOSIS — Z47.89 AFTERCARE FOR ANTERIOR CRUCIATE LIGAMENT (ACL) REPAIR: ICD-10-CM

## 2022-11-29 DIAGNOSIS — M25.661 DECREASED RANGE OF MOTION (ROM) OF RIGHT KNEE: Primary | ICD-10-CM

## 2022-11-29 DIAGNOSIS — R29.898 WEAKNESS OF BOTH LOWER EXTREMITIES: ICD-10-CM

## 2022-11-29 DIAGNOSIS — R29.898 WEAKNESS OF RIGHT LOWER EXTREMITY: ICD-10-CM

## 2022-11-29 DIAGNOSIS — R26.89 IMPAIRED GAIT AND MOBILITY: ICD-10-CM

## 2022-11-29 PROCEDURE — 97110 THERAPEUTIC EXERCISES: CPT | Mod: PN

## 2022-11-29 NOTE — PROGRESS NOTES
[facility]   Physical Therapy Treatment Note     Name: Evan Khoury  Clinic Number: [id number]    Therapy Diagnosis:   Encounter Diagnoses   Name Primary?    Decreased range of motion (ROM) of right knee Yes    Impaired gait and mobility     Weakness of right lower extremity     Weakness of both lower extremities     Aftercare for anterior cruciate ligament (ACL) repair          Physician: Luis Watkins, Jennifer    Visit Date: 11/29/2022  Physician Orders: PT Eval and Treat   1. Right knee ACL reconstruction with hamstring autograft   2. Right knee arthroscopic menisectomy versus meniscus repair   3. Right knee arthroscopic chondroplasty   4. Right knee arthroscopic synovectomy  Surgery Date: 3/8/2022  Medical Diagnosis from Referral: S83.511A (ICD-10-CM) - Complete tear of anterior cruciate ligament of right knee, initial encounter  Evaluation Date: 3/10/2022  Surgical Date: 3/8/3033  Authorization Period Expiration: 12/31/2022  Plan of Care Expiration: 12/31/2022  Visit # / Visits authorized: 52 /60  FOTO: 5/5. 11/10/2022  PTA:  0/5      Time In: 9:20 am  Time Out: 10:55 am  Total Billable Time: 75 minutes     Precautions: Standard        Subjective       Pt denied pain at the start of the session    He was not compliant with home exercise program .  Response to previous treatment: good session  Functional change: no change      Pain: 0/10  Location: R anterior knee and R hamstring     Objective      11/14/22: anterior drawer: negative    CMS Impairment/Limitation/Restriction for FOTO Knee Survey     Therapist reviewed FOTO scores for Evan Khoury on 11/10/2022.   FOTO documents entered into [employer] - see Media section.     Limitation Score: 34%  Category: Mobility     Current : CJ = at least 20% but < 40% impaired, limited or restricted  Goal: CJ = at least 20% but < 40% impaired, limited or restricted      9/21/2022, 10/25 last reassessment       Evan received therapeutic exercises to  "develop strength, endurance, ROM, flexibility, posture and core stabilization for 75 minutes including:      Dynamic warm ups: toy soldiers, knee hugs, knee pulls, piriformis, lunges 40' x 2  Treadmill walking x 10 min (3 min 3.0 mph, gradual increase incline to 5 and 4.2 mph, 2 min cool down)  lateral lunges x 10 reps  lunges x 10 reps  Captain Morgans 2 x 15 reps each LE  Side lying hip circles 20 reps cw, 20 reps ccw with 2#  Side lying hip abd 2 x 12 reps with 4#  Modified NHE with physioball 2 x 10 (stir the pot last rep 5 x ea)    Shuttle jumps 1 cord bottom 3 x 30"  Shuttle prancing 1 cord bottom 3 x 30"  Shuttle single leg jumps 1 cord    2 leg jump in place 3 x 30 reps (90 sec rest between sets)  2 leg jump forward/backwards 3 x 30 reps (90 sec rest between sets)  2 leg hop side to side 3 x 30 reps (90 sec rest between sets)  short broad jumps with emphasis on soft landing 2 x 40 ft    Wall sits 30 sec x 3   Side lunges x 20 reps     Planks 3 x 30"  Side planks 3 x 30"    SL squat 3 x 10 reps ea with UE support    Piriformis str 30" x 2  IT band/ TFL str 30" x 2 with strap  Single limb bridging 2 x 10 (figure 4)  U  Bridge x 15 reps  SAQ 3 x 10 reps with 4#  Prone quad stretch x 2'  Prone hamstring curls 3# 3 x 15    Shuttle 3 x 10 reps with 4 black bands, 1 red band  Shuttle single leg iso holds 30" x 4 ea with 100#  Side lying shuttle 3 x 10 reps with 1 black band, 1 red band        gastroc stretch on incline x 90"  Soleus stretch on incline x 90"  Hamstring stretches x 90"    Side steps 3 laps with purple soft band  Monster walks  3 laps with pink soft band (1 set backwards stopped 2/2 L anterior knee pain)    Lateral tap downs 2 x 15 reps ea on 6" step  Forward step ups 6" 2 x 10 reps    Upright bike x 6 min full revolutions seat 13, L4 hills program  Hamstring stretches on step 3 x 30"  Iso BOSU hold R LE  3 x 30"    U HR  x 26 ea max reps to faitgue  prone hang 3# for 3 min   Prone knee flex strap 2 " "min      Neuromuscular re-education x 00 min for improved proprioception, coordination, balance including:    Dead lifts 20# from step 2 x 15  Drinking bird 2 x 15 reps with UE support, 10#  1/2 star taps x 5 reps  R side planks 3 sets to burn  SLS on foam 3 x 30"- added BTB pull downs 'Itis"  rebounder toss SLS on foam 3 x 30"  M/L wobble board x 2 min  Downhill mini squat 1/2 foam rolls 3 x 15     Evan received the following manual therapy techniques: Joint mobilizations were applied to the: patallar and R hamstring for 5 minutes, including:    stm to R Gastroc/ hamstrings/IT band/lateral quad  IASTM / scrapping hamstrings   Cross friction distal quad  All planes patella mob gr 2-3 supine  Grades II and III tibial mobilizations to increase flexion   Patella Y strip and medial decompression for L knee pain  Reapplied 2"I" strips to R IT band with two decompressive "I" strip to decrease anterior knee pain     Pt received 10 min cold pack to R knee with towel to protect skin.       Home Exercises Provided and Patient Education Provided      Education provided:   Planks   Forward planks  Side lunges     Written Home Exercises Provided: Patient instructed to cont prior HEP.  Exercises were reviewed and Evan was able to demonstrate them prior to the end of the session.  Evan demonstrated good understanding of the education provided.      See EMR under Patient Instructions for exercises provided prior visit.        Assessment   Patient is planning a skiing trip in late February 2023. Will continue with R LE strengthening and neuromuscular training to progress to running and skiing. Had some difficulty with broad jumps today. Added planks and side lunges to HEP.      Evan Is progressing well towards his goals.   Pt prognosis is Good.      Pt will continue to benefit from skilled outpatient physical therapy to address the deficits listed in the problem list box on initial evaluation, provide pt/family education and to " maximize pt's level of independence in the home and community environment.      Pt's spiritual, cultural and educational needs considered and pt agreeable to plan of care and goals.     Anticipated barriers to physical therapy: none     Goals:  Short Term Goals (6 Weeks):   1. Patient to have full extension Right (equal to left) for normalized gait pattern  met  2. Patient to have 120 degrees of passive left knee flexion for improved performance of ADL's such as sit<>stand transfers  met  3. Patient to have decreased edema left knee   met  4. Patient to report 2/10 pain or less in left knee with ambulating community distances   met  5. Patient to be compliant with home program 5x's a week as noted by proper demonstration of exercises to therapist for improved management of condition   met  6. Patient to ambulate full weight bearing Right lower extremity and normal gait pattern     met     Long Term Goals   -(12 Weeks):   1. Patient to have 4+/5 or greater strength in RLE for functional performance of ADL's such as lifting  met  2. Patient to descend one flight of stairs with alternating gait pattern without use of handrail and with proper LE alignment- met  3. Patient to have 135 degrees or greater Right knee flexion for return to ADL's including squatting   met  -(36 weeks):   4. Patient to have decreased subjective report of disability as noted by <20% limitation on FOTO knee questionnaire Progressing, not met  5. Patient to perform single leg squat without assistance and without valgus collapse to improve participation in recreational  Progressing, not met  6. Patient's RLE strength to be 5/5 for return to recreational activities and sport Progressing, not met     PLAN     Continue heavy resistance training and balance activities as tolerated for return to recreational activities and skiing. Manual therapy prn for pain modulation         Luis Doherty, PT

## 2022-11-29 NOTE — PATIENT INSTRUCTIONS
PLANK      While lying face down, lift your body up on your elbows and toes. Try and maintain a straight spine. Do not allow your hips or pelvis on either side to drop. Maintain pelvic neutral position the entire time.    Hold for 30 seconds. Perform 3 reps.    Copyright © 2022 [employer]    Side Plank        Hold for 30 seconds. Perform 3 reps.    Copyright © 2022 [employer]    Banded Side Lunges with Slider        Place band above the knees. Using furniture slider lunge to the side keeping knee inline over toes. The band helps cue the  hip abductors on the stance leg and is a form of reactive neuromuscular retraining (band promotes dynamic valgus and cues muscles that control that to work).    Perform 20 reps.    Copyright © 2022 [employer]

## 2022-12-01 ENCOUNTER — CLINICAL SUPPORT (OUTPATIENT)
Dept: REHABILITATION | Facility: OTHER | Age: 62
End: 2022-12-01
Payer: COMMERCIAL

## 2022-12-01 DIAGNOSIS — R29.898 WEAKNESS OF BOTH LOWER EXTREMITIES: ICD-10-CM

## 2022-12-01 DIAGNOSIS — M25.661 DECREASED RANGE OF MOTION (ROM) OF RIGHT KNEE: Primary | ICD-10-CM

## 2022-12-01 DIAGNOSIS — R29.898 WEAKNESS OF RIGHT LOWER EXTREMITY: ICD-10-CM

## 2022-12-01 DIAGNOSIS — Z47.89 AFTERCARE FOR ANTERIOR CRUCIATE LIGAMENT (ACL) REPAIR: ICD-10-CM

## 2022-12-01 DIAGNOSIS — R26.89 IMPAIRED GAIT AND MOBILITY: ICD-10-CM

## 2022-12-01 PROCEDURE — 97110 THERAPEUTIC EXERCISES: CPT | Mod: PN

## 2022-12-01 NOTE — PROGRESS NOTES
[facility]   Physical Therapy Treatment Note     Name: Evan Khoury  Clinic Number: [id number]    Therapy Diagnosis:   Encounter Diagnoses   Name Primary?    Decreased range of motion (ROM) of right knee Yes    Impaired gait and mobility     Weakness of right lower extremity     Weakness of both lower extremities     Aftercare for anterior cruciate ligament (ACL) repair        Physician: Luis Watkins, Jennifer    Visit Date: 12/1/2022  Physician Orders: PT Eval and Treat   1. Right knee ACL reconstruction with hamstring autograft   2. Right knee arthroscopic menisectomy versus meniscus repair   3. Right knee arthroscopic chondroplasty   4. Right knee arthroscopic synovectomy  Surgery Date: 3/8/2022  Medical Diagnosis from Referral: S83.511A (ICD-10-CM) - Complete tear of anterior cruciate ligament of right knee, initial encounter  Evaluation Date: 3/10/2022  Surgical Date: 3/8/3033  Authorization Period Expiration: 12/31/2022  Plan of Care Expiration: 12/31/2022  Visit # / Visits authorized: 53 /60  FOTO: 1/5. 12/1/2022  PTA:  0/5      Time In: 9:27 am  Time Out: 10:38 am  Total Billable Time: 61 minutes     Precautions: Standard        Subjective       Pt denies R knee pain. States he is scheduled for his [employer] testing.    He was not compliant with home exercise program .  Response to previous treatment: was tired and overall sore following last session  Functional change: no change      Pain: 0/10  Location: R anterior knee and R hamstring     Objective      11/14/22: anterior drawer: negative    CMS Impairment/Limitation/Restriction for FOTO Knee Survey     Therapist reviewed FOTO scores for Evan Khoury on 12/1/2022.   FOTO documents entered into [employer] - see Media section.     Limitation Score: 29%  Category: Mobility     Current : CJ = at least 20% but < 40% impaired, limited or restricted  Goal: CJ = at least 20% but < 40% impaired, limited or restricted      9/21/2022, 10/25 last  "reassessment       Evan received therapeutic exercises to develop strength, endurance, ROM, flexibility, posture and core stabilization for 61 minutes including:      Dynamic warm ups: toy soldiers, knee hugs, knee pulls, piriformis, lunges 40' x 2  Treadmill walking x 10 min (3 min 3.0 mph, gradual increase incline to 5 and 4.2 mph, 2 min cool down)  lateral lunges x 10 reps  lunges x 10 reps  Captain Morgans 2 x 15 reps each LE  Side lying hip circles 20 reps cw, 20 reps ccw with 2#  Side lying hip abd 2 x 12 reps with 4#  Modified NHE with physioball 2 x 10 (stir the pot last rep 5 x ea)    Shuttle jumps 1 cord bottom 3 x 30"  Shuttle prancing 1 cord bottom 3 x 30"  Shuttle single leg jumps 1 cord    2 leg jump in place 3 x 30 reps (90 sec rest between sets)  2 leg jump forward/backwards 3 x 30 reps (90 sec rest between sets)  2 leg hop side to side 3 x 30 reps (90 sec rest between sets)  short broad jumps with emphasis on soft landing 2 x 40 ft    Wall sits 30 sec x 3   Side lunges x 20 reps     Planks 3 x 30"  Side planks 3 x 30"    SL squat 2 x 10 reps ea without UE support    Piriformis str 30" x 2  IT band/ TFL str 30" x 2 with strap  Single limb bridging 2 x 10 (figure 4)  U  Bridge x 15 reps  SAQ 3 x 10 reps with 4#  Prone quad stretch x 2'  Prone hamstring curls 3# 3 x 15    Shuttle 3 x 10 reps with 4 black bands, 1 red band  Shuttle single leg iso holds 30" x 4 ea with 100#  Side lying shuttle 3 x 10 reps with 1 black band, 1 red band    gastroc stretch on incline x 90"  Soleus stretch on incline x 90"  Hamstring stretches x 90"    Side steps 3 laps with purple soft band  Monster walks  3 laps with pink soft band (1 set backwards stopped 2/2 L anterior knee pain)    Lateral tap downs 2 x 15 reps ea on 6" step  Forward step ups 6" 2 x 10 reps    Upright bike x 6 min full revolutions seat 13, L4 hills program  Hamstring stretches on step 3 x 30"  Iso BOSU hold R LE  3 x 30"    U HR  x 26 ea max reps to " "faitgue  prone hang 3# for 3 min   Prone knee flex strap 2 min      Neuromuscular re-education x 00 min for improved proprioception, coordination, balance including:    Dead lifts 20# from step 2 x 15  Drinking bird 2 x 15 reps with UE support, 10#  1/2 star taps x 5 reps  R side planks 3 sets to burn  SLS on foam 3 x 30"- added BTB pull downs 'Itis"  rebounder toss SLS on foam 3 x 30"  M/L wobble board x 2 min  Downhill mini squat 1/2 foam rolls 3 x 15     Evan received the following manual therapy techniques: Joint mobilizations were applied to the: patallar and R hamstring for 0 minutes, including:    stm to R Gastroc/ hamstrings/IT band/lateral quad  IASTM / scrapping hamstrings   Cross friction distal quad  All planes patella mob gr 2-3 supine  Grades II and III tibial mobilizations to increase flexion   Patella Y strip and medial decompression for L knee pain  Reapplied 2"I" strips to R IT band with two decompressive "I" strip to decrease anterior knee pain     Pt received 10 min cold pack to R knee with towel to protect skin.       Home Exercises Provided and Patient Education Provided      Education provided:        Written Home Exercises Provided: Patient instructed to cont prior HEP.  Exercises were reviewed and Evan was able to demonstrate them prior to the end of the session.  Evan demonstrated good understanding of the education provided.      See EMR under Patient Instructions for exercises provided prior visit.        Assessment   Initiated single leg jumping today. Patient able to perform without pain, but did have initial episode of weakness. Is scheduled for [employer] testing. Will continue with core strengthening, R hip strengthening, and quad strengthening to return to recreational activities without pain or restrictions.      Evan Is progressing well towards his goals.   Pt prognosis is Good.      Pt will continue to benefit from skilled outpatient physical therapy to address the deficits " listed in the problem list box on initial evaluation, provide pt/family education and to maximize pt's level of independence in the home and community environment.      Pt's spiritual, cultural and educational needs considered and pt agreeable to plan of care and goals.     Anticipated barriers to physical therapy: none     Goals:  Short Term Goals (6 Weeks):   1. Patient to have full extension Right (equal to left) for normalized gait pattern  met  2. Patient to have 120 degrees of passive left knee flexion for improved performance of ADL's such as sit<>stand transfers  met  3. Patient to have decreased edema left knee   met  4. Patient to report 2/10 pain or less in left knee with ambulating community distances   met  5. Patient to be compliant with home program 5x's a week as noted by proper demonstration of exercises to therapist for improved management of condition   met  6. Patient to ambulate full weight bearing Right lower extremity and normal gait pattern     met     Long Term Goals   -(12 Weeks):   1. Patient to have 4+/5 or greater strength in RLE for functional performance of ADL's such as lifting  met  2. Patient to descend one flight of stairs with alternating gait pattern without use of handrail and with proper LE alignment- met  3. Patient to have 135 degrees or greater Right knee flexion for return to ADL's including squatting   met  -(36 weeks):   4. Patient to have decreased subjective report of disability as noted by <20% limitation on FOTO knee questionnaire Progressing, not met  5. Patient to perform single leg squat without assistance and without valgus collapse to improve participation in recreational  Progressing, not met  6. Patient's RLE strength to be 5/5 for return to recreational activities and sport Progressing, not met     PLAN     Continue heavy resistance training and balance activities as tolerated for return to recreational activities and skiing. Manual therapy prn for pain  modulation         Luis Doherty, PT

## 2022-12-06 ENCOUNTER — CLINICAL SUPPORT (OUTPATIENT)
Dept: REHABILITATION | Facility: OTHER | Age: 62
End: 2022-12-06
Payer: COMMERCIAL

## 2022-12-06 DIAGNOSIS — R29.898 WEAKNESS OF BOTH LOWER EXTREMITIES: ICD-10-CM

## 2022-12-06 DIAGNOSIS — M25.661 DECREASED RANGE OF MOTION (ROM) OF RIGHT KNEE: Primary | ICD-10-CM

## 2022-12-06 DIAGNOSIS — R29.898 WEAKNESS OF RIGHT LOWER EXTREMITY: ICD-10-CM

## 2022-12-06 DIAGNOSIS — Z47.89 AFTERCARE FOR ANTERIOR CRUCIATE LIGAMENT (ACL) REPAIR: ICD-10-CM

## 2022-12-06 DIAGNOSIS — R26.89 IMPAIRED GAIT AND MOBILITY: ICD-10-CM

## 2022-12-06 PROCEDURE — 97110 THERAPEUTIC EXERCISES: CPT | Mod: PN

## 2022-12-06 PROCEDURE — 97140 MANUAL THERAPY 1/> REGIONS: CPT | Mod: PN

## 2022-12-06 NOTE — PROGRESS NOTES
[facility]   Physical Therapy Treatment Note     Name: Evan Khoury  Clinic Number: [id number]    Therapy Diagnosis:   Encounter Diagnoses   Name Primary?    Decreased range of motion (ROM) of right knee Yes    Impaired gait and mobility     Weakness of right lower extremity     Weakness of both lower extremities     Aftercare for anterior cruciate ligament (ACL) repair          Physician: Luis Watkins, Jennifer    Visit Date: 12/6/2022  Physician Orders: PT Eval and Treat   1. Right knee ACL reconstruction with hamstring autograft   2. Right knee arthroscopic menisectomy versus meniscus repair   3. Right knee arthroscopic chondroplasty   4. Right knee arthroscopic synovectomy  Surgery Date: 3/8/2022  Medical Diagnosis from Referral: S83.511A (ICD-10-CM) - Complete tear of anterior cruciate ligament of right knee, initial encounter  Evaluation Date: 3/10/2022  Surgical Date: 3/8/3033  Authorization Period Expiration: 12/31/2022  Plan of Care Expiration: 12/31/2022  Visit # / Visits authorized: 54 /60  FOTO: 2/5. 12/1/2022  PTA:  0/5      Time In: 9:15 am  Time Out: 10:05 am  Total Billable Time:  minutes     Precautions: Standard        Subjective       Pt states his R knee was bothering him this weekend. States he is going out of town next week and has rescheduled     He was not compliant with home exercise program .  Response to previous treatment: was tired and overall sore following last session  Functional change: no change      Pain: 0/10  Location: R anterior knee and R hamstring     Objective      11/14/22: anterior drawer: negative    CMS Impairment/Limitation/Restriction for FOTO Knee Survey     Therapist reviewed FOTO scores for Evan Khoury on 12/1/2022.   FOTO documents entered into [employer] - see Media section.     Limitation Score: 29%  Category: Mobility     Current : CJ = at least 20% but < 40% impaired, limited or restricted  Goal: CJ = at least 20% but < 40% impaired,  "limited or restricted      9/21/2022, 10/25 last reassessment       Evan received therapeutic exercises to develop strength, endurance, ROM, flexibility, posture and core stabilization for 20 minutes including:      Dynamic warm ups: toy soldiers, knee hugs, knee pulls, lunges 40' x 2  Treadmill walking x 10 min (3 min 3.0 mph, gradual increase incline to 5 and 4.2 mph, 2 min cool down)  lateral lunges x 10 reps  lunges x 10 reps  Captain Morgans 2 x 15 reps each LE  Side lying hip circles 20 reps cw, 20 reps ccw with 2#  Side lying hip abd 2 x 12 reps with 4#  Modified NHE with physioball 2 x 10 (stir the pot last rep 5 x ea)    Shuttle jumps 1 cord bottom 3 x 30"  Shuttle prancing 1 cord bottom 3 x 30"  Shuttle single leg jumps 1 cord    2 leg jump in place 3 x 30 reps (90 sec rest between sets)  2 leg jump forward/backwards 3 x 30 reps (90 sec rest between sets)  2 leg hop side to side 3 x 30 reps (90 sec rest between sets)  short broad jumps with emphasis on soft landing 2 x 40 ft    Wall sits 30 sec x 3   Side lunges x 20 reps     Planks 3 x 30"  Side planks 3 x 30"    SL squat 2 x 10 reps ea with UE support    Piriformis str 30" x 2  IT band/ TFL str 30" x 2 with strap  Single limb bridging 2 x 10 (figure 4)  U  Bridge x 15 reps  SAQ 3 x 10 reps with 4#  Prone quad stretch x 2'  Prone hamstring curls 3# 3 x 15    Shuttle 3 x 10 reps with 4 black bands, 1 red band  Shuttle single leg iso holds 30" x 4 ea with 100#  Side lying shuttle 3 x 10 reps with 1 black band, 1 red band    gastroc stretch on incline x 90"  Soleus stretch on incline x 90"  Hamstring stretches x 90"    Side steps 3 laps with purple soft band  Monster walks  3 laps with pink soft band (1 set backwards stopped 2/2 L anterior knee pain)    Lateral tap downs 2 x 15 reps ea on 6" step  Forward step ups 6" 2 x 10 reps    Upright bike x 6 min full revolutions seat 13, L4 hills program  Hamstring stretches on step 3 x 30"  Iso BOSU hold R LE  3 x " "30"    U HR  x 26 ea max reps to faitgue  prone hang 3# for 3 min   Prone knee flex strap 2 min      Neuromuscular re-education x 00 min for improved proprioception, coordination, balance including:    Dead lifts 20# from step 2 x 15  Drinking bird 2 x 15 reps with UE support, 10#  1/2 star taps x 5 reps  R side planks 3 sets to burn  SLS on foam 3 x 30"- added BTB pull downs 'Itis"  rebounder toss SLS on foam 3 x 30"  M/L wobble board x 2 min  Downhill mini squat 1/2 foam rolls 3 x 15     Evan received the following manual therapy techniques: Joint mobilizations were applied to the: patallar and R hamstring for 15 minutes, including:    stm to R Gastroc/ hamstrings  IASTM / scrapping hamstrings   Cross friction distal quad  All planes patella mob gr 2-3 supine  Grades II and III tibial mobilizations to increase flexion   Patella Y strip and medial decompression for L knee pain  Reapplied 2 "I" strips to R IT band with two decompressive "I" strip to decrease anterior knee pain     Pt received 10 min cold pack to R knee with towel to protect skin.       Home Exercises Provided and Patient Education Provided      Education provided:        Written Home Exercises Provided: Patient instructed to cont prior HEP.  Exercises were reviewed and Evan was able to demonstrate them prior to the end of the session.  Evan demonstrated good understanding of the education provided.      See EMR under Patient Instructions for exercises provided prior visit.        Assessment   Held jumping today secondary increased R knee pain over the weekend. PT contacted Raymond Dickerson concerning rescheduling the Biodex testing. Tender to palpation in R pes anserine. Discussed patient using ice at home.     Evan Is progressing well towards his goals.   Pt prognosis is Good.      Pt will continue to benefit from skilled outpatient physical therapy to address the deficits listed in the problem list box on initial evaluation, provide pt/family " education and to maximize pt's level of independence in the home and community environment.      Pt's spiritual, cultural and educational needs considered and pt agreeable to plan of care and goals.     Anticipated barriers to physical therapy: none     Goals:  Short Term Goals (6 Weeks):   1. Patient to have full extension Right (equal to left) for normalized gait pattern  met  2. Patient to have 120 degrees of passive left knee flexion for improved performance of ADL's such as sit<>stand transfers  met  3. Patient to have decreased edema left knee   met  4. Patient to report 2/10 pain or less in left knee with ambulating community distances   met  5. Patient to be compliant with home program 5x's a week as noted by proper demonstration of exercises to therapist for improved management of condition   met  6. Patient to ambulate full weight bearing Right lower extremity and normal gait pattern     met     Long Term Goals   -(12 Weeks):   1. Patient to have 4+/5 or greater strength in RLE for functional performance of ADL's such as lifting  met  2. Patient to descend one flight of stairs with alternating gait pattern without use of handrail and with proper LE alignment- met  3. Patient to have 135 degrees or greater Right knee flexion for return to ADL's including squatting   met  -(36 weeks):   4. Patient to have decreased subjective report of disability as noted by <20% limitation on FOTO knee questionnaire Progressing, not met  5. Patient to perform single leg squat without assistance and without valgus collapse to improve participation in recreational  Progressing, not met  6. Patient's RLE strength to be 5/5 for return to recreational activities and sport Progressing, not met     PLAN     Continue heavy resistance training and balance activities as tolerated for return to recreational activities and skiing. Manual therapy prn for pain modulation         Luis Doherty, PT

## 2022-12-08 ENCOUNTER — CLINICAL SUPPORT (OUTPATIENT)
Dept: REHABILITATION | Facility: OTHER | Age: 62
End: 2022-12-08
Payer: COMMERCIAL

## 2022-12-08 ENCOUNTER — PATIENT MESSAGE (OUTPATIENT)
Dept: REHABILITATION | Facility: OTHER | Age: 62
End: 2022-12-08

## 2022-12-08 DIAGNOSIS — M25.661 DECREASED RANGE OF MOTION (ROM) OF RIGHT KNEE: Primary | ICD-10-CM

## 2022-12-08 DIAGNOSIS — R26.89 IMPAIRED GAIT AND MOBILITY: ICD-10-CM

## 2022-12-08 DIAGNOSIS — R29.898 WEAKNESS OF RIGHT LOWER EXTREMITY: ICD-10-CM

## 2022-12-08 DIAGNOSIS — Z47.89 AFTERCARE FOR ANTERIOR CRUCIATE LIGAMENT (ACL) REPAIR: ICD-10-CM

## 2022-12-08 DIAGNOSIS — R29.898 WEAKNESS OF BOTH LOWER EXTREMITIES: ICD-10-CM

## 2022-12-08 PROCEDURE — 97140 MANUAL THERAPY 1/> REGIONS: CPT | Mod: PN

## 2022-12-08 PROCEDURE — 97110 THERAPEUTIC EXERCISES: CPT | Mod: PN

## 2022-12-08 NOTE — PROGRESS NOTES
[facility]   Physical Therapy Treatment Note     Name: Evan Khoury  Clinic Number: [id number]    Therapy Diagnosis:   Encounter Diagnoses   Name Primary?    Decreased range of motion (ROM) of right knee Yes    Impaired gait and mobility     Weakness of right lower extremity     Weakness of both lower extremities     Aftercare for anterior cruciate ligament (ACL) repair      Physician: Luis Watkins, Jennifer    Visit Date: 12/8/2022  Physician Orders: PT Eval and Treat   1. Right knee ACL reconstruction with hamstring autograft   2. Right knee arthroscopic menisectomy versus meniscus repair   3. Right knee arthroscopic chondroplasty   4. Right knee arthroscopic synovectomy  Surgery Date: 3/8/2022  Medical Diagnosis from Referral: S83.511A (ICD-10-CM) - Complete tear of anterior cruciate ligament of right knee, initial encounter  Evaluation Date: 3/10/2022  Surgical Date: 3/8/3033  Authorization Period Expiration: 12/31/2022  Plan of Care Expiration: 12/31/2022  Visit # / Visits authorized: 55/60  FOTO: 3/5. 12/1/2022  PTA:  0/5      Time In: 9:15 am  Time Out: 10:25 am  Total Billable Time: 45 minutes     Precautions: Standard        Subjective       Pt states he is still experiencing weakness in his R LE going down the stairs.    He was not compliant with home exercise program .  Response to previous treatment: was tired and overall sore following last session  Functional change: no change      Pain: 0/10  Location: R anterior knee and R hamstring     Objective      11/14/22: anterior drawer: negative    CMS Impairment/Limitation/Restriction for FOTO Knee Survey     Therapist reviewed FOTO scores for Evan Khoury on 12/1/2022.   FOTO documents entered into [employer] - see Media section.     Limitation Score: 29%  Category: Mobility     Current : CJ = at least 20% but < 40% impaired, limited or restricted  Goal: CJ = at least 20% but < 40% impaired, limited or restricted      9/21/2022,  "10/25 last reassessment       Evan received therapeutic exercises to develop strength, endurance, ROM, flexibility, posture and core stabilization for 15 minutes including:      Dynamic warm ups: toy soldiers, knee hugs, knee pulls, lunges 40' x 2  Treadmill walking x 10 min (3 min 3.0 mph, gradual increase incline to 5 and 4.2 mph, 2 min cool down)  lateral lunges x 10 reps  lunges x 10 reps  Captain Morgans 2 x 15 reps each LE  Side lying hip circles 20 reps cw, 20 reps ccw with 2#  Side lying hip abd 2 x 12 reps with 4#  Modified NHE with physioball 2 x 10 (stir the pot last rep 5 x ea)    Shuttle jumps 1 cord bottom 3 x 30"  Shuttle prancing 1 cord bottom 3 x 30"  Shuttle single leg jumps 1 cord    2 leg jump in place 3 x 30 reps (90 sec rest between sets)  2 leg jump forward/backwards 3 x 30 reps (90 sec rest between sets)  2 leg hop side to side 3 x 30 reps (90 sec rest between sets)  short broad jumps with emphasis on soft landing 2 x 40 ft    Wall sits 30 sec x 3   Side lunges x 20 reps     Planks 3 x 30"  Side planks 3 x 30"    SL squat 2 x 10 reps ea with UE support    Piriformis str 30" x 2  IT band/ TFL str 30" x 2 with strap  Single limb bridging 2 x 10 (figure 4)  U  Bridge x 15 reps  SAQ 3 x 10 reps with 4#  Prone quad stretch x 2'  Prone hamstring curls 3# 3 x 15    Shuttle 3 x 10 reps with 4 black bands, 1 red band  Shuttle single leg iso holds 30" x 4 ea with 100#  Side lying shuttle 3 x 10 reps with 1 black band, 1 red band    gastroc stretch on incline x 90"  Soleus stretch on incline x 90"  Hamstring stretches x 90"    Side steps 3 laps with purple soft band  Monster walks  3 laps with pink soft band (1 set backwards stopped 2/2 L anterior knee pain)    Lateral tap downs 2 x 15 reps ea on 6" step  Forward step ups 6" 2 x 10 reps    Upright bike x 6 min full revolutions seat 13, L4 hills program  Hamstring stretches on step 3 x 30"  Iso BOSU hold R LE  3 x 30"    U HR  x 26 ea max reps to " "faitgue  prone hang 3# for 3 min   Prone knee flex strap 2 min      Neuromuscular re-education x 00 min for improved proprioception, coordination, balance including:    Dead lifts 20# from step 2 x 15  Drinking bird 2 x 15 reps with UE support, 10#  1/2 star taps x 5 reps  R side planks 3 sets to burn  SLS on foam 3 x 30"- added BTB pull downs 'Itis"  rebounder toss SLS on foam 3 x 30"  M/L wobble board x 2 min  Downhill mini squat 1/2 foam rolls 3 x 15     Evna received the following manual therapy techniques: Joint mobilizations were applied to the: patallar and R hamstring for 30 minutes, including:    stm to R Gastroc/ hamstrings  IASTM / scrapping hamstrings   Cross friction distal quad  All planes patella mob gr 2-3 supine  Grades II and III tibial mobilizations to increase flexion   Patella Y strip and medial decompression for L knee pain  Reapplied 2 "I" strips to R IT band with two decompressive "I" strip to decrease anterior knee pain     Pt received 10 min cold pack to R knee with towel to protect skin.       Home Exercises Provided and Patient Education Provided      Education provided:        Written Home Exercises Provided: Patient instructed to cont prior HEP.  Exercises were reviewed and Evan was able to demonstrate them prior to the end of the session.  Evan demonstrated good understanding of the education provided.      See EMR under Patient Instructions for exercises provided prior visit.        Assessment   Continues to experience R medial knee pain. Reports he has had episodes of knee instability coming down stairs and felt pain and discomfort in patellar area as well as in pes anserine area. Message sent to Raymond Dickerson about recent flare up of pain and perhaps postponing Biodex testing. Message also sent to DEBBIE Watkins. Patient will be seeing Dr. Velazquez next Friday when he returns into [facility].     Evan Is progressing well towards his goals.   Pt prognosis is Good.      Pt will continue " to benefit from skilled outpatient physical therapy to address the deficits listed in the problem list box on initial evaluation, provide pt/family education and to maximize pt's level of independence in the home and community environment.      Pt's spiritual, cultural and educational needs considered and pt agreeable to plan of care and goals.     Anticipated barriers to physical therapy: none     Goals:  Short Term Goals (6 Weeks):   1. Patient to have full extension Right (equal to left) for normalized gait pattern  met  2. Patient to have 120 degrees of passive left knee flexion for improved performance of ADL's such as sit<>stand transfers  met  3. Patient to have decreased edema left knee   met  4. Patient to report 2/10 pain or less in left knee with ambulating community distances   met  5. Patient to be compliant with home program 5x's a week as noted by proper demonstration of exercises to therapist for improved management of condition   met  6. Patient to ambulate full weight bearing Right lower extremity and normal gait pattern     met     Long Term Goals   -(12 Weeks):   1. Patient to have 4+/5 or greater strength in RLE for functional performance of ADL's such as lifting  met  2. Patient to descend one flight of stairs with alternating gait pattern without use of handrail and with proper LE alignment- met  3. Patient to have 135 degrees or greater Right knee flexion for return to ADL's including squatting   met  -(36 weeks):   4. Patient to have decreased subjective report of disability as noted by <20% limitation on FOTO knee questionnaire Progressing, not met  5. Patient to perform single leg squat without assistance and without valgus collapse to improve participation in recreational  Progressing, not met  6. Patient's RLE strength to be 5/5 for return to recreational activities and sport Progressing, not met     PLAN     Continue heavy resistance training and balance activities as tolerated for  return to recreational activities and skiing. Manual therapy prn for pain modulation         Luis Doherty, PT

## 2022-12-16 NOTE — PROGRESS NOTES
Subjective:          Chief Complaint: Evan Khoury is a 62 y.o. male who had concerns including Pain of the Right Knee.    Evan Khoury presents to our clinic for  month post operative evaluation of the below procedures. He is overall doing well. No continued pain or instability symptoms. Progressing appropriately with PT.       DATE OF PROCEDURE: 3/8/2022     ATTENDING SURGEON: Surgeon(s) and Role:     * Maris Velazquez MD - Primary     * Cam Chakraborty MD - Resident - Assisting     Assistants:  Palmer Levy MD - Resident  Annelise Quevedo PA-C        PREOPERATIVE DIAGNOSIS:  Right  Tear, Medial meniscus, acute S83.249A and Anterior Cruciate Ligament Tear S83.510     POSTOPERATIVE DIAGNOSIS:   Right  Tear, Medial meniscus, acute S83.249A and Anterior Cruciate Ligament Tear S83.510     PROCEDURES(S) PERFORMED:   1. Right  Arthroscopy, anterior cruciate ligament reconstruction 13806  2.  Right  Arthroscopy, with meniscus repair (medial OR lateral) 00188      Review of Systems   Constitutional: Negative for chills, fever and night sweats.   HENT:  Negative for congestion, hearing loss and sore throat.    Eyes:  Negative for blurred vision, discharge, double vision and visual disturbance.   Cardiovascular:  Negative for chest pain, leg swelling, palpitations and syncope.   Respiratory:  Negative for cough and shortness of breath.    Endocrine: Negative for cold intolerance, heat intolerance and polyuria.   Hematologic/Lymphatic: Negative for bleeding problem.   Skin:  Negative for dry skin and rash.   Musculoskeletal:  Positive for joint pain and joint swelling. Negative for back pain, muscle cramps and muscle weakness.   Gastrointestinal:  Negative for abdominal pain, melena, nausea and vomiting.   Genitourinary:  Negative for hematuria.   Neurological:  Negative for focal weakness, loss of balance, numbness and paresthesias.   Psychiatric/Behavioral:  Negative for altered mental status.                   Objective:        General: Evan is well-developed, well-nourished, appears stated age, in no acute distress, alert and oriented to time, place and person.     General    Nursing note and vitals reviewed.  Constitutional: He is oriented to person, place, and time. He appears well-developed and well-nourished. No distress.   HENT:   Head: Normocephalic and atraumatic.   Nose: Nose normal.   Mouth/Throat: No oropharyngeal exudate.   Eyes: Conjunctivae and EOM are normal. Right eye exhibits no discharge. Left eye exhibits no discharge.   Neck: Neck supple.   Cardiovascular:  Normal rate, regular rhythm and intact distal pulses.            Pulmonary/Chest: Effort normal and breath sounds normal. No respiratory distress.   Abdominal: Soft. Bowel sounds are normal. He exhibits no distension. There is no abdominal tenderness.   Neurological: He is alert and oriented to person, place, and time. He has normal reflexes. No cranial nerve deficit. Coordination normal.   Psychiatric: He has a normal mood and affect. His behavior is normal. Judgment and thought content normal.     General Musculoskeletal Exam   Gait: normal       Right Knee Exam     Inspection   Erythema: absent  Scars: present  Swelling: absent  Effusion: absent  Deformity: absent  Bruising: absent    Tenderness   The patient is tender to palpation of the tibial tubercle.    Crepitus   The patient has crepitus of the patella.    Range of Motion   Extension:  0   Flexion:  150 abnormal     Tests   Meniscus   Nik:  Medial - negative Lateral - negative  Ligament Examination   Lachman: normal (-1 to 2mm)   PCL-Posterior Drawer: normal (0 to 2mm)     MCL - Valgus: normal (0 to 2mm)  LCL - Varus: normal  Pivot Shift: normal (Equal)  Reverse Pivot Shift: normal (Equal)  Dial Test at 30 degrees: normal (< 5 degrees)  Dial Test at 90 degrees: normal (< 5 degrees)  Posterior Sag Test: negative  Posterolateral Corner: stable  Patella   Patellar apprehension:  negative  Passive Patellar Tilt: neutral  Patellar Tracking: normal  Patellar Glide (quadrants): Lateral - 1   Medial - 2  Q-Angle at 90 degrees: normal  Patellar Grind: negative  J-Sign: none    Other   Meniscal Cyst: absent  Popliteal (Baker's) Cyst: absent  Sensation: normal    Comments:  Incision clean/dry/intact  No sign of infection  Mild swelling  Compartments soft  Neurovascular status intact in extremity      Left Knee Exam     Inspection   Erythema: absent  Scars: present  Swelling: absent  Effusion: absent  Deformity: absent  Bruising: absent    Tenderness   The patient is experiencing no tenderness.     Range of Motion   Extension:  0   Flexion:  150     Tests   Meniscus   Nik:  Medial - negative Lateral - negative  Stability   Lachman: normal (-1 to 2mm)   PCL-Posterior Drawer: normal (0 to 2mm)  MCL - Valgus: normal (0 to 2mm)  LCL - Varus: normal (0 to 2mm)  Pivot Shift: normal (Equal)  Reverse Pivot Shift: normal (Equal)  Dial Test at 30 degrees: normal (< 5 degrees)  Dial Test at 90 degrees: normal (< 5 degrees)  Posterior Sag Test: negative  Posterolateral Corner: stable  Patella   Patellar apprehension: negative  Passive Patellar Tilt: neutral  Patellar Tracking: normal  Patellar Glide (Quadrants): Lateral - 1 Medial - 2  Q-Angle at 90 degrees: normal  Patellar Grind: negative  J-Sign: J sign absent    Other   Meniscal Cyst: absent  Popliteal (Baker's) Cyst: absent  Sensation: normal    Right Hip Exam     Tests   Mesha: negative  Left Hip Exam     Tests   Mesha: negative          Muscle Strength   Right Lower Extremity   Hip Abduction: 5/5   Quadriceps:  4/5   Hamstrin/5   Left Lower Extremity   Hip Abduction: 5/5   Quadriceps:  5/5   Hamstrin/5     Reflexes     Left Side  Achilles:  2+  Quadriceps:  2+    Right Side   Achilles:  2+  Quadriceps:  2+    Vascular Exam     Right Pulses  Dorsalis Pedis:      2+  Posterior Tibial:      2+        Left Pulses  Dorsalis Pedis:       2+  Posterior Tibial:      2+        Edema  Right Lower Leg: absent  Left Lower Leg: absent    Radiographic Findings 12/19/2022:    Interval ACL reconstruction.  I see no acute fracture.  Joint spaces are preserved.     Small suprapatellar joint effusion.  Diffuse soft tissue edema.     Impression:     Interval ACL reconstruction with no acute osseous abnormality seen.  Negative for patella helder  Xrays of the right knee were ordered and reviewed by me today. These findings were discussed and reviewed with the patient.    X-ray Knee Ortho Bilateral with Flexion  Narrative: EXAMINATION:  XR KNEE ORTHO BILAT WITH FLEXION    CLINICAL HISTORY:  Contracture, right knee    TECHNIQUE:  AP standing of both knees, PA flexion standing views of both knees, and Merchant views of both knees were performed.  Lateral views of both knees were also performed.    COMPARISON:  May 16, 2022    FINDINGS:  There are surgical changes of the right and left knee relating to anterior cruciate ligament reconstruction, appearance unchanged compared to previous imaging.  There is no fracture or osseous destructive process.  No significant degree of joint space narrowing is noted.  Minimal osteophytic spurring present about the left knee.  Impression: Stable surgical changes    Electronically signed by: Modesta Casanova MD  Date:    09/12/2022  Time:    10:12            Assessment:       Encounter Diagnoses   Name Primary?    Right knee pain, unspecified chronicity Yes    Weakness of both lower extremities     Chondromalacia of right knee     Decreased range of motion (ROM) of right knee     Impaired gait and mobility     Aftercare for anterior cruciate ligament (ACL) repair             Plan:       1. IKDC, SF-12 and KOOS was filled out today in clinic.     RTC in 3 months with Dr. Maris Velazquez for post op visit. Patient will fill out IKDC, SF-12 and KOOS on return.    2. Medications: Refills of the following Rx were sent to patients preferred  Pharmacy:  celecoxib (CELEBREX) 200 MG capsule    3. Physical Therapy: Continue/Begin: Continue at [facility]    4. HEP: N/A  HEP 54581 - Maris corrales, instructed and demonstrated a core HEP. The patient then demonstrated understanding of exercises and proper technique. This program was performed for 20 minutes.      5. Procedures/Procedural Planning: N/a    6. DME: N/a    7. Work/Sport Status: Return as tolerated    8. Visit Summary: Follow up in 3 months                         [facility] patient questionnaires have been collected today.

## 2022-12-19 ENCOUNTER — HOSPITAL ENCOUNTER (OUTPATIENT)
Dept: RADIOLOGY | Facility: HOSPITAL | Age: 62
Discharge: HOME | End: 2022-12-19
Attending: ORTHOPAEDIC SURGERY
Payer: COMMERCIAL

## 2022-12-19 ENCOUNTER — OFFICE VISIT (OUTPATIENT)
Dept: SPORTS MEDICINE | Facility: CLINIC | Age: 62
End: 2022-12-19
Payer: COMMERCIAL

## 2022-12-19 VITALS
SYSTOLIC BLOOD PRESSURE: 118 MMHG | HEIGHT: 74 IN | WEIGHT: 199 LBS | HEART RATE: 74 BPM | DIASTOLIC BLOOD PRESSURE: 76 MMHG | BODY MASS INDEX: 25.54 KG/M2

## 2022-12-19 DIAGNOSIS — M94.261 CHONDROMALACIA OF RIGHT KNEE: ICD-10-CM

## 2022-12-19 DIAGNOSIS — R26.89 IMPAIRED GAIT AND MOBILITY: ICD-10-CM

## 2022-12-19 DIAGNOSIS — R29.898 WEAKNESS OF BOTH LOWER EXTREMITIES: ICD-10-CM

## 2022-12-19 DIAGNOSIS — Z47.89 AFTERCARE FOR ANTERIOR CRUCIATE LIGAMENT (ACL) REPAIR: ICD-10-CM

## 2022-12-19 DIAGNOSIS — M25.661 DECREASED RANGE OF MOTION (ROM) OF RIGHT KNEE: ICD-10-CM

## 2022-12-19 DIAGNOSIS — M25.561 RIGHT KNEE PAIN, UNSPECIFIED CHRONICITY: Primary | ICD-10-CM

## 2022-12-19 DIAGNOSIS — M25.561 RIGHT KNEE PAIN, UNSPECIFIED CHRONICITY: ICD-10-CM

## 2022-12-19 PROBLEM — S83.511A COMPLETE TEAR OF RIGHT ACL: Status: RESOLVED | Noted: 2022-02-18 | Resolved: 2022-12-19

## 2022-12-19 PROCEDURE — 3074F SYST BP LT 130 MM HG: CPT | Mod: CPTII,S$GLB,, | Performed by: ORTHOPAEDIC SURGERY

## 2022-12-19 PROCEDURE — 99999 PR PBB SHADOW E&M-EST. PATIENT-LVL III: CPT | Mod: PBBFAC,,, | Performed by: ORTHOPAEDIC SURGERY

## 2022-12-19 PROCEDURE — 3074F PR MOST RECENT SYSTOLIC BLOOD PRESSURE < 130 MM HG: ICD-10-PCS | Mod: CPTII,S$GLB,, | Performed by: ORTHOPAEDIC SURGERY

## 2022-12-19 PROCEDURE — 1159F PR MEDICATION LIST DOCUMENTED IN MEDICAL RECORD: ICD-10-PCS | Mod: CPTII,S$GLB,, | Performed by: ORTHOPAEDIC SURGERY

## 2022-12-19 PROCEDURE — 73564 X-RAY EXAM KNEE 4 OR MORE: CPT | Mod: 26,,, | Performed by: RADIOLOGY

## 2022-12-19 PROCEDURE — 3078F PR MOST RECENT DIASTOLIC BLOOD PRESSURE < 80 MM HG: ICD-10-PCS | Mod: CPTII,S$GLB,, | Performed by: ORTHOPAEDIC SURGERY

## 2022-12-19 PROCEDURE — 73564 XR KNEE ORTHO BILAT WITH FLEXION: ICD-10-PCS | Mod: 26,,, | Performed by: RADIOLOGY

## 2022-12-19 PROCEDURE — 3008F PR BODY MASS INDEX (BMI) DOCUMENTED: ICD-10-PCS | Mod: CPTII,S$GLB,, | Performed by: ORTHOPAEDIC SURGERY

## 2022-12-19 PROCEDURE — 1159F MED LIST DOCD IN RCRD: CPT | Mod: CPTII,S$GLB,, | Performed by: ORTHOPAEDIC SURGERY

## 2022-12-19 PROCEDURE — 99214 PR OFFICE/OUTPT VISIT, EST, LEVL IV, 30-39 MIN: ICD-10-PCS | Mod: S$GLB,,, | Performed by: ORTHOPAEDIC SURGERY

## 2022-12-19 PROCEDURE — 3078F DIAST BP <80 MM HG: CPT | Mod: CPTII,S$GLB,, | Performed by: ORTHOPAEDIC SURGERY

## 2022-12-19 PROCEDURE — 99214 OFFICE O/P EST MOD 30 MIN: CPT | Mod: S$GLB,,, | Performed by: ORTHOPAEDIC SURGERY

## 2022-12-19 PROCEDURE — 97110 THERAPEUTIC EXERCISES: CPT | Mod: S$GLB,,, | Performed by: ORTHOPAEDIC SURGERY

## 2022-12-19 PROCEDURE — 97110 PR THERAPEUTIC EXERCISES: ICD-10-PCS | Mod: S$GLB,,, | Performed by: ORTHOPAEDIC SURGERY

## 2022-12-19 PROCEDURE — 73564 X-RAY EXAM KNEE 4 OR MORE: CPT | Mod: TC,50

## 2022-12-19 PROCEDURE — 99999 PR PBB SHADOW E&M-EST. PATIENT-LVL III: ICD-10-PCS | Mod: PBBFAC,,, | Performed by: ORTHOPAEDIC SURGERY

## 2022-12-19 PROCEDURE — 3008F BODY MASS INDEX DOCD: CPT | Mod: CPTII,S$GLB,, | Performed by: ORTHOPAEDIC SURGERY

## 2022-12-27 ENCOUNTER — CLINICAL SUPPORT (OUTPATIENT)
Dept: REHABILITATION | Facility: OTHER | Age: 62
End: 2022-12-27
Payer: COMMERCIAL

## 2022-12-27 DIAGNOSIS — R26.89 IMPAIRED GAIT AND MOBILITY: ICD-10-CM

## 2022-12-27 DIAGNOSIS — Z47.89 AFTERCARE FOR ANTERIOR CRUCIATE LIGAMENT (ACL) REPAIR: ICD-10-CM

## 2022-12-27 DIAGNOSIS — M25.661 DECREASED RANGE OF MOTION (ROM) OF RIGHT KNEE: Primary | ICD-10-CM

## 2022-12-27 DIAGNOSIS — R29.898 WEAKNESS OF BOTH LOWER EXTREMITIES: ICD-10-CM

## 2022-12-27 PROCEDURE — 97140 MANUAL THERAPY 1/> REGIONS: CPT | Mod: PN

## 2022-12-27 PROCEDURE — 97110 THERAPEUTIC EXERCISES: CPT | Mod: PN

## 2022-12-27 NOTE — PROGRESS NOTES
[facility]   Physical Therapy Treatment Note     Name: Evan Khoury  Clinic Number: [id number]    Therapy Diagnosis:   Encounter Diagnoses   Name Primary?    Decreased range of motion (ROM) of right knee Yes    Impaired gait and mobility     Weakness of both lower extremities     Aftercare for anterior cruciate ligament (ACL) repair        Physician: Luis Watkins, *    Visit Date: 12/27/2022  Physician Orders: PT Eval and Treat   1. Right knee ACL reconstruction with hamstring autograft   2. Right knee arthroscopic menisectomy versus meniscus repair   3. Right knee arthroscopic chondroplasty   4. Right knee arthroscopic synovectomy  Surgery Date: 3/8/2022  Medical Diagnosis from Referral: S83.511A (ICD-10-CM) - Complete tear of anterior cruciate ligament of right knee, initial encounter  Evaluation Date: 3/10/2022  Surgical Date: 3/8/3033  Authorization Period Expiration: 12/31/2022  Plan of Care Expiration: 12/31/2022  Visit # / Visits authorized: 55/60  FOTO: 3/5. 12/1/2022  PTA:  0/5      Time In: 9:22 am  Time Out: 10:20 am  Total Billable Time: 48 minutes     Precautions: Standard        Subjective       Pt states he saw Dr Velazquez and states his knee is fine, but needs to continue strengthening his quads. Still can have some anterior R knee pain. States he is having a biopsy tomorrow and will not be in session on Thursday.    He was not compliant with home exercise program .  Response to previous treatment: no adverse effects  Functional change: still has difficulty descending stairs.     Pain: 0/10  Location: R anterior knee and R hamstring     Objective      11/14/22: anterior drawer: negative    CMS Impairment/Limitation/Restriction for FOTO Knee Survey     Therapist reviewed FOTO scores for Evan Khoury on 12/1/2022.   FOTO documents entered into [employer] - see Media section.     Limitation Score: 29%  Category: Mobility     Current : CJ = at least 20% but < 40% impaired,  "limited or restricted  Goal: CJ = at least 20% but < 40% impaired, limited or restricted      9/21/2022, 10/25 last reassessment       Evan received therapeutic exercises to develop strength, endurance, ROM, flexibility, posture and core stabilization for 36 minutes including:      Dynamic warm ups: toy soldiers, knee hugs, knee pulls, lunges 40' x 2  Treadmill walking x 10 min (3 min 3.0 mph, gradual increase incline to 5 and 4.2 mph, 2 min cool down)  lateral lunges x 10 reps  lunges x 10 reps  Captain Morgans 2 x 15 reps each LE  Side lying hip circles 20 reps cw, 20 reps ccw with 2#  Side lying hip abd 2 x 12 reps with 4#  Modified NHE with physioball 2 x 10 (stir the pot last rep 5 x ea)    Shuttle jumps 1 cord bottom 3 x 30"  Shuttle prancing 1 cord bottom 3 x 30"  Shuttle single leg jumps 1 cord    2 leg jump in place 3 x 30 reps (90 sec rest between sets)  2 leg jump forward/backwards 3 x 30 reps (90 sec rest between sets)  2 leg hop side to side 3 x 30 reps (90 sec rest between sets)  short broad jumps with emphasis on soft landing 2 x 40 ft    Wall sits 30 sec x 3   Side lunges x 20 reps    LAQ 10 x 5" with 5#     Planks 3 x 30"  Side planks 3 x 30"    SL squat 2 x 10 reps ea with UE support    Piriformis str 30" x 2  IT band/ TFL str 30" x 2 with strap  Single limb bridging 2 x 10 (figure 4)  U  Bridge x 15 reps  SAQ 3 x 10 reps with 4#  Prone quad stretch x 2'  Prone hamstring curls 3# 3 x 15    Shuttle 3 x 10 reps with 4 black bands, 1 red band  Shuttle single leg iso holds 30" x 4 ea with 100#  Side lying shuttle 3 x 10 reps with 1 black band, 1 red band    gastroc stretch on incline x 90"  Soleus stretch on incline x 90"  Hamstring stretches x 90"    Side steps 3 laps with purple soft band  Monster walks  3 laps with pink soft band (1 set backwards stopped 2/2 L anterior knee pain)    Lateral tap downs 2 x 15 reps ea on 6" step  Forward step ups 6" 2 x 10 reps    Upright bike x 6 min full revolutions " "seat 13, L4 hills program  Hamstring stretches on step 3 x 30"  Iso BOSU hold R LE  3 x 30"    U HR  x 26 ea max reps to faitgue  prone hang 3# for 3 min   Prone knee flex strap 2 min      Neuromuscular re-education x 00 min for improved proprioception, coordination, balance including:    Dead lifts 20# from step 2 x 15  Drinking bird 2 x 15 reps with UE support, 10#  1/2 star taps x 5 reps  R side planks 3 sets to burn  SLS on foam 3 x 30"- added BTB pull downs 'Itis"  rebounder toss SLS on foam 3 x 30"  M/L wobble board x 2 min  Downhill mini squat 1/2 foam rolls 3 x 15     Evan received the following manual therapy techniques: Joint mobilizations were applied to the: patallar and R hamstring for 12 minutes, including:    stm to R Gastroc/ hamstrings  IASTM / scrapping hamstrings   Cross friction distal quad  All planes patella mob gr 2-3 supine  Grades II and III tibial mobilizations to increase flexion   Patella Y strip and medial decompression for L knee pain  Reapplied 2 "I" strips to R IT band with two decompressive "I" strip to decrease anterior knee pain     Pt received 10 min cold pack to R knee with towel to protect skin.       Home Exercises Provided and Patient Education Provided      Education provided:        Written Home Exercises Provided: Patient instructed to cont prior HEP.  Exercises were reviewed and Evan was able to demonstrate them prior to the end of the session.  Evan demonstrated good understanding of the education provided.      See EMR under Patient Instructions for exercises provided prior visit.        Assessment   Resumed gluteal and quad strengthening today. Will be out of clinic on Thursday and next week. PT discussed patient continuing planks and single leg squats at home while not able to come into therapy.     Evan Is progressing well towards his goals.   Pt prognosis is Good.      Pt will continue to benefit from skilled outpatient physical therapy to address the deficits " listed in the problem list box on initial evaluation, provide pt/family education and to maximize pt's level of independence in the home and community environment.      Pt's spiritual, cultural and educational needs considered and pt agreeable to plan of care and goals.     Anticipated barriers to physical therapy: none     Goals:  Short Term Goals (6 Weeks):   1. Patient to have full extension Right (equal to left) for normalized gait pattern  met  2. Patient to have 120 degrees of passive left knee flexion for improved performance of ADL's such as sit<>stand transfers  met  3. Patient to have decreased edema left knee   met  4. Patient to report 2/10 pain or less in left knee with ambulating community distances   met  5. Patient to be compliant with home program 5x's a week as noted by proper demonstration of exercises to therapist for improved management of condition   met  6. Patient to ambulate full weight bearing Right lower extremity and normal gait pattern     met     Long Term Goals   -(12 Weeks):   1. Patient to have 4+/5 or greater strength in RLE for functional performance of ADL's such as lifting  met  2. Patient to descend one flight of stairs with alternating gait pattern without use of handrail and with proper LE alignment- met  3. Patient to have 135 degrees or greater Right knee flexion for return to ADL's including squatting   met  -(36 weeks):   4. Patient to have decreased subjective report of disability as noted by <20% limitation on FOTO knee questionnaire Progressing, not met  5. Patient to perform single leg squat without assistance and without valgus collapse to improve participation in recreational  Progressing, not met  6. Patient's RLE strength to be 5/5 for return to recreational activities and sport Progressing, not met     PLAN     Continue heavy resistance training and balance activities as tolerated for return to recreational activities and skiing. Manual therapy prn for pain  modulation         Luis Doherty, PT

## 2023-01-10 ENCOUNTER — CLINICAL SUPPORT (OUTPATIENT)
Dept: REHABILITATION | Facility: OTHER | Age: 63
End: 2023-01-10
Payer: COMMERCIAL

## 2023-01-10 DIAGNOSIS — R26.89 IMPAIRED GAIT AND MOBILITY: ICD-10-CM

## 2023-01-10 DIAGNOSIS — Z47.89 AFTERCARE FOR ANTERIOR CRUCIATE LIGAMENT (ACL) REPAIR: ICD-10-CM

## 2023-01-10 DIAGNOSIS — M25.661 DECREASED RANGE OF MOTION (ROM) OF RIGHT KNEE: Primary | ICD-10-CM

## 2023-01-10 DIAGNOSIS — R29.898 WEAKNESS OF BOTH LOWER EXTREMITIES: ICD-10-CM

## 2023-01-10 PROCEDURE — 97110 THERAPEUTIC EXERCISES: CPT | Mod: PN | Performed by: INTERNAL MEDICINE

## 2023-01-10 PROCEDURE — 97112 NEUROMUSCULAR REEDUCATION: CPT | Mod: PN | Performed by: INTERNAL MEDICINE

## 2023-01-12 ENCOUNTER — CLINICAL SUPPORT (OUTPATIENT)
Dept: REHABILITATION | Facility: OTHER | Age: 63
End: 2023-01-12
Payer: COMMERCIAL

## 2023-01-12 DIAGNOSIS — M25.661 DECREASED RANGE OF MOTION (ROM) OF RIGHT KNEE: Primary | ICD-10-CM

## 2023-01-12 DIAGNOSIS — Z47.89 AFTERCARE FOR ANTERIOR CRUCIATE LIGAMENT (ACL) REPAIR: ICD-10-CM

## 2023-01-12 DIAGNOSIS — R29.898 WEAKNESS OF BOTH LOWER EXTREMITIES: ICD-10-CM

## 2023-01-12 DIAGNOSIS — R26.89 IMPAIRED GAIT AND MOBILITY: ICD-10-CM

## 2023-01-12 PROCEDURE — 97110 THERAPEUTIC EXERCISES: CPT | Mod: PN

## 2023-01-12 NOTE — PLAN OF CARE
[facility]   Physical Therapy progress note     Name: Evan Khoury  Clinic Number: [id number]    Therapy Diagnosis:   Encounter Diagnoses   Name Primary?    Decreased range of motion (ROM) of right knee Yes    Impaired gait and mobility     Weakness of both lower extremities     Aftercare for anterior cruciate ligament (ACL) repair        Physician: Luis Watkins, Jennifer    Visit Date: 1/10/23  Physician Orders: PT Eval and Treat   1. Right knee ACL reconstruction with hamstring autograft   2. Right knee arthroscopic menisectomy versus meniscus repair   3. Right knee arthroscopic chondroplasty   4. Right knee arthroscopic synovectomy  Surgery Date: 3/8/2022  Medical Diagnosis from Referral: S83.511A (ICD-10-CM) - Complete tear of anterior cruciate ligament of right knee, initial encounter  Evaluation Date: 3/10/2022  Surgical Date: 3/8/3022  Authorization Period Expiration: 12/31/2023  Plan of Care Expiration:  2/9/23  Visit # / Visits authorized: 56/80  FOTO: 3/5. 12/1/2022  PTA:  0/5      Time In: 9:25 am  Time Out: 10:25 am  Total Billable Time: 60 minutes     Precautions: Standard        Subjective       Pt states he saw Dr Velazquez and states his knee is fine, but needs to continue strengthening his quads. Pt reports occas   anterior R knee pain.      He was not compliant with home exercise program .  Response to previous treatment: no adverse effects  Functional change: still has difficulty descending stairs.     Pain: 0/10  Location: R anterior knee      Objective    1/5 MMT B hip abd 5/5  Quads 4/5    11/14/22: anterior drawer: negative    CMS Impairment/Limitation/Restriction for FOTO Knee Survey     Therapist reviewed FOTO scores for Evan Khoury on 12/1/2022.   FOTO documents entered into [employer] - see Media section.     Limitation Score: 29%  Category: Mobility     Current : CJ = at least 20% but < 40% impaired, limited or restricted  Goal: CJ = at least 20% but < 40% impaired,  "limited or restricted      9/21/2022, 10/25 last reassessment       Evan received therapeutic exercises to develop strength, endurance, ROM, flexibility, posture and core stabilization for 20  minutes including:      Dynamic warm ups: toy soldiers, knee hugs, knee pulls, lunges 40' x 2     Modified NHE with physioball 2 x 10 (stir the pot last rep 5 x ea)    Shuttle jumps 1 cord bottom 3 x 30"  Shuttle prancing 1 cord bottom 3 x 30"  Shuttle single leg jumps 1 cord    2 leg jump in place 3 x 30 reps (90 sec rest between sets)  2 leg jump forward/backwards 3 x 30 reps (90 sec rest between sets)  2 leg hop side to side 3 x 30 reps (90 sec rest between sets)  short broad jumps with emphasis on soft landing 2 x 40 ft     Wall sits 30 sec x 3  L heel down, B  30 sec   Side lunges x 20 reps    LAQ 10 x 5" with 7#     planks 3 x 30"  Side planks 3 x 30"   Shuttle 3 x 10 reps with 4 black bands emphasis on slow lower             Neuromuscular re-education x  25 min for improved proprioception, coordination, balance including:  Lunge plyo yellow med ball toss  3 x f   Dead lifts 20# from step 2 x 15  Drinking bird 2 x 15 reps with UE support, 10#  U squat 30 sec x 4  Side to side 1 min jog  1/2 star taps x 5 reps  R side planks 3 sets to burn  SLS on foam 3 x 30"- added BTB pull downs 'Itis"  rebounder toss SLS on foam 3 x 30"  M/L wobble board x 2 min  Downhill mini squat 1/2 foam rolls 3 x 15         Pt received 10 min cold pack to R knee with towel to protect skin.       Home Exercises Provided and Patient Education Provided      Education provided:   Need to cont strengthening quads     Written Home Exercises Provided: Patient instructed to cont prior HEP.  Exercises were reviewed and Evan was able to demonstrate them prior to the end of the session.  Evan demonstrated good understanding of the education provided.      See EMR under Patient Instructions for exercises provided prior visit.        Assessment "   Progressing with improved B Hip strength. Cont with R quad weakness with decreased R knee flex with light plyo jog side to side and decreased rom with U squat.      Evan Is progressing well towards his goals.   Pt prognosis is Good.      Pt will continue to benefit from skilled outpatient physical therapy to address the deficits listed in the problem list box on initial evaluation, provide pt/family education and to maximize pt's level of independence in the home and community environment.      Pt's spiritual, cultural and educational needs considered and pt agreeable to plan of care and goals.     Anticipated barriers to physical therapy: none     Goals:  Short Term Goals (6 Weeks):   1. Patient to have full extension Right (equal to left) for normalized gait pattern  met  2. Patient to have 120 degrees of passive left knee flexion for improved performance of ADL's such as sit<>stand transfers  met  3. Patient to have decreased edema left knee   met  4. Patient to report 2/10 pain or less in left knee with ambulating community distances   met  5. Patient to be compliant with home program 5x's a week as noted by proper demonstration of exercises to therapist for improved management of condition   met  6. Patient to ambulate full weight bearing Right lower extremity and normal gait pattern     met     Long Term Goals   -(12 Weeks):   1. Patient to have 4+/5 or greater strength in RLE for functional performance of ADL's such as lifting  met  2. Patient to descend one flight of stairs with alternating gait pattern without use of handrail and with proper LE alignment- met  3. Patient to have 135 degrees or greater Right knee flexion for return to ADL's including squatting   met  -(36 weeks):   4. Patient to have decreased subjective report of disability as noted by <20% limitation on FOTO knee questionnaire Progressing, not met  5. Patient to perform single leg squat without assistance and without valgus collapse to  improve participation in recreational  Progressing, not met  6. Patient's RLE strength to be 5/5 for return to recreational activities and sport Progressing, not met     PLAN     Continue heavy resistance training and balance activities as tolerated for return to recreational activities and skiing. Manual therapy prn for pain modulation . Possible Y balance, microfet testing next visit.       Tammi Lopez, PT                                                          [facility]   Physical Therapy Treatment Note     Name: Evan Khoury  Clinic Number: [id number]    Therapy Diagnosis:   Encounter Diagnoses   Name Primary?    Decreased range of motion (ROM) of right knee Yes    Impaired gait and mobility     Weakness of both lower extremities     Aftercare for anterior cruciate ligament (ACL) repair        Physician: Luis Watkins, *    Visit Date: 12/27/2022  Physician Orders: PT Eval and Treat   1. Right knee ACL reconstruction with hamstring autograft   2. Right knee arthroscopic menisectomy versus meniscus repair   3. Right knee arthroscopic chondroplasty   4. Right knee arthroscopic synovectomy  Surgery Date: 3/8/2022  Medical Diagnosis from Referral: S83.511A (ICD-10-CM) - Complete tear of anterior cruciate ligament of right knee, initial encounter  Evaluation Date: 3/10/2022  Surgical Date: 3/8/3033  Authorization Period Expiration: 12/31/2022  Plan of Care Expiration: 12/31/2022  Visit # / Visits authorized: 55/60  FOTO: 3/5. 12/1/2022  PTA:  0/5      Time In: 9:22 am  Time Out: 10:20 am  Total Billable Time: 48 minutes     Precautions: Standard        Subjective       Pt states he saw Dr Velazquez and states his knee is fine, but needs to continue strengthening his quads. Still can have some anterior R knee pain. States he is having a biopsy tomorrow and will not be in session on Thursday.    He was not compliant with home exercise program .  Response to previous treatment: no  "adverse effects  Functional change: still has difficulty descending stairs.     Pain: 0/10  Location: R anterior knee and R hamstring     Objective      11/14/22: anterior drawer: negative    CMS Impairment/Limitation/Restriction for FOTO Knee Survey     Therapist reviewed FOTO scores for Evan Khoury on 12/1/2022.   FOTO documents entered into [employer] - see Media section.     Limitation Score: 29%  Category: Mobility     Current : CJ = at least 20% but < 40% impaired, limited or restricted  Goal: CJ = at least 20% but < 40% impaired, limited or restricted      9/21/2022, 10/25 last reassessment       Evan received therapeutic exercises to develop strength, endurance, ROM, flexibility, posture and core stabilization for 36 minutes including:      Dynamic warm ups: toy soldiers, knee hugs, knee pulls, lunges 40' x 2  Treadmill walking x 10 min (3 min 3.0 mph, gradual increase incline to 5 and 4.2 mph, 2 min cool down)  lateral lunges x 10 reps  lunges x 10 reps  Captain Morgans 2 x 15 reps each LE  Side lying hip circles 20 reps cw, 20 reps ccw with 2#  Side lying hip abd 2 x 12 reps with 4#  Modified NHE with physioball 2 x 10 (stir the pot last rep 5 x ea)    Shuttle jumps 1 cord bottom 3 x 30"  Shuttle prancing 1 cord bottom 3 x 30"  Shuttle single leg jumps 1 cord    2 leg jump in place 3 x 30 reps (90 sec rest between sets)  2 leg jump forward/backwards 3 x 30 reps (90 sec rest between sets)  2 leg hop side to side 3 x 30 reps (90 sec rest between sets)  short broad jumps with emphasis on soft landing 2 x 40 ft    Wall sits 30 sec x 3   Side lunges x 20 reps    LAQ 10 x 5" with 5#     Planks 3 x 30"  Side planks 3 x 30"    SL squat 2 x 10 reps ea with UE support    Piriformis str 30" x 2  IT band/ TFL str 30" x 2 with strap  Single limb bridging 2 x 10 (figure 4)  U  Bridge x 15 reps  SAQ 3 x 10 reps with 4#  Prone quad stretch x 2'  Prone hamstring curls 3# 3 x 15    Shuttle 3 x 10 reps with 4 black bands, 1 red " "band  Shuttle single leg iso holds 30" x 4 ea with 100#  Side lying shuttle 3 x 10 reps with 1 black band, 1 red band    gastroc stretch on incline x 90"  Soleus stretch on incline x 90"  Hamstring stretches x 90"    Side steps 3 laps with purple soft band  Monster walks  3 laps with pink soft band (1 set backwards stopped 2/2 L anterior knee pain)    Lateral tap downs 2 x 15 reps ea on 6" step  Forward step ups 6" 2 x 10 reps    Upright bike x 6 min full revolutions seat 13, L4 hills program  Hamstring stretches on step 3 x 30"  Iso BOSU hold R LE  3 x 30"    U HR  x 26 ea max reps to faitgue  prone hang 3# for 3 min   Prone knee flex strap 2 min      Neuromuscular re-education x 00 min for improved proprioception, coordination, balance including:    Dead lifts 20# from step 2 x 15  Drinking bird 2 x 15 reps with UE support, 10#  1/2 star taps x 5 reps  R side planks 3 sets to burn  SLS on foam 3 x 30"- added BTB pull downs 'Itis"  rebounder toss SLS on foam 3 x 30"  M/L wobble board x 2 min  Downhill mini squat 1/2 foam rolls 3 x 15     Evan received the following manual therapy techniques: Joint mobilizations were applied to the: patallar and R hamstring for 12 minutes, including:    stm to R Gastroc/ hamstrings  IASTM / scrapping hamstrings   Cross friction distal quad  All planes patella mob gr 2-3 supine  Grades II and III tibial mobilizations to increase flexion   Patella Y strip and medial decompression for L knee pain  Reapplied 2 "I" strips to R IT band with two decompressive "I" strip to decrease anterior knee pain     Pt received 10 min cold pack to R knee with towel to protect skin.       Home Exercises Provided and Patient Education Provided      Education provided:        Written Home Exercises Provided: Patient instructed to cont prior HEP.  Exercises were reviewed and Evan was able to demonstrate them prior to the end of the session.  Evan demonstrated good understanding of the education " provided.      See EMR under Patient Instructions for exercises provided prior visit.        Assessment   Resumed gluteal and quad strengthening today. Will be out of clinic on Thursday and next week. PT discussed patient continuing planks and single leg squats at home while not able to come into therapy.     Evan Is progressing well towards his goals.   Pt prognosis is Good.      Pt will continue to benefit from skilled outpatient physical therapy to address the deficits listed in the problem list box on initial evaluation, provide pt/family education and to maximize pt's level of independence in the home and community environment.      Pt's spiritual, cultural and educational needs considered and pt agreeable to plan of care and goals.     Anticipated barriers to physical therapy: none     Goals:  Short Term Goals (6 Weeks):   1. Patient to have full extension Right (equal to left) for normalized gait pattern  met  2. Patient to have 120 degrees of passive left knee flexion for improved performance of ADL's such as sit<>stand transfers  met  3. Patient to have decreased edema left knee   met  4. Patient to report 2/10 pain or less in left knee with ambulating community distances   met  5. Patient to be compliant with home program 5x's a week as noted by proper demonstration of exercises to therapist for improved management of condition   met  6. Patient to ambulate full weight bearing Right lower extremity and normal gait pattern     met     Long Term Goals   -(12 Weeks):   1. Patient to have 4+/5 or greater strength in RLE for functional performance of ADL's such as lifting  met  2. Patient to descend one flight of stairs with alternating gait pattern without use of handrail and with proper LE alignment- met  3. Patient to have 135 degrees or greater Right knee flexion for return to ADL's including squatting   met  -(36 weeks):   4. Patient to have decreased subjective report of disability as noted by <20%  limitation on FOTO knee questionnaire Progressing, not met  5. Patient to perform single leg squat without assistance and without valgus collapse to improve participation in recreational  Progressing, not met  6. Patient's RLE strength to be 5/5 for return to recreational activities and sport Progressing, not met     PLAN     Continue heavy resistance training and balance activities as tolerated for return to recreational activities and skiing. Manual therapy prn for pain modulation         Luis Doherty, PT

## 2023-01-12 NOTE — PROGRESS NOTES
[facility]   Physical Therapy Treatment Note     Name: Evan Khoury  Clinic Number: [id number]    Therapy Diagnosis:   Encounter Diagnoses   Name Primary?    Decreased range of motion (ROM) of right knee Yes    Impaired gait and mobility     Weakness of both lower extremities     Aftercare for anterior cruciate ligament (ACL) repair          Physician: Luis Watkins, *    Visit Date: 1/12/2023  Physician Orders: PT Eval and Treat   1. Right knee ACL reconstruction with hamstring autograft   2. Right knee arthroscopic menisectomy versus meniscus repair   3. Right knee arthroscopic chondroplasty   4. Right knee arthroscopic synovectomy  Surgery Date: 3/8/2022  Medical Diagnosis from Referral: S83.511A (ICD-10-CM) - Complete tear of anterior cruciate ligament of right knee, initial encounter  Evaluation Date: 3/10/2022  Surgical Date: 3/8/3022  Authorization Period Expiration: 12/31/2023  Plan of Care Expiration: 12/31/2022  Visit # / Visits authorized: 57 (2/25)  FOTO: 3/5. 12/1/2022  PTA:  0/5      Time In: 10:52 am  Time Out: 12:00 am  Total Billable Time: 58 minutes     Precautions: Standard        Subjective       Pt states his knee feels fantastic. Able to go down stairs better, feels stronger.    He was not compliant with home exercise program .  Response to previous treatment: no adverse effects  Functional change: still has difficulty descending stairs.     Pain: 0/10  Location: R anterior knee and R hamstring     Objective      11/14/22: anterior drawer: negative    CMS Impairment/Limitation/Restriction for FOTO Knee Survey     Therapist reviewed FOTO scores for Evan Khoury on 12/1/2022.   FOTO documents entered into [employer] - see Media section.     Limitation Score: 29%  Category: Mobility     Current : CJ = at least 20% but < 40% impaired, limited or restricted  Goal: CJ = at least 20% but < 40% impaired, limited or restricted        Evan received therapeutic exercises to  "develop strength, endurance, ROM, flexibility, posture and core stabilization for 53 minutes including:      Dynamic warm ups: toy soldiers, knee hugs, knee pulls, lunges 40' x 2  Treadmill walking x 10 min (3 min 3.0 mph, gradual increase incline to 5 and 4.2 mph, 2 min cool down)  lateral lunges x 10 reps  lunges x 10 reps  Captain Morgans 2 x 15 reps each LE  Side lying hip circles 20 reps cw, 20 reps ccw with 2#  Side lying hip abd 2 x 12 reps with 4#  Modified NHE with physioball 2 x 10 (stir the pot last rep 5 x ea)    Shuttle jumps 1 cord bottom 3 x 30"  Shuttle prancing 1 cord bottom 3 x 30"  Shuttle single leg jumps 1 cord    2 leg jump in place 3 x 30 reps (90 sec rest between sets)  2 leg jump forward/backwards 3 x 30 reps (90 sec rest between sets)  2 leg hop side to side 3 x 30 reps (90 sec rest between sets)  short broad jumps with emphasis on soft landing 2 x 40 ft    single leg wall sits 30 sec x 3   Side lunges x 20 reps    LAQ 2 10 x 5" with 7#     planks 3 x 30"    Shuttle 3 x 10 reps with 4 black bands emphasis on slow lower  Shuttle single leg iso holds 30" x 4 ea with 100#  Side lying shuttle 3 x 10 reps with 1 black band, 1 red band    Downhill mini squat 1/2 foam rolls 3 x 15    Lateral step downs 2 x 10" 6" step    Piriformis str 30" x 2  IT band/ TFL str 30" x 2 with strap  Single limb bridging 2 x 10 (figure 4)  U  Bridge x 15 reps  SAQ 3 x 10 reps with 4#  Prone quad stretch x 2'  Prone hamstring curls 3# 3 x 15    gastroc stretch on incline x 90"  Soleus stretch on incline x 90"  Hamstring stretches x 90"    Side steps 3 laps with purple soft band  Monster walks  3 laps with pink soft band (1 set backwards stopped 2/2 L anterior knee pain)    Lateral tap downs 2 x 15 reps ea on 6" step  Forward step ups 6" 2 x 10 reps    Upright bike x 6 min full revolutions seat 13, L4 hills program  Hamstring stretches on step 3 x 30"  Iso BOSU hold R LE  3 x 30"    U HR  x 26 ea max reps to " "faitgue  prone hang 3# for 3 min   Prone knee flex strap 2 min      Neuromuscular re-education x 5 min for improved proprioception, coordination, balance including:    Drinking bird 2 x 15 reps with UE support, 10#  U squat 30 sec x 4  Side to side 2 x 1 min jog    Dead lifts 20# from step 2 x 15  Drinking bird 2 x 15 reps with UE support, 10#  1/2 star taps x 5 reps  R side planks 3 sets to burn  SLS on foam 3 x 30"- added BTB pull downs 'Itis"  rebounder toss SLS on foam 3 x 30"  M/L wobble board x 2 min        Evan received the following manual therapy techniques: Joint mobilizations were applied to the: patallar and R hamstring for 00 minutes, including:    stm to R Gastroc/ hamstrings  IASTM / scrapping hamstrings   Cross friction distal quad  All planes patella mob gr 2-3 supine  Grades II and III tibial mobilizations to increase flexion   Patella Y strip and medial decompression for L knee pain  Reapplied 2 "I" strips to R IT band with two decompressive "I" strip to decrease anterior knee pain     Pt received 10 min cold pack to R knee with towel to protect skin.       Home Exercises Provided and Patient Education Provided      Education provided:        Written Home Exercises Provided: Patient instructed to cont prior HEP.  Exercises were reviewed and Evan was able to demonstrate them prior to the end of the session.  Evan demonstrated good understanding of the education provided.      See EMR under Patient Instructions for exercises provided prior visit.        Assessment   Continued with plyometrics and R gluteal and quad strengthening. Will continue with neuromuscular training for patient to return to running. May add agility ladder at 1/4 speed for change of direction.     Evan Is progressing well towards his goals.   Pt prognosis is Good.      Pt will continue to benefit from skilled outpatient physical therapy to address the deficits listed in the problem list box on initial evaluation, provide " pt/family education and to maximize pt's level of independence in the home and community environment.      Pt's spiritual, cultural and educational needs considered and pt agreeable to plan of care and goals.     Anticipated barriers to physical therapy: none     Goals:  Short Term Goals (6 Weeks):   1. Patient to have full extension Right (equal to left) for normalized gait pattern  met  2. Patient to have 120 degrees of passive left knee flexion for improved performance of ADL's such as sit<>stand transfers  met  3. Patient to have decreased edema left knee   met  4. Patient to report 2/10 pain or less in left knee with ambulating community distances   met  5. Patient to be compliant with home program 5x's a week as noted by proper demonstration of exercises to therapist for improved management of condition   met  6. Patient to ambulate full weight bearing Right lower extremity and normal gait pattern     met     Long Term Goals   -(12 Weeks):   1. Patient to have 4+/5 or greater strength in RLE for functional performance of ADL's such as lifting  met  2. Patient to descend one flight of stairs with alternating gait pattern without use of handrail and with proper LE alignment- met  3. Patient to have 135 degrees or greater Right knee flexion for return to ADL's including squatting   met  -(36 weeks):   4. Patient to have decreased subjective report of disability as noted by <20% limitation on FOTO knee questionnaire Progressing, not met  5. Patient to perform single leg squat without assistance and without valgus collapse to improve participation in recreational  Progressing, not met  6. Patient's RLE strength to be 5/5 for return to recreational activities and sport Progressing, not met     PLAN     Continue with neuromuscular training, R gluteal and quad strengthening, and progress to running.        Luis Doherty, PT

## 2023-01-23 NOTE — PROGRESS NOTES
[facility]   Physical Therapy Treatment Note     Name: Evan Khoury  Clinic Number: [id number]    Therapy Diagnosis:   No diagnosis found.        Physician: Luis Watkins, *    Visit Date: 1/24/2023  Physician Orders: PT Eval and Treat   1. Right knee ACL reconstruction with hamstring autograft   2. Right knee arthroscopic menisectomy versus meniscus repair   3. Right knee arthroscopic chondroplasty   4. Right knee arthroscopic synovectomy  Surgery Date: 3/8/2022  Medical Diagnosis from Referral: S83.511A (ICD-10-CM) - Complete tear of anterior cruciate ligament of right knee, initial encounter  Evaluation Date: 3/10/2022  Surgical Date: 3/8/3022  Authorization Period Expiration: 12/31/2023  Plan of Care Expiration: 2/9/2023  Visit # / Visits authorized: 57 (3/25)  FOTO: 3/5.   PTA:  0/5      Time In: 9:15 am  Time Out: 10:00 am  Total Billable Time:  minutes     Precautions: Standard        Subjective       Pt states his knee feels fantastic. Able to go down stairs better, feels stronger.    He was not compliant with home exercise program .  Response to previous treatment: no adverse effects  Functional change: still has difficulty descending stairs.     Pain: 0/10  Location: R anterior knee and R hamstring     Objective      11/14/22: anterior drawer: negative    CMS Impairment/Limitation/Restriction for FOTO Knee Survey     Therapist reviewed FOTO scores for Evan Khoury on 12/1/2022.   FOTO documents entered into [employer] - see Media section.     Limitation Score: 29%  Category: Mobility     Current : CJ = at least 20% but < 40% impaired, limited or restricted  Goal: CJ = at least 20% but < 40% impaired, limited or restricted        Evan received therapeutic exercises to develop strength, endurance, ROM, flexibility, posture and core stabilization for 53 minutes including:      Dynamic warm ups: toy soldiers, knee hugs, knee pulls, lunges 40' x 2  Treadmill walking x 10 min (3  "min 3.0 mph, gradual increase incline to 5 and 4.2 mph, 2 min cool down)  lateral lunges x 10 reps  lunges x 10 reps  Captain Morgans 2 x 15 reps each LE  Side lying hip circles 20 reps cw, 20 reps ccw with 2#  Side lying hip abd 2 x 12 reps with 4#  Modified NHE with physioball 2 x 10 (stir the pot last rep 5 x ea)    Shuttle jumps 1 cord bottom 3 x 30"  Shuttle prancing 1 cord bottom 3 x 30"  Shuttle single leg jumps 1 cord    2 leg jump in place 3 x 30 reps (90 sec rest between sets)  2 leg jump forward/backwards 3 x 30 reps (90 sec rest between sets)  2 leg hop side to side 3 x 30 reps (90 sec rest between sets)  short broad jumps with emphasis on soft landing 2 x 40 ft    single leg wall sits 30 sec x 3   Side lunges x 20 reps    LAQ 2 10 x 5" with 7#     planks 3 x 30"    Shuttle 3 x 10 reps with 4 black bands emphasis on slow lower  Shuttle single leg iso holds 30" x 4 ea with 100#  Side lying shuttle 3 x 10 reps with 1 black band, 1 red band    Downhill mini squat 1/2 foam rolls 3 x 15    Lateral step downs 2 x 10" 6" step    Piriformis str 30" x 2  IT band/ TFL str 30" x 2 with strap  Single limb bridging 2 x 10 (figure 4)  U  Bridge x 15 reps  SAQ 3 x 10 reps with 4#  Prone quad stretch x 2'  Prone hamstring curls 3# 3 x 15    gastroc stretch on incline x 90"  Soleus stretch on incline x 90"  Hamstring stretches x 90"    Side steps 3 laps with purple soft band  Monster walks  3 laps with pink soft band (1 set backwards stopped 2/2 L anterior knee pain)    Lateral tap downs 2 x 15 reps ea on 6" step  Forward step ups 6" 2 x 10 reps    Upright bike x 6 min full revolutions seat 13, L4 hills program  Hamstring stretches on step 3 x 30"  Iso BOSU hold R LE  3 x 30"    U HR  x 26 ea max reps to faitgue  prone hang 3# for 3 min   Prone knee flex strap 2 min      Neuromuscular re-education x 5 min for improved proprioception, coordination, balance including:    Drinking bird 2 x 15 reps with UE support, 10#  U " "squat 30 sec x 4  Side to side 2 x 1 min jog    Dead lifts 20# from step 2 x 15  Drinking bird 2 x 15 reps with UE support, 10#  1/2 star taps x 5 reps  R side planks 3 sets to burn  SLS on foam 3 x 30"- added BTB pull downs 'Itis"  rebounder toss SLS on foam 3 x 30"  M/L wobble board x 2 min        Evan received the following manual therapy techniques: Joint mobilizations were applied to the: patallar and R hamstring for 00 minutes, including:    stm to R Gastroc/ hamstrings  IASTM / scrapping hamstrings   Cross friction distal quad  All planes patella mob gr 2-3 supine  Grades II and III tibial mobilizations to increase flexion   Patella Y strip and medial decompression for L knee pain  Reapplied 2 "I" strips to R IT band with two decompressive "I" strip to decrease anterior knee pain     Pt received 10 min cold pack to R knee with towel to protect skin.       Home Exercises Provided and Patient Education Provided      Education provided:        Written Home Exercises Provided: Patient instructed to cont prior HEP.  Exercises were reviewed and Evan was able to demonstrate them prior to the end of the session.  Evan demonstrated good understanding of the education provided.      See EMR under Patient Instructions for exercises provided prior visit.        Assessment   Continued with plyometrics and R gluteal and quad strengthening. Will continue with neuromuscular training for patient to return to running. May add agility ladder at 1/4 speed for change of direction.     Evan Is progressing well towards his goals.   Pt prognosis is Good.      Pt will continue to benefit from skilled outpatient physical therapy to address the deficits listed in the problem list box on initial evaluation, provide pt/family education and to maximize pt's level of independence in the home and community environment.      Pt's spiritual, cultural and educational needs considered and pt agreeable to plan of care and goals.   "   Anticipated barriers to physical therapy: none     Goals:  Short Term Goals (6 Weeks):   1. Patient to have full extension Right (equal to left) for normalized gait pattern  met  2. Patient to have 120 degrees of passive left knee flexion for improved performance of ADL's such as sit<>stand transfers  met  3. Patient to have decreased edema left knee   met  4. Patient to report 2/10 pain or less in left knee with ambulating community distances   met  5. Patient to be compliant with home program 5x's a week as noted by proper demonstration of exercises to therapist for improved management of condition   met  6. Patient to ambulate full weight bearing Right lower extremity and normal gait pattern     met     Long Term Goals   -(12 Weeks):   1. Patient to have 4+/5 or greater strength in RLE for functional performance of ADL's such as lifting  met  2. Patient to descend one flight of stairs with alternating gait pattern without use of handrail and with proper LE alignment- met  3. Patient to have 135 degrees or greater Right knee flexion for return to ADL's including squatting   met  -(36 weeks):   4. Patient to have decreased subjective report of disability as noted by <20% limitation on FOTO knee questionnaire Progressing, not met  5. Patient to perform single leg squat without assistance and without valgus collapse to improve participation in recreational  Progressing, not met  6. Patient's RLE strength to be 5/5 for return to recreational activities and sport Progressing, not met     PLAN     Continue with neuromuscular training, R gluteal and quad strengthening, and progress to running.        Luis Doherty, PT

## 2023-01-24 ENCOUNTER — CLINICAL SUPPORT (OUTPATIENT)
Dept: REHABILITATION | Facility: OTHER | Age: 63
End: 2023-01-24
Payer: COMMERCIAL

## 2023-01-24 DIAGNOSIS — Z47.89 AFTERCARE FOR ANTERIOR CRUCIATE LIGAMENT (ACL) REPAIR: ICD-10-CM

## 2023-01-24 DIAGNOSIS — R26.89 IMPAIRED GAIT AND MOBILITY: ICD-10-CM

## 2023-01-24 DIAGNOSIS — R29.898 WEAKNESS OF BOTH LOWER EXTREMITIES: ICD-10-CM

## 2023-01-24 DIAGNOSIS — M25.661 DECREASED RANGE OF MOTION (ROM) OF RIGHT KNEE: Primary | ICD-10-CM

## 2023-01-24 PROCEDURE — 97112 NEUROMUSCULAR REEDUCATION: CPT | Mod: PN | Performed by: INTERNAL MEDICINE

## 2023-01-24 PROCEDURE — 97110 THERAPEUTIC EXERCISES: CPT | Mod: PN | Performed by: INTERNAL MEDICINE

## 2023-01-24 PROCEDURE — 97530 THERAPEUTIC ACTIVITIES: CPT | Mod: PN | Performed by: INTERNAL MEDICINE

## 2023-01-24 NOTE — PROGRESS NOTES
[facility]   Physical Therapy Treatment Note     Name: Evan Khoury  Clinic Number: [id number]    Therapy Diagnosis:   Encounter Diagnoses   Name Primary?    Decreased range of motion (ROM) of right knee Yes    Impaired gait and mobility     Weakness of both lower extremities     Aftercare for anterior cruciate ligament (ACL) repair          Physician: Luis Watkins, Jennifer    Visit Date:1/24/23  Physician Orders: PT Eval and Treat   1. Right knee ACL reconstruction with hamstring autograft   2. Right knee arthroscopic menisectomy versus meniscus repair   3. Right knee arthroscopic chondroplasty   4. Right knee arthroscopic synovectomy  Surgery Date: 3/8/2022  Medical Diagnosis from Referral: S83.511A (ICD-10-CM) - Complete tear of anterior cruciate ligament of right knee, initial encounter  Evaluation Date: 3/10/2022  Surgical Date: 3/8/3022  Authorization Period Expiration: 12/31/2023  Plan of Care Expiration: 2/9/23  Visit # / Visits authorized: 58 (3/25)  FOTO: 3/5. 12/1/2022  PTA:  0/5      Time In:  920 am  Time Out: 1015am  Total Billable Time: 45 minutes     Precautions: Standard        Subjective       Pt states his knee feels fantastic. Going skiing for [employer] and says he will be very careful, wear a brace.    He was not compliant with home exercise program .  Response to previous treatment: no adverse effects  Functional change: still has difficulty descending stairs.     Pain: 0/10  Location: R anterior knee      Objective      1/24/23 single leg hop 16 in less on R    CMS Impairment/Limitation/Restriction for FOTO Knee Survey     Therapist reviewed FOTO scores for Evan Khoury on 12/1/2022.   FOTO documents entered into [employer] - see Media section.     Limitation Score: 29%  Category: Mobility     Current : CJ = at least 20% but < 40% impaired, limited or restricted  Goal: CJ = at least 20% but < 40% impaired, limited or restricted        Evan received therapeutic  "exercises to develop strength, endurance, ROM, flexibility, posture and core stabilization for 25 minutes including:         Treadmill walking x 10 min (3 min 3.0 mph, gradual increase incline to 5 and 4.2 mph, 2 min cool down)  lateral lunges x 10 reps  lunges x 10 reps  Side lying hip circles 20 reps cw, 20 reps ccw with 2#  Side lying hip abd 2 x 12 reps with 4#  Modified NHE with physioball 2 x 10 (stir the pot last rep 5 x ea)    Shuttle jumps 1 cord bottom 3 x 30"  Shuttle prancing 1 cord bottom 3 x 30"  Shuttle single leg jumps 1 cord    2 leg jump in place 3 x 30 reps (90 sec rest between sets)  2 leg jump forward/backwards 3 x 30 reps (90 sec rest between sets)  2 leg hop side to side 3 x 30 reps (90 sec rest between sets)  short broad jumps with emphasis on soft landing 2 x 40 ft    single leg wall sits 30 sec x 3   Side lunges x 20 reps  LAQ 2 10 x 5" with 7#   planks 3 x 30" alt leg lift  Side planks 20 sec x 3  Shuttle 3 x 10 reps with 4 black bands emphasis on slow lower   Downhill mini squat 1/2 foam rolls 3 x 15  Lateral step downs 2 x 10" 6" step      gastroc stretch on incline x 90"  Soleus stretch on incline x 90"  Hamstring stretches x 90"    Side steps 3 laps with purple soft band  Monster walks  3 laps with pink soft band (1 set backwards stopped 2/2 L anterior knee pain)    U HR  x 26 ea max reps to faitgue  prone hang 3# for 3 min   Prone knee flex strap 2 min      Neuromuscular re-education x 10 min for improved proprioception, coordination, balance including:  Dynamic warm ups: toy soldiers, knee hugs, knee pulls, lunges 40' x 2  Drinking bird 2 x 15 reps with UE support, 10#  U squat 30 sec x 4  Side to side 2 x 1 min jog    Dead lifts 20# from step 2 x 15  Drinking bird 2 x 15 reps with UE support, 10#  1/2 star taps x 5 reps  R side planks 3 sets to burn  SLS on foam 3 x 30"- added BTB pull downs 'Itis"  rebounder toss SLS on foam 3 x 30"  M/L wobble board x 2 min      Therapeutic " activities for return to sport x 10 min:   B jumps 10x 2  Single leg jumps 10 x 2   Shuffle 40 ft x 2  High knee jog 40 ft x 2     Evan received the following manual therapy techniques: Joint mobilizations were applied to the: patallar and R hamstring for 00 minutes, including:  Cross friction distal quad  All planes patella mob gr 2-3 supine       Pt received 10 min cold pack to R knee with towel to protect skin.       Home Exercises Provided and Patient Education Provided      Education provided:   R LE not ready for skiing due to weakness     Written Home Exercises Provided: Patient instructed to cont prior HEP.  Exercises were reviewed and Evan was able to demonstrate them prior to the end of the session.  Evan demonstrated good understanding of the education provided.      See EMR under Patient Instructions for exercises provided prior visit.        Assessment   Cont quad weakness as evident by single leg hop test. Cont decreased WB R LE with B jumps and also decreased knee flex. 3/10 ant knee pain with jumping activities which decreased after stopping.      Evan Is progressing well towards his goals.   Pt prognosis is Good.      Pt will continue to benefit from skilled outpatient physical therapy to address the deficits listed in the problem list box on initial evaluation, provide pt/family education and to maximize pt's level of independence in the home and community environment.      Pt's spiritual, cultural and educational needs considered and pt agreeable to plan of care and goals.     Anticipated barriers to physical therapy: none     Goals:  Short Term Goals (6 Weeks):   1. Patient to have full extension Right (equal to left) for normalized gait pattern  met  2. Patient to have 120 degrees of passive left knee flexion for improved performance of ADL's such as sit<>stand transfers  met  3. Patient to have decreased edema left knee   met  4. Patient to report 2/10 pain or less in left knee with  ambulating community distances   met  5. Patient to be compliant with home program 5x's a week as noted by proper demonstration of exercises to therapist for improved management of condition   met  6. Patient to ambulate full weight bearing Right lower extremity and normal gait pattern     met     Long Term Goals   -(12 Weeks):   1. Patient to have 4+/5 or greater strength in RLE for functional performance of ADL's such as lifting  met  2. Patient to descend one flight of stairs with alternating gait pattern without use of handrail and with proper LE alignment- met  3. Patient to have 135 degrees or greater Right knee flexion for return to ADL's including squatting   met  -(36 weeks):   4. Patient to have decreased subjective report of disability as noted by <20% limitation on FOTO knee questionnaire Progressing, not met  5. Patient to perform single leg squat without assistance and without valgus collapse to improve participation in recreational  Progressing, not met  6. Patient's RLE strength to be 5/5 for return to recreational activities and sport Progressing, not met     PLAN     Continue with neuromuscular training, R gluteal and quad strengthening, and progress to running.        Tammi Lopez, PT [facility]   Physical Therapy Treatment Note     Name: Evan BROWN terrell  [facility] Number: [id number]    Therapy Diagnosis:   Encounter Diagnoses   Name Primary?    Decreased range of motion (ROM) of right knee Yes    Impaired gait and mobility     Weakness of both lower extremities     Aftercare for anterior cruciate ligament (ACL) repair          Physician: Luis Watkins, *    Visit Date: 1/12/2023  Physician Orders: PT Eval and Treat   1. Right knee ACL reconstruction with hamstring autograft   2. Right knee arthroscopic menisectomy versus meniscus repair   3. Right knee arthroscopic chondroplasty   4. Right knee  "arthroscopic synovectomy  Surgery Date: 3/8/2022  Medical Diagnosis from Referral: S83.511A (ICD-10-CM) - Complete tear of anterior cruciate ligament of right knee, initial encounter  Evaluation Date: 3/10/2022  Surgical Date: 3/8/3022  Authorization Period Expiration: 12/31/2023  Plan of Care Expiration: 12/31/2022  Visit # / Visits authorized: 57 (2/25)  FOTO: 3/5. 12/1/2022  PTA:  0/5      Time In: 920 am  Time Out: 1015  am  Total Billable Time: 58 minutes     Precautions: Standard        Subjective       Pt states his knee feels great     He was not compliant with home exercise program .  Response to previous treatment: no adverse effects  Functional change: still has difficulty descending stairs.     Pain: 0/10  Location: R anterior knee and R hamstring     Objective      11/14/22: anterior drawer: negative    CMS Impairment/Limitation/Restriction for FOTO Knee Survey     Therapist reviewed FOTO scores for Evan Khoury on 12/1/2022.   FOTO documents entered into [employer] - see Media section.     Limitation Score: 29%  Category: Mobility     Current : CJ = at least 20% but < 40% impaired, limited or restricted  Goal: CJ = at least 20% but < 40% impaired, limited or restricted        Evan received therapeutic exercises to develop strength, endurance, ROM, flexibility, posture and core stabilization for 53 minutes including:      Dynamic warm ups: toy soldiers, knee hugs, knee pulls, lunges 40' x 2  Treadmill walking x 10 min (3 min 3.0 mph, gradual increase incline to 5 and 4.2 mph, 2 min cool down)  lateral lunges x 10 reps  lunges x 10 reps  Captain Morgans 2 x 15 reps each LE  Side lying hip circles 20 reps cw, 20 reps ccw with 2#  Side lying hip abd 2 x 12 reps with 4#  Modified NHE with physioball 2 x 10 (stir the pot last rep 5 x ea)    Shuttle jumps 1 cord bottom 3 x 30"  Shuttle prancing 1 cord bottom 3 x 30"  Shuttle single leg jumps 1 cord    2 leg jump in place 3 x 30 reps (90 sec rest between sets)  2 " "leg jump forward/backwards 3 x 30 reps (90 sec rest between sets)  2 leg hop side to side 3 x 30 reps (90 sec rest between sets)  short broad jumps with emphasis on soft landing 2 x 40 ft    single leg wall sits 30 sec x 3   Side lunges x 20 reps    LAQ 2 10 x 5" with 7#     planks 3 x 30"    Shuttle 3 x 10 reps with 3 black bands emphasis on slow lower  Shuttle single leg iso holds 30" x 4 ea with 100#  Side lying shuttle 3 x 10 reps with 1 black band, 1 red band    Downhill mini squat 1/2 foam rolls 3 x 15    Lateral step downs 3 x 10" 4" step    Piriformis str 30" x 2  IT band/ TFL str 30" x 2 with strap  Single limb bridging 2 x 10 (figure 4)  U  Bridge x 15 reps  SAQ 3 x 10 reps with 4#  Prone quad stretch x 2'  Prone hamstring curls 3# 3 x 15    gastroc stretch on incline x 90"  Soleus stretch on incline x 90"  Hamstring stretches x 90"    Side steps 3 laps with purple soft band  Monster walks  3 laps with pink soft band (1 set backwards stopped 2/2 L anterior knee pain)    Lateral tap downs 2 x 15 reps ea on 6" step  Forward step ups 6" 2 x 10 reps    Upright bike x 6 min full revolutions seat 13, L4 hills program  Hamstring stretches on step 3 x 30"  Iso BOSU hold R LE  3 x 30"    U HR  x 26 ea max reps to faitgue  prone hang 3# for 3 min   Prone knee flex strap 2 min      Neuromuscular re-education x 5 min for improved proprioception, coordination, balance including:    Drinking bird 2 x 15 reps with UE support, 10#  U squat 30 sec x 4  Side to side 2 x 1 min jog  High knee jog   Shuffle 30 ft x 4     Single leg hop 15 x 3  Single hop distance R 16 in vs L     Dead lifts 20# from step 2 x 15  Drinking bird 2 x 15 reps with UE support, 10#  1/2 star taps x 5 reps  R side planks 3 sets to burn  SLS on foam 3 x 30"- added BTB pull downs 'Itis"  rebounder toss SLS on foam 3 x 30"  M/L wobble board x 2 min        Evan received the following manual therapy techniques: Joint mobilizations were applied to the: " "patallar and R hamstring for 00 minutes, including:    stm to R Gastroc/ hamstrings  IASTM / scrapping hamstrings   Cross friction distal quad  All planes patella mob gr 2-3 supine  Grades II and III tibial mobilizations to increase flexion   Patella Y strip and medial decompression for L knee pain  Reapplied 2 "I" strips to R IT band with two decompressive "I" strip to decrease anterior knee pain     Pt received 10 min cold pack to R knee with towel to protect skin.       Home Exercises Provided and Patient Education Provided      Education provided:        Written Home Exercises Provided: Patient instructed to cont prior HEP.  Exercises were reviewed and Evan was able to demonstrate them prior to the end of the session.  Evan demonstrated good understanding of the education provided.      See EMR under Patient Instructions for exercises provided prior visit.        Assessment   Continued with plyometrics and R gluteal and quad strengthening. Will continue with neuromuscular training for patient to return to running. May add agility ladder at 1/4 speed for change of direction.     Evan Is progressing well towards his goals.   Pt prognosis is Good.      Pt will continue to benefit from skilled outpatient physical therapy to address the deficits listed in the problem list box on initial evaluation, provide pt/family education and to maximize pt's level of independence in the home and community environment.      Pt's spiritual, cultural and educational needs considered and pt agreeable to plan of care and goals.     Anticipated barriers to physical therapy: none     Goals:  Short Term Goals (6 Weeks):   1. Patient to have full extension Right (equal to left) for normalized gait pattern  met  2. Patient to have 120 degrees of passive left knee flexion for improved performance of ADL's such as sit<>stand transfers  met  3. Patient to have decreased edema left knee   met  4. Patient to report 2/10 pain or less in left " knee with ambulating community distances   met  5. Patient to be compliant with home program 5x's a week as noted by proper demonstration of exercises to therapist for improved management of condition   met  6. Patient to ambulate full weight bearing Right lower extremity and normal gait pattern     met     Long Term Goals   -(12 Weeks):   1. Patient to have 4+/5 or greater strength in RLE for functional performance of ADL's such as lifting  met  2. Patient to descend one flight of stairs with alternating gait pattern without use of handrail and with proper LE alignment- met  3. Patient to have 135 degrees or greater Right knee flexion for return to ADL's including squatting   met  -(36 weeks):   4. Patient to have decreased subjective report of disability as noted by <20% limitation on FOTO knee questionnaire Progressing, not met  5. Patient to perform single leg squat without assistance and without valgus collapse to improve participation in recreational  Progressing, not met  6. Patient's RLE strength to be 5/5 for return to recreational activities and sport Progressing, not met     PLAN     Continue with neuromuscular training, R gluteal and quad strengthening, and progress to running.        Luis Doherty, PT

## 2023-01-26 ENCOUNTER — CLINICAL SUPPORT (OUTPATIENT)
Dept: REHABILITATION | Facility: OTHER | Age: 63
End: 2023-01-26
Payer: COMMERCIAL

## 2023-01-26 DIAGNOSIS — Z47.89 AFTERCARE FOR ANTERIOR CRUCIATE LIGAMENT (ACL) REPAIR: ICD-10-CM

## 2023-01-26 DIAGNOSIS — M25.661 DECREASED RANGE OF MOTION (ROM) OF RIGHT KNEE: Primary | ICD-10-CM

## 2023-01-26 DIAGNOSIS — R26.89 IMPAIRED GAIT AND MOBILITY: ICD-10-CM

## 2023-01-26 DIAGNOSIS — R29.898 WEAKNESS OF BOTH LOWER EXTREMITIES: ICD-10-CM

## 2023-01-26 PROCEDURE — 97530 THERAPEUTIC ACTIVITIES: CPT | Mod: PN | Performed by: INTERNAL MEDICINE

## 2023-01-26 PROCEDURE — 97110 THERAPEUTIC EXERCISES: CPT | Mod: PN | Performed by: INTERNAL MEDICINE

## 2023-01-26 PROCEDURE — 97112 NEUROMUSCULAR REEDUCATION: CPT | Mod: PN | Performed by: INTERNAL MEDICINE

## 2023-01-26 NOTE — PROGRESS NOTES
[facility]   Physical Therapy Treatment Note     Name: Evan Khoury  Clinic Number: [id number]    Therapy Diagnosis:   Encounter Diagnoses   Name Primary?    Decreased range of motion (ROM) of right knee Yes    Impaired gait and mobility     Weakness of both lower extremities     Aftercare for anterior cruciate ligament (ACL) repair          Physician: Luis Watkins, Jennifer    Visit Date:1/24/23  Physician Orders: PT Eval and Treat   1. Right knee ACL reconstruction with hamstring autograft   2. Right knee arthroscopic menisectomy versus meniscus repair   3. Right knee arthroscopic chondroplasty   4. Right knee arthroscopic synovectomy  Surgery Date: 3/8/2022  Medical Diagnosis from Referral: S83.511A (ICD-10-CM) - Complete tear of anterior cruciate ligament of right knee, initial encounter  Evaluation Date: 3/10/2022  Surgical Date: 3/8/3022  Authorization Period Expiration: 12/31/2023  Plan of Care Expiration: 2/9/23  Visit # / Visits authorized: 539 (4/25)  FOTO: 1/5. 1/26/2023  PTA:  0/5      Time In:  920 am  Time Out: 1015am  Total Billable Time:  minutes     Precautions: Standard        Subjective       Pt states his knee feels fantastic. Going skiing for [place] [employer] and says he will be very careful, wear a brace.    He was not compliant with home exercise program .  Response to previous treatment: no adverse effects  Functional change: still has difficulty descending stairs.     Pain: 0/10  Location: R anterior knee      Objective      1/24/23 single leg hop 16 in less on R    CMS Impairment/Limitation/Restriction for FOTO Knee Survey     Therapist reviewed FOTO scores for Evan Khoury on 1/26/2023.   FOTO documents entered into [employer] - see Media section.     Limitation Score: %  Category: Mobility     Current : CJ = at least 20% but < 40% impaired, limited or restricted  Goal: CJ = at least 20% but < 40% impaired, limited or restricted        Evan received therapeutic  "exercises to develop strength, endurance, ROM, flexibility, posture and core stabilization for 25 minutes including:         Treadmill walking x 10 min (3 min 3.0 mph, gradual increase incline to 5 and 4.2 mph, 2 min cool down)  lateral lunges x 10 reps  lunges x 10 reps  Side lying hip circles 20 reps cw, 20 reps ccw with 2#  Side lying hip abd 2 x 12 reps with 4#  Modified NHE with physioball 2 x 10 (stir the pot last rep 5 x ea)    Shuttle jumps 1 cord bottom 3 x 30"  Shuttle prancing 1 cord bottom 3 x 30"  Shuttle single leg jumps 1 cord    2 leg jump in place 3 x 30 reps (90 sec rest between sets)  2 leg jump forward/backwards 3 x 30 reps (90 sec rest between sets)  2 leg hop side to side 3 x 30 reps (90 sec rest between sets)  short broad jumps with emphasis on soft landing 2 x 40 ft    single leg wall sits 30 sec x 3   Side lunges x 20 reps  LAQ 2 10 x 5" with 7#   planks 3 x 30" alt leg lift  Side planks 20 sec x 3  Shuttle 3 x 10 reps with 4 black bands emphasis on slow lower   Downhill mini squat 1/2 foam rolls 3 x 15  Lateral step downs 2 x 10" 6" step      gastroc stretch on incline x 90"  Soleus stretch on incline x 90"  Hamstring stretches x 90"    Side steps 3 laps with purple soft band  Monster walks  3 laps with pink soft band (1 set backwards stopped 2/2 L anterior knee pain)    U HR  x 26 ea max reps to faitgue  prone hang 3# for 3 min   Prone knee flex strap 2 min      Neuromuscular re-education x 10 min for improved proprioception, coordination, balance including:  Dynamic warm ups: toy soldiers, knee hugs, knee pulls, lunges 40' x 2  Drinking bird 2 x 15 reps with UE support, 10#  U squat 30 sec x 4  Side to side 2 x 1 min jog    Dead lifts 20# from step 2 x 15  Drinking bird 2 x 15 reps with UE support, 10#  1/2 star taps x 5 reps  R side planks 3 sets to burn  SLS on foam 3 x 30"- added BTB pull downs 'Itis"  rebounder toss SLS on foam 3 x 30"  M/L wobble board x 2 min      Therapeutic " activities for return to sport x 10 min:   B jumps 10x 2  Single leg jumps 10 x 2   Shuffle 40 ft x 2  High knee jog 40 ft x 2     Evan received the following manual therapy techniques: Joint mobilizations were applied to the: patallar and R hamstring for 00 minutes, including:  Cross friction distal quad  All planes patella mob gr 2-3 supine       Pt received 10 min cold pack to R knee with towel to protect skin.       Home Exercises Provided and Patient Education Provided      Education provided:   R LE not ready for skiing due to weakness     Written Home Exercises Provided: Patient instructed to cont prior HEP.  Exercises were reviewed and Evan was able to demonstrate them prior to the end of the session.  Evan demonstrated good understanding of the education provided.      See EMR under Patient Instructions for exercises provided prior visit.        Assessment   Cont quad weakness as evident by single leg hop test. Cont decreased WB R LE with B jumps and also decreased knee flex. 3/10 ant knee pain with jumping activities which decreased after stopping.      Evan Is progressing well towards his goals.   Pt prognosis is Good.      Pt will continue to benefit from skilled outpatient physical therapy to address the deficits listed in the problem list box on initial evaluation, provide pt/family education and to maximize pt's level of independence in the home and community environment.      Pt's spiritual, cultural and educational needs considered and pt agreeable to plan of care and goals.     Anticipated barriers to physical therapy: none     Goals:  Short Term Goals (6 Weeks):   1. Patient to have full extension Right (equal to left) for normalized gait pattern  met  2. Patient to have 120 degrees of passive left knee flexion for improved performance of ADL's such as sit<>stand transfers  met  3. Patient to have decreased edema left knee   met  4. Patient to report 2/10 pain or less in left knee with  ambulating community distances   met  5. Patient to be compliant with home program 5x's a week as noted by proper demonstration of exercises to therapist for improved management of condition   met  6. Patient to ambulate full weight bearing Right lower extremity and normal gait pattern     met     Long Term Goals   -(12 Weeks):   1. Patient to have 4+/5 or greater strength in RLE for functional performance of ADL's such as lifting  met  2. Patient to descend one flight of stairs with alternating gait pattern without use of handrail and with proper LE alignment- met  3. Patient to have 135 degrees or greater Right knee flexion for return to ADL's including squatting   met  -(36 weeks):   4. Patient to have decreased subjective report of disability as noted by <20% limitation on FOTO knee questionnaire Progressing, not met  5. Patient to perform single leg squat without assistance and without valgus collapse to improve participation in recreational  Progressing, not met  6. Patient's RLE strength to be 5/5 for return to recreational activities and sport Progressing, not met     PLAN     Continue with neuromuscular training, R gluteal and quad strengthening, and progress to running.        Luis Doherty, [facility]   Physical Therapy Treatment Note     Name: Evan Khoury  [facility] Number: [id number]    Therapy Diagnosis:   Encounter Diagnoses   Name Primary?    Decreased range of motion (ROM) of right knee Yes    Impaired gait and mobility     Weakness of both lower extremities     Aftercare for anterior cruciate ligament (ACL) repair          Physician: Luis Watkins, *    Visit Date: 1/12/2023  Physician Orders: PT Eval and Treat   1. Right knee ACL reconstruction with hamstring autograft   2. Right knee arthroscopic menisectomy versus meniscus repair   3. Right knee arthroscopic chondroplasty   4. Right knee  "arthroscopic synovectomy  Surgery Date: 3/8/2022  Medical Diagnosis from Referral: S83.511A (ICD-10-CM) - Complete tear of anterior cruciate ligament of right knee, initial encounter  Evaluation Date: 3/10/2022  Surgical Date: 3/8/3022  Authorization Period Expiration: 12/31/2023  Plan of Care Expiration: 12/31/2022  Visit # / Visits authorized: 57 (2/25)  FOTO: 3/5. 12/1/2022  PTA:  0/5      Time In: 920 am  Time Out: 1015  am  Total Billable Time: 58 minutes     Precautions: Standard        Subjective       Pt states his knee feels great     He was not compliant with home exercise program .  Response to previous treatment: no adverse effects  Functional change: still has difficulty descending stairs.     Pain: 0/10  Location: R anterior knee and R hamstring     Objective      11/14/22: anterior drawer: negative    CMS Impairment/Limitation/Restriction for FOTO Knee Survey     Therapist reviewed FOTO scores for Evan Khoury on 12/1/2022.   FOTO documents entered into [employer] - see Media section.     Limitation Score: 29%  Category: Mobility     Current : CJ = at least 20% but < 40% impaired, limited or restricted  Goal: CJ = at least 20% but < 40% impaired, limited or restricted        Evan received therapeutic exercises to develop strength, endurance, ROM, flexibility, posture and core stabilization for 53 minutes including:      Dynamic warm ups: toy soldiers, knee hugs, knee pulls, lunges 40' x 2  Treadmill walking x 10 min (3 min 3.0 mph, gradual increase incline to 5 and 4.2 mph, 2 min cool down)  lateral lunges x 10 reps  lunges x 10 reps  Captain Morgans 2 x 15 reps each LE  Side lying hip circles 20 reps cw, 20 reps ccw with 2#  Side lying hip abd 2 x 12 reps with 4#  Modified NHE with physioball 2 x 10 (stir the pot last rep 5 x ea)    Shuttle jumps 1 cord bottom 3 x 30"  Shuttle prancing 1 cord bottom 3 x 30"  Shuttle single leg jumps 1 cord    2 leg jump in place 3 x 30 reps (90 sec rest between sets)  2 " "leg jump forward/backwards 3 x 30 reps (90 sec rest between sets)  2 leg hop side to side 3 x 30 reps (90 sec rest between sets)  short broad jumps with emphasis on soft landing 2 x 40 ft    single leg wall sits 30 sec x 3   Side lunges x 20 reps    LAQ 2 10 x 5" with 7#     planks 3 x 30"    Shuttle 3 x 10 reps with 3 black bands emphasis on slow lower  Shuttle single leg iso holds 30" x 4 ea with 100#  Side lying shuttle 3 x 10 reps with 1 black band, 1 red band    Downhill mini squat 1/2 foam rolls 3 x 15    Lateral step downs 3 x 10" 4" step    Piriformis str 30" x 2  IT band/ TFL str 30" x 2 with strap  Single limb bridging 2 x 10 (figure 4)  U  Bridge x 15 reps  SAQ 3 x 10 reps with 4#  Prone quad stretch x 2'  Prone hamstring curls 3# 3 x 15    gastroc stretch on incline x 90"  Soleus stretch on incline x 90"  Hamstring stretches x 90"    Side steps 3 laps with purple soft band  Monster walks  3 laps with pink soft band (1 set backwards stopped 2/2 L anterior knee pain)    Lateral tap downs 2 x 15 reps ea on 6" step  Forward step ups 6" 2 x 10 reps    Upright bike x 6 min full revolutions seat 13, L4 hills program  Hamstring stretches on step 3 x 30"  Iso BOSU hold R LE  3 x 30"    U HR  x 26 ea max reps to faitgue  prone hang 3# for 3 min   Prone knee flex strap 2 min      Neuromuscular re-education x 5 min for improved proprioception, coordination, balance including:    Drinking bird 2 x 15 reps with UE support, 10#  U squat 30 sec x 4  Side to side 2 x 1 min jog  High knee jog   Shuffle 30 ft x 4     Single leg hop 15 x 3  Single hop distance R 16 in vs L     Dead lifts 20# from step 2 x 15  Drinking bird 2 x 15 reps with UE support, 10#  1/2 star taps x 5 reps  R side planks 3 sets to burn  SLS on foam 3 x 30"- added BTB pull downs 'Itis"  rebounder toss SLS on foam 3 x 30"  M/L wobble board x 2 min        Evan received the following manual therapy techniques: Joint mobilizations were applied to the: " "patallar and R hamstring for 00 minutes, including:    stm to R Gastroc/ hamstrings  IASTM / scrapping hamstrings   Cross friction distal quad  All planes patella mob gr 2-3 supine  Grades II and III tibial mobilizations to increase flexion   Patella Y strip and medial decompression for L knee pain  Reapplied 2 "I" strips to R IT band with two decompressive "I" strip to decrease anterior knee pain     Pt received 10 min cold pack to R knee with towel to protect skin.       Home Exercises Provided and Patient Education Provided      Education provided:        Written Home Exercises Provided: Patient instructed to cont prior HEP.  Exercises were reviewed and Evan was able to demonstrate them prior to the end of the session.  Evan demonstrated good understanding of the education provided.      See EMR under Patient Instructions for exercises provided prior visit.        Assessment   Continued with plyometrics and R gluteal and quad strengthening. Will continue with neuromuscular training for patient to return to running. May add agility ladder at 1/4 speed for change of direction.     Evan Is progressing well towards his goals.   Pt prognosis is Good.      Pt will continue to benefit from skilled outpatient physical therapy to address the deficits listed in the problem list box on initial evaluation, provide pt/family education and to maximize pt's level of independence in the home and community environment.      Pt's spiritual, cultural and educational needs considered and pt agreeable to plan of care and goals.     Anticipated barriers to physical therapy: none     Goals:  Short Term Goals (6 Weeks):   1. Patient to have full extension Right (equal to left) for normalized gait pattern  met  2. Patient to have 120 degrees of passive left knee flexion for improved performance of ADL's such as sit<>stand transfers  met  3. Patient to have decreased edema left knee   met  4. Patient to report 2/10 pain or less in left " knee with ambulating community distances   met  5. Patient to be compliant with home program 5x's a week as noted by proper demonstration of exercises to therapist for improved management of condition   met  6. Patient to ambulate full weight bearing Right lower extremity and normal gait pattern     met     Long Term Goals   -(12 Weeks):   1. Patient to have 4+/5 or greater strength in RLE for functional performance of ADL's such as lifting  met  2. Patient to descend one flight of stairs with alternating gait pattern without use of handrail and with proper LE alignment- met  3. Patient to have 135 degrees or greater Right knee flexion for return to ADL's including squatting   met  -(36 weeks):   4. Patient to have decreased subjective report of disability as noted by <20% limitation on FOTO knee questionnaire Progressing, not met  5. Patient to perform single leg squat without assistance and without valgus collapse to improve participation in recreational  Progressing, not met  6. Patient's RLE strength to be 5/5 for return to recreational activities and sport Progressing, not met     PLAN     Continue with neuromuscular training, R gluteal and quad strengthening, and progress to running.        Luis Doherty, PT

## 2023-01-26 NOTE — PROGRESS NOTES
[facility]   Physical Therapy Treatment Note     Name: Evan Khoury  Clinic Number: [id number]    Therapy Diagnosis:   Encounter Diagnoses   Name Primary?    Decreased range of motion (ROM) of right knee Yes    Impaired gait and mobility     Weakness of both lower extremities     Aftercare for anterior cruciate ligament (ACL) repair          Physician: Luis Watkins, Jennifer    Visit Date:1/24/23  Physician Orders: PT Eval and Treat   1. Right knee ACL reconstruction with hamstring autograft   2. Right knee arthroscopic menisectomy versus meniscus repair   3. Right knee arthroscopic chondroplasty   4. Right knee arthroscopic synovectomy  Surgery Date: 3/8/2022  Medical Diagnosis from Referral: S83.511A (ICD-10-CM) - Complete tear of anterior cruciate ligament of right knee, initial encounter  Evaluation Date: 3/10/2022  Surgical Date: 3/8/3022  Authorization Period Expiration: 12/31/2023  Plan of Care Expiration: 2/9/23  Visit # / Visits authorized: 59 (4/25)  FOTO: 3/5. 12/1/2022  PTA:  0/5      Time In:  920 am  Time Out: 1015am  Total Billable Time: 55 minutes     Precautions: Standard        Subjective       Pt states his knee feels fantastic. Going skiing for [place] and says he will be very careful, wear a brace.    He was not compliant with home exercise program .  Response to previous treatment: no adverse effects  Functional change: still has difficulty descending stairs.     Pain: 0/10  Location: R anterior knee      Objective      1/24/23 single leg hop 16 in less on R    CMS Impairment/Limitation/Restriction for FOTO Knee Survey     Therapist reviewed FOTO scores for Evan Khoury on 12/1/2022.   FOTO documents entered into [employer] - see Media section.     Limitation Score: 29%  Category: Mobility     Current : CJ = at least 20% but < 40% impaired, limited or restricted  Goal: CJ = at least 20% but < 40% impaired, limited or restricted        Evan received therapeutic  "exercises to develop strength, endurance, ROM, flexibility, posture and core stabilization for 25 minutes includin leg jump in place 3 x 30 reps (90 sec rest between sets)  2 leg jump forward/backwards 3 x 30 reps (90 sec rest between sets)  2 leg hop side to side 3 x 30 reps (90 sec rest between sets)  short broad jumps with emphasis on soft landing 2 x 40 ft    single leg wall sits 30 sec x 3   Side lunges x 20 reps  LAQ 2 10 x 5" with 7#   planks 3 x 30" alt leg lift  Side planks 20 sec x 3  Shuttle leg press  3 x 10 reps with 4 black bands emphasis on slow lower   Downhill mini squat 1/2 foam rolls 3 x 15  Lateral step downs 2 x 10" 6" step      gastroc stretch on incline x 90"  Soleus stretch on incline x 90"  Hamstring stretches x 90"    Side steps 3 laps with purple soft band  Monster walks  3 laps with pink soft band (1 set backwards stopped 2/2 L anterior knee pain)    U HR  x 26 ea max reps to faitgue  prone hang 3# for 3 min   Prone knee flex strap 2 min      Neuromuscular re-education x 10 min for improved proprioception, coordination, balance including:  Dynamic warm ups: toy soldiers, knee hugs, knee pulls, lunges 40' x 2  Drinking bird 2 x 15 reps with UE support, 10#  U squat 30 sec x 4  Side to side 2 x 1 min jog    Dead lifts 20# from step 2 x 15  Drinking bird 2 x 15 reps with UE support, 10#  1/2 star taps x 5 reps  R side planks 3 sets to burn  SLS on foam 3 x 30"- added BTB pull downs 'Itis"  rebounder toss SLS on foam 3 x 30"  M/L wobble board x 2 min      Therapeutic activities for return to sport x 20 min:   Side to side 2 x 1 min jog  B jumps 10x 2  Single leg jumps 10 x 2   Shuffle 40 ft x 2  High knee jog 40 ft x 2   Plyo U shuttle jumps 2 x 10  50 #         Pt received 10 min cold pack to R knee with towel to protect skin.       Home Exercises Provided and Patient Education Provided      Education provided:   R LE not ready for skiing due to quad weakness     Written Home " Exercises Provided: Patient instructed to cont prior HEP.  Exercises were reviewed and Evan was able to demonstrate them prior to the end of the session.  Evan demonstrated good understanding of the education provided.      See EMR under Patient Instructions for exercises provided prior visit.        Assessment   Cont quad weakness as evident by single leg hop, step ups, other plyo activities. No valgus noted during session, just slight patellofem pain.. 3/10 ant knee pain with jumping activities which decreased after stopping.      Evan Is progressing well towards his goals.   Pt prognosis is Good.      Pt will continue to benefit from skilled outpatient physical therapy to address the deficits listed in the problem list box on initial evaluation, provide pt/family education and to maximize pt's level of independence in the home and community environment.      Pt's spiritual, cultural and educational needs considered and pt agreeable to plan of care and goals.     Anticipated barriers to physical therapy: none     Goals:  Short Term Goals (6 Weeks):   1. Patient to have full extension Right (equal to left) for normalized gait pattern  met  2. Patient to have 120 degrees of passive left knee flexion for improved performance of ADL's such as sit<>stand transfers  met  3. Patient to have decreased edema left knee   met  4. Patient to report 2/10 pain or less in left knee with ambulating community distances   met  5. Patient to be compliant with home program 5x's a week as noted by proper demonstration of exercises to therapist for improved management of condition   met  6. Patient to ambulate full weight bearing Right lower extremity and normal gait pattern     met     Long Term Goals   -(12 Weeks):   1. Patient to have 4+/5 or greater strength in RLE for functional performance of ADL's such as lifting  met  2. Patient to descend one flight of stairs with alternating gait pattern without use of handrail and with  proper LE alignment- met  3. Patient to have 135 degrees or greater Right knee flexion for return to ADL's including squatting   met  -(36 weeks):   4. Patient to have decreased subjective report of disability as noted by <20% limitation on FOTO knee questionnaire Progressing, not met  5. Patient to perform single leg squat without assistance and without valgus collapse to improve participation in recreational  Progressing, not met  6. Patient's RLE strength to be 5/5 for return to recreational activities and sport Progressing, not met     PLAN     Continue with neuromuscular training, R gluteal and quad strengthening, and progress to running.        Tammi Loepz, PT                                                            [facility]   Physical Therapy Treatment Note     Name: Evan Khoury  Clinic Number: [id number]    Therapy Diagnosis:   Encounter Diagnoses   Name Primary?    Decreased range of motion (ROM) of right knee Yes    Impaired gait and mobility     Weakness of both lower extremities     Aftercare for anterior cruciate ligament (ACL) repair          Physician: Luis Watkins, *    Visit Date: 1/12/2023  Physician Orders: PT Eval and Treat   1. Right knee ACL reconstruction with hamstring autograft   2. Right knee arthroscopic menisectomy versus meniscus repair   3. Right knee arthroscopic chondroplasty   4. Right knee arthroscopic synovectomy  Surgery Date: 3/8/2022  Medical Diagnosis from Referral: S83.511A (ICD-10-CM) - Complete tear of anterior cruciate ligament of right knee, initial encounter  Evaluation Date: 3/10/2022  Surgical Date: 3/8/3022  Authorization Period Expiration: 12/31/2023  Plan of Care Expiration: 12/31/2022  Visit # / Visits authorized: 57 (2/25)  FOTO: 3/5. 12/1/2022  PTA:  0/5      Time In: 920 am  Time Out: 1015  am  Total Billable Time: 58 minutes     Precautions: Standard        Subjective       Pt states his knee feels great     He was  "not compliant with home exercise program .  Response to previous treatment: no adverse effects  Functional change: still has difficulty descending stairs.     Pain: 0/10  Location: R anterior knee and R hamstring     Objective      11/14/22: anterior drawer: negative    CMS Impairment/Limitation/Restriction for FOTO Knee Survey     Therapist reviewed FOTO scores for Evan Khoury on 12/1/2022.   FOTO documents entered into [employer] - see Media section.     Limitation Score: 29%  Category: Mobility     Current : CJ = at least 20% but < 40% impaired, limited or restricted  Goal: CJ = at least 20% but < 40% impaired, limited or restricted        Evan received therapeutic exercises to develop strength, endurance, ROM, flexibility, posture and core stabilization for 53 minutes including:      Dynamic warm ups: toy soldiers, knee hugs, knee pulls, lunges 40' x 2  Treadmill walking x 10 min (3 min 3.0 mph, gradual increase incline to 5 and 4.2 mph, 2 min cool down)  lateral lunges x 10 reps  lunges x 10 reps  [place] 2 x 15 reps each LE  Side lying hip circles 20 reps cw, 20 reps ccw with 2#  Side lying hip abd 2 x 12 reps with 4#  Modified NHE with physioball 2 x 10 (stir the pot last rep 5 x ea)    Shuttle jumps 1 cord bottom 3 x 30"  Shuttle prancing 1 cord bottom 3 x 30"  Shuttle single leg jumps 1 cord    2 leg jump in place 3 x 30 reps (90 sec rest between sets)  2 leg jump forward/backwards 3 x 30 reps (90 sec rest between sets)  2 leg hop side to side 3 x 30 reps (90 sec rest between sets)  short broad jumps with emphasis on soft landing 2 x 40 ft    single leg wall sits 30 sec x 3   Side lunges x 20 reps    LAQ 2 10 x 5" with 7#     planks 3 x 30"    Shuttle 3 x 10 reps with 3 black bands emphasis on slow lower  Shuttle single leg iso holds 30" x 4 ea with 100#  Side lying shuttle 3 x 10 reps with 1 black band, 1 red band    Downhill mini squat 1/2 foam rolls 3 x 15    Lateral step downs 3 x 10" 4" " "step    Piriformis str 30" x 2  IT band/ TFL str 30" x 2 with strap  Single limb bridging 2 x 10 (figure 4)  U  Bridge x 15 reps  SAQ 3 x 10 reps with 4#  Prone quad stretch x 2'  Prone hamstring curls 3# 3 x 15    gastroc stretch on incline x 90"  Soleus stretch on incline x 90"  Hamstring stretches x 90"    Side steps 3 laps with purple soft band  Monster walks  3 laps with pink soft band (1 set backwards stopped 2/2 L anterior knee pain)    Lateral tap downs 2 x 15 reps ea on 6" step  Forward step ups 6" 2 x 10 reps    Upright bike x 6 min full revolutions seat 13, L4 hills program  Hamstring stretches on step 3 x 30"  Iso BOSU hold R LE  3 x 30"    U HR  x 26 ea max reps to faitgue  prone hang 3# for 3 min   Prone knee flex strap 2 min      Neuromuscular re-education x 15 min for improved proprioception, coordination, balance including:    Drinking bird 2 x 15 reps with UE support, 10#  U squat 30 sec x 4  Side to side 90 sec  jog  High knee jog   Shuffle 30 ft x 4     Single leg hop 15 x 3  Single hop distance R 16 in vs L   Drinking bird 10 # 3 x10   Plyo leg press  50 # 3 x 10   Dead lifts 20# from step 2 x 15  Drinking bird 2 x 15 reps with UE support, 10#  1/2 star taps x 5 reps  R side planks 3 sets to burn  SLS on foam 3 x 30"- added BTB pull downs 'Itis"  rebounder toss SLS on foam 3 x 30"  M/L wobble board x 2 min        Evan received the following manual therapy techniques: Joint mobilizations were applied to the: patallar and R hamstring for 00 minutes, including:    stm to R Gastroc/ hamstrings  IASTM / scrapping hamstrings   Cross friction distal quad  All planes patella mob gr 2-3 supine  Grades II and III tibial mobilizations to increase flexion   Patella Y strip and medial decompression for L knee pain  Reapplied 2 "I" strips to R IT band with two decompressive "I" strip to decrease anterior knee pain     Pt received 10 min cold pack to R knee with towel to protect skin.       Home Exercises " Provided and Patient Education Provided      Education provided:        Written Home Exercises Provided: Patient instructed to cont prior HEP.  Exercises were reviewed and Evan was able to demonstrate them prior to the end of the session.  Evan demonstrated good understanding of the education provided.      See EMR under Patient Instructions for exercises provided prior visit.        Assessment   Continued with plyometrics and R gluteal and quad strengthening. Will continue with neuromuscular training for patient to return to running. May add agility ladder at 1/4 speed for change of direction.     Evan Is progressing well towards his goals.   Pt prognosis is Good.      Pt will continue to benefit from skilled outpatient physical therapy to address the deficits listed in the problem list box on initial evaluation, provide pt/family education and to maximize pt's level of independence in the home and community environment.      Pt's spiritual, cultural and educational needs considered and pt agreeable to plan of care and goals.     Anticipated barriers to physical therapy: none     Goals:  Short Term Goals (6 Weeks):   1. Patient to have full extension Right (equal to left) for normalized gait pattern  met  2. Patient to have 120 degrees of passive left knee flexion for improved performance of ADL's such as sit<>stand transfers  met  3. Patient to have decreased edema left knee   met  4. Patient to report 2/10 pain or less in left knee with ambulating community distances   met  5. Patient to be compliant with home program 5x's a week as noted by proper demonstration of exercises to therapist for improved management of condition   met  6. Patient to ambulate full weight bearing Right lower extremity and normal gait pattern     met     Long Term Goals   -(12 Weeks):   1. Patient to have 4+/5 or greater strength in RLE for functional performance of ADL's such as lifting  met  2. Patient to descend one flight of  stairs with alternating gait pattern without use of handrail and with proper LE alignment- met  3. Patient to have 135 degrees or greater Right knee flexion for return to ADL's including squatting   met  -(36 weeks):   4. Patient to have decreased subjective report of disability as noted by <20% limitation on FOTO knee questionnaire Progressing, not met  5. Patient to perform single leg squat without assistance and without valgus collapse to improve participation in recreational  Progressing, not met  6. Patient's RLE strength to be 5/5 for return to recreational activities and sport Progressing, not met     PLAN     Continue with neuromuscular training, R gluteal and quad strengthening, and progress to running.        Luis Doherty, PT

## 2023-01-30 NOTE — PROGRESS NOTES
[facility]   Physical Therapy Treatment Note     Name: Evan Khoury  Clinic Number: [id number]    Therapy Diagnosis:   Encounter Diagnoses   Name Primary?    Decreased range of motion (ROM) of right knee Yes    Impaired gait and mobility     Weakness of both lower extremities     Aftercare for anterior cruciate ligament (ACL) repair      Physician: Luis Watkins, *    Visit Date: 1/31/2023  Physician Orders: PT Eval and Treat   1. Right knee ACL reconstruction with hamstring autograft   2. Right knee arthroscopic menisectomy versus meniscus repair   3. Right knee arthroscopic chondroplasty   4. Right knee arthroscopic synovectomy  Surgery Date: 3/8/2022  Medical Diagnosis from Referral: S83.511A (ICD-10-CM) - Complete tear of anterior cruciate ligament of right knee, initial encounter  Evaluation Date: 3/10/2022  Surgical Date: 3/8/3022  Authorization Period Expiration: 12/31/2023  Plan of Care Expiration: 2/9/23  Visit # / Visits authorized: 61 (5/25)  FOTO: 2/5 1/26/2023  PTA:  0/5      Time In:  9:25 am  Time Out: 10:22 am  Total Billable Time: 48 minutes     Precautions: Standard        Subjective       Pt states he has been feeling great after the workouts. Still feels some instability going down the stairs    He was not compliant with home exercise program .  Response to previous treatment: no adverse effects  Functional change: still has difficulty descending stairs.     Pain: 0/10  Location: R anterior knee      Objective      1/24/23 single leg hop 16 in less on R    CMS Impairment/Limitation/Restriction for FOTO Knee Survey     Therapist reviewed FOTO scores for Evan Khoury on 1/26/2023.   FOTO documents entered into [employer] - see Media section.     Limitation Score: 29%  Category: Mobility     Current : CJ = at least 20% but < 40% impaired, limited or restricted  Goal: CJ = at least 20% but < 40% impaired, limited or restricted        Evan received therapeutic exercises  "to develop strength, endurance, ROM, flexibility, posture and core stabilization for 3 minutes including:         Treadmill walking x 10 min (3 min 3.0 mph, gradual increase incline to 5 and 4.2 mph, 2 min cool down)  lateral lunges x 10 reps  lunges x 10 reps  Side lying hip circles 20 reps cw, 20 reps ccw with 2#  Side lying hip abd 2 x 12 reps with 4#  Modified NHE with physioball 2 x 10 (stir the pot last rep 5 x ea)    Shuttle jumps 1 cord bottom 3 x 30"  Shuttle prancing 1 cord bottom 3 x 30"  Shuttle single leg jumps 1 cord    2 leg jump in place 3 x 30 reps (90 sec rest between sets)  2 leg jump forward/backwards 3 x 30 reps (90 sec rest between sets)  2 leg hop side to side 3 x 30 reps (90 sec rest between sets)  short broad jumps with emphasis on soft landing 2 x 40 ft    single leg wall sits 30 sec x 3   Side lunges x 20 reps  LAQ 2 10 x 5" with 7#  planks 3 x 30" alt leg lift  Side planks 20 sec x 3  Shuttle 3 x 10 reps with 4 black bands emphasis on slow lower   Downhill mini squat 1/2 foam rolls 3 x 15  Lateral step downs 2 x 10" 6" step      gastroc stretch on incline x 90"  Soleus stretch on incline x 90"  Hamstring stretches x 90"    Side steps 3 laps with purple soft band  Monster walks  3 laps with pink soft band (1 set backwards stopped 2/2 L anterior knee pain)    U HR  x 26 ea max reps to faitgue  prone hang 3# for 3 min   Prone knee flex strap 2 min      Neuromuscular re-education x  45 min for improved proprioception, coordination, balance including:  Dynamic warm ups: toy soldiers, knee hugs, knee pulls, lunges 40' x 2  +slide board 5 x 30"  +[place] squats with black band with 15# KB 3 x 30"  +modified squats on incline 3 x 30" with 15#KB    Drinking bird 2 x 15 reps with UE support, 15#  U squat 30 sec x 4  Side to side jogging 3 x 30"  Double leg side to side jumping 3 x 30"    Dead lifts 20# from step 2 x 15  Drinking bird 2 x 15 reps with UE support, 10#  1/2 star taps x 5 reps  R side " "planks 3 sets to burn  SLS on foam 3 x 30"- added BTB pull downs 'Itis"  rebounder toss SLS on foam 3 x 30"  M/L wobble board x 2 min      Therapeutic activities for return to sport x 00 min:   B jumps 10x 2  Single leg jumps 10 x 2   Shuffle 40 ft x 2  High knee jog 40 ft x 2     Evan received the following manual therapy techniques: Joint mobilizations were applied to the: patallar and R hamstring for 00 minutes, including:  Cross friction distal quad  All planes patella mob gr 2-3 supine       Pt received 10 min cold pack to R knee with towel to protect skin.       Home Exercises Provided and Patient Education Provided      Education provided:   R LE not ready for skiing due to weakness     Written Home Exercises Provided: Patient instructed to cont prior HEP.  Exercises were reviewed and Evan was able to demonstrate them prior to the end of the session.  Evan demonstrated good understanding of the education provided.      See EMR under Patient Instructions for exercises provided prior visit.        Assessment   Continued with knee stabilization and proprioception training. Added sliding board and [place] squats. No episodes of increased pain or instability in te R knee. Resumed side to side jogging and jumping     Evan Is progressing well towards his goals.   Pt prognosis is Good.      Pt will continue to benefit from skilled outpatient physical therapy to address the deficits listed in the problem list box on initial evaluation, provide pt/family education and to maximize pt's level of independence in the home and community environment.      Pt's spiritual, cultural and educational needs considered and pt agreeable to plan of care and goals.     Anticipated barriers to physical therapy: none     Goals:  Short Term Goals (6 Weeks):   1. Patient to have full extension Right (equal to left) for normalized gait pattern  met  2. Patient to have 120 degrees of passive left knee flexion for improved performance of " ADL's such as sit<>stand transfers  met  3. Patient to have decreased edema left knee   met  4. Patient to report 2/10 pain or less in left knee with ambulating community distances   met  5. Patient to be compliant with home program 5x's a week as noted by proper demonstration of exercises to therapist for improved management of condition   met  6. Patient to ambulate full weight bearing Right lower extremity and normal gait pattern     met     Long Term Goals   -(12 Weeks):   1. Patient to have 4+/5 or greater strength in RLE for functional performance of ADL's such as lifting  met  2. Patient to descend one flight of stairs with alternating gait pattern without use of handrail and with proper LE alignment- met  3. Patient to have 135 degrees or greater Right knee flexion for return to ADL's including squatting   met  -(36 weeks):   4. Patient to have decreased subjective report of disability as noted by <20% limitation on FOTO knee questionnaire Progressing, not met  5. Patient to perform single leg squat without assistance and without valgus collapse to improve participation in recreational  Progressing, not met  6. Patient's RLE strength to be 5/5 for return to recreational activities and sport Progressing, not met     PLAN     Continue with neuromuscular training, R gluteal and quad strengthening, and progress to running.        Luis Doherty, PT

## 2023-01-31 ENCOUNTER — CLINICAL SUPPORT (OUTPATIENT)
Dept: REHABILITATION | Facility: OTHER | Age: 63
End: 2023-01-31
Payer: COMMERCIAL

## 2023-01-31 DIAGNOSIS — R29.898 WEAKNESS OF BOTH LOWER EXTREMITIES: ICD-10-CM

## 2023-01-31 DIAGNOSIS — M25.661 DECREASED RANGE OF MOTION (ROM) OF RIGHT KNEE: Primary | ICD-10-CM

## 2023-01-31 DIAGNOSIS — Z47.89 AFTERCARE FOR ANTERIOR CRUCIATE LIGAMENT (ACL) REPAIR: ICD-10-CM

## 2023-01-31 DIAGNOSIS — R26.89 IMPAIRED GAIT AND MOBILITY: ICD-10-CM

## 2023-01-31 PROCEDURE — 97112 NEUROMUSCULAR REEDUCATION: CPT | Mod: PN

## 2023-02-06 NOTE — PROGRESS NOTES
[facility]   Physical Therapy Treatment Note     Name: Evan Khoury  Clinic Number: [id number]    Therapy Diagnosis:   Encounter Diagnoses   Name Primary?    Decreased range of motion (ROM) of right knee Yes    Impaired gait and mobility     Weakness of both lower extremities     Aftercare for anterior cruciate ligament (ACL) repair        Physician: Luis Watkins, Jennifer    Visit Date: 2/7/2023  Physician Orders: PT Eval and Treat   1. Right knee ACL reconstruction with hamstring autograft   2. Right knee arthroscopic menisectomy versus meniscus repair   3. Right knee arthroscopic chondroplasty   4. Right knee arthroscopic synovectomy  Surgery Date: 3/8/2022  Medical Diagnosis from Referral: S83.511A (ICD-10-CM) - Complete tear of anterior cruciate ligament of right knee, initial encounter  Evaluation Date: 3/10/2022  Surgical Date: 3/8/3022  Authorization Period Expiration: 12/31/2023  Plan of Care Expiration: 2/9/23  Visit # / Visits authorized: 62 (6/25)  FOTO: 3/5 1/26/2023  PTA:  0/5      Time In:  9:21 am  Time Out: 10:28 am  Total Billable Time: 48 minutes     Precautions: Standard        Subjective      Pt denies pain today. States his knee feels great.    He was not compliant with home exercise program .  Response to previous treatment: great workout  Functional change: still has difficulty descending stairs.     Pain: 0/10  Location: R anterior knee      Objective      1/24/23 single leg hop 16 in less on R    CMS Impairment/Limitation/Restriction for FOTO Knee Survey     Therapist reviewed FOTO scores for Evan Khoury on 1/26/2023.   FOTO documents entered into [employer] - see Media section.     Limitation Score: 29%  Category: Mobility     Current : CJ = at least 20% but < 40% impaired, limited or restricted  Goal: CJ = at least 20% but < 40% impaired, limited or restricted        Evan received therapeutic exercises to develop strength, endurance, ROM, flexibility, posture and  "core stabilization for 6 minutes including:         Treadmill walking x 10 min (3 min 3.0 mph, gradual increase incline to 5 and 4.2 mph, 2 min cool down)  lateral lunges x 10 reps  lunges x 10 reps  Side lying hip circles 20 reps cw, 20 reps ccw with 2#  Side lying hip abd 2 x 12 reps with 4#  Modified NHE with physioball 2 x 10 (stir the pot last rep 5 x ea)    Shuttle jumps 1 cord bottom 3 x 30"  Shuttle prancing 1 cord bottom 3 x 30"  Shuttle single leg jumps 1 cord    2 leg jump in place 3 x 30 reps (90 sec rest between sets)  2 leg jump forward/backwards 3 x 30 reps (90 sec rest between sets)  2 leg hop side to side 3 x 30 reps (90 sec rest between sets)  short broad jumps with emphasis on soft landing 2 x 40 ft    single leg wall sits 30 sec x 3   Side lunges x 20 reps  LAQ 2 10 x 5" with 7#  planks 3 x 30" alt leg lift  Side planks 20 sec x 3  Shuttle 3 x 10 reps with 4 black bands emphasis on slow lower  Lateral step downs 2 x 10" 6" step      gastroc stretch on incline x 90"  Soleus stretch on incline x 90"  Hamstring stretches x 90"    +elliptical x 3'    Side steps 3 laps with purple soft band  Monster walks  3 laps with pink soft band (1 set backwards stopped 2/2 L anterior knee pain)    U HR  x 26 ea max reps to faitgue  prone hang 3# for 3 min   Prone knee flex strap 2 min      Neuromuscular re-education x  45 min for improved proprioception, coordination, balance including:  Dynamic warm ups: toy soldiers, knee hugs, knee pulls, lunges 40' x 2  slide board 5 x 30"  Malagasy squats with black band with 15# KB 3 x 30"  modified squats on incline 3 x 30" with 15#KB    Drinking bird 2 x 15 reps with UE support, 15#  U squat 2 x 20 reps  Side to side jogging 3 x 30"  Double leg side to side jumping 3 x 30"    Dead lifts 20# from step 2 x 15  Drinking bird 2 x 15 reps with UE support, 10#  1/2 star taps x 5 reps  R side planks 3 sets to burn  SLS on foam 3 x 30"- added BTB pull downs 'Itis"  rebounder toss " "SLS on foam 3 x 30"  M/L wobble board x 2 min      Therapeutic activities for return to sport x 00 min:   B jumps 10x 2  Single leg jumps 10 x 2   Shuffle 40 ft x 2  High knee jog 40 ft x 2     Evan received the following manual therapy techniques: Joint mobilizations were applied to the: patallar and R hamstring for 00 minutes, including:  Cross friction distal quad  All planes patella mob gr 2-3 supine       Pt received 10 min cold pack to R knee with towel to protect skin.       Home Exercises Provided and Patient Education Provided      Education provided:   R LE not ready for skiing due to weakness     Written Home Exercises Provided: Patient instructed to cont prior HEP.  Exercises were reviewed and Evan was able to demonstrate them prior to the end of the session.  Evan demonstrated good understanding of the education provided.      See EMR under Patient Instructions for exercises provided prior visit.        Assessment   Did experience medial knee pain with double LE knee jumps today. Continuing with gluteal and quad strengthening and proprioceptive training in anticipation of patient return to skiing.     Evan Is progressing well towards his goals.   Pt prognosis is Good.      Pt will continue to benefit from skilled outpatient physical therapy to address the deficits listed in the problem list box on initial evaluation, provide pt/family education and to maximize pt's level of independence in the home and community environment.      Pt's spiritual, cultural and educational needs considered and pt agreeable to plan of care and goals.     Anticipated barriers to physical therapy: none     Goals:  Short Term Goals (6 Weeks):   1. Patient to have full extension Right (equal to left) for normalized gait pattern  met  2. Patient to have 120 degrees of passive left knee flexion for improved performance of ADL's such as sit<>stand transfers  met  3. Patient to have decreased edema left knee   met  4. Patient " to report 2/10 pain or less in left knee with ambulating community distances   met  5. Patient to be compliant with home program 5x's a week as noted by proper demonstration of exercises to therapist for improved management of condition   met  6. Patient to ambulate full weight bearing Right lower extremity and normal gait pattern     met     Long Term Goals   -(12 Weeks):   1. Patient to have 4+/5 or greater strength in RLE for functional performance of ADL's such as lifting  met  2. Patient to descend one flight of stairs with alternating gait pattern without use of handrail and with proper LE alignment- met  3. Patient to have 135 degrees or greater Right knee flexion for return to ADL's including squatting   met  -(36 weeks):   4. Patient to have decreased subjective report of disability as noted by <20% limitation on FOTO knee questionnaire Progressing, not met  5. Patient to perform single leg squat without assistance and without valgus collapse to improve participation in recreational  Progressing, not met  6. Patient's RLE strength to be 5/5 for return to recreational activities and sport Progressing, not met     PLAN     Continue with neuromuscular training, R gluteal and quad strengthening, and progress to running.        Luis Doherty, PT

## 2023-02-07 ENCOUNTER — CLINICAL SUPPORT (OUTPATIENT)
Dept: REHABILITATION | Facility: OTHER | Age: 63
End: 2023-02-07
Payer: COMMERCIAL

## 2023-02-07 DIAGNOSIS — M25.661 DECREASED RANGE OF MOTION (ROM) OF RIGHT KNEE: Primary | ICD-10-CM

## 2023-02-07 DIAGNOSIS — Z47.89 AFTERCARE FOR ANTERIOR CRUCIATE LIGAMENT (ACL) REPAIR: ICD-10-CM

## 2023-02-07 DIAGNOSIS — R26.89 IMPAIRED GAIT AND MOBILITY: ICD-10-CM

## 2023-02-07 DIAGNOSIS — R29.898 WEAKNESS OF BOTH LOWER EXTREMITIES: ICD-10-CM

## 2023-02-07 PROCEDURE — 97112 NEUROMUSCULAR REEDUCATION: CPT | Mod: PN

## 2023-02-09 ENCOUNTER — CLINICAL SUPPORT (OUTPATIENT)
Dept: REHABILITATION | Facility: OTHER | Age: 63
End: 2023-02-09
Payer: COMMERCIAL

## 2023-02-09 DIAGNOSIS — M25.661 DECREASED RANGE OF MOTION (ROM) OF RIGHT KNEE: Primary | ICD-10-CM

## 2023-02-09 DIAGNOSIS — R26.89 IMPAIRED GAIT AND MOBILITY: ICD-10-CM

## 2023-02-09 DIAGNOSIS — Z47.89 AFTERCARE FOR ANTERIOR CRUCIATE LIGAMENT (ACL) REPAIR: ICD-10-CM

## 2023-02-09 DIAGNOSIS — R29.898 WEAKNESS OF BOTH LOWER EXTREMITIES: ICD-10-CM

## 2023-02-09 PROCEDURE — 97112 NEUROMUSCULAR REEDUCATION: CPT | Mod: PN

## 2023-02-09 NOTE — PROGRESS NOTES
[facility]   Physical Therapy Treatment Note     Name: Evan Khoury  Clinic Number: [id number]    Therapy Diagnosis:   Encounter Diagnoses   Name Primary?    Decreased range of motion (ROM) of right knee Yes    Impaired gait and mobility     Weakness of both lower extremities     Aftercare for anterior cruciate ligament (ACL) repair          Physician: Luis Watkins, *    Visit Date: 2/9/2023  Physician Orders: PT Eval and Treat   1. Right knee ACL reconstruction with hamstring autograft   2. Right knee arthroscopic menisectomy versus meniscus repair   3. Right knee arthroscopic chondroplasty   4. Right knee arthroscopic synovectomy  Surgery Date: 3/8/2022  Medical Diagnosis from Referral: S83.511A (ICD-10-CM) - Complete tear of anterior cruciate ligament of right knee, initial encounter  Evaluation Date: 3/10/2022  Surgical Date: 3/8/3022  Authorization Period Expiration: 12/31/2023  Plan of Care Expiration: 2/9/23  Visit # / Visits authorized: 63 (7/25)  FOTO: 4/5 1/26/2023  PTA:  0/5      Time In:  9:25 am  Time Out: 10:44 am  Total Billable Time: 64 minutes     Precautions: Standard        Subjective      Pt denies pain today. States he did not have any lingering pain with last session.    He was not compliant with home exercise program .  Response to previous treatment: great workout  Functional change: still has difficulty descending stairs.     Pain: 0/10  Location: R anterior knee      Objective      1/24/23 single leg hop 16 in less on R    CMS Impairment/Limitation/Restriction for FOTO Knee Survey     Therapist reviewed FOTO scores for Evan Khoury on 1/26/2023.   FOTO documents entered into [employer] - see Media section.     Limitation Score: 29%  Category: Mobility     Current : CJ = at least 20% but < 40% impaired, limited or restricted  Goal: CJ = at least 20% but < 40% impaired, limited or restricted        Evan received therapeutic exercises to develop strength,  "endurance, ROM, flexibility, posture and core stabilization for 5 minutes including:         Treadmill walking x 10 min (3 min 3.0 mph, gradual increase incline to 5 and 4.2 mph, 2 min cool down)  lateral lunges x 10 reps  lunges x 10 reps  Side lying hip circles 20 reps cw, 20 reps ccw with 2#  Side lying hip abd 2 x 12 reps with 4#  Modified NHE with physioball 2 x 10 (stir the pot last rep 5 x ea)    Shuttle jumps 1 cord bottom 3 x 30"  Shuttle prancing 1 cord bottom 3 x 30"  Shuttle single leg jumps 1 cord    2 leg jump in place 3 x 30 reps (90 sec rest between sets)  2 leg jump forward/backwards 3 x 30 reps (90 sec rest between sets)  2 leg hop side to side 3 x 30 reps (90 sec rest between sets)  short broad jumps with emphasis on soft landing 2 x 40 ft    single leg wall sits 30 sec x 3   Side lunges x 20 reps  LAQ 2 10 x 5" with 7#  planks 3 x 30" alt leg lift  Side planks 20 sec x 3  Shuttle 3 x 10 reps with 4 black bands emphasis on slow lower  Lateral step downs 2 x 10" 6" step      gastroc stretch on incline x 90"  Soleus stretch on incline x 90"  Hamstring stretches x 90" on step    elliptical x 3'    Side steps 3 laps with purple soft band  Monster walks  3 laps with pink soft band (1 set backwards stopped 2/2 L anterior knee pain)    U HR  x 26 ea max reps to faitgue  prone hang 3# for 3 min   Prone knee flex strap 2 min      Neuromuscular re-education x 64 min for improved proprioception, coordination, balance including:  Dynamic warm ups: toy soldiers, knee hugs, knee pulls, lunges 40' x 2  slide board 5 x 30"  [place]ak[place] squats with black band with 15# KB 3 x 30"  modified squats on incline 3 x 30" with 15#KB    Drinking bird 2 x 15 reps with UE support, 15#  U squat 2 x 20 reps  Side to side jogging 3 x 30"  Double leg side to side jumping 3 x 30"    Dead lifts 20# from step 2 x 15  Drinking bird 2 x 15 reps with UE support, 10#  1/2 star taps x 5 reps  R side planks 3 sets to burn  SLS on foam 3 x " "30"- added BTB pull downs 'Itis"  rebounder toss SLS on foam 3 x 30"  M/L wobble board x 2 min      Therapeutic activities for return to sport x 00 min:   B jumps 10x 2  Single leg jumps 10 x 2   Shuffle 40 ft x 2  High knee jog 40 ft x 2     Evan received the following manual therapy techniques: Joint mobilizations were applied to the: patallar and R hamstring for 00 minutes, including:  Cross friction distal quad  All planes patella mob gr 2-3 supine       Pt received 10 min cold pack to R knee with towel to protect skin.       Home Exercises Provided and Patient Education Provided      Education provided:   No new HEP provided     Written Home Exercises Provided: Patient instructed to cont prior HEP.  Exercises were reviewed and Evan was able to demonstrate them prior to the end of the session.  vEan demonstrated good understanding of the education provided.      See EMR under Patient Instructions for exercises provided prior visit.        Assessment   Continued with R hip and quad strengthening, proprioceptive training. No increased R knee pain noted with exercises today.     Evan Is progressing well towards his goals.   Pt prognosis is Good.      Pt will continue to benefit from skilled outpatient physical therapy to address the deficits listed in the problem list box on initial evaluation, provide pt/family education and to maximize pt's level of independence in the home and community environment.      Pt's spiritual, cultural and educational needs considered and pt agreeable to plan of care and goals.     Anticipated barriers to physical therapy: none     Goals:  Short Term Goals (6 Weeks):   1. Patient to have full extension Right (equal to left) for normalized gait pattern  met  2. Patient to have 120 degrees of passive left knee flexion for improved performance of ADL's such as sit<>stand transfers  met  3. Patient to have decreased edema left knee   met  4. Patient to report 2/10 pain or less in left " knee with ambulating community distances   met  5. Patient to be compliant with home program 5x's a week as noted by proper demonstration of exercises to therapist for improved management of condition   met  6. Patient to ambulate full weight bearing Right lower extremity and normal gait pattern     met     Long Term Goals   -(12 Weeks):   1. Patient to have 4+/5 or greater strength in RLE for functional performance of ADL's such as lifting  met  2. Patient to descend one flight of stairs with alternating gait pattern without use of handrail and with proper LE alignment- met  3. Patient to have 135 degrees or greater Right knee flexion for return to ADL's including squatting   met  -(36 weeks):   4. Patient to have decreased subjective report of disability as noted by <20% limitation on FOTO knee questionnaire Progressing, not met  5. Patient to perform single leg squat without assistance and without valgus collapse to improve participation in recreational  Progressing, not met  6. Patient's RLE strength to be 5/5 for return to recreational activities and sport Progressing, not met     PLAN     Continue with neuromuscular training, R gluteal and quad strengthening, and progress to running.        Luis Doherty, PT

## 2023-02-14 ENCOUNTER — CLINICAL SUPPORT (OUTPATIENT)
Dept: REHABILITATION | Facility: OTHER | Age: 63
End: 2023-02-14
Payer: COMMERCIAL

## 2023-02-14 DIAGNOSIS — Z47.89 AFTERCARE FOR ANTERIOR CRUCIATE LIGAMENT (ACL) REPAIR: ICD-10-CM

## 2023-02-14 DIAGNOSIS — R29.898 WEAKNESS OF BOTH LOWER EXTREMITIES: ICD-10-CM

## 2023-02-14 DIAGNOSIS — M25.661 DECREASED RANGE OF MOTION (ROM) OF RIGHT KNEE: Primary | ICD-10-CM

## 2023-02-14 DIAGNOSIS — R26.89 IMPAIRED GAIT AND MOBILITY: ICD-10-CM

## 2023-02-14 PROCEDURE — 97112 NEUROMUSCULAR REEDUCATION: CPT | Mod: PN

## 2023-02-14 PROCEDURE — 97110 THERAPEUTIC EXERCISES: CPT | Mod: PN

## 2023-02-14 NOTE — PLAN OF CARE
[facility]  Physical Therapy Updated Plan of Care    Visit Date: 2/14/2023  Name: Evan Khoury  Clinic Number: [id number]    Therapy Diagnosis:   Encounter Diagnoses   Name Primary?    Decreased range of motion (ROM) of right knee Yes    Impaired gait and mobility     Weakness of both lower extremities     Aftercare for anterior cruciate ligament (ACL) repair      Physician: Luis Watkins, *    Physician Orders: PT Eval and Treat   1. Right knee ACL reconstruction with hamstring autograft   2. Right knee arthroscopic menisectomy versus meniscus repair   3. Right knee arthroscopic chondroplasty   4. Right knee arthroscopic synovectomy  Surgery Date: 3/8/2022  Medical Diagnosis from Referral: S83.511A (ICD-10-CM) - Complete tear of anterior cruciate ligament of right knee, initial encounter  Evaluation Date: 3/10/2022  Surgical Date: 3/8/3022    Total Visits Received: 64  Cancelled Visits: 20  No Show Visits: 0    Current Certification Period:  1/10/23 to 2/9/2023  Precautions:  Standard  Visits from Evaluation Date:  63  Functional Level Prior to Evaluation:  walking with B axillary crutches at evaluation. I with amb and ADL prior to surgery    Time In:  9:25 am  Time Out: 10:50 am  Total Billable Time: 75 minutes    Subjective     Update:     Pt states his knee feels good today. Interested in beginning running/ sprinting.    He was not compliant with home exercise program .  Response to previous treatment: n adverse effects  Functional change: improvement in anterior R knee pain with activity     Pain: 0/10  Location: R anterior knee     Objective     Update:       CMS Impairment/Limitation/Restriction for FOTO Knee Survey     Therapist reviewed FOTO scores for Evan Khoury on 2/14/2023.   FOTO documents entered into [employer] - see Media section.     Limitation Score: 31%  Category: Mobility     Current : CJ = at least 20% but < 40% impaired, limited or restricted  Goal: CJ = at least  "20% but < 40% impaired, limited or restricted       MMT R L   Hip flexion 5/5 5/5   Hip abduction 5/5 5/5   Hip extension 5/5 5/5   Hip ER 5/5 5/5   Hip IR 5/5 5/5   Knee extension 5/5 5/5   Knee flexion 5/5 5/5   Ankle dorsiflexion 5/5 5/5   Ankle plantar flexion 5/5 5/5   Ankle inversion 5/5 5/5   Ankle eversion 5/5 5/5     microFet:  R knee ext: 66.8  L knee ext: 67.3    Evan received therapeutic exercises to develop strength, endurance, ROM, flexibility, posture and core stabilization for 15 minutes including:   Reassessed strength, functional limitation to update plan of care.  gastroc stretch on incline x 90"  Soleus stretch on incline x 90"  Hamstring stretches x 90" on step      Neuromuscular re-education x 60 min for improved proprioception, coordination, balance including:  Dynamic warm ups: toy soldiers, knee hugs, knee pulls, lunges 40' x 2  slide board 5 x 30"  [place] squats with black band with 15# KB 3 x 30"  modified squats on incline 3 x 30" with 15#KB    Drinking bird 2 x 15 reps with UE support, 15#  U squat 3 x 10 reps with black theraband  Side to side jogging 3 x 30"  Double leg side to side jumping 3 x 30"    Drinking bird 2 x 15 reps with UE support, 10#    Shuttle jumps 1 cord bottom 3 x 30"  Shuttle prancing 1 cord bottom 3 x 30"  Shuttle single leg jumps 1 cord    1/2 speed sprints 2 x 40 ft  3/4 speed sprints 2 x 40 ft    1/2 speed "lines" x 2 trials  3/4 speed "lines" x 2 trials    Pt received 10 min cold pack to R knee with towel to protect skin.           Assessment     Update:     Initiated forwards sprints at reduced speed as well as planting and return (lines) today. No issues of increased R knee pain or instability. Returned to plyometrics on shuttle today without increased R knee pain.     Evan Is progressing well towards his goals.   Pt prognosis is Good.      Pt will continue to benefit from skilled outpatient physical therapy to address the deficits listed in the problem " list box on initial evaluation, provide pt/family education and to maximize pt's level of independence in the home and community environment.      Pt's spiritual, cultural and educational needs considered and pt agreeable to plan of care and goals.     Anticipated barriers to physical therapy: none    Previous Short Term Goals Status:     Short Term Goals (4 Weeks):   1. Patient to have full extension Right (equal to left) for normalized gait pattern  met  2. Patient to have 120 degrees of passive left knee flexion for improved performance of ADL's such as sit<>stand transfers  met  3. Patient to have decreased edema left knee   met  4. Patient to report 2/10 pain or less in left knee with ambulating community distances   met  5. Patient to be compliant with home program 5x's a week as noted by proper demonstration of exercises to therapist for improved management of condition   met  6. Patient to ambulate full weight bearing Right lower extremity and normal gait pattern     met     Long Term Goals (8 weeks)  1. Patient to have 4+/5 or greater strength in RLE for functional performance of ADL's such as lifting  met  2. Patient to descend one flight of stairs with alternating gait pattern without use of handrail and with proper LE alignment- met  3. Patient to have 135 degrees or greater Right knee flexion for return to ADL's including squatting   met  -(36 weeks):   4. Patient to have decreased subjective report of disability as noted by <20% limitation on FOTO knee questionnaire Progressing, not met  5. Patient to perform single leg squat without assistance and without valgus collapse to improve participation in recreational  Progressing, not met  6. Patient's RLE strength to be 5/5 for return to recreational activities and sport met    Long Term Goal Status:   continue per initial plan of care.  Reasons for Recertification of Therapy:   Patient continuing to benefit from OP PT with increased R knee strength and  stability. Will benefit from OP PT for continued progression to running and return to recreational sporting activities.    Plan     Updated Certification Period: 2/14/2023 to 4/11/2023  Recommended Treatment Plan: 2 times per week for 8 weeks: Gait Training, Manual Therapy, Moist Heat/ Ice, Neuromuscular Re-ed, Patient Education, Therapeutic Activities, and Therapeutic Exercise    Luis Doherty, PT  2/14/2023      I CERTIFY THE NEED FOR THESE SERVICES FURNISHED UNDER THIS PLAN OF TREATMENT AND WHILE UNDER MY CARE    Physician's comments:        Physician's Signature: ___________________________________________________                Home Exercises Provided and Patient Education Provided      Education provided:   No new HEP provided     Written Home Exercises Provided: Patient instructed to cont prior HEP.  Exercises were reviewed and Evan was able to demonstrate them prior to the end of the session.  Evan demonstrated good understanding of the education provided.      See EMR under Patient Instructions for exercises provided prior visit.        Assessment   Continued with R hip and quad strengthening, proprioceptive training. No increased R knee pain noted with exercises today.        Goals:  S   PLAN     Continue with neuromuscular training, R gluteal and quad strengthening, and progress to running.        Luis Doherty, PT

## 2023-02-16 ENCOUNTER — CLINICAL SUPPORT (OUTPATIENT)
Dept: REHABILITATION | Facility: OTHER | Age: 63
End: 2023-02-16
Payer: COMMERCIAL

## 2023-02-16 DIAGNOSIS — M25.661 DECREASED RANGE OF MOTION (ROM) OF RIGHT KNEE: Primary | ICD-10-CM

## 2023-02-16 DIAGNOSIS — R26.89 IMPAIRED GAIT AND MOBILITY: ICD-10-CM

## 2023-02-16 DIAGNOSIS — Z47.89 AFTERCARE FOR ANTERIOR CRUCIATE LIGAMENT (ACL) REPAIR: ICD-10-CM

## 2023-02-16 DIAGNOSIS — R29.898 WEAKNESS OF BOTH LOWER EXTREMITIES: ICD-10-CM

## 2023-02-16 PROCEDURE — 97112 NEUROMUSCULAR REEDUCATION: CPT | Mod: PN

## 2023-02-16 NOTE — PROGRESS NOTES
[facility]   Physical Therapy Treatment Note     Name: Evan Khoury  Clinic Number: [id number]    Therapy Diagnosis:   Encounter Diagnoses   Name Primary?    Decreased range of motion (ROM) of right knee Yes    Impaired gait and mobility     Weakness of both lower extremities     Aftercare for anterior cruciate ligament (ACL) repair        Physician: Luis Watkins, *    Visit Date: 2/16/2023  Physician Orders: PT Eval and Treat   1. Right knee ACL reconstruction with hamstring autograft   2. Right knee arthroscopic menisectomy versus meniscus repair   3. Right knee arthroscopic chondroplasty   4. Right knee arthroscopic synovectomy  Surgery Date: 3/8/2022  Medical Diagnosis from Referral: S83.511A (ICD-10-CM) - Complete tear of anterior cruciate ligament of right knee, initial encounter  Evaluation Date: 3/10/2022  Surgical Date: 3/8/3022  Authorization Period Expiration: 12/31/2023  Plan of Care Expiration: 4/11/2023  Visit # / Visits authorized: 65 (9/25)  FOTO: 2/5 2/14/2023  PTA:  0/5      Time In:  9:21 am  Time Out: 10:51 am  Total Billable Time: 70 minutes     Precautions: Standard        Subjective      Pt denied R knee pain. States he did not have any pain with the running/jogging last session    He was not compliant with home exercise program .  Response to previous treatment: no adverse effects with new activities. Felt great  Functional change: no change reported today     Pain: 0/10  Location: R anterior knee      Objective       CMS Impairment/Limitation/Restriction for FOTO Knee Survey     Therapist reviewed FOTO scores for Evan Khoury on 2/14/2023.   FOTO documents entered into [employer] - see Media section.     Limitation Score: 31%  Category: Mobility     Current : CJ = at least 20% but < 40% impaired, limited or restricted  Goal: CJ = at least 20% but < 40% impaired, limited or restricted        2/14/2023     MMT R L   Hip flexion 5/5 5/5   Hip abduction 5/5 5/5  "  Hip extension 5/5 5/5   Hip ER 5/5 5/5   Hip IR 5/5 5/5   Knee extension 5/5 5/5   Knee flexion 5/5 5/5   Ankle dorsiflexion 5/5 5/5   Ankle plantar flexion 5/5 5/5   Ankle inversion 5/5 5/5   Ankle eversion 5/5 5/5      microFet:  R knee ext: 66.8  L knee ext: 67.3          Evan received therapeutic exercises to develop strength, endurance, ROM, flexibility, posture and core stabilization for 5 minutes including:         Treadmill walking x 10 min (3 min 3.0 mph, gradual increase incline to 5 and 4.2 mph, 2 min cool down)  lateral lunges x 10 reps  lunges x 10 reps  Side lying hip circles 20 reps cw, 20 reps ccw with 2#  Side lying hip abd 2 x 12 reps with 4#  Modified NHE with physioball 2 x 10 (stir the pot last rep 5 x ea)    Shuttle jumps 1 cord bottom 3 x 30"  Shuttle prancing 1 cord bottom 3 x 30"  Shuttle single leg jumps 1 cord    2 leg jump in place 3 x 30 reps (90 sec rest between sets)  2 leg jump forward/backwards 3 x 30 reps (90 sec rest between sets)  2 leg hop side to side 3 x 30 reps (90 sec rest between sets)  short broad jumps with emphasis on soft landing 2 x 40 ft    single leg wall sits 30 sec x 3   Side lunges x 20 reps  LAQ 2 10 x 5" with 7#  planks 3 x 30" alt leg lift  Side planks 20 sec x 3  Shuttle 3 x 10 reps with 4 black bands emphasis on slow lower  Lateral step downs 2 x 10" 6" step      gastroc stretch on incline x 90"  Soleus stretch on incline x 90"  Hamstring stretches x 90" on step    elliptical x 3'    Side steps 3 laps with purple soft band  Monster walks  3 laps with pink soft band (1 set backwards stopped 2/2 L anterior knee pain)    U HR  x 26 ea max reps to faitgue  prone hang 3# for 3 min   Prone knee flex strap 2 min      Neuromuscular re-education x 65 min for improved proprioception, coordination, balance including:  Dynamic warm ups: toy soldiers, knee hugs, knee pulls, lunges 40' x 2  slide board 5 x 30"    Finnish squats with black band with 15# KB 3 x " "30"  modified squats on incline 3 x 30" with 15#KB     Drinking bird 2 x 15 reps with UE support, 15#  U squat 3 x 10 reps with gray theraband  Side to side jogging 3 x 30"  Double leg side to side jumping 3 x 30"        Shuttle jumps 1 cord bottom 3 x 30"  Shuttle prancing 1 cord bottom 3 x 30"  Shuttle single leg jumps 1 cord     1/2 speed sprints 2 x 40 ft  3/4 speed sprints 2 x 40 ft  Full speed x 40 ft     1/2 speed "lines" x 2 trials  3/4 speed "lines" x 2 trials  Full speed x 1 trial    +3 cone drill, 2 trials at 1/2 speed, 2 trials at 3/4 speed, 1 trial at full speed    Dead lifts 20# from step 2 x 15  1/2 star taps x 5 reps  R side planks 3 sets to burn  SLS on foam 3 x 30"- added BTB pull downs 'Itis"  rebounder toss SLS on foam 3 x 30"  M/L wobble board x 2 min      Therapeutic activities for return to sport x 00 min:   B jumps 10x 2  Single leg jumps 10 x 2   Shuffle 40 ft x 2  High knee jog 40 ft x 2     Evan received the following manual therapy techniques: Joint mobilizations were applied to the: patallar and R hamstring for 00 minutes, including:  Cross friction distal quad  All planes patella mob gr 2-3 supine       Pt received 10 min cold pack to R knee with towel to protect skin.       Home Exercises Provided and Patient Education Provided      Education provided:   No new HEP provided     Written Home Exercises Provided: Patient instructed to cont prior HEP.  Exercises were reviewed and Evan was able to demonstrate them prior to the end of the session.  Evan demonstrated good understanding of the education provided.      See EMR under Patient Instructions for exercises provided prior visit.        Assessment   Added full speed run, lines/suicides, and 3 cone drill for change of direction. No reports of increased R knee pain or instability with exercises.     Evan Is progressing well towards his goals.   Pt prognosis is Good.      Pt will continue to benefit from skilled outpatient physical " therapy to address the deficits listed in the problem list box on initial evaluation, provide pt/family education and to maximize pt's level of independence in the home and community environment.      Pt's spiritual, cultural and educational needs considered and pt agreeable to plan of care and goals.     Anticipated barriers to physical therapy: none     Goals:  Short Term Goals (4 Weeks):   1. Patient to have full extension Right (equal to left) for normalized gait pattern  met  2. Patient to have 120 degrees of passive left knee flexion for improved performance of ADL's such as sit<>stand transfers  met  3. Patient to have decreased edema left knee   met  4. Patient to report 2/10 pain or less in left knee with ambulating community distances   met  5. Patient to be compliant with home program 5x's a week as noted by proper demonstration of exercises to therapist for improved management of condition   met  6. Patient to ambulate full weight bearing Right lower extremity and normal gait pattern     met     Long Term Goals (8 weeks)  1. Patient to have 4+/5 or greater strength in RLE for functional performance of ADL's such as lifting  met  2. Patient to descend one flight of stairs with alternating gait pattern without use of handrail and with proper LE alignment- met  3. Patient to have 135 degrees or greater Right knee flexion for return to ADL's including squatting   met  -(36 weeks):   4. Patient to have decreased subjective report of disability as noted by <20% limitation on FOTO knee questionnaire Progressing, not met  5. Patient to perform single leg squat without assistance and without valgus collapse to improve participation in recreational  Progressing, not met  6. Patient's RLE strength to be 5/5 for return to recreational activities and sport met     PLAN     Continue with neuromuscular training, R gluteal and quad strengthening, and progress to running.        Luis Doherty, PT

## 2023-02-20 ENCOUNTER — CLINICAL SUPPORT (OUTPATIENT)
Dept: REHABILITATION | Facility: OTHER | Age: 63
End: 2023-02-20
Payer: COMMERCIAL

## 2023-02-20 DIAGNOSIS — R26.89 IMPAIRED GAIT AND MOBILITY: ICD-10-CM

## 2023-02-20 DIAGNOSIS — Z47.89 AFTERCARE FOR ANTERIOR CRUCIATE LIGAMENT (ACL) REPAIR: ICD-10-CM

## 2023-02-20 DIAGNOSIS — R29.898 WEAKNESS OF BOTH LOWER EXTREMITIES: ICD-10-CM

## 2023-02-20 DIAGNOSIS — M25.661 DECREASED RANGE OF MOTION (ROM) OF RIGHT KNEE: Primary | ICD-10-CM

## 2023-02-20 PROCEDURE — 97112 NEUROMUSCULAR REEDUCATION: CPT | Mod: PN,CQ

## 2023-02-20 NOTE — PROGRESS NOTES
[facility]   Physical Therapy Treatment Note     Name: Evan Khoury  Clinic Number: [id number]    Therapy Diagnosis:   Encounter Diagnoses   Name Primary?    Decreased range of motion (ROM) of right knee Yes    Impaired gait and mobility     Weakness of both lower extremities     Aftercare for anterior cruciate ligament (ACL) repair        Physician: Luis Watkins, *    Visit Date: 2/20/2023  Physician Orders: PT Eval and Treat   1. Right knee ACL reconstruction with hamstring autograft   2. Right knee arthroscopic menisectomy versus meniscus repair   3. Right knee arthroscopic chondroplasty   4. Right knee arthroscopic synovectomy  Surgery Date: 3/8/2022  Medical Diagnosis from Referral: S83.511A (ICD-10-CM) - Complete tear of anterior cruciate ligament of right knee, initial encounter  Evaluation Date: 3/10/2022  Surgical Date: 3/8/3022  Authorization Period Expiration: 12/31/2023  Plan of Care Expiration: 4/11/2023  Visit # / Visits authorized: 66 (10/25)  FOTO: 2/5 2/14/2023  PTA:  1/5      Time In:  0915  Time Out: 1030  Total Billable Time: 50 minutes     Precautions: Standard        Subjective      Pt states feeling well w/ no c/o pn in R knee.      He was not compliant with home exercise program .  Response to previous treatment: no adverse effects   Functional change: no change reported     Pain: 0/10  Location: R anterior knee      Objective            Evan received therapeutic exercises to develop strength, endurance, ROM, flexibility, posture and core stabilization for 5 minutes ( 2 min 1 on 1 w/ PTA )  including:         Treadmill walking x 10 min (3 min 3.0 mph, gradual increase incline to 5 and 4.2 mph, 2 min cool down)  lateral lunges x 10 reps  lunges x 10 reps  Side lying hip circles 20 reps cw, 20 reps ccw with 2#  Side lying hip abd 2 x 12 reps with 4#  Modified NHE with physioball 2 x 10 (stir the pot last rep 5 x ea)    Shuttle jumps 1 cord bottom 3 x  "30"  Shuttle prancing 1 cord bottom 3 x 30"  Shuttle single leg jumps 1 cord    2 leg jump in place 3 x 30 reps (90 sec rest between sets)  2 leg jump forward/backwards 3 x 30 reps (90 sec rest between sets)  2 leg hop side to side 3 x 30 reps (90 sec rest between sets)  short broad jumps with emphasis on soft landing 2 x 40 ft    single leg wall sits 30 sec x 3   Side lunges x 20 reps  LAQ 2 10 x 5" with 7#  planks 3 x 30" alt leg lift  Side planks 20 sec x 3  Shuttle 3 x 10 reps with 4 black bands emphasis on slow lower  Lateral step downs 2 x 10" 6" step      gastroc stretch on incline x 90"  Soleus stretch on incline x 90"  Hamstring stretches x 90" on step    elliptical x 3'    Side steps 3 laps with purple soft band  Monster walks  3 laps with pink soft band (1 set backwards stopped 2/2 L anterior knee pain)    U HR  x 26 ea max reps to faitgue  prone hang 3# for 3 min   Prone knee flex strap 2 min      Neuromuscular re-education x  60 min (48 min 1 on 1 w/ PTA ) for improved proprioception, coordination, balance including:  Dynamic warm ups: toy soldiers, knee hugs, knee pulls, lunges 40' x 2  slide board 5 x 30"    Thai squats with black band with 15# KB 3 x 30"  modified squats on incline 3 x 30" with 15#KB     Drinking bird 2 x 15 reps with UE support, 15#  U squat 3 x 10 reps with gray theraband  Side to side jogging 3 x 30"  Double leg side to side jumping 3 x 30"        Shuttle jumps 1 cord bottom 3 x 30"  Shuttle prancing 1 cord bottom 3 x 30"  Shuttle single leg jumps 1 cord     1/2 speed sprints 2 x 40 ft  3/4 speed sprints 2 x 40 ft  Full speed x 40 ft     1/2 speed "lines" x 2 trials  3/4 speed "lines" x 2 trials  Full speed x 1 trial    +3 cone drill, 2 trials at 1/2 speed, 2 trials at 3/4 speed, 1 trial at full speed    Dead lifts 20# from step 2 x 15  1/2 star taps x 5 reps  R side planks 3 sets to burn  SLS on foam 3 x 30"- added BTB pull downs 'Itis"  rebounder toss SLS on foam 3 x 30"  M/L " wobble board x 2 min      Therapeutic activities for return to sport x 00 min:   B jumps 10x 2  Single leg jumps 10 x 2   Shuffle 40 ft x 2  High knee jog 40 ft x 2     Evan received the following manual therapy techniques: Joint mobilizations were applied to the: patallar and R hamstring for 00 minutes, including:  Cross friction distal quad  All planes patella mob gr 2-3 supine       Pt received 10 min cold pack to R knee with towel to protect skin.       Home Exercises Provided and Patient Education Provided      Education provided:   Pt edu on proper exercise technique.       Written Home Exercises Provided: Patient instructed to cont prior HEP.  Exercises were reviewed and Evan was able to demonstrate them prior to the end of the session.  Evan demonstrated good understanding of the education provided.      See EMR under Patient Instructions for exercises provided prior visit.        Assessment   Pt cont  have some R LE weakness.  Pt cont to have slight limp with gait pattern during jog-running.  Pt displyed improved strength and endurance during tx  Pt julia tx well w/ no c/o pn.      Evan Is progressing well towards his goals.   Pt prognosis is Good.      Pt will continue to benefit from skilled outpatient physical therapy to address the deficits listed in the problem list box on initial evaluation, provide pt/family education and to maximize pt's level of independence in the home and community environment.      Pt's spiritual, cultural and educational needs considered and pt agreeable to plan of care and goals.     Anticipated barriers to physical therapy: none     Goals:  Short Term Goals (4 Weeks):   1. Patient to have full extension Right (equal to left) for normalized gait pattern  met  2. Patient to have 120 degrees of passive left knee flexion for improved performance of ADL's such as sit<>stand transfers  met  3. Patient to have decreased edema left knee   met  4. Patient to report 2/10 pain or less  in left knee with ambulating community distances   met  5. Patient to be compliant with home program 5x's a week as noted by proper demonstration of exercises to therapist for improved management of condition   met  6. Patient to ambulate full weight bearing Right lower extremity and normal gait pattern     met     Long Term Goals (8 weeks)  1. Patient to have 4+/5 or greater strength in RLE for functional performance of ADL's such as lifting  met  2. Patient to descend one flight of stairs with alternating gait pattern without use of handrail and with proper LE alignment- met  3. Patient to have 135 degrees or greater Right knee flexion for return to ADL's including squatting   met  -(36 weeks):   4. Patient to have decreased subjective report of disability as noted by <20% limitation on FOTO knee questionnaire Progressing, not met  5. Patient to perform single leg squat without assistance and without valgus collapse to improve participation in recreational  Progressing, not met  6. Patient's RLE strength to be 5/5 for return to recreational activities and sport met     PLAN     Cont to progress towards goals set by PT.  Work to increase quad strength/power next visit.          Reji Busby, PTA

## 2023-02-28 ENCOUNTER — PATIENT MESSAGE (OUTPATIENT)
Dept: REHABILITATION | Facility: OTHER | Age: 63
End: 2023-02-28
Payer: COMMERCIAL

## 2023-03-07 ENCOUNTER — CLINICAL SUPPORT (OUTPATIENT)
Dept: REHABILITATION | Facility: OTHER | Age: 63
End: 2023-03-07
Payer: COMMERCIAL

## 2023-03-07 DIAGNOSIS — R29.898 WEAKNESS OF BOTH LOWER EXTREMITIES: ICD-10-CM

## 2023-03-07 DIAGNOSIS — M25.661 DECREASED RANGE OF MOTION (ROM) OF RIGHT KNEE: Primary | ICD-10-CM

## 2023-03-07 DIAGNOSIS — R26.89 IMPAIRED GAIT AND MOBILITY: ICD-10-CM

## 2023-03-07 DIAGNOSIS — Z47.89 AFTERCARE FOR ANTERIOR CRUCIATE LIGAMENT (ACL) REPAIR: ICD-10-CM

## 2023-03-07 PROCEDURE — 97110 THERAPEUTIC EXERCISES: CPT | Mod: PN

## 2023-03-07 NOTE — PROGRESS NOTES
[facility]  Physical Therapy Discharge Note    Name: Evan Khoury  Clinic Number: [id number]    Therapy Diagnosis:   Encounter Diagnoses   Name Primary?    Decreased range of motion (ROM) of right knee Yes    Impaired gait and mobility     Weakness of both lower extremities     Aftercare for anterior cruciate ligament (ACL) repair      Physician: Luis Watkins, *    Physician Orders: PT Eval and Treat   1. Right knee ACL reconstruction with hamstring autograft   2. Right knee arthroscopic menisectomy versus meniscus repair   3. Right knee arthroscopic chondroplasty   4. Right knee arthroscopic synovectomy  Surgery Date: 3/8/2022  Medical Diagnosis from Referral: S83.511A (ICD-10-CM) - Complete tear of anterior cruciate ligament of right knee, initial encounter  Evaluation Date: 3/10/2022      Date of Last visit: 3/7/2023    Total Visits Received: 67    Time In:  9:15 am  Time Out: 10:30 am  Total Billable Time: 15 minutes     Precautions: Standard    Subjective      Pt states he did well on his skiing trip. States he started easy and progressed each day. Skied with the brace which was comfortable. Only discomfort was when he hit a rough patch of packed snow, but the pain      He was not compliant with home exercise program .  Response to previous treatment: no adverse effects   Functional change: no change reported     Pain: 0/10  Location: R anterior knee     Objective     CMS Impairment/Limitation/Restriction for FOTO Knee Survey    Therapist reviewed FOTO scores for Evan Khoury on 3/7/2023.   FOTO documents entered into [employer] - see Media section.    Limitation Score: 18%  Category: Mobility    Goal: CJ = at least 20% but < 40% impaired, limited or restricted  Discharge: CI = at least 1% but < 20% impaired, limited or restricted           ASSESSMENT      Patient has progressed well in OP PT and has returned to recreational activities.    Discharge reason: Patient has met all of  his/her goals and Patient requested discharge    Discharge FOTO Score: 82 raw, 18% functional limitation    Goals: Short Term Goals (4 Weeks):   1. Patient to have full extension Right (equal to left) for normalized gait pattern  met  2. Patient to have 120 degrees of passive left knee flexion for improved performance of ADL's such as sit<>stand transfers  met  3. Patient to have decreased edema left knee   met  4. Patient to report 2/10 pain or less in left knee with ambulating community distances   met  5. Patient to be compliant with home program 5x's a week as noted by proper demonstration of exercises to therapist for improved management of condition   met  6. Patient to ambulate full weight bearing Right lower extremity and normal gait pattern     met     Long Term Goals (8 weeks)  1. Patient to have 4+/5 or greater strength in RLE for functional performance of ADL's such as lifting  met  2. Patient to descend one flight of stairs with alternating gait pattern without use of handrail and with proper LE alignment- met  3. Patient to have 135 degrees or greater Right knee flexion for return to ADL's including squatting   met  -(36 weeks):   4. Patient to have decreased subjective report of disability as noted by <20% limitation on FOTO knee questionnaire met  5. Patient to perform single leg squat without assistance and without valgus collapse to improve participation in recreational  met  6. Patient's RLE strength to be 5/5 for return to recreational activities and sport met    PLAN   This patient is discharged from Physical Therapy      Luis Doherty, PT

## 2023-03-09 PROBLEM — M25.661 DECREASED RANGE OF MOTION (ROM) OF RIGHT KNEE: Status: RESOLVED | Noted: 2022-03-11 | Resolved: 2023-03-09

## 2023-03-09 PROBLEM — R26.89 IMPAIRED GAIT AND MOBILITY: Status: RESOLVED | Noted: 2022-03-11 | Resolved: 2023-03-09

## 2023-03-09 PROBLEM — R29.898 WEAKNESS OF BOTH LOWER EXTREMITIES: Status: RESOLVED | Noted: 2022-02-07 | Resolved: 2023-03-09

## 2023-03-09 PROBLEM — Z47.89 AFTERCARE FOR ANTERIOR CRUCIATE LIGAMENT (ACL) REPAIR: Status: RESOLVED | Noted: 2022-03-18 | Resolved: 2023-03-09

## 2023-05-23 NOTE — PROGRESS NOTES
Subjective:          Chief Complaint: Evan Khoury is a 63 y.o. male who had concerns including Pain of the Right Knee.    Evan Khoury presents to our clinic for 1 year postoperative followup.       DATE OF PROCEDURE: 3/8/2022     ATTENDING SURGEON: Surgeon(s) and Role:     * Maris Velazquez MD - Primary     * Cam Chakraborty MD - Resident - Assisting     Assistants:  Palmer Levy MD - Resident  Annelise Quevedo PA-C        PREOPERATIVE DIAGNOSIS:  Right  Tear, Medial meniscus, acute S83.249A and Anterior Cruciate Ligament Tear S83.510     POSTOPERATIVE DIAGNOSIS:   Right  Tear, Medial meniscus, acute S83.249A and Anterior Cruciate Ligament Tear S83.510     PROCEDURES(S) PERFORMED:   1. Right  Arthroscopy, anterior cruciate ligament reconstruction 98537  2.  Right  Arthroscopy, with meniscus repair (medial OR lateral) 54017      Review of Systems   Constitutional: Negative for chills, fever and night sweats.   HENT:  Negative for congestion, hearing loss and sore throat.    Eyes:  Negative for blurred vision, discharge, double vision and visual disturbance.   Cardiovascular:  Negative for chest pain, leg swelling, palpitations and syncope.   Respiratory:  Negative for cough and shortness of breath.    Endocrine: Negative for cold intolerance, heat intolerance and polyuria.   Hematologic/Lymphatic: Negative for bleeding problem.   Skin:  Negative for dry skin and rash.   Musculoskeletal:  Positive for joint pain and joint swelling. Negative for back pain, muscle cramps and muscle weakness.   Gastrointestinal:  Negative for abdominal pain, melena, nausea and vomiting.   Genitourinary:  Negative for hematuria.   Neurological:  Negative for focal weakness, loss of balance, numbness and paresthesias.   Psychiatric/Behavioral:  Negative for altered mental status.      Pain Related Questions  Over the past 3 days, what was your average pain during activity? (I.e. running, jogging, walking, climbing stairs,  getting dressed, ect.): 0  Over the past 3 days, what was your highest pain level?: 0  Over the past 3 days, what was your lowest pain level? : 0    Other  How many nights a week are you awakened by your affected body part?: 0  Was the patient's HEIGHT measured or patient reported?: Patient Reported  Was the patient's WEIGHT measured or patient reported?: Measured      Objective:        General: Evan is well-developed, well-nourished, appears stated age, in no acute distress, alert and oriented to time, place and person.     General    Nursing note and vitals reviewed.  Constitutional: He is oriented to person, place, and time. He appears well-developed and well-nourished. No distress.   HENT:   Head: Normocephalic and atraumatic.   Nose: Nose normal.   Mouth/Throat: No oropharyngeal exudate.   Eyes: Conjunctivae and EOM are normal. Right eye exhibits no discharge. Left eye exhibits no discharge.   Neck: Neck supple.   Cardiovascular:  Normal rate, regular rhythm and intact distal pulses.            Pulmonary/Chest: Effort normal and breath sounds normal. No respiratory distress.   Abdominal: Soft. Bowel sounds are normal. He exhibits no distension. There is no abdominal tenderness.   Neurological: He is alert and oriented to person, place, and time. He has normal reflexes. No cranial nerve deficit. Coordination normal.   Psychiatric: He has a normal mood and affect. His behavior is normal. Judgment and thought content normal.     General Musculoskeletal Exam   Gait: normal       Right Knee Exam     Inspection   Erythema: absent  Scars: present  Swelling: absent  Effusion: absent  Deformity: absent  Bruising: absent    Tenderness   The patient is tender to palpation of the tibial tubercle.    Crepitus   The patient has crepitus of the patella.    Range of Motion   Extension:  0   Flexion:  150 abnormal     Tests   Meniscus   Nik:  Medial - negative Lateral - negative  Ligament Examination   Lachman: normal (-1  to 2mm)   PCL-Posterior Drawer: normal (0 to 2mm)     MCL - Valgus: normal (0 to 2mm)  LCL - Varus: normal  Pivot Shift: normal (Equal)  Reverse Pivot Shift: normal (Equal)  Dial Test at 30 degrees: normal (< 5 degrees)  Dial Test at 90 degrees: normal (< 5 degrees)  Posterior Sag Test: negative  Posterolateral Corner: stable  Patella   Patellar apprehension: negative  Passive Patellar Tilt: neutral  Patellar Tracking: normal  Patellar Glide (quadrants): Lateral - 1   Medial - 2  Q-Angle at 90 degrees: normal  Patellar Grind: negative  J-Sign: none    Other   Meniscal Cyst: absent  Popliteal (Baker's) Cyst: absent  Sensation: normal    Comments:  Incision clean/dry/intact  No sign of infection  Mild swelling  Compartments soft  Neurovascular status intact in extremity      Left Knee Exam     Inspection   Erythema: absent  Scars: present  Swelling: absent  Effusion: absent  Deformity: absent  Bruising: absent    Tenderness   The patient is experiencing no tenderness.     Range of Motion   Extension:  0   Flexion:  150     Tests   Meniscus   Nik:  Medial - negative Lateral - negative  Stability   Lachman: normal (-1 to 2mm)   PCL-Posterior Drawer: normal (0 to 2mm)  MCL - Valgus: normal (0 to 2mm)  LCL - Varus: normal (0 to 2mm)  Pivot Shift: normal (Equal)  Reverse Pivot Shift: normal (Equal)  Dial Test at 30 degrees: normal (< 5 degrees)  Dial Test at 90 degrees: normal (< 5 degrees)  Posterior Sag Test: negative  Posterolateral Corner: stable  Patella   Patellar apprehension: negative  Passive Patellar Tilt: neutral  Patellar Tracking: normal  Patellar Glide (Quadrants): Lateral - 1 Medial - 2  Q-Angle at 90 degrees: normal  Patellar Grind: negative  J-Sign: J sign absent    Other   Meniscal Cyst: absent  Popliteal (Baker's) Cyst: absent  Sensation: normal    Right Hip Exam     Tests   Mesha: negative  Left Hip Exam     Tests   Mesha: negative          Muscle Strength   Right Lower Extremity   Hip Abduction: 5/5    Quadriceps:  4/5   Hamstrin/5   Left Lower Extremity   Hip Abduction: 5/5   Quadriceps:  5/5   Hamstrin/5     Reflexes     Left Side  Achilles:  2+  Quadriceps:  2+    Right Side   Achilles:  2+  Quadriceps:  2+    Vascular Exam     Right Pulses  Dorsalis Pedis:      2+  Posterior Tibial:      2+        Left Pulses  Dorsalis Pedis:      2+  Posterior Tibial:      2+        Edema  Right Lower Leg: absent  Left Lower Leg: absent    Radiographic Findings 2023:    Interval ACL reconstruction.  I see no acute fracture.  Joint spaces are preserved.     Small suprapatellar joint effusion.  Diffuse soft tissue edema.     Impression:     Interval ACL reconstruction with no acute osseous abnormality seen.  Negative for patella helder  Xrays of the right knee were ordered and reviewed by me today. These findings were discussed and reviewed with the patient.    X-ray Knee Ortho Bilateral with Flexion  Narrative: EXAMINATION:  XR KNEE ORTHO BILAT WITH FLEXION    CLINICAL HISTORY:  Pain in right knee    TECHNIQUE:  AP standing of both knees, PA flexion standing views of both knees, and Merchant views of both knees were performed.  Lateral views of both knees were also performed.    COMPARISON:  2022    FINDINGS:  Postoperative changes of bilateral ACL repair.  Position alignment is satisfactory and unchanged as compared to previous study.  No fracture or dislocation.  No bone destruction identified  Impression: See above    Electronically signed by: Leonel Castro MD  Date:    2022  Time:    08:47            Assessment:       Encounter Diagnoses   Name Primary?    Right knee pain, unspecified chronicity     S/P ACL surgery, Bilateral Yes              Plan:       1. RTC in 1 years with Dr. Maris Velazquez. IKDC, SF-12 and KOOS was filled out today in clinic. Patient will fill out IKDC, SF-12 and KOOS on return.    2. Medications: Refills of the following Rx were sent to patients preferred Pharmacy:  No  Refills Needed Today    3. Physical Therapy:  none    4. HEP: HEP 79597 - Maris Velazquez MD, instructed and demonstrated a CORE and gluteal HEP. The patient then demonstrated understanding of exercises and proper technique. This program was performed for 15 minutes.     5. Procedures/Procedural Planning:   N/A    6. DME: N/A    7. Work/Sport Status: No interval change    8. Visit Summary: Doing great but needs to perform home program                            [facility] patient questionnaires have been collected today.

## 2023-05-26 ENCOUNTER — PATIENT MESSAGE (OUTPATIENT)
Dept: REHABILITATION | Facility: OTHER | Age: 63
End: 2023-05-26
Payer: COMMERCIAL

## 2023-05-29 ENCOUNTER — OFFICE VISIT (OUTPATIENT)
Dept: SPORTS MEDICINE | Facility: CLINIC | Age: 63
End: 2023-05-29
Payer: COMMERCIAL

## 2023-05-29 ENCOUNTER — HOSPITAL ENCOUNTER (OUTPATIENT)
Dept: RADIOLOGY | Facility: HOSPITAL | Age: 63
Discharge: HOME | End: 2023-05-29
Attending: ORTHOPAEDIC SURGERY
Payer: COMMERCIAL

## 2023-05-29 VITALS
WEIGHT: 204 LBS | SYSTOLIC BLOOD PRESSURE: 118 MMHG | HEIGHT: 74 IN | BODY MASS INDEX: 26.18 KG/M2 | DIASTOLIC BLOOD PRESSURE: 72 MMHG | HEART RATE: 56 BPM

## 2023-05-29 DIAGNOSIS — Z98.890 S/P ACL SURGERY: Primary | ICD-10-CM

## 2023-05-29 DIAGNOSIS — M25.561 RIGHT KNEE PAIN, UNSPECIFIED CHRONICITY: ICD-10-CM

## 2023-05-29 PROCEDURE — 3008F PR BODY MASS INDEX (BMI) DOCUMENTED: ICD-10-PCS | Mod: CPTII,S$GLB,, | Performed by: ORTHOPAEDIC SURGERY

## 2023-05-29 PROCEDURE — 3078F DIAST BP <80 MM HG: CPT | Mod: CPTII,S$GLB,, | Performed by: ORTHOPAEDIC SURGERY

## 2023-05-29 PROCEDURE — 3078F PR MOST RECENT DIASTOLIC BLOOD PRESSURE < 80 MM HG: ICD-10-PCS | Mod: CPTII,S$GLB,, | Performed by: ORTHOPAEDIC SURGERY

## 2023-05-29 PROCEDURE — 73564 X-RAY EXAM KNEE 4 OR MORE: CPT | Mod: 26,,, | Performed by: RADIOLOGY

## 2023-05-29 PROCEDURE — 1160F PR REVIEW ALL MEDS BY PRESCRIBER/CLIN PHARMACIST DOCUMENTED: ICD-10-PCS | Mod: CPTII,S$GLB,, | Performed by: ORTHOPAEDIC SURGERY

## 2023-05-29 PROCEDURE — 3074F SYST BP LT 130 MM HG: CPT | Mod: CPTII,S$GLB,, | Performed by: ORTHOPAEDIC SURGERY

## 2023-05-29 PROCEDURE — 1160F RVW MEDS BY RX/DR IN RCRD: CPT | Mod: CPTII,S$GLB,, | Performed by: ORTHOPAEDIC SURGERY

## 2023-05-29 PROCEDURE — 1159F PR MEDICATION LIST DOCUMENTED IN MEDICAL RECORD: ICD-10-PCS | Mod: CPTII,S$GLB,, | Performed by: ORTHOPAEDIC SURGERY

## 2023-05-29 PROCEDURE — 3008F BODY MASS INDEX DOCD: CPT | Mod: CPTII,S$GLB,, | Performed by: ORTHOPAEDIC SURGERY

## 2023-05-29 PROCEDURE — 1159F MED LIST DOCD IN RCRD: CPT | Mod: CPTII,S$GLB,, | Performed by: ORTHOPAEDIC SURGERY

## 2023-05-29 PROCEDURE — 3074F PR MOST RECENT SYSTOLIC BLOOD PRESSURE < 130 MM HG: ICD-10-PCS | Mod: CPTII,S$GLB,, | Performed by: ORTHOPAEDIC SURGERY

## 2023-05-29 PROCEDURE — 99999 PR PBB SHADOW E&M-EST. PATIENT-LVL III: ICD-10-PCS | Mod: PBBFAC,,, | Performed by: ORTHOPAEDIC SURGERY

## 2023-05-29 PROCEDURE — 99999 PR PBB SHADOW E&M-EST. PATIENT-LVL III: CPT | Mod: PBBFAC,,, | Performed by: ORTHOPAEDIC SURGERY

## 2023-05-29 PROCEDURE — 73564 X-RAY EXAM KNEE 4 OR MORE: CPT | Mod: TC,50

## 2023-05-29 PROCEDURE — 97110 PR THERAPEUTIC EXERCISES: ICD-10-PCS | Mod: S$GLB,,, | Performed by: ORTHOPAEDIC SURGERY

## 2023-05-29 PROCEDURE — 99214 OFFICE O/P EST MOD 30 MIN: CPT | Mod: S$GLB,,, | Performed by: ORTHOPAEDIC SURGERY

## 2023-05-29 PROCEDURE — 73564 XR KNEE ORTHO BILAT WITH FLEXION: ICD-10-PCS | Mod: 26,,, | Performed by: RADIOLOGY

## 2023-05-29 PROCEDURE — 99214 PR OFFICE/OUTPT VISIT, EST, LEVL IV, 30-39 MIN: ICD-10-PCS | Mod: S$GLB,,, | Performed by: ORTHOPAEDIC SURGERY

## 2023-05-29 PROCEDURE — 97110 THERAPEUTIC EXERCISES: CPT | Mod: S$GLB,,, | Performed by: ORTHOPAEDIC SURGERY

## 2023-05-29 RX ORDER — DUTASTERIDE 0.5 MG/1
0.5 CAPSULE, LIQUID FILLED ORAL
COMMUNITY
Start: 2022-07-25 | End: 2023-07-25

## 2023-10-23 ENCOUNTER — PATIENT MESSAGE (OUTPATIENT)
Dept: REHABILITATION | Facility: OTHER | Age: 63
End: 2023-10-23
Payer: COMMERCIAL

## 2024-02-28 ENCOUNTER — HOSPITAL ENCOUNTER (OUTPATIENT)
Dept: RADIOLOGY | Facility: HOSPITAL | Age: 64
Discharge: HOME | End: 2024-02-28
Attending: ORTHOPAEDIC SURGERY
Payer: COMMERCIAL

## 2024-02-28 ENCOUNTER — OFFICE VISIT (OUTPATIENT)
Dept: SPORTS MEDICINE | Facility: CLINIC | Age: 64
End: 2024-02-28
Payer: COMMERCIAL

## 2024-02-28 VITALS — WEIGHT: 202.81 LBS | BODY MASS INDEX: 26.04 KG/M2

## 2024-02-28 DIAGNOSIS — M25.561 RIGHT KNEE PAIN, UNSPECIFIED CHRONICITY: ICD-10-CM

## 2024-02-28 DIAGNOSIS — M25.561 RIGHT KNEE PAIN, UNSPECIFIED CHRONICITY: Primary | ICD-10-CM

## 2024-02-28 PROCEDURE — 73564 X-RAY EXAM KNEE 4 OR MORE: CPT | Mod: 26,,, | Performed by: RADIOLOGY

## 2024-02-28 PROCEDURE — 3008F BODY MASS INDEX DOCD: CPT | Mod: CPTII,S$GLB,, | Performed by: ORTHOPAEDIC SURGERY

## 2024-02-28 PROCEDURE — 99999 PR PBB SHADOW E&M-EST. PATIENT-LVL II: CPT | Mod: PBBFAC,,, | Performed by: ORTHOPAEDIC SURGERY

## 2024-02-28 PROCEDURE — 99214 OFFICE O/P EST MOD 30 MIN: CPT | Mod: S$GLB,,, | Performed by: ORTHOPAEDIC SURGERY

## 2024-02-28 PROCEDURE — 73564 X-RAY EXAM KNEE 4 OR MORE: CPT | Mod: TC,50

## 2024-02-28 PROCEDURE — 1159F MED LIST DOCD IN RCRD: CPT | Mod: CPTII,S$GLB,, | Performed by: ORTHOPAEDIC SURGERY

## 2024-02-28 NOTE — PROGRESS NOTES
Subjective:          Chief Complaint: Evan Khoury is a 63 y.o. male who had no chief complaint listed for this encounter.    Evan Khoury presents to our clinic for follow up of the right knee, he is concerned for re-injury after he tweaked his knee and has a skiing trip in 2 weeks. He fell 6 days ago while coming down the back steps of his house. He has done rest, ice, and OTC NSAIDs. Denies locking/popping/catching. He does have some swelling but this is improving.       DATE OF PROCEDURE: 3/8/2022     ATTENDING SURGEON: Surgeon(s) and Role:     * Maris Velazquez MD - Primary     * Cam Chakraborty MD - Resident - Assisting     Assistants:  Palmer Levy MD - Resident  Annelise Quevedo PA-C        PREOPERATIVE DIAGNOSIS:  Right  Tear, Medial meniscus, acute S83.249A and Anterior Cruciate Ligament Tear S83.510     POSTOPERATIVE DIAGNOSIS:   Right  Tear, Medial meniscus, acute S83.249A and Anterior Cruciate Ligament Tear S83.510     PROCEDURES(S) PERFORMED:   1. Right  Arthroscopy, anterior cruciate ligament reconstruction 68687  2.  Right  Arthroscopy, with meniscus repair (medial OR lateral) 51568      Review of Systems   Constitutional: Negative for chills, fever and night sweats.   HENT:  Negative for congestion, hearing loss and sore throat.    Eyes:  Negative for blurred vision, discharge, double vision and visual disturbance.   Cardiovascular:  Negative for chest pain, leg swelling, palpitations and syncope.   Respiratory:  Negative for cough and shortness of breath.    Endocrine: Negative for cold intolerance, heat intolerance and polyuria.   Hematologic/Lymphatic: Negative for bleeding problem.   Skin:  Negative for dry skin and rash.   Musculoskeletal:  Positive for joint pain and joint swelling. Negative for back pain, muscle cramps and muscle weakness.   Gastrointestinal:  Negative for abdominal pain, melena, nausea and vomiting.   Genitourinary:  Negative for hematuria.   Neurological:  Negative  for focal weakness, loss of balance, numbness and paresthesias.   Psychiatric/Behavioral:  Negative for altered mental status.                    Objective:        General: Evan is well-developed, well-nourished, appears stated age, in no acute distress, alert and oriented to time, place and person.     General    Nursing note and vitals reviewed.  Constitutional: He is oriented to person, place, and time. He appears well-developed and well-nourished. No distress.   HENT:   Head: Normocephalic and atraumatic.   Nose: Nose normal.   Mouth/Throat: No oropharyngeal exudate.   Eyes: Conjunctivae and EOM are normal. Right eye exhibits no discharge. Left eye exhibits no discharge.   Neck: Neck supple.   Cardiovascular:  Normal rate, regular rhythm and intact distal pulses.            Pulmonary/Chest: Effort normal and breath sounds normal. No respiratory distress.   Abdominal: Soft. Bowel sounds are normal. He exhibits no distension. There is no abdominal tenderness.   Neurological: He is alert and oriented to person, place, and time. He has normal reflexes. No cranial nerve deficit. Coordination normal.   Psychiatric: He has a normal mood and affect. His behavior is normal. Judgment and thought content normal.     General Musculoskeletal Exam   Gait: normal       Right Knee Exam     Inspection   Erythema: absent  Scars: present  Swelling: absent  Effusion: absent  Deformity: absent  Bruising: absent    Tenderness   The patient is tender to palpation of the medial joint line.    Crepitus   The patient has crepitus of the patella.    Range of Motion   Extension:  0   Flexion:  150     Tests   Meniscus   Nik:  Medial - negative Lateral - negative  Ligament Examination   Lachman: normal (-1 to 2mm)   PCL-Posterior Drawer: normal (0 to 2mm)     MCL - Valgus: normal (0 to 2mm)  LCL - Varus: normal  Pivot Shift: normal (Equal)  Reverse Pivot Shift: normal (Equal)  Dial Test at 30 degrees: normal (< 5 degrees)  Dial Test  at 90 degrees: normal (< 5 degrees)  Posterior Sag Test: negative  Posterolateral Corner: stable  Patella   Patellar apprehension: negative  Passive Patellar Tilt: neutral  Patellar Tracking: normal  Patellar Glide (quadrants): Lateral - 1   Medial - 2  Q-Angle at 90 degrees: normal  Patellar Grind: negative  J-Sign: none    Other   Meniscal Cyst: absent  Popliteal (Baker's) Cyst: absent  Sensation: normal    Comments:  Incision clean/dry/intact  No sign of infection  Mild swelling  Compartments soft  Neurovascular status intact in extremity      Left Knee Exam     Inspection   Erythema: absent  Scars: present  Swelling: absent  Effusion: absent  Deformity: absent  Bruising: absent    Tenderness   The patient is experiencing no tenderness.     Range of Motion   Extension:  0   Flexion:  150     Tests   Meniscus   Nik:  Medial - negative Lateral - negative  Stability   Lachman: normal (-1 to 2mm)   PCL-Posterior Drawer: normal (0 to 2mm)  MCL - Valgus: normal (0 to 2mm)  LCL - Varus: normal (0 to 2mm)  Pivot Shift: normal (Equal)  Reverse Pivot Shift: normal (Equal)  Dial Test at 30 degrees: normal (< 5 degrees)  Dial Test at 90 degrees: normal (< 5 degrees)  Posterior Sag Test: negative  Posterolateral Corner: stable  Patella   Patellar apprehension: negative  Passive Patellar Tilt: neutral  Patellar Tracking: normal  Patellar Glide (Quadrants): Lateral - 1 Medial - 2  Q-Angle at 90 degrees: normal  Patellar Grind: negative  J-Sign: J sign absent    Other   Meniscal Cyst: absent  Popliteal (Baker's) Cyst: absent  Sensation: normal    Right Hip Exam     Tests   Mesha: negative  Left Hip Exam     Tests   Mesha: negative          Muscle Strength   Right Lower Extremity   Hip Abduction: 5/5   Quadriceps:  5/5   Hamstrin/5   Left Lower Extremity   Hip Abduction: 5/5   Quadriceps:  5/5   Hamstrin/5     Reflexes     Left Side  Achilles:  2+  Quadriceps:  2+    Right Side   Achilles:  2+  Quadriceps:   2+    Vascular Exam     Right Pulses  Dorsalis Pedis:      2+  Posterior Tibial:      2+        Left Pulses  Dorsalis Pedis:      2+  Posterior Tibial:      2+        Edema  Right Lower Leg: absent  Left Lower Leg: absent      Radiographic Findings 02/28/2024:    Interval ACL reconstruction.  I see no acute fracture.  Joint spaces are preserved.     Small suprapatellar joint effusion.  Diffuse soft tissue edema.     Impression:     Interval ACL reconstruction with no acute osseous abnormality seen.  Negative for patella helder  Xrays of the right knee were ordered and reviewed by me today. These findings were discussed and reviewed with the patient.    X-ray Knee Ortho Bilateral with Flexion  Narrative: EXAMINATION:  XR KNEE ORTHO BILAT WITH FLEXION    TECHNIQUE:  Five views of the knees were obtained, with AP standing, AP standing/flexion and merchant's projections of both knees and a lateral view of each knee submitted.    COMPARISON:  Comparison is made to 12/19/2022.    FINDINGS:  Postoperative changes are again identified relating to a anterior cruciate ligament reconstruction procedures on both sides.  No interval tibiofemoral or patellofemoral joint space narrowing.  No evidence of recent or healing fracture, lytic destructive process, or other significant osseous abnormality.  No significant knee joint effusion is seen on either side.  Impression: Postoperative changes of prior bilateral anterior cruciate ligament reconstruction again observed.  No significant detrimental interval change since the examination of 12/19/2022 is appreciated.    Electronically signed by: Leonel Ferris MD  Date:    05/29/2023  Time:    10:06    Radiographs: Bilateral knees with normal alignment and no fractures or dislocations.         Assessment:       Encounter Diagnosis   Name Primary?    Right knee pain, unspecified chronicity Yes         Plan:       1. RTC as needed      2. Medications: Refills of the following Rx were sent to  patients preferred Pharmacy:  No Refills Needed Today    3. Physical Therapy: Na    4. HEP: N/A    5. Procedures/Procedural Planning: Na    6. DME: Provided with a Visco skin knee brace today for stability    7. Work/Sport Status: Full activity    8. Visit Summary: His pain is improved today, he was proved with a knee brace today to wear during his upcoming ski trip, he will follow up with us as needed.                                   [facility] patient questionnaires have been collected today.

## 2024-03-10 ENCOUNTER — OFFICE VISIT (OUTPATIENT)
Dept: URGENT CARE | Facility: CLINIC | Age: 64
End: 2024-03-10
Payer: COMMERCIAL

## 2024-03-10 VITALS
OXYGEN SATURATION: 97 % | WEIGHT: 202 LBS | RESPIRATION RATE: 20 BRPM | DIASTOLIC BLOOD PRESSURE: 91 MMHG | TEMPERATURE: 98 F | HEIGHT: 74 IN | HEART RATE: 62 BPM | SYSTOLIC BLOOD PRESSURE: 145 MMHG | BODY MASS INDEX: 25.93 KG/M2

## 2024-03-10 DIAGNOSIS — R05.9 COUGH, UNSPECIFIED TYPE: ICD-10-CM

## 2024-03-10 DIAGNOSIS — J01.90 ACUTE NON-RECURRENT SINUSITIS, UNSPECIFIED LOCATION: ICD-10-CM

## 2024-03-10 DIAGNOSIS — H66.90 OTITIS MEDIA, UNSPECIFIED LATERALITY, UNSPECIFIED OTITIS MEDIA TYPE: ICD-10-CM

## 2024-03-10 DIAGNOSIS — H10.89 OTHER CONJUNCTIVITIS OF RIGHT EYE: Primary | ICD-10-CM

## 2024-03-10 PROCEDURE — 99214 OFFICE O/P EST MOD 30 MIN: CPT | Mod: S$GLB,,, | Performed by: FAMILY MEDICINE

## 2024-03-10 RX ORDER — DOXYCYCLINE 100 MG/1
1 TABLET ORAL 2 TIMES DAILY
COMMUNITY
Start: 2024-03-06

## 2024-03-10 RX ORDER — OFLOXACIN 3 MG/ML
1 SOLUTION/ DROPS OPHTHALMIC EVERY 4 HOURS
Qty: 5 ML | Refills: 0 | Status: SHIPPED | OUTPATIENT
Start: 2024-03-10

## 2024-03-10 RX ORDER — HYDROCODONE BITARTRATE AND ACETAMINOPHEN 5; 325 MG/1; MG/1
1 TABLET ORAL DAILY PRN
COMMUNITY
Start: 2024-03-06

## 2024-03-10 RX ORDER — AMOXICILLIN AND CLAVULANATE POTASSIUM 875; 125 MG/1; MG/1
1 TABLET, FILM COATED ORAL 2 TIMES DAILY
Qty: 14 TABLET | Refills: 0 | Status: SHIPPED | OUTPATIENT
Start: 2024-03-10 | End: 2024-03-17

## 2024-03-10 RX ORDER — PREDNISONE 20 MG/1
20 TABLET ORAL 2 TIMES DAILY
COMMUNITY
Start: 2024-03-06

## 2024-03-10 NOTE — PROGRESS NOTES
"Subjective:      Patient ID: Evan Khoury is a 63 y.o. male.    Vitals:  height is 6' 2" (1.88 m) and weight is 91.6 kg (202 lb). His temperature is 98.3 °F (36.8 °C). His blood pressure is 145/91 (abnormal) and his pulse is 62. His respiration is 20 and oxygen saturation is 97%.     Chief Complaint: Eye Problem    Pt presents with complaint of right eye redness, discharge.  Pt states his symptoms started last night and states he has not applied any eye drops.  Patient denies any blurry vision.  Pt states he has also been experiencing sinus pressure, left ear pain and cough.  Pt reports hx of nasal surgery and hx of sinusitis.  Patient denies fever, chills headache, body aches, chest pain, SOB, diarrhea.    Eye Problem   The right eye is affected. This is a new problem. The current episode started yesterday. The problem occurs constantly. The problem has been unchanged. The injury mechanism is unknown. The pain is at a severity of 0/10. The patient is experiencing no pain. There is No known exposure to pink eye. He Does not wear contacts. Associated symptoms include an eye discharge and eye redness. Pertinent negatives include no blurred vision. He has tried nothing for the symptoms.       Eyes:  Positive for eye discharge and eye redness. Negative for blurred vision.      Objective:     Physical Exam   Constitutional: He is oriented to person, place, and time. He appears well-developed. He is cooperative.  Non-toxic appearance. He does not appear ill. No distress.   HENT:   Head: Normocephalic and atraumatic.   Ears:   Right Ear: Hearing, tympanic membrane, external ear and ear canal normal. impacted cerumen  Left Ear: Hearing, external ear and ear canal normal. impacted cerumen     Comments:   Positive left TM erythema and bulging.  Nose: Congestion present. No mucosal edema, rhinorrhea or nasal deformity. No epistaxis. Right sinus exhibits no maxillary sinus tenderness and no frontal sinus tenderness. Left sinus " exhibits no maxillary sinus tenderness and no frontal sinus tenderness.   Mouth/Throat: Uvula is midline, oropharynx is clear and moist and mucous membranes are normal. No trismus in the jaw. Normal dentition. No uvula swelling. No oropharyngeal exudate, posterior oropharyngeal edema or posterior oropharyngeal erythema.   Eyes: Conjunctivae and lids are normal. Pupils are equal, round, and reactive to light. No scleral icterus. Extraocular movement intact      Comments: Right Eye:     +ve conjunctival erythema.   No discharge.  No swelling.  Visual acuity at baseline.          Neck: Trachea normal and phonation normal. Neck supple. No edema present. No erythema present. No neck rigidity present.   Cardiovascular: Normal rate, regular rhythm, normal heart sounds and normal pulses.   No murmur heard.  Pulmonary/Chest: Effort normal and breath sounds normal. No stridor. No respiratory distress. He has no decreased breath sounds. He has no wheezes. He has no rhonchi. He has no rales.   Abdominal: Normal appearance. He exhibits no distension. There is no abdominal tenderness.   Musculoskeletal: Normal range of motion.         General: No deformity. Normal range of motion.   Neurological: He is alert and oriented to person, place, and time. He exhibits normal muscle tone. Coordination normal.   Skin: Skin is warm, dry, intact, not diaphoretic and not pale.   Psychiatric: His speech is normal and behavior is normal. Judgment and thought content normal.   Nursing note and vitals reviewed.      Assessment:     1. Other conjunctivitis of right eye    2. Acute non-recurrent sinusitis, unspecified location    3. Otitis media, unspecified laterality, unspecified otitis media type    4. Cough, unspecified type        Plan:   Discussed exam findings/results/diagnosis/plan with patient. Advised to f/u with PCP within 2-5 days. ER precautions given if symptoms get any worse. All questions answered. Patient verbally understood and  agreed with treatment plan.  Educational materials and instructions regarding the visit diagnosis and management provided.     Other conjunctivitis of right eye    Acute non-recurrent sinusitis, unspecified location    Otitis media, unspecified laterality, unspecified otitis media type    Cough, unspecified type    Other orders  -     amoxicillin-clavulanate 875-125mg (AUGMENTIN) 875-125 mg per tablet; Take 1 tablet by mouth 2 (two) times daily. for 7 days  Dispense: 14 tablet; Refill: 0  -     ofloxacin (OCUFLOX) 0.3 % ophthalmic solution; Place 1 drop into the right eye every 4 (four) hours.  Dispense: 5 mL; Refill: 0

## 2024-10-24 ENCOUNTER — PATIENT MESSAGE (OUTPATIENT)
Dept: RESEARCH | Facility: HOSPITAL | Age: 64
End: 2024-10-24
Payer: COMMERCIAL

## 2024-10-29 ENCOUNTER — OFFICE VISIT (OUTPATIENT)
Dept: OPHTHALMOLOGY | Facility: CLINIC | Age: 64
End: 2024-10-29
Payer: COMMERCIAL

## 2024-10-29 DIAGNOSIS — H16.229 KERATITIS SICCA: Primary | ICD-10-CM

## 2024-10-29 DIAGNOSIS — H52.13 MYOPIA OF BOTH EYES: ICD-10-CM

## 2024-10-29 PROCEDURE — 99999 PR PBB SHADOW E&M-EST. PATIENT-LVL III: CPT | Mod: PBBFAC,,, | Performed by: OPHTHALMOLOGY

## (undated) DEVICE — DRESSING XEROFORM FOIL PK 1X8

## (undated) DEVICE — REAMER 4.5MM GRAFTMAX FLEX CH

## (undated) DEVICE — PAD ABD 8X10 STERILE

## (undated) DEVICE — PAD ELECTRODE STER 1.5X3

## (undated) DEVICE — UNDERGLOVES BIOGEL PI SIZE 8.5

## (undated) DEVICE — KIT ACL DISPOSABLE

## (undated) DEVICE — GOWN SMARTGOWN LVL4 X-LONG XL

## (undated) DEVICE — ADHESIVE DERMABOND ADVANCED

## (undated) DEVICE — PADDING WYTEX UNDRCST 6INX4YD

## (undated) DEVICE — DRAPE STERI INSTRUMENT 1018

## (undated) DEVICE — DRAPE PLASTIC U 60X72

## (undated) DEVICE — UNDERGLOVES BIOGEL PI SZ 7 LF

## (undated) DEVICE — TUBE SET INFLOW/OUTFLOW

## (undated) DEVICE — MARKER SKIN STND TIP BLUE BARR

## (undated) DEVICE — BLADE SURG #15 CARBON STEEL

## (undated) DEVICE — GOWN SURGICAL XX LARGE X LONG

## (undated) DEVICE — COVER BACK TABLE 72X21

## (undated) DEVICE — ADHESIVE MASTISOL VIAL 48/BX

## (undated) DEVICE — DRESSING AQUACEL AG ADV 3.5X6

## (undated) DEVICE — GUIDE GRAFTMAX XACTPIN FLEX
Type: IMPLANTABLE DEVICE | Site: KNEE | Status: NON-FUNCTIONAL
Removed: 2022-03-08

## (undated) DEVICE — SUT MCRYL PLUS 4-0 PS2 27IN

## (undated) DEVICE — SUT VICRYL+ 1 CT1 18IN

## (undated) DEVICE — BLADE SAGITTA 5/BX

## (undated) DEVICE — SHAVER SYS 5.5 ULRAFRR

## (undated) DEVICE — TOURNIQUET SB QC DP 34X4IN

## (undated) DEVICE — Device

## (undated) DEVICE — APPLICATOR CHLORAPREP ORN 26ML

## (undated) DEVICE — IMPLANTABLE DEVICE
Type: IMPLANTABLE DEVICE | Site: KNEE | Status: NON-FUNCTIONAL
Removed: 2022-03-08

## (undated) DEVICE — ELECTRODE 90 DEGREE ANGLE

## (undated) DEVICE — GLOVE BIOGEL SKINSENSE PI 8.5

## (undated) DEVICE — DRAPE STERI U-SHAPED 47X51IN

## (undated) DEVICE — PAD COLD THERAPY KNEE WRAP ON

## (undated) DEVICE — SYR 30CC LUER LOCK

## (undated) DEVICE — GAUZE SPONGE 4X4 12PLY

## (undated) DEVICE — SUT PDS VIL/BLU DUAL ORTHO

## (undated) DEVICE — BLADE SURG CARBON STEEL SZ11

## (undated) DEVICE — SHAVER ULTRAFFR 4.2MM

## (undated) DEVICE — GLOVE BIOGEL SKINSENSE PI 7.0

## (undated) DEVICE — BNDG COFLEX FOAM LF2 ST 6X5YD

## (undated) DEVICE — ELECTRODE REM PLYHSV RETURN 9

## (undated) DEVICE — SOL IRR NACL .9% 3000ML

## (undated) DEVICE — CLOSURE SKIN STERI STRIP 1/2X4

## (undated) DEVICE — PADDING CAST 6IN DELTA ROLL

## (undated) DEVICE — SEE MEDLINE ITEM 157150

## (undated) DEVICE — DRESSING XEROFORM 1X8IN

## (undated) DEVICE — SUT VICRYL PLUS 3-0 SH 18IN

## (undated) DEVICE — SUT MONOCRYL 3-0 PS-2 UND

## (undated) DEVICE — SEE MEDLINE ITEM 157131

## (undated) DEVICE — DRAPE EMERALD 87X114.75X113

## (undated) DEVICE — SUT 4-0 ETHILON 18 PS-2

## (undated) DEVICE — NDL SAFETY 22G X 1.5 ECLIPSE

## (undated) DEVICE — NDL 22GA X1 1/2 REG BEVEL

## (undated) DEVICE — TOWEL OR DISP STRL BLUE 4/PK